# Patient Record
Sex: FEMALE | Race: BLACK OR AFRICAN AMERICAN | NOT HISPANIC OR LATINO | Employment: OTHER | URBAN - METROPOLITAN AREA
[De-identification: names, ages, dates, MRNs, and addresses within clinical notes are randomized per-mention and may not be internally consistent; named-entity substitution may affect disease eponyms.]

---

## 2018-07-16 ENCOUNTER — TELEPHONE (OUTPATIENT)
Dept: GASTROENTEROLOGY | Facility: CLINIC | Age: 58
End: 2018-07-16

## 2018-07-16 NOTE — TELEPHONE ENCOUNTER
----- Message from Marissa Franks sent at 7/16/2018 10:06 AM CDT -----  Contact: self - [phone]  Is asking to have cscope done - has not had one in 7 years - [phone]

## 2018-07-17 ENCOUNTER — TELEPHONE (OUTPATIENT)
Dept: ENDOSCOPY | Facility: HOSPITAL | Age: 58
End: 2018-07-17

## 2018-09-17 ENCOUNTER — TELEPHONE (OUTPATIENT)
Dept: ENDOSCOPY | Facility: HOSPITAL | Age: 58
End: 2018-09-17

## 2018-09-17 DIAGNOSIS — Z12.11 SPECIAL SCREENING FOR MALIGNANT NEOPLASMS, COLON: Primary | ICD-10-CM

## 2018-09-21 DIAGNOSIS — Z12.11 SPECIAL SCREENING FOR MALIGNANT NEOPLASMS, COLON: Primary | ICD-10-CM

## 2018-09-21 RX ORDER — SODIUM, POTASSIUM,MAG SULFATES 17.5-3.13G
SOLUTION, RECONSTITUTED, ORAL ORAL
Qty: 354 ML | Refills: 0 | Status: SHIPPED | OUTPATIENT
Start: 2018-09-21 | End: 2022-07-11

## 2018-09-24 ENCOUNTER — TELEPHONE (OUTPATIENT)
Dept: PHARMACY | Facility: CLINIC | Age: 58
End: 2018-09-24

## 2018-09-25 ENCOUNTER — TELEPHONE (OUTPATIENT)
Dept: ENDOSCOPY | Facility: HOSPITAL | Age: 58
End: 2018-09-25

## 2018-10-29 ENCOUNTER — ANESTHESIA EVENT (OUTPATIENT)
Dept: ENDOSCOPY | Facility: HOSPITAL | Age: 58
End: 2018-10-29
Payer: MEDICARE

## 2018-10-29 ENCOUNTER — ANESTHESIA (OUTPATIENT)
Dept: ENDOSCOPY | Facility: HOSPITAL | Age: 58
End: 2018-10-29
Payer: MEDICARE

## 2018-10-29 ENCOUNTER — HOSPITAL ENCOUNTER (OUTPATIENT)
Facility: HOSPITAL | Age: 58
Discharge: HOME | End: 2018-10-29
Attending: INTERNAL MEDICINE | Admitting: INTERNAL MEDICINE
Payer: MEDICARE

## 2018-10-29 VITALS
OXYGEN SATURATION: 97 % | RESPIRATION RATE: 12 BRPM | TEMPERATURE: 99 F | WEIGHT: 150 LBS | DIASTOLIC BLOOD PRESSURE: 69 MMHG | HEART RATE: 70 BPM | BODY MASS INDEX: 24.11 KG/M2 | HEIGHT: 66 IN | SYSTOLIC BLOOD PRESSURE: 114 MMHG

## 2018-10-29 DIAGNOSIS — Z12.11 SCREENING FOR COLON CANCER: ICD-10-CM

## 2018-10-29 DIAGNOSIS — Z12.11 COLON CANCER SCREENING: Primary | ICD-10-CM

## 2018-10-29 PROCEDURE — E9220 PRA ENDO ANESTHESIA: HCPCS | Mod: ,,, | Performed by: NURSE ANESTHETIST, CERTIFIED REGISTERED

## 2018-10-29 PROCEDURE — 25000003 PHARM REV CODE 250: Performed by: INTERNAL MEDICINE

## 2018-10-29 PROCEDURE — 37000008 HC ANESTHESIA 1ST 15 MINUTES: Performed by: INTERNAL MEDICINE

## 2018-10-29 PROCEDURE — 63600175 PHARM REV CODE 636 W HCPCS: Performed by: NURSE ANESTHETIST, CERTIFIED REGISTERED

## 2018-10-29 PROCEDURE — G0105 COLORECTAL SCRN; HI RISK IND: HCPCS | Mod: ,,, | Performed by: INTERNAL MEDICINE

## 2018-10-29 PROCEDURE — G0105 COLORECTAL SCRN; HI RISK IND: HCPCS | Performed by: INTERNAL MEDICINE

## 2018-10-29 PROCEDURE — 37000009 HC ANESTHESIA EA ADD 15 MINS: Performed by: INTERNAL MEDICINE

## 2018-10-29 RX ORDER — LIDOCAINE HCL/PF 100 MG/5ML
SYRINGE (ML) INTRAVENOUS
Status: DISCONTINUED | OUTPATIENT
Start: 2018-10-29 | End: 2018-10-29

## 2018-10-29 RX ORDER — SODIUM CHLORIDE 9 MG/ML
INJECTION, SOLUTION INTRAVENOUS CONTINUOUS
Status: DISCONTINUED | OUTPATIENT
Start: 2018-10-29 | End: 2018-10-29 | Stop reason: HOSPADM

## 2018-10-29 RX ORDER — PROPOFOL 10 MG/ML
VIAL (ML) INTRAVENOUS CONTINUOUS PRN
Status: DISCONTINUED | OUTPATIENT
Start: 2018-10-29 | End: 2018-10-29

## 2018-10-29 RX ORDER — PROPOFOL 10 MG/ML
VIAL (ML) INTRAVENOUS
Status: DISCONTINUED | OUTPATIENT
Start: 2018-10-29 | End: 2018-10-29

## 2018-10-29 RX ADMIN — PROPOFOL 80 MG: 10 INJECTION, EMULSION INTRAVENOUS at 09:10

## 2018-10-29 RX ADMIN — LIDOCAINE HYDROCHLORIDE 60 MG: 20 INJECTION, SOLUTION INTRAVENOUS at 09:10

## 2018-10-29 RX ADMIN — SODIUM CHLORIDE: 0.9 INJECTION, SOLUTION INTRAVENOUS at 09:10

## 2018-10-29 RX ADMIN — PROPOFOL 90 MCG/KG/MIN: 10 INJECTION, EMULSION INTRAVENOUS at 09:10

## 2018-10-29 NOTE — [employer] PATIENT INSTRUCTIONS
Discharge Summary/Instructions after an Endoscopic Procedure  Patient Name: Nohemi Hull  Patient MRN: [id number]  Patient YOB: 1960  Monday, October 29, 2018  Mp Alvarado MD  RESTRICTIONS:  During your procedure today, you received medications for sedation.  These   medications may affect your judgment, balance and coordination.  Therefore,   for 24 hours, you have the following restrictions:   - DO NOT drive a car, operate machinery, make legal/financial decisions,   sign important papers or drink alcohol.    ACTIVITY:  Today: no heavy lifting, straining or running due to procedural   sedation/anesthesia.  The following day: return to full activity including work.  DIET:  Eat and drink normally unless instructed otherwise.     TREATMENT FOR COMMON SIDE EFFECTS:  - Mild abdominal pain, nausea, belching, bloating or excessive gas:  rest,   eat lightly and use a heating pad.  - Sore Throat: treat with throat lozenges and/or gargle with warm salt   water.  - Because air was used during the procedure, expelling large amounts of air   from your rectum or belching is normal.  - If a bowel prep was taken, you may not have a bowel movement for 1-3 days.    This is normal.  SYMPTOMS TO WATCH FOR AND REPORT TO YOUR PHYSICIAN:  1. Abdominal pain or bloating, other than gas cramps.  2. Chest pain.  3. Back pain.  4. Signs of infection such as: chills or fever occurring within 24 hours   after the procedure.  5. Rectal bleeding, which would show as bright red, maroon, or black stools.   (A tablespoon of blood from the rectum is not serious, especially if   hemorrhoids are present.)  6. Vomiting.  7. Weakness or dizziness.  GO DIRECTLY TO THE NEAREST EMERGENCY ROOM IF YOU HAVE ANY OF THE FOLLOWING:      Difficulty breathing              Chills and/or fever over 101 F   Persistent vomiting and/or vomiting blood   Severe abdominal pain   Severe chest pain   Black, tarry stools   Bleeding- more than one  tablespoon   Any other symptom or condition that you feel may need urgent attention  Your doctor recommends these additional instructions:  If any biopsies were taken, your doctors clinic will contact you in 1 to 2   weeks with any results.  - Patient has a contact number available for emergencies.  The signs and   symptoms of potential delayed complications were discussed with the   patient.  Return to normal activities tomorrow.  Written discharge   instructions were provided to the patient.   - Discharge patient to home.   - Resume previous diet.   - Continue present medications.   - Repeat colonoscopy in 5 years for surveillance.   For questions, problems or results please call your physician - Mp Alvarado MD at Work:  [phone].  [facility], EMERGENCY ROOM PHONE NUMBER: [phone]  IF A COMPLICATION OR EMERGENCY SITUATION ARISES AND YOU ARE UNABLE TO REACH   YOUR PHYSICIAN - GO DIRECTLY TO THE EMERGENCY ROOM.  Mp Alvarado MD  10/29/2018 9:45:32 AM  This report has been verified and signed electronically.  [employer]

## 2018-10-29 NOTE — DISCHARGE INSTRUCTIONS
Colonoscopy     A camera attached to a flexible tube with a viewing lens is used to take video pictures.     Colonoscopy is a test to view the inside of your lower digestive tract (colon and rectum). Sometimes it can show the last part of the small intestine (ileum). During the test, small pieces of tissue may be removed for testing. This is called a biopsy. Small growths, such as polyps, may also be removed.   Why is colonoscopy done?  The test is done to help look for colon cancer. And it can help find the source of abdominal pain, bleeding, and changes in bowel habits. It may be needed once a year, depending on factors such as your:  · Age  · Health history  · Family health history  · Symptoms  · Results from any prior colonoscopy  Risks and possible complications  These include:  · Bleeding               · A puncture or tear in the colon   · Risks of anesthesia  · A cancer lesion not being seen  Getting ready   To prepare for the test:  · Talk with your healthcare provider about the risks of the test (see below). Also ask your healthcare provider about alternatives to the test.  · Tell your healthcare provider about any medicines you take. Also tell him or her about any health conditions you may have.  · Make sure your rectum and colon are empty for the test. Follow the diet and bowel prep instructions exactly. If you dont, the test may need to be rescheduled.  · Plan for a friend or family member to drive you home after the test.     Colonoscopy provides an inside view of the entire colon.     You may discuss the results with your doctor right away or at a future visit.  During the test   The test is usually done in the hospital on an outpatient basis. This means you go home the same day. The procedure takes about 30 minutes. During that time:  · You are given relaxing (sedating) medicine through an IV line. You may be drowsy, or fully asleep.  · The healthcare provider will first give you a physical exam to  check for anal and rectal problems.  · Then the anus is lubricated and the scope inserted.  · If you are awake, you may have a feeling similar to needing to have a bowel movement. You may also feel pressure as air is pumped into the colon. Its OK to pass gas during the procedure.  · Biopsy, polyp removal, or other treatments may be done during the test.  After the test   You may have gas right after the test. It can help to try to pass it to help prevent later bloating. Your healthcare provider may discuss the results with you right away. Or you may need to schedule a follow-up visit to talk about the results. After the test, you can go back to your normal eating and other activities. You may be tired from the sedation and need to rest for a few hours.  Date Last Reviewed: 11/1/2016 © 2000-2017 [employer]. [address] [place] [address], [place] [address]. All rights reserved. This information is not intended as a substitute for professional medical care. Always follow your healthcare professional's instructions.

## 2018-10-29 NOTE — ANESTHESIA POSTPROCEDURE EVALUATION
"Anesthesia Post Evaluation    Patient: Nohemi Hull    Procedure(s) Performed: Procedure(s) (LRB):  COLONOSCOPY (N/A)    Final Anesthesia Type: general  Patient location during evaluation: PACU  Patient participation: Yes- Able to Participate  Level of consciousness: awake and alert  Post-procedure vital signs: reviewed and stable  Pain management: adequate  Airway patency: patent  PONV status at discharge: No PONV  Anesthetic complications: no      Cardiovascular status: blood pressure returned to baseline  Respiratory status: unassisted  Hydration status: euvolemic  Follow-up not needed.        Visit Vitals  /69 (BP Location: Left arm, Patient Position: Lying)   Pulse 70   Temp 37.1 °C (98.7 °F) (Oral)   Resp 12   Ht 5' 6" (1.676 m)   Wt 68 kg (150 lb)   SpO2 97%   Breastfeeding? No   BMI 24.21 kg/m²       Pain/Hussein Score: Pain Assessment Performed: Yes (10/29/2018  9:59 AM)  Presence of Pain: denies (10/29/2018  9:59 AM)  Pain Rating Prior to Med Admin: 0 (10/29/2018  9:59 AM)  Hussein Score: 10 (10/29/2018  9:59 AM)        "

## 2018-10-29 NOTE — H&P
Short Stay Endoscopy History and Physical    PCP - Natalya Arias MD    Procedure - Colonoscopy  Sedation: GA  ASA - per anesthesia  Mallampati - per anesthesia  History of Anesthesia problems - no  Family history Anesthesia problems -  no     HPI:  This is a 58 y.o. female here for evaluation of : Screening for CRC    Reflux - no  Dysphagia - no  Abdominal pain - no  Diarrhea - no    ROS:  Constitutional: No fevers, chills, No weight loss  ENT: No allergies  CV: No chest pain  Pulm: No cough, No shortness of breath  Ophtho: No vision changes  GI: see HPI  Medical History:  has a past medical history of Depression (2000).    Surgical History:  has a past surgical history that includes Gastric bypass; Tonsillectomy; Dilation and curettage of uterus; and Partial hysterectomy.    Family History: family history includes Cancer in her maternal grandfather and paternal grandmother; Depression in her brother, maternal aunt, maternal grandfather, mother, paternal grandfather, paternal uncle, sister, and son; Diabetes in her paternal uncle; Hypertension in her maternal aunt, mother, and sister.. Otherwise no colon cancer, inflammatory bowel disease, or GI malignancies.    Social History:  reports that she has been smoking.  She has a 1.75 pack-year smoking history. She does not have any smokeless tobacco history on file. She reports that she drinks about 1.2 oz of alcohol per week. She reports that she does not use drugs.    Review of patient's allergies indicates:  No Known Allergies    Medications:   Medications Prior to Admission   Medication Sig Dispense Refill Last Dose    alprazolam (XANAX) 1 MG tablet Take 1 mg by mouth 2 (two) times daily.   Taking    doxepin (SINEQUAN) 100 MG capsule Take 100 mg by mouth every evening.       duloxetine (CYMBALTA) 30 MG capsule Take 3 capsules by mouth once daily. 3 capsule,delayed release(DR/EC) Oral Every day   Taking    fluconazole (DIFLUCAN) 150 MG Tab Take 1 tablet by  mouth as directed.   Taking    ibuprofen (ADVIL,MOTRIN) 800 MG tablet Take 1 tablet by mouth. 1 Tablet Oral Three times a day.  Take with food   Taking    metronidazole (FLAGYL) 500 MG tablet Take 1 tablet by mouth Twice daily.     Taking    sertraline (ZOLOFT) 100 MG tablet Take 1 tablet by mouth every morning.   Taking    sodium,potassium,mag sulfates (SUPREP BOWEL PREP KIT) 17.5-3.13-1.6 gram SolR Use as directed. 354 mL 0     zolpidem 12.5 MG ORAL CR TBMP (AMBIEN CR) 12.5 MG CR tablet Take 1 tablet (12.5 mg total) by mouth nightly as needed for Insomnia (brand name  is preferred). 30 tablet 2        Objective Findings:    Vital Signs: Per nursing notes.    Physical Exam:  General Appearance: Well appearing in no acute distress  Head:   Normocephalic, without obvious abnormality  Eyes:    No scleral icterus  Airway: Open  Neck: No restriction in mobility  Lungs: CTA bilaterally in anterior and posterior fields, no wheezes, no crackles.  Heart:  Regular rate and rhythm, S1, S2 normal, no murmurs heard  Abdomen: Soft, non tender, non distended      Labs:  Lab Results   Component Value Date    WBC 3.45 (L) 09/28/2006    HGB 12.1 09/28/2006    HCT 37.1 09/28/2006     09/28/2006    CHOL 143 09/28/2006    TRIG 41 09/28/2006    HDL 45.0 09/28/2006    ALT 16 09/28/2006    AST 17 09/28/2006     09/28/2006    K 3.8 09/28/2006     09/28/2006    CREATININE 0.9 09/28/2006    BUN 17 09/28/2006    CO2 23 09/28/2006    TSH 4.4 (H) 10/04/2004         I have explained the risks and benefits of endoscopy procedures to the patient including but not limited to bleeding, perforation, infection, and death.    Thank you so much for allowing me to participate in the care of Nohemi Alvarado MD

## 2018-10-29 NOTE — ANESTHESIA PREPROCEDURE EVALUATION
10/29/2018  Nohemi Hull is a 58 y.o., female.  Past Medical History:   Diagnosis Date    Depression 2000     Past Surgical History:   Procedure Laterality Date    DILATION AND CURETTAGE OF UTERUS      GASTRIC BYPASS      PARTIAL HYSTERECTOMY      TONSILLECTOMY       Patient Active Problem List   Diagnosis    Screening for colon cancer     There is no height or weight on file to calculate BMI.  2D Echo:  No results found for this or any previous visit.    Please See ROS/PMH and Active Problem List above      Anesthesia Evaluation    I have reviewed the Patient Summary Reports.    I have reviewed the Nursing Notes.   I have reviewed the Medications.     Review of Systems  Anesthesia Hx:  No problems with previous Anesthesia    Hematology/Oncology:  Hematology Normal   Oncology Normal     Cardiovascular:   Exercise tolerance: good    Pulmonary:  Pulmonary Normal    Renal/:  Renal/ Normal     Hepatic/GI:   S/p gastric bypass   Musculoskeletal:  Musculoskeletal Normal    Neurological:  Neurology Normal    Endocrine:  Endocrine Normal    Dermatological:  Skin Normal    Psych:   Psychiatric History          Physical Exam  General:  Well nourished    Airway/Jaw/Neck:  Airway Findings: Mouth Opening: Normal Tongue: Normal  General Airway Assessment: Adult  Mallampati: I  TM Distance: 4 - 6 cm  Jaw/Neck Findings:  Neck ROM: Normal ROM     Eyes/Ears/Nose:  Eyes/Ears/Nose Findings:    Dental:  Dental Findings: In tact   Chest/Lungs:  Chest/Lungs Findings: Clear to auscultation, Normal Respiratory Rate     Heart/Vascular:  Heart Findings: Rate: Normal  Rhythm: Regular Rhythm        Mental Status:  Mental Status Findings:  Cooperative, Alert and Oriented         Anesthesia Plan  Type of Anesthesia, risks & benefits discussed:  Anesthesia Type:  general  Patient's Preference: gen  Intra-op Monitoring Plan:  standard ASA monitors  Intra-op Monitoring Plan Comments:   Post Op Pain Control Plan:   Post Op Pain Control Plan Comments: Iv>po  Induction:   IV  Beta Blocker:  Patient is not currently on a Beta-Blocker (No further documentation required).       Informed Consent: Patient understands risks and agrees with Anesthesia plan.  Questions answered. Anesthesia consent signed with patient.  ASA Score: 2     Day of Surgery Review of History & Physical:    H&P update referred to the surgeon.         Ready For Surgery From Anesthesia Perspective.   GETA 2005

## 2018-10-29 NOTE — TRANSFER OF CARE
"Anesthesia Transfer of Care Note    Patient: Nohemi Hull    Procedure(s) Performed: Procedure(s) (LRB):  COLONOSCOPY (N/A)    Patient location: PACU    Anesthesia Type: general    Transport from OR: Transported from OR on 2-3 L/min O2 by NC with adequate spontaneous ventilation    Post pain: adequate analgesia    Post assessment: no apparent anesthetic complications    Post vital signs: stable    Level of consciousness: awake    Nausea/Vomiting: no nausea/vomiting    Complications: none    Transfer of care protocol was followed      Last vitals:   Visit Vitals  /75 (BP Location: Left arm, Patient Position: Lying)   Pulse (!) 57   Temp 36.2 °C (97.2 °F) (Temporal)   Resp 18   Ht 5' 6" (1.676 m)   Wt 68 kg (150 lb)   SpO2 100%   Breastfeeding? No   BMI 24.21 kg/m²     "

## 2018-11-05 ENCOUNTER — TELEPHONE (OUTPATIENT)
Dept: ENDOSCOPY | Facility: HOSPITAL | Age: 58
End: 2018-11-05

## 2019-07-29 ENCOUNTER — NURSE TRIAGE (OUTPATIENT)
Dept: ADMINISTRATIVE | Facility: CLINIC | Age: 59
End: 2019-07-29

## 2019-07-29 NOTE — TELEPHONE ENCOUNTER
"    Reason for Disposition   Nursing judgment    Protocols used: ST NO GUIDELINE OR REFERENCE [id number]      Nohemi thinks she may be lactose intolerant.  Gets soft stool after any almond milk or cow's milk.  Explained there is no lactose in almond milk, and encouraged her to discuss symptoms with her pcp.  She said she has had the soft stool (not diarrhea) with intake of these products for over 2 mos.  She will call her pcp for appt.  Message to Dr Natalya Arias.  Please contact caller directly with any additional care advice.      Lactose is digested with the help of an enzyme the body makes called lactase." People with lactose intolerance cannot digest dairy products. This is because their bodies do not make enough lactase. Undigested lactose in the gut cannot be absorbed. This can cause nausea, cramping, bloating, gas, diarrhea, and foul-smelling stools. These symptoms occur within 30 minutes to 2 hours after eating. Symptoms may be mild or severe.  Babies are born with the lactose enzyme, which allows them to absorb breast milk. However, lactose intolerance may appear in children as early as 2 to 5 years old. Even if you have been able to eat dairy products all your life, your body may lose the ability to make the lactose enzyme as you get older. Asians,  Americans, Hispanics, and American Indians are prone to get this problem earlier in life.  Home care  The following guidelines will help you care for yourself at home:  · Your body doesnt need dairy products to be healthy. So, if your symptoms are severe, it is best to stop eating dairy products that contain lactose.  · If your symptoms are milder, you can probably do well consuming smaller amounts of dairy as long as you combine it with nondairy foods. Yogurt with live cultures may be OK because it contains enzymes that digest lactose. Butter, margarine, hard and aged cheeses (cheddar, Swiss) contain less lactose and may be OK to eat. You " will have to experiment to learn how much dairy you can tolerate without getting symptoms. It may help to keep a food diary.  · There are many lactose-free dairy products including milk, ice cream, and cheeses that allow you to still enjoy dairy products. You may also take a lactase enzyme as a supplement that will help you digest dairy products.  · We all need calcium and vitamin D in our diet for healthy bones. If you reduce or eliminate dairy from your diet, you need to replace it with other food sources that contain these nutrients such as kale, broccoli, white beans, green beans, lima beans, salmon, soy beans, tofu, or fortified juices. Or, you can take daily supplements.  · Learn to read food labels. Also, watch for prepared foods that are made with products that contain lactose (such as bread, cereal, lunch meats, salad dressings, cake and cookie mix, and coffee creamers). However, many people can consume small amounts of lactose without symptoms, so an overly restrictive diet is often not needed.  · Lactase enzyme supplements can be obtained over-the-counter. They can help control symptoms if you take the enzymes when you eat lactose.  Other products besides milk and milk products may contain lactose. They may be less obvious, so check the labels carefully. These things may not bother you, but should be considered if you continue to have problems:  · Bread and other baked goods  · Waffles, pancakes, biscuits, cookies, and mixes to make them  · Processed breakfast foods such as doughnuts, frozen waffles and pancakes, toaster pastries, and sweet rolls  · Processed breakfast cereals  · Instant potatoes, soups, and breakfast drinks  · Potato chips, corn chips, and other processed snacks  · Processed meats like wallace, sausage, hot dogs, and lunch meats  · Margarine  · Salad dressings  · Liquid and powdered milk-based meal replacements  · Protein powders and bars  · Candies  · Nondairy liquid and powdered coffee  creamers  · Nondairy whipped toppings  Follow-up care  Follow up with your healthcare provider, or as advised.  When to seek medical advice  Call your healthcare provider right away if any of these occur:  · Increasing abdominal pain or swelling  · Abdominal pain that moves to the right side of the lower abdomen  · Fever of 100.4ºF (38ºC) or higher, or as directed by your healthcare provider  · Repeated vomiting or diarrhea  · Blood in the stool or black and tarry stool (depending on the amount of blood, consider calling 911 for emergency assistance)  Date Last Reviewed: 10/1/2016  © 3257-3644 [employer]. [address] [place] [address], [place] [address]. All rights reserved. This information is not intended as a substitute for professional medical care. Always follow your healthcare professional's instructions.

## 2019-07-29 NOTE — TELEPHONE ENCOUNTER
Reason for Disposition   No answer.  First attempt to contact caller.  Follow-up call scheduled within 15 minutes.   Second attempt to contact family AND no contact made. Phone number verified.    Protocols used: NO CONTACT OR DUPLICATE CONTACT CALL-A-OH    No answer x 2 attempts.  No contact, no triage.

## 2020-02-18 ENCOUNTER — HOSPITAL ENCOUNTER (EMERGENCY)
Facility: HOSPITAL | Age: 60
Discharge: HOME | End: 2020-02-18
Attending: EMERGENCY MEDICINE
Payer: MEDICARE

## 2020-02-18 VITALS
OXYGEN SATURATION: 96 % | HEART RATE: 80 BPM | WEIGHT: 160 LBS | HEIGHT: 68 IN | SYSTOLIC BLOOD PRESSURE: 133 MMHG | BODY MASS INDEX: 24.25 KG/M2 | TEMPERATURE: 100 F | RESPIRATION RATE: 20 BRPM | DIASTOLIC BLOOD PRESSURE: 82 MMHG

## 2020-02-18 DIAGNOSIS — F41.9 ANXIETY: Primary | ICD-10-CM

## 2020-02-18 LAB
ALBUMIN SERPL BCP-MCNC: 3.8 G/DL (ref 3.5–5.2)
ALP SERPL-CCNC: 72 U/L (ref 55–135)
ALT SERPL W/O P-5'-P-CCNC: 13 U/L (ref 10–44)
ANION GAP SERPL CALC-SCNC: 9 MMOL/L (ref 8–16)
AST SERPL-CCNC: 16 U/L (ref 10–40)
BASOPHILS # BLD AUTO: 0.01 K/UL (ref 0–0.2)
BASOPHILS NFR BLD: 0.3 % (ref 0–1.9)
BILIRUB SERPL-MCNC: 0.4 MG/DL (ref 0.1–1)
BUN SERPL-MCNC: 15 MG/DL (ref 6–20)
CALCIUM SERPL-MCNC: 8.8 MG/DL (ref 8.7–10.5)
CHLORIDE SERPL-SCNC: 104 MMOL/L (ref 95–110)
CO2 SERPL-SCNC: 27 MMOL/L (ref 23–29)
CREAT SERPL-MCNC: 1.1 MG/DL (ref 0.5–1.4)
DIFFERENTIAL METHOD: ABNORMAL
EOSINOPHIL # BLD AUTO: 0 K/UL (ref 0–0.5)
EOSINOPHIL NFR BLD: 0 % (ref 0–8)
ERYTHROCYTE [DISTWIDTH] IN BLOOD BY AUTOMATED COUNT: 12 % (ref 11.5–14.5)
EST. GFR  (AFRICAN AMERICAN): >60 ML/MIN/1.73 M^2
EST. GFR  (NON AFRICAN AMERICAN): 55.1 ML/MIN/1.73 M^2
GLUCOSE SERPL-MCNC: 100 MG/DL (ref 70–110)
HCT VFR BLD AUTO: 37.7 % (ref 37–48.5)
HGB BLD-MCNC: 11.7 G/DL (ref 12–16)
IMM GRANULOCYTES # BLD AUTO: 0.03 K/UL (ref 0–0.04)
IMM GRANULOCYTES NFR BLD AUTO: 0.8 % (ref 0–0.5)
INFLUENZA A, MOLECULAR: NEGATIVE
INFLUENZA B, MOLECULAR: NEGATIVE
LYMPHOCYTES # BLD AUTO: 0.6 K/UL (ref 1–4.8)
LYMPHOCYTES NFR BLD: 16.9 % (ref 18–48)
MCH RBC QN AUTO: 32.1 PG (ref 27–31)
MCHC RBC AUTO-ENTMCNC: 31 G/DL (ref 32–36)
MCV RBC AUTO: 103 FL (ref 82–98)
MONOCYTES # BLD AUTO: 0.3 K/UL (ref 0.3–1)
MONOCYTES NFR BLD: 9.4 % (ref 4–15)
NEUTROPHILS # BLD AUTO: 2.6 K/UL (ref 1.8–7.7)
NEUTROPHILS NFR BLD: 72.6 % (ref 38–73)
NRBC BLD-RTO: 0 /100 WBC
PLATELET # BLD AUTO: 174 K/UL (ref 150–350)
PMV BLD AUTO: 9.7 FL (ref 9.2–12.9)
POTASSIUM SERPL-SCNC: 3.6 MMOL/L (ref 3.5–5.1)
PROT SERPL-MCNC: 7.2 G/DL (ref 6–8.4)
RBC # BLD AUTO: 3.65 M/UL (ref 4–5.4)
SODIUM SERPL-SCNC: 140 MMOL/L (ref 136–145)
SPECIMEN SOURCE: NORMAL
TSH SERPL DL<=0.005 MIU/L-ACNC: 1.03 UIU/ML (ref 0.4–4)
WBC # BLD AUTO: 3.6 K/UL (ref 3.9–12.7)

## 2020-02-18 PROCEDURE — 63600175 PHARM REV CODE 636 W HCPCS: Performed by: STUDENT IN AN ORGANIZED HEALTH CARE EDUCATION/TRAINING PROGRAM

## 2020-02-18 PROCEDURE — 99284 EMERGENCY DEPT VISIT MOD MDM: CPT | Mod: 25

## 2020-02-18 PROCEDURE — 96374 THER/PROPH/DIAG INJ IV PUSH: CPT

## 2020-02-18 PROCEDURE — 87502 INFLUENZA DNA AMP PROBE: CPT

## 2020-02-18 PROCEDURE — 93005 ELECTROCARDIOGRAM TRACING: CPT

## 2020-02-18 PROCEDURE — 80053 COMPREHEN METABOLIC PANEL: CPT

## 2020-02-18 PROCEDURE — 25000003 PHARM REV CODE 250: Performed by: EMERGENCY MEDICINE

## 2020-02-18 PROCEDURE — 85025 COMPLETE CBC W/AUTO DIFF WBC: CPT

## 2020-02-18 PROCEDURE — 93010 EKG 12-LEAD: ICD-10-PCS | Mod: ,,, | Performed by: INTERNAL MEDICINE

## 2020-02-18 PROCEDURE — 84443 ASSAY THYROID STIM HORMONE: CPT

## 2020-02-18 PROCEDURE — 99285 EMERGENCY DEPT VISIT HI MDM: CPT | Mod: ,,, | Performed by: EMERGENCY MEDICINE

## 2020-02-18 PROCEDURE — 99285 PR EMERGENCY DEPT VISIT,LEVEL V: ICD-10-PCS | Mod: ,,, | Performed by: EMERGENCY MEDICINE

## 2020-02-18 PROCEDURE — 93010 ELECTROCARDIOGRAM REPORT: CPT | Mod: ,,, | Performed by: INTERNAL MEDICINE

## 2020-02-18 RX ORDER — MIRTAZAPINE 45 MG/1
45 TABLET, FILM COATED ORAL NIGHTLY
COMMUNITY

## 2020-02-18 RX ORDER — LORAZEPAM 1 MG/1
1 TABLET ORAL
Status: COMPLETED | OUTPATIENT
Start: 2020-02-18 | End: 2020-02-18

## 2020-02-18 RX ORDER — TRAZODONE HYDROCHLORIDE 150 MG/1
300 TABLET ORAL NIGHTLY
COMMUNITY

## 2020-02-18 RX ORDER — LORAZEPAM 2 MG/ML
0.5 INJECTION INTRAMUSCULAR
Status: COMPLETED | OUTPATIENT
Start: 2020-02-18 | End: 2020-02-18

## 2020-02-18 RX ORDER — LORAZEPAM 1 MG/1
0.5 TABLET ORAL EVERY 6 HOURS PRN
Qty: 10 TABLET | Refills: 0 | Status: SHIPPED | OUTPATIENT
Start: 2020-02-18 | End: 2022-07-11

## 2020-02-18 RX ORDER — TEMAZEPAM 15 MG/1
15 CAPSULE ORAL NIGHTLY PRN
COMMUNITY
End: 2022-07-11

## 2020-02-18 RX ADMIN — LORAZEPAM 0.5 MG: 2 INJECTION INTRAMUSCULAR; INTRAVENOUS at 03:02

## 2020-02-18 RX ADMIN — LORAZEPAM 1 MG: 1 TABLET ORAL at 03:02

## 2020-02-18 RX ADMIN — SODIUM CHLORIDE 1000 ML: 0.9 INJECTION, SOLUTION INTRAVENOUS at 03:02

## 2020-02-18 NOTE — ED PROVIDER NOTES
Encounter Date: 2/18/2020       History     Chief Complaint   Patient presents with    Panic Attack     deniex suicidal/homicidal ideation, i got anxiety really bad     Patient is a 59-year-old female with history of depression and anxiety who presents for anxiety x3 weeks.  History is taken from the patient.  Patient notes she was in her usual state of health until about 3 weeks ago, when she had gradual worsening anxiety.  She noted her brother recently was admitted for anxiety, and notes that made her anxious.  She notes no suicidal or homicidal ideation.  She notes she just feels anxious about everything.  She lives alone, but her daughter lives below her and checks on her every day.  I spoke with the daughter, who noted she has been anxious for while.  Patient notes she recently saw her psychiatrist, but has no new medications.  She has no chest pain, no shortness of breath.        Review of patient's allergies indicates:  No Known Allergies  Past Medical History:   Diagnosis Date    Depression 2000     Past Surgical History:   Procedure Laterality Date    COLONOSCOPY N/A 10/29/2018    Procedure: COLONOSCOPY;  Surgeon: Mp Alvarado MD;  Location: [facility] ([address] [place] [address]);  Service: Endoscopy;  Laterality: N/A;  Referral received from Dr. SMOOTH Arias, [facility]  Last colonoscopy 2012-recommended f/u 5 years-past due    DILATION AND CURETTAGE OF UTERUS      GASTRIC BYPASS      PARTIAL HYSTERECTOMY      TONSILLECTOMY       Family History   Problem Relation Age of Onset    Hypertension Mother     Depression Mother     Hypertension Sister     Depression Sister     Depression Brother     Depression Son     Hypertension Maternal Aunt     Depression Maternal Aunt     Diabetes Paternal Uncle     Depression Paternal Uncle     Cancer Maternal Grandfather         rectal ca    Depression Maternal Grandfather     Cancer Paternal Grandmother         breast ca    Depression Paternal Grandfather      Alcohol abuse Neg Hx      Social History     Tobacco Use    Smoking status: Current Some Day Smoker     Packs/day: 0.25     Years: 7.00     Pack years: 1.75   Substance Use Topics    Alcohol use: Yes     Alcohol/week: 2.0 standard drinks     Types: 2 Glasses of wine per week    Drug use: No     Review of Systems   Constitutional: Negative for chills and fever.   HENT: Positive for congestion. Negative for rhinorrhea and sore throat.    Eyes: Negative for visual disturbance.   Respiratory: Negative for cough and shortness of breath.    Cardiovascular: Negative for chest pain.   Gastrointestinal: Negative for abdominal pain, diarrhea, nausea and vomiting.   Genitourinary: Negative for dysuria and hematuria.   Musculoskeletal: Negative for back pain.   Skin: Negative for wound.   Neurological: Negative for dizziness, syncope and weakness.   Psychiatric/Behavioral: Negative for hallucinations, self-injury and suicidal ideas.        Anxiety       Physical Exam     Initial Vitals [02/18/20 1413]   BP Pulse Resp Temp SpO2   (!) 154/77 (!) 112 18 100.3 °F (37.9 °C) 96 %      MAP       --         Physical Exam    Nursing note and vitals reviewed.  Constitutional: She appears well-developed and well-nourished. She is not diaphoretic.   HENT:   Head: Normocephalic and atraumatic.   Mouth/Throat: Oropharynx is clear and moist. No oropharyngeal exudate.   Eyes: Pupils are equal, round, and reactive to light. Right eye exhibits no discharge. Left eye exhibits no discharge. No scleral icterus.   Neck: No tracheal deviation present.   Cardiovascular: Normal rate, regular rhythm, normal heart sounds and intact distal pulses. Exam reveals no gallop and no friction rub.    No murmur heard.  Initially tachycardic on arrival, however on reassessment, her tachycardia resolved.  Radial pulses are synchronous and symmetric bilaterally   Pulmonary/Chest: Breath sounds normal. No respiratory distress. She has no wheezes. She has no  rhonchi. She has no rales. She exhibits no tenderness.   Abdominal: Soft. Bowel sounds are normal. She exhibits no distension. There is no tenderness. There is no guarding.   Musculoskeletal: She exhibits no edema or tenderness.   Neurological: She is alert. No cranial nerve deficit.   Moves all extremities, carries on conversation   Skin: Skin is warm and dry. Capillary refill takes less than 2 seconds.   Psychiatric: She has a normal mood and affect.   Calm and cooperative, no suicidal or homicidal ideation.  No plan to hurt herself.  She notes she feels safe at home.         ED Course   Procedures  Labs Reviewed   CBC W/ AUTO DIFFERENTIAL - Abnormal; Notable for the following components:       Result Value    WBC 3.60 (*)     RBC 3.65 (*)     Hemoglobin 11.7 (*)     Mean Corpuscular Volume 103 (*)     Mean Corpuscular Hemoglobin 32.1 (*)     Mean Corpuscular Hemoglobin Conc 31.0 (*)     Immature Granulocytes 0.8 (*)     Lymph # 0.6 (*)     Lymph% 16.9 (*)     All other components within normal limits   COMPREHENSIVE METABOLIC PANEL - Abnormal; Notable for the following components:    eGFR if non  55.1 (*)     All other components within normal limits   INFLUENZA A & B BY MOLECULAR   TSH     EKG Readings: (Independently Interpreted)   Initial Reading: No STEMI.     ECG Results          EKG 12-lead (Final result)  Result time 02/19/20 14:29:55    Final result by Interface, Lab In [facility] (02/19/20 14:29:55)                 Narrative:    Test Reason : F41.9,    Vent. Rate : 096 BPM     Atrial Rate : 096 BPM     P-R Int : 126 ms          QRS Dur : 066 ms      QT Int : 348 ms       P-R-T Axes : 082 069 046 degrees     QTc Int : 439 ms    Normal sinus rhythm  Nonspecific T wave abnormality  Abnormal ECG  When compared with ECG of 16-SEP-2010 07:21,  Vent. rate has increased BY  32 BPM    Confirmed by Isabella Kirkpatrick MD ([id number]) on 2/19/2020 2:29:44 PM    Referred By: CORY   SELF           Confirmed  "By:Isabella Kirkpatrick MD                            Imaging Results    None          Medical Decision Making:   History:   I obtained history from: someone other than patient.       <> Summary of History: Family member as above  Initial Assessment:   On arrival, patient was mildly tachycardic which improved with observation.  Blood pressure stable, she is not hypoxic  Differential Diagnosis:   Anxiety, influenza, electrolyte abnormality  Clinical Tests:   Lab Tests: Ordered and Reviewed  ED Management:  On arrival, patient noted some congestion as well as anxiety.  Her influenza test was negative. She repeatedly did not endorse any suicidal or homicidal ideation, and noted she felt safe at home.  She was treated with Ativan, IV fluids, observation.  She notes significant improvement and noted she felt safe going home with follow-up with her outpatient psychiatrist.  She was given a short course of Xanax, which she noted helped her, and agreed to call her psychiatrist for short-term follow-up.  We do not believe she meant PEC criteria, as she was not a harm to herself or others.  I reviewed the case with the daughter on arrival.  I believe her congestion and low-grade temperature was related to a viral URI, as her influenza test was negative. No chest x-ray was pursued because she had no hypoxia, abnormal lung sounds on exam.  Her electrolytes did not require repletion, HGB not have any leukocytosis or bandemia.  Patient stable for discharge.              Attending Attestation:   Physician Attestation Statement for Resident:  As the supervising MD   Physician Attestation Statement: I have personally seen and examined this patient.   I agree with the above history. -:   As the supervising MD I agree with the above PE.    As the supervising MD I agree with the above treatment, course, plan, and disposition.   -: Patient has sad, guarded affect, no FNDs.  She reports change in "night medication "by her psychiatrist 1 week " ago, however she reports her anxiety has been increasing prior to this.  She states that she has no plans to harm herself, however she does feels if her anxiety is becoming unmanageable.    Pt reports improvement of her symptoms with Ativan. Will provide short course of Xanax which has worked well for her in the past, advised her to f/u promptly with her psychiatrist and return for worsening symptoms.       I have reviewed and agree with the residents interpretation of the following: lab data and EKG.  I have reviewed the following: old records at this facility.                                  Clinical Impression:       ICD-10-CM ICD-9-CM   1. Anxiety F41.9 300.00                             Kurt Pak MD  Resident  02/18/20 1509       Daniela Adan MD  02/20/20 1228

## 2020-02-18 NOTE — DISCHARGE INSTRUCTIONS
Thank you for coming to our Emergency Department today. You were evaluated for anxiety. Your heart testing was normal, your flu test was normal. Please call your psychiatrist for re-evaluation and discussion about your medications.      It is important to remember that some problems are difficult to diagnose and may not be found during your first visit. Be sure to follow up with your primary care doctor and review any labs/imaging that was performed with them. If you do not have a primary care doctor, you may contact the one listed on your discharge paperwork or you may also call the [facility] Appointment Desk at [phone] to schedule an appointment with one.     All medications may potentially have side effects and it is impossible to predict which medications may give you side effects. If you feel that you are having a negative effect of any medication you should immediately stop taking them and seek medical attention.    Return to the ER with any questions/concerns, new/concerning symptoms, worsening or failure to improve. Do not drive or make any important decisions for 24 hours if you have received any pain medications, sedatives or mood altering drugs during your ER visit.

## 2021-03-20 ENCOUNTER — IMMUNIZATION (OUTPATIENT)
Dept: PRIMARY CARE CLINIC | Facility: CLINIC | Age: 61
End: 2021-03-20
Payer: MEDICARE

## 2021-03-20 DIAGNOSIS — Z23 NEED FOR VACCINATION: Primary | ICD-10-CM

## 2021-03-20 PROCEDURE — 0011A COVID-19, MRNA, LNP-S, PF, 100 MCG/0.5 ML DOSE VACCINE: CPT | Mod: PBBFAC | Performed by: FAMILY MEDICINE

## 2021-03-27 ENCOUNTER — NURSE TRIAGE (OUTPATIENT)
Dept: ADMINISTRATIVE | Facility: CLINIC | Age: 61
End: 2021-03-27

## 2021-03-29 ENCOUNTER — TELEPHONE (OUTPATIENT)
Dept: BARIATRICS | Facility: CLINIC | Age: 61
End: 2021-03-29

## 2021-04-17 ENCOUNTER — IMMUNIZATION (OUTPATIENT)
Dept: PRIMARY CARE CLINIC | Facility: CLINIC | Age: 61
End: 2021-04-17
Payer: MEDICARE

## 2021-04-17 DIAGNOSIS — Z23 NEED FOR VACCINATION: Primary | ICD-10-CM

## 2021-04-17 PROCEDURE — 0012A COVID-19, MRNA, LNP-S, PF, 100 MCG/0.5 ML DOSE VACCINE: ICD-10-PCS | Mod: CV19,S$GLB,, | Performed by: FAMILY MEDICINE

## 2021-04-17 PROCEDURE — 91301 COVID-19, MRNA, LNP-S, PF, 100 MCG/0.5 ML DOSE VACCINE: ICD-10-PCS | Mod: S$GLB,,, | Performed by: FAMILY MEDICINE

## 2021-04-17 PROCEDURE — 0012A COVID-19, MRNA, LNP-S, PF, 100 MCG/0.5 ML DOSE VACCINE: CPT | Mod: CV19,S$GLB,, | Performed by: FAMILY MEDICINE

## 2021-04-17 PROCEDURE — 91301 COVID-19, MRNA, LNP-S, PF, 100 MCG/0.5 ML DOSE VACCINE: CPT | Mod: S$GLB,,, | Performed by: FAMILY MEDICINE

## 2021-12-03 ENCOUNTER — HOSPITAL ENCOUNTER (OUTPATIENT)
Dept: RADIOLOGY | Facility: OTHER | Age: 61
Discharge: HOME | End: 2021-12-03
Attending: STUDENT IN AN ORGANIZED HEALTH CARE EDUCATION/TRAINING PROGRAM
Payer: MEDICARE

## 2021-12-03 DIAGNOSIS — Z12.31 OTHER SCREENING MAMMOGRAM: ICD-10-CM

## 2021-12-03 PROCEDURE — 77067 MAMMO DIGITAL SCREENING BILAT WITH TOMO: ICD-10-PCS | Mod: 26,,, | Performed by: RADIOLOGY

## 2021-12-03 PROCEDURE — 77067 SCR MAMMO BI INCL CAD: CPT | Mod: TC

## 2021-12-03 PROCEDURE — 77063 MAMMO DIGITAL SCREENING BILAT WITH TOMO: ICD-10-PCS | Mod: 26,,, | Performed by: RADIOLOGY

## 2021-12-03 PROCEDURE — 77063 BREAST TOMOSYNTHESIS BI: CPT | Mod: 26,,, | Performed by: RADIOLOGY

## 2021-12-03 PROCEDURE — 77067 SCR MAMMO BI INCL CAD: CPT | Mod: 26,,, | Performed by: RADIOLOGY

## 2022-01-14 ENCOUNTER — OFFICE VISIT (OUTPATIENT)
Dept: ORTHOPEDICS | Facility: CLINIC | Age: 62
End: 2022-01-14
Payer: MEDICARE

## 2022-01-14 ENCOUNTER — HOSPITAL ENCOUNTER (OUTPATIENT)
Dept: RADIOLOGY | Facility: HOSPITAL | Age: 62
Discharge: HOME | End: 2022-01-14
Attending: ORTHOPAEDIC SURGERY
Payer: MEDICARE

## 2022-01-14 VITALS — WEIGHT: 170.94 LBS | HEIGHT: 67 IN | BODY MASS INDEX: 26.83 KG/M2

## 2022-01-14 DIAGNOSIS — M25.561 PAIN IN BOTH KNEES, UNSPECIFIED CHRONICITY: ICD-10-CM

## 2022-01-14 DIAGNOSIS — M25.562 PAIN IN BOTH KNEES, UNSPECIFIED CHRONICITY: Primary | ICD-10-CM

## 2022-01-14 DIAGNOSIS — M17.11 PRIMARY OSTEOARTHRITIS OF RIGHT KNEE: ICD-10-CM

## 2022-01-14 DIAGNOSIS — M17.12 PRIMARY OSTEOARTHRITIS OF LEFT KNEE: Primary | ICD-10-CM

## 2022-01-14 DIAGNOSIS — M25.562 PAIN IN BOTH KNEES, UNSPECIFIED CHRONICITY: ICD-10-CM

## 2022-01-14 DIAGNOSIS — M25.561 PAIN IN BOTH KNEES, UNSPECIFIED CHRONICITY: Primary | ICD-10-CM

## 2022-01-14 PROCEDURE — 1159F MED LIST DOCD IN RCRD: CPT | Mod: CPTII,S$GLB,ICN, | Performed by: ORTHOPAEDIC SURGERY

## 2022-01-14 PROCEDURE — 1160F RVW MEDS BY RX/DR IN RCRD: CPT | Mod: CPTII,S$GLB,ICN, | Performed by: ORTHOPAEDIC SURGERY

## 2022-01-14 PROCEDURE — 3008F BODY MASS INDEX DOCD: CPT | Mod: CPTII,S$GLB,ICN, | Performed by: ORTHOPAEDIC SURGERY

## 2022-01-14 PROCEDURE — 20610 DRAIN/INJ JOINT/BURSA W/O US: CPT | Mod: 50,S$GLB,ICN, | Performed by: ORTHOPAEDIC SURGERY

## 2022-01-14 PROCEDURE — 99999 PR PBB SHADOW E&M-EST. PATIENT-LVL III: CPT | Mod: PBBFAC,,, | Performed by: ORTHOPAEDIC SURGERY

## 2022-01-14 PROCEDURE — 99203 PR OFFICE/OUTPT VISIT, NEW, LEVL III, 30-44 MIN: ICD-10-PCS | Mod: 25,S$GLB,ICN, | Performed by: ORTHOPAEDIC SURGERY

## 2022-01-14 PROCEDURE — 20610 DRAIN/INJ JOINT/BURSA W/O US: CPT | Mod: 50,PBBFAC | Performed by: ORTHOPAEDIC SURGERY

## 2022-01-14 PROCEDURE — 20610 PR DRAIN/INJECT LARGE JOINT/BURSA: ICD-10-PCS | Mod: 50,S$GLB,ICN, | Performed by: ORTHOPAEDIC SURGERY

## 2022-01-14 PROCEDURE — 73562 X-RAY EXAM OF KNEE 3: CPT | Mod: 26,50,, | Performed by: RADIOLOGY

## 2022-01-14 PROCEDURE — 99999 PR PBB SHADOW E&M-EST. PATIENT-LVL III: ICD-10-PCS | Mod: PBBFAC,,, | Performed by: ORTHOPAEDIC SURGERY

## 2022-01-14 PROCEDURE — 99203 OFFICE O/P NEW LOW 30 MIN: CPT | Mod: 25,S$GLB,ICN, | Performed by: ORTHOPAEDIC SURGERY

## 2022-01-14 PROCEDURE — 73562 X-RAY EXAM OF KNEE 3: CPT | Mod: TC,50

## 2022-01-14 PROCEDURE — 3008F PR BODY MASS INDEX (BMI) DOCUMENTED: ICD-10-PCS | Mod: CPTII,S$GLB,ICN, | Performed by: ORTHOPAEDIC SURGERY

## 2022-01-14 PROCEDURE — 96372 THER/PROPH/DIAG INJ SC/IM: CPT | Mod: PBBFAC | Performed by: ORTHOPAEDIC SURGERY

## 2022-01-14 PROCEDURE — 1159F PR MEDICATION LIST DOCUMENTED IN MEDICAL RECORD: ICD-10-PCS | Mod: CPTII,S$GLB,ICN, | Performed by: ORTHOPAEDIC SURGERY

## 2022-01-14 PROCEDURE — 73562 XR KNEE ORTHO BILAT: ICD-10-PCS | Mod: 26,50,, | Performed by: RADIOLOGY

## 2022-01-14 PROCEDURE — 99213 OFFICE O/P EST LOW 20 MIN: CPT | Mod: PBBFAC | Performed by: ORTHOPAEDIC SURGERY

## 2022-01-14 PROCEDURE — 1160F PR REVIEW ALL MEDS BY PRESCRIBER/CLIN PHARMACIST DOCUMENTED: ICD-10-PCS | Mod: CPTII,S$GLB,ICN, | Performed by: ORTHOPAEDIC SURGERY

## 2022-01-14 RX ORDER — CLONAZEPAM 1 MG/1
1 TABLET ORAL 2 TIMES DAILY PRN
COMMUNITY

## 2022-01-14 RX ORDER — MULTIVIT-MIN/FA/LYCOPEN/LUTEIN .4-300-25
1 TABLET ORAL DAILY
COMMUNITY

## 2022-01-14 RX ORDER — MELOXICAM 15 MG/1
15 TABLET ORAL DAILY
Qty: 30 TABLET | Refills: 1 | Status: SHIPPED | OUTPATIENT
Start: 2022-01-14 | End: 2022-08-12 | Stop reason: ALTCHOICE

## 2022-01-14 RX ORDER — MAGNESIUM 30 MG
TABLET ORAL ONCE
COMMUNITY

## 2022-01-14 RX ORDER — MELOXICAM 15 MG/1
15 TABLET ORAL DAILY
COMMUNITY
End: 2022-01-14 | Stop reason: SDUPTHER

## 2022-01-14 RX ORDER — RISPERIDONE 1 MG/1
1 TABLET ORAL 2 TIMES DAILY
COMMUNITY

## 2022-01-14 RX ORDER — PEDI MULTIVIT NO.12 W-FLUORIDE 0.25 MG
1 TABLET,CHEWABLE ORAL DAILY
COMMUNITY

## 2022-01-14 RX ORDER — SUVOREXANT 20 MG/1
TABLET, FILM COATED ORAL
COMMUNITY

## 2022-01-14 RX ADMIN — TRIAMCINOLONE ACETONIDE 40 MG: 40 INJECTION, SUSPENSION INTRA-ARTICULAR; INTRAMUSCULAR at 09:01

## 2022-01-19 PROBLEM — M17.12 PRIMARY OSTEOARTHRITIS OF LEFT KNEE: Status: ACTIVE | Noted: 2022-01-19

## 2022-01-19 PROBLEM — M17.11 PRIMARY OSTEOARTHRITIS OF RIGHT KNEE: Status: ACTIVE | Noted: 2022-01-19

## 2022-01-19 RX ORDER — TRIAMCINOLONE ACETONIDE 40 MG/ML
40 INJECTION, SUSPENSION INTRA-ARTICULAR; INTRAMUSCULAR
Status: DISCONTINUED | OUTPATIENT
Start: 2022-01-14 | End: 2022-01-19 | Stop reason: HOSPADM

## 2022-01-19 NOTE — PROCEDURES
Large Joint Aspiration/Injection: bilateral knee    Date/Time: 1/14/2022 9:00 AM  Performed by: Luis Fernando Meehan MD  Authorized by: Luis Fernando Meehan MD     Consent Done?:  Yes (Verbal)  Indications:  Pain and arthritis  Site marked: the procedure site was marked    Timeout: prior to procedure the correct patient, procedure, and site was verified    Prep: patient was prepped and draped in usual sterile fashion      Local anesthesia used?: Yes    Local anesthetic:  Bupivacaine 0.25% without epinephrine  Anesthetic total (ml):  5      Details:  Needle Size:  25 G  Ultrasonic Guidance for needle placement?: No    Approach:  Anterolateral  Location:  Knee  Laterality:  Bilateral  Site:  Bilateral knee  Medications (Right):  40 mg triamcinolone acetonide 40 mg/mL  Medications (Left):  40 mg triamcinolone acetonide 40 mg/mL  Patient tolerance:  Patient tolerated the procedure well with no immediate complications

## 2022-01-19 NOTE — PROGRESS NOTES
Subjective:       Patient ID: Nohemi Hull is a 61 y.o. female.    Chief Complaint:   Pain of the Right Knee and Pain of the Left Knee  She comes in for bilateral knee pain for almost a year.  This began after she started exercising more.  She has in the past had 3 meniscus surgeries on her knees, 2 on the left and 1 on the right.  She typically walks several miles at a time and goes to yoga classes.  She had a gastric bypass in 2004. She has night pain interfering with sleep.  The left side hurts more than the right.  No fall, accident, injury.  No groin pain, distal numbness or tingling.    Past Medical History:   Diagnosis Date    Depression 2000     Past Surgical History:   Procedure Laterality Date    COLONOSCOPY N/A 10/29/2018    Procedure: COLONOSCOPY;  Surgeon: Mp Alvarado MD;  Location: [facility] ([address] [place] [address]);  Service: Endoscopy;  Laterality: N/A;  Referral received from Dr. SMOOTH Arias, [facility]  Last colonoscopy 2012-recommended f/u 5 years-past due    DILATION AND CURETTAGE OF UTERUS      GASTRIC BYPASS      PARTIAL HYSTERECTOMY      TONSILLECTOMY       Family History   Problem Relation Age of Onset    Hypertension Mother     Depression Mother     Hypertension Sister     Depression Sister     Depression Brother     Depression Son     Hypertension Maternal Aunt     Depression Maternal Aunt     Diabetes Paternal Uncle     Depression Paternal Uncle     Cancer Maternal Grandfather         rectal ca    Depression Maternal Grandfather     Cancer Paternal Grandmother         breast ca    Depression Paternal Grandfather     Alcohol abuse Neg Hx      Social History     Socioeconomic History    Marital status:    Tobacco Use    Smoking status: Current Some Day Smoker     Packs/day: 0.25     Years: 7.00     Pack years: 1.75    Smokeless tobacco: Never Used   Substance and Sexual Activity    Alcohol use: Yes     Alcohol/week: 2.0 standard drinks     Types: 2  Glasses of wine per week    Drug use: No    Sexual activity: Yes     Partners: Male     Birth control/protection: None       Current Outpatient Medications   Medication Sig Dispense Refill    aspirin (RANDEE ADVANCED) 500 MG tablet Take 500 mg by mouth every 6 (six) hours as needed for Pain.      calcium carbonate (CALCIUM 300 ORAL) Take by mouth.      clonazePAM (KLONOPIN) 1 MG tablet Take 1 mg by mouth 2 (two) times daily as needed for Anxiety.      DULoxetine (CYMBALTA) 30 MG capsule Take 3 capsules by mouth once daily. 3 capsule,delayed release(DR/EC) Oral Every day      magnesium 30 mg Tab Take by mouth once.      mirtazapine (REMERON) 45 MG tablet Take 45 mg by mouth every evening.      multivit-min-FA-lycopen-lutein (CENTRUM SILVER) 0.4-300-250 mg-mcg-mcg Tab Take 1 tablet by mouth once daily.      omega-3 fatty acids fish oil (OMEGA-3 2100) 1,050-1,200 mg Cap capsule Take 1 capsule by mouth once daily.      risperiDONE (RISPERDAL) 1 MG tablet Take 1 mg by mouth 2 (two) times daily.      sertraline (ZOLOFT) 100 MG tablet Take 1 tablet by mouth every morning.      suvorexant (BELSOMRA) 20 mg Tab Take by mouth.      traZODone (DESYREL) 150 MG tablet Take 450 mg by mouth every evening.      alprazolam (XANAX) 1 MG tablet Take 1 mg by mouth 2 (two) times daily.      doxepin (SINEQUAN) 100 MG capsule Take 100 mg by mouth every evening.      fluconazole (DIFLUCAN) 150 MG Tab Take 1 tablet by mouth as directed.      LORazepam (ATIVAN) 1 MG tablet Take 0.5 tablets (0.5 mg total) by mouth every 6 (six) hours as needed for Anxiety. 10 tablet 0    meloxicam (MOBIC) 15 MG tablet Take 1 tablet (15 mg total) by mouth once daily. 30 tablet 1    metroNIDAZOLE (FLAGYL) 500 MG tablet Take 1 tablet by mouth Twice daily.        sodium,potassium,mag sulfates (SUPREP BOWEL PREP KIT) 17.5-3.13-1.6 gram SolR Use as directed. (Patient not taking: Reported on 1/14/2022) 354 mL 0    temazepam (RESTORIL) 15 mg Cap  "Take 15 mg by mouth nightly as needed.      zolpidem 12.5 MG ORAL CR TBMP (AMBIEN CR) 12.5 MG CR tablet Take 1 tablet (12.5 mg total) by mouth nightly as needed for Insomnia (brand name  is preferred). 30 tablet 2     No current facility-administered medications for this visit.     Review of patient's allergies indicates:  No Known Allergies      Objective:      Vitals:    01/14/22 0855   Weight: 77.6 kg (170 lb 15.5 oz)   Height: 5' 7" (1.702 m)     Physical Exam  Bilateral knees:  There is is a mild varus deformity, which is passively correctable.  Active range of motion is 5-120 degrees.  Anterior crepitus with active extension.  Patellar mobility is limited.  Boggy synovitis without effusion.  Medial joint line tenderness predominates.  No instability to varus/valgus/Lachman's stressing.  No pain in the groin with flexion and internal rotation of the hip which is not limited.  Skin intact.  Distal neurovascular intact.  Flip test negative.    Imaging Review:   Weightbearing x-rays show advanced varus gonarthrosis bilaterally, but without bone-on-bone changes.  Assessment:   GERARDO, knees  Plan:       After discussion of treatment options we opted to try cortisone injection today.  She cannot take NSAIDs because of her gastric bypass, and she has not gotten adequate relief from Tylenol.  She understands the injections can be repeated as often as every 3 months if they are effective, and that there are other nonsurgical treatment options which we discussed in detail today.  The patient's pathophysiology was explained in detail with reference to x-rays, models, other visual aids as appropriate.  Treatment options were discussed in detail.  Questions were invited and answered to the patient's satisfaction.    "

## 2022-01-25 ENCOUNTER — TELEPHONE (OUTPATIENT)
Dept: OTOLARYNGOLOGY | Facility: CLINIC | Age: 62
End: 2022-01-25

## 2022-01-25 ENCOUNTER — OFFICE VISIT (OUTPATIENT)
Dept: OTOLARYNGOLOGY | Facility: CLINIC | Age: 62
End: 2022-01-25
Payer: MEDICARE

## 2022-01-25 VITALS
HEIGHT: 67 IN | DIASTOLIC BLOOD PRESSURE: 85 MMHG | BODY MASS INDEX: 26.68 KG/M2 | WEIGHT: 170 LBS | HEART RATE: 103 BPM | TEMPERATURE: 97 F | SYSTOLIC BLOOD PRESSURE: 132 MMHG

## 2022-01-25 DIAGNOSIS — S01.311S: Primary | ICD-10-CM

## 2022-01-25 PROCEDURE — 3079F DIAST BP 80-89 MM HG: CPT | Mod: CPTII,S$GLB,, | Performed by: OTOLARYNGOLOGY

## 2022-01-25 PROCEDURE — 3008F PR BODY MASS INDEX (BMI) DOCUMENTED: ICD-10-PCS | Mod: CPTII,S$GLB,, | Performed by: OTOLARYNGOLOGY

## 2022-01-25 PROCEDURE — 3079F PR MOST RECENT DIASTOLIC BLOOD PRESSURE 80-89 MM HG: ICD-10-PCS | Mod: CPTII,S$GLB,, | Performed by: OTOLARYNGOLOGY

## 2022-01-25 PROCEDURE — 3075F SYST BP GE 130 - 139MM HG: CPT | Mod: CPTII,S$GLB,, | Performed by: OTOLARYNGOLOGY

## 2022-01-25 PROCEDURE — 1159F MED LIST DOCD IN RCRD: CPT | Mod: CPTII,S$GLB,, | Performed by: OTOLARYNGOLOGY

## 2022-01-25 PROCEDURE — 1159F PR MEDICATION LIST DOCUMENTED IN MEDICAL RECORD: ICD-10-PCS | Mod: CPTII,S$GLB,, | Performed by: OTOLARYNGOLOGY

## 2022-01-25 PROCEDURE — 1160F RVW MEDS BY RX/DR IN RCRD: CPT | Mod: CPTII,S$GLB,, | Performed by: OTOLARYNGOLOGY

## 2022-01-25 PROCEDURE — 99499 UNLISTED E&M SERVICE: CPT | Mod: S$GLB,,, | Performed by: OTOLARYNGOLOGY

## 2022-01-25 PROCEDURE — 3008F BODY MASS INDEX DOCD: CPT | Mod: CPTII,S$GLB,, | Performed by: OTOLARYNGOLOGY

## 2022-01-25 PROCEDURE — 99499 NO LOS: ICD-10-PCS | Mod: S$GLB,,, | Performed by: OTOLARYNGOLOGY

## 2022-01-25 PROCEDURE — 3075F PR MOST RECENT SYSTOLIC BLOOD PRESS GE 130-139MM HG: ICD-10-PCS | Mod: CPTII,S$GLB,, | Performed by: OTOLARYNGOLOGY

## 2022-01-25 PROCEDURE — 1160F PR REVIEW ALL MEDS BY PRESCRIBER/CLIN PHARMACIST DOCUMENTED: ICD-10-PCS | Mod: CPTII,S$GLB,, | Performed by: OTOLARYNGOLOGY

## 2022-01-25 NOTE — TELEPHONE ENCOUNTER
Called to inform the patient that I will call tomorrow to let her know a date for her procedure.  She is available either 2/14 or 2/21.

## 2022-01-25 NOTE — PROGRESS NOTES
a 61-year-old black female who presents with complaints of right torn earlobe.  This is a complete through and through tear from her previous ear piercing through the lobe.  I have discussed with her the fact that we need to excise intact skin then sutured this together then removed skin sutures at approximately 6-7 days.  She then must allow this to heal for 6-8 weeks before we can repierce.  She also understands that this is not an insurance comparable procedure and it is out of pocket.  She wishes to set this up which we will do at her convenience in the outpatient surgery area under local anesthesia.

## 2022-02-21 ENCOUNTER — PROCEDURE VISIT (OUTPATIENT)
Dept: OTOLARYNGOLOGY | Facility: CLINIC | Age: 62
End: 2022-02-21
Payer: MEDICAID

## 2022-02-21 DIAGNOSIS — Z91.89 AT HIGH RISK FOR PAIN FROM PROCEDURE: Primary | ICD-10-CM

## 2022-02-21 PROCEDURE — 12051 INTMD RPR FACE/MM 2.5 CM/<: CPT | Mod: S$GLB,,, | Performed by: OTOLARYNGOLOGY

## 2022-02-21 PROCEDURE — 12051 PR INTERMED WOUND REPAIR FACE/EAR/EYELID/NOSE/LIP/MUC MEBR, 2.5CM OR LESS: ICD-10-PCS | Mod: S$GLB,,, | Performed by: OTOLARYNGOLOGY

## 2022-02-21 RX ORDER — LIDOCAINE HYDROCHLORIDE 10 MG/ML
1 INJECTION INFILTRATION; PERINEURAL
Status: DISCONTINUED | OUTPATIENT
Start: 2022-02-21 | End: 2022-07-11

## 2022-02-21 NOTE — PROCEDURES
presents for cosmetic repair of torn right earlobe.  The area was injected using 1% lidocaine with epi and sterilly preped and draped.  The intact skin as excised and hemosatsis was obtained with thermal cautery.  Closure was performed with 5-0 vicryl in the deep layer and 6-0 nylon for the skin in interrupted fashion on the front of lobe and 5-0 chromic on the posterior aspect.  Bacitracin ointment was then applied.  She tolerated the procedure well and post op instructions given.  She will return to clinic for suture removal March 2nd.

## 2022-03-02 ENCOUNTER — OFFICE VISIT (OUTPATIENT)
Dept: OTOLARYNGOLOGY | Facility: CLINIC | Age: 62
End: 2022-03-02
Payer: MEDICARE

## 2022-03-02 VITALS — TEMPERATURE: 98 F | HEART RATE: 84 BPM | SYSTOLIC BLOOD PRESSURE: 117 MMHG | DIASTOLIC BLOOD PRESSURE: 81 MMHG

## 2022-03-02 DIAGNOSIS — S01.311S: Primary | ICD-10-CM

## 2022-03-02 DIAGNOSIS — Z98.890 POST-OPERATIVE STATE: ICD-10-CM

## 2022-03-02 PROCEDURE — 3074F SYST BP LT 130 MM HG: CPT | Mod: CPTII,S$GLB,, | Performed by: OTOLARYNGOLOGY

## 2022-03-02 PROCEDURE — 1159F PR MEDICATION LIST DOCUMENTED IN MEDICAL RECORD: ICD-10-PCS | Mod: CPTII,S$GLB,, | Performed by: OTOLARYNGOLOGY

## 2022-03-02 PROCEDURE — 1160F RVW MEDS BY RX/DR IN RCRD: CPT | Mod: CPTII,S$GLB,, | Performed by: OTOLARYNGOLOGY

## 2022-03-02 PROCEDURE — 99024 PR POST-OP FOLLOW-UP VISIT: ICD-10-PCS | Mod: S$GLB,,, | Performed by: OTOLARYNGOLOGY

## 2022-03-02 PROCEDURE — 3074F PR MOST RECENT SYSTOLIC BLOOD PRESSURE < 130 MM HG: ICD-10-PCS | Mod: CPTII,S$GLB,, | Performed by: OTOLARYNGOLOGY

## 2022-03-02 PROCEDURE — 3079F DIAST BP 80-89 MM HG: CPT | Mod: CPTII,S$GLB,, | Performed by: OTOLARYNGOLOGY

## 2022-03-02 PROCEDURE — 1159F MED LIST DOCD IN RCRD: CPT | Mod: CPTII,S$GLB,, | Performed by: OTOLARYNGOLOGY

## 2022-03-02 PROCEDURE — 3079F PR MOST RECENT DIASTOLIC BLOOD PRESSURE 80-89 MM HG: ICD-10-PCS | Mod: CPTII,S$GLB,, | Performed by: OTOLARYNGOLOGY

## 2022-03-02 PROCEDURE — 1160F PR REVIEW ALL MEDS BY PRESCRIBER/CLIN PHARMACIST DOCUMENTED: ICD-10-PCS | Mod: CPTII,S$GLB,, | Performed by: OTOLARYNGOLOGY

## 2022-03-02 PROCEDURE — 99024 POSTOP FOLLOW-UP VISIT: CPT | Mod: S$GLB,,, | Performed by: OTOLARYNGOLOGY

## 2022-03-02 NOTE — PROGRESS NOTES
One week status post repair of torn right earlobe.  She is healing well and her sutures removed without difficulty.  She will wait 3 months before re piercing.  Plan:  Return to clinic p.r.n.

## 2022-04-26 ENCOUNTER — TELEPHONE (OUTPATIENT)
Dept: BARIATRICS | Facility: CLINIC | Age: 62
End: 2022-04-26
Payer: MEDICARE

## 2022-04-26 NOTE — TELEPHONE ENCOUNTER
Contacted the patient. Patient was scheduled an appointment with Dr. Quijano per her request. Patient stated she is in need of a follow up visit.   Patient was scheduled and date and time was verbally understood by patient.

## 2022-04-26 NOTE — TELEPHONE ENCOUNTER
----- Message from Joel Fosneca sent at 4/26/2022 12:51 PM CDT -----  Type:  Sooner Apoointment Request    Caller is requesting a sooner appointment.  Caller declined first available appointment listed below.  Caller will not accept being placed on the waitlist and is requesting a message be sent to doctor.  Name of Caller:Nohemi Hull     When is the first available appointment?no available appointment     Symptoms:patient states that she has   about ten pounds and would like to be tested if possible     Would the patient rather a call back or a response via [facility]? Call back     Best Call Back Number:[phone] (mobile)     Additional Information:

## 2022-05-10 ENCOUNTER — LAB VISIT (OUTPATIENT)
Dept: LAB | Facility: HOSPITAL | Age: 62
End: 2022-05-10
Payer: MEDICARE

## 2022-05-10 ENCOUNTER — OFFICE VISIT (OUTPATIENT)
Dept: BARIATRICS | Facility: CLINIC | Age: 62
End: 2022-05-10
Payer: MEDICARE

## 2022-05-10 VITALS
DIASTOLIC BLOOD PRESSURE: 74 MMHG | WEIGHT: 165.81 LBS | BODY MASS INDEX: 26.02 KG/M2 | OXYGEN SATURATION: 97 % | HEART RATE: 72 BPM | HEIGHT: 67 IN | SYSTOLIC BLOOD PRESSURE: 106 MMHG

## 2022-05-10 DIAGNOSIS — R79.9 ABNORMAL FINDING OF BLOOD CHEMISTRY, UNSPECIFIED: ICD-10-CM

## 2022-05-10 DIAGNOSIS — Z79.899 ENCOUNTER FOR LONG-TERM (CURRENT) USE OF HIGH-RISK MEDICATION: ICD-10-CM

## 2022-05-10 DIAGNOSIS — Z98.84 H/O GASTRIC BYPASS: Primary | ICD-10-CM

## 2022-05-10 DIAGNOSIS — Z98.84 H/O GASTRIC BYPASS: ICD-10-CM

## 2022-05-10 LAB
25(OH)D3+25(OH)D2 SERPL-MCNC: 53 NG/ML (ref 30–96)
ALBUMIN SERPL BCP-MCNC: 4.1 G/DL (ref 3.5–5.2)
ALP SERPL-CCNC: 77 U/L (ref 55–135)
ALT SERPL W/O P-5'-P-CCNC: 17 U/L (ref 10–44)
ANION GAP SERPL CALC-SCNC: 10 MMOL/L (ref 8–16)
AST SERPL-CCNC: 23 U/L (ref 10–40)
BASOPHILS # BLD AUTO: 0.02 K/UL (ref 0–0.2)
BASOPHILS NFR BLD: 0.5 % (ref 0–1.9)
BILIRUB SERPL-MCNC: 0.4 MG/DL (ref 0.1–1)
BUN SERPL-MCNC: 24 MG/DL (ref 8–23)
CALCIUM SERPL-MCNC: 9.6 MG/DL (ref 8.7–10.5)
CHLORIDE SERPL-SCNC: 101 MMOL/L (ref 95–110)
CHOLEST SERPL-MCNC: 164 MG/DL (ref 120–199)
CHOLEST/HDLC SERPL: 2.2 {RATIO} (ref 2–5)
CO2 SERPL-SCNC: 26 MMOL/L (ref 23–29)
CREAT SERPL-MCNC: 1 MG/DL (ref 0.5–1.4)
DIFFERENTIAL METHOD: ABNORMAL
EOSINOPHIL # BLD AUTO: 0.1 K/UL (ref 0–0.5)
EOSINOPHIL NFR BLD: 1.8 % (ref 0–8)
ERYTHROCYTE [DISTWIDTH] IN BLOOD BY AUTOMATED COUNT: 13 % (ref 11.5–14.5)
EST. GFR  (AFRICAN AMERICAN): >60 ML/MIN/1.73 M^2
EST. GFR  (NON AFRICAN AMERICAN): >60 ML/MIN/1.73 M^2
GLUCOSE SERPL-MCNC: 94 MG/DL (ref 70–110)
HCT VFR BLD AUTO: 37.1 % (ref 37–48.5)
HDLC SERPL-MCNC: 74 MG/DL (ref 40–75)
HDLC SERPL: 45.1 % (ref 20–50)
HGB BLD-MCNC: 12 G/DL (ref 12–16)
IMM GRANULOCYTES # BLD AUTO: 0.01 K/UL (ref 0–0.04)
IMM GRANULOCYTES NFR BLD AUTO: 0.3 % (ref 0–0.5)
IRON SERPL-MCNC: 76 UG/DL (ref 30–160)
LDLC SERPL CALC-MCNC: 79.8 MG/DL (ref 63–159)
LYMPHOCYTES # BLD AUTO: 1.3 K/UL (ref 1–4.8)
LYMPHOCYTES NFR BLD: 34 % (ref 18–48)
MCH RBC QN AUTO: 32.8 PG (ref 27–31)
MCHC RBC AUTO-ENTMCNC: 32.3 G/DL (ref 32–36)
MCV RBC AUTO: 101 FL (ref 82–98)
MONOCYTES # BLD AUTO: 0.2 K/UL (ref 0.3–1)
MONOCYTES NFR BLD: 6 % (ref 4–15)
NEUTROPHILS # BLD AUTO: 2.2 K/UL (ref 1.8–7.7)
NEUTROPHILS NFR BLD: 57.4 % (ref 38–73)
NONHDLC SERPL-MCNC: 90 MG/DL
NRBC BLD-RTO: 0 /100 WBC
PLATELET # BLD AUTO: 211 K/UL (ref 150–450)
PMV BLD AUTO: 9.7 FL (ref 9.2–12.9)
POTASSIUM SERPL-SCNC: 4.3 MMOL/L (ref 3.5–5.1)
PROT SERPL-MCNC: 7.2 G/DL (ref 6–8.4)
RBC # BLD AUTO: 3.66 M/UL (ref 4–5.4)
SATURATED IRON: 20 % (ref 20–50)
SODIUM SERPL-SCNC: 137 MMOL/L (ref 136–145)
TOTAL IRON BINDING CAPACITY: 385 UG/DL (ref 250–450)
TRANSFERRIN SERPL-MCNC: 260 MG/DL (ref 200–375)
TRIGL SERPL-MCNC: 51 MG/DL (ref 30–150)
VIT B12 SERPL-MCNC: 711 PG/ML (ref 210–950)
WBC # BLD AUTO: 3.85 K/UL (ref 3.9–12.7)

## 2022-05-10 PROCEDURE — 82607 VITAMIN B-12: CPT | Performed by: SURGERY

## 2022-05-10 PROCEDURE — 80061 LIPID PANEL: CPT | Performed by: SURGERY

## 2022-05-10 PROCEDURE — 1159F PR MEDICATION LIST DOCUMENTED IN MEDICAL RECORD: ICD-10-PCS | Mod: CPTII,S$GLB,, | Performed by: SURGERY

## 2022-05-10 PROCEDURE — 3008F BODY MASS INDEX DOCD: CPT | Mod: CPTII,S$GLB,, | Performed by: SURGERY

## 2022-05-10 PROCEDURE — 80053 COMPREHEN METABOLIC PANEL: CPT | Performed by: SURGERY

## 2022-05-10 PROCEDURE — 3074F SYST BP LT 130 MM HG: CPT | Mod: CPTII,S$GLB,, | Performed by: SURGERY

## 2022-05-10 PROCEDURE — 85025 COMPLETE CBC W/AUTO DIFF WBC: CPT | Performed by: SURGERY

## 2022-05-10 PROCEDURE — 3078F PR MOST RECENT DIASTOLIC BLOOD PRESSURE < 80 MM HG: ICD-10-PCS | Mod: CPTII,S$GLB,, | Performed by: SURGERY

## 2022-05-10 PROCEDURE — 3074F PR MOST RECENT SYSTOLIC BLOOD PRESSURE < 130 MM HG: ICD-10-PCS | Mod: CPTII,S$GLB,, | Performed by: SURGERY

## 2022-05-10 PROCEDURE — 99999 PR PBB SHADOW E&M-EST. PATIENT-LVL IV: CPT | Mod: PBBFAC,,, | Performed by: SURGERY

## 2022-05-10 PROCEDURE — 84425 ASSAY OF VITAMIN B-1: CPT | Performed by: SURGERY

## 2022-05-10 PROCEDURE — 84466 ASSAY OF TRANSFERRIN: CPT | Performed by: SURGERY

## 2022-05-10 PROCEDURE — 1160F RVW MEDS BY RX/DR IN RCRD: CPT | Mod: CPTII,S$GLB,, | Performed by: SURGERY

## 2022-05-10 PROCEDURE — 99204 OFFICE O/P NEW MOD 45 MIN: CPT | Mod: S$GLB,,, | Performed by: SURGERY

## 2022-05-10 PROCEDURE — 1160F PR REVIEW ALL MEDS BY PRESCRIBER/CLIN PHARMACIST DOCUMENTED: ICD-10-PCS | Mod: CPTII,S$GLB,, | Performed by: SURGERY

## 2022-05-10 PROCEDURE — 82306 VITAMIN D 25 HYDROXY: CPT | Performed by: SURGERY

## 2022-05-10 PROCEDURE — 99999 PR PBB SHADOW E&M-EST. PATIENT-LVL IV: ICD-10-PCS | Mod: PBBFAC,,, | Performed by: SURGERY

## 2022-05-10 PROCEDURE — 3078F DIAST BP <80 MM HG: CPT | Mod: CPTII,S$GLB,, | Performed by: SURGERY

## 2022-05-10 PROCEDURE — 1159F MED LIST DOCD IN RCRD: CPT | Mod: CPTII,S$GLB,, | Performed by: SURGERY

## 2022-05-10 PROCEDURE — 3008F PR BODY MASS INDEX (BMI) DOCUMENTED: ICD-10-PCS | Mod: CPTII,S$GLB,, | Performed by: SURGERY

## 2022-05-10 PROCEDURE — 99204 PR OFFICE/OUTPT VISIT, NEW, LEVL IV, 45-59 MIN: ICD-10-PCS | Mod: S$GLB,,, | Performed by: SURGERY

## 2022-05-10 RX ORDER — FERROUS SULFATE TAB 325 MG (65 MG ELEMENTAL FE) 325 (65 FE) MG
TAB ORAL DAILY
COMMUNITY
Start: 2022-02-22

## 2022-05-10 NOTE — LETTER
[facility] 2nd Fl  [address]  [facility] [address]  Phone: [phone]  Fax: [phone]                 May 10, 2022        Patient: Nohemi Hull   MR Number: [id number]   YOB: 1960   Date of Visit: 5/10/2022       Natalya Arias MD  [facility] [address] [address]      Dear Dr. Arias:    Thank you for referring Nohemi Hull to me for evaluation. Attached you will find relevant portions of my assessment and plan of care.    If you have questions, please do not hesitate to call me. I look forward to following Nohemi Hull along with you.      Sincerely,          Ruddy Quijano MD            Enclosure

## 2022-05-10 NOTE — PROGRESS NOTES
History & Physical    SUBJECTIVE:     History of Present Illness:  Patient is a 62 y.o. female presents with s/p bypass by me 2004 (bmi 44, essential htn (states she didn't have it that it was stress), hld (resolved), gerd (states she didn't have it but it is in our record), osteoarthritis, hx dvt (no further trouble) and depression).  She has not been taking her vitamins and has some dietary concerns.  She wants to have her blood drawn to check levels.  She is remaining on her diet and remains very low carb and sugars.  She goes to the gym twice a week and walks 3 times a week.     shows 3 narcotics rx this year.  She takes them for dental problems.  She won't need more.    Chief Complaint   Patient presents with    Follow-up       Review of patient's allergies indicates:  No Known Allergies    Current Outpatient Medications   Medication Sig Dispense Refill    aspirin 500 MG tablet Take 500 mg by mouth every 6 (six) hours as needed for Pain.      calcium carbonate (CALCIUM 300 ORAL) Take by mouth.      clonazePAM (KLONOPIN) 1 MG tablet Take 1 mg by mouth 2 (two) times daily as needed for Anxiety.      doxepin (SINEQUAN) 100 MG capsule Take 100 mg by mouth every evening.      DULoxetine (CYMBALTA) 30 MG capsule Take 3 capsules by mouth once daily. 3 capsule,delayed release(DR/EC) Oral Every day      magnesium 30 mg Tab Take by mouth once.      meloxicam (MOBIC) 15 MG tablet Take 1 tablet (15 mg total) by mouth once daily. 30 tablet 1    multivit-min-FA-lycopen-lutein (CENTRUM SILVER) 0.4-300-250 mg-mcg-mcg Tab Take 1 tablet by mouth once daily.      omega-3 fatty acids fish oil (OMEGA-3 2100) 1,050-1,200 mg Cap capsule Take 1 capsule by mouth once daily.      risperiDONE (RISPERDAL) 1 MG tablet Take 1 mg by mouth 2 (two) times daily.      alprazolam (XANAX) 1 MG tablet Take 1 mg by mouth 2 (two) times daily.      FEROSUL 325 mg (65 mg iron) Tab tablet Take by mouth once daily.      fluconazole  (DIFLUCAN) 150 MG Tab Take 1 tablet by mouth as directed.      LORazepam (ATIVAN) 1 MG tablet Take 0.5 tablets (0.5 mg total) by mouth every 6 (six) hours as needed for Anxiety. (Patient not taking: Reported on 5/10/2022) 10 tablet 0    metroNIDAZOLE (FLAGYL) 500 MG tablet Take 1 tablet by mouth Twice daily.        mirtazapine (REMERON) 45 MG tablet Take 45 mg by mouth every evening.      sertraline (ZOLOFT) 100 MG tablet Take 1 tablet by mouth every morning.      sodium,potassium,mag sulfates (SUPREP BOWEL PREP KIT) 17.5-3.13-1.6 gram SolR Use as directed. (Patient not taking: Reported on 5/10/2022) 354 mL 0    suvorexant (BELSOMRA) 20 mg Tab Take by mouth.      temazepam (RESTORIL) 15 mg Cap Take 15 mg by mouth nightly as needed.      traZODone (DESYREL) 150 MG tablet Take 450 mg by mouth every evening.      zolpidem 12.5 MG ORAL CR TBMP (AMBIEN CR) 12.5 MG CR tablet Take 1 tablet (12.5 mg total) by mouth nightly as needed for Insomnia (brand name  is preferred). (Patient not taking: Reported on 5/10/2022) 30 tablet 2     Current Facility-Administered Medications   Medication Dose Route Frequency Provider Last Rate Last Admin    LIDOcaine HCL 10 mg/ml (1%) injection 1 mL  1 mL Other 1 time in Clinic/HOD Inez CHELLE Manley III, MD           Past Medical History:   Diagnosis Date    Depression 2000     Past Surgical History:   Procedure Laterality Date    COLONOSCOPY N/A 10/29/2018    Procedure: COLONOSCOPY;  Surgeon: Mp Alvarado MD;  Location: [address]);  Service: Endoscopy;  Laterality: N/A;  Referral received from Dr. SMOOTH Arias, [facility]  Last colonoscopy 2012-recommended f/u 5 years-past due    DILATION AND CURETTAGE OF UTERUS      GASTRIC BYPASS      PARTIAL HYSTERECTOMY      TONSILLECTOMY       Family History   Problem Relation Age of Onset    Hypertension Mother     Depression Mother     Hypertension Sister     Depression Sister     Depression Brother      "Depression Son     Hypertension Maternal Aunt     Depression Maternal Aunt     Diabetes Paternal Uncle     Depression Paternal Uncle     Cancer Maternal Grandfather         rectal ca    Depression Maternal Grandfather     Cancer Paternal Grandmother         breast ca    Depression Paternal Grandfather     Alcohol abuse Neg Hx      Social History     Tobacco Use    Smoking status: Current Some Day Smoker     Packs/day: 0.25     Years: 7.00     Pack years: 1.75    Smokeless tobacco: Never Used   Substance Use Topics    Alcohol use: Yes     Alcohol/week: 2.0 standard drinks     Types: 2 Glasses of wine per week    Drug use: No        Review of Systems:  Review of Systems   Constitutional: Negative for fever.   Respiratory: Negative for cough, chest tightness and shortness of breath.    Cardiovascular: Negative for chest pain.   Gastrointestinal: Negative for abdominal pain, constipation, diarrhea, nausea and vomiting.        Denies heartburn   Genitourinary: Negative for difficulty urinating.   Skin: Negative for rash and wound.   Hematological:        Has easy bruising but no easy bleeding       OBJECTIVE:     Vital Signs (Most Recent)  Pulse: 72 (05/10/22 1335)  BP: 106/74 (05/10/22 1335)  SpO2: 97 % (05/10/22 1335)  5' 7" (1.702 m)  75.2 kg (165 lb 12.6 oz)     Physical Exam:  Physical Exam    Laboratory  CBC: Reviewed  CMP: Reviewed  Basically ok 6/2021    Diagnostic Results:  Cscope reviewed, 2018, diverticula  Ekg 2020 reviewed, nssttw changes    ASSESSMENT/PLAN:     S/p bypass and doing very well.      PLAN:       Obtain bariatric labs.  Follow up one year.               "

## 2022-05-16 LAB — VIT B1 BLD-MCNC: 80 UG/L (ref 38–122)

## 2022-05-19 ENCOUNTER — TELEPHONE (OUTPATIENT)
Dept: SURGERY | Facility: CLINIC | Age: 62
End: 2022-05-19
Payer: MEDICARE

## 2022-05-19 NOTE — TELEPHONE ENCOUNTER
----- Message from Radha Boone sent at 5/19/2022  2:10 PM CDT -----  Regarding: Pt Inquiry  Pt calling to see if labs can sent to PCP Dr Zambrano      [phone]

## 2022-05-23 ENCOUNTER — TELEPHONE (OUTPATIENT)
Dept: PLASTIC SURGERY | Facility: CLINIC | Age: 62
End: 2022-05-23
Payer: MEDICARE

## 2022-05-23 ENCOUNTER — TELEPHONE (OUTPATIENT)
Dept: ORTHOPEDICS | Facility: CLINIC | Age: 62
End: 2022-05-23
Payer: MEDICARE

## 2022-05-23 NOTE — TELEPHONE ENCOUNTER
I called and spoke to the patient regarding her appointment. I informed the patient that Dr. Meehan will not be in this week. The patient did not want to see an HAILEE and would prefer to wait for Dr. Meehan's next appointment. The patient is scheduled for csi injection on 6/9/22. The patient is understanding and has no further questions.     ----- Message from Maria Antonia Magallon sent at 5/23/2022 12:30 PM CDT -----  Contact: Patient  Type: Patient Call Back         Who called: Patient         What is the request in detail: calling to see if she is ready for her 2nd injection for knee; currently sched for 5/25; wants to know if she can get next injection; please advise         Can the clinic reply by [facility]? call         Would the patient rather a call back or a response via [facility]? Call          Best call back number: [phone]         Additional Information:            Thank You

## 2022-05-27 ENCOUNTER — CLINICAL SUPPORT (OUTPATIENT)
Dept: BARIATRICS | Facility: CLINIC | Age: 62
End: 2022-05-27
Payer: MEDICARE

## 2022-05-27 VITALS — BODY MASS INDEX: 26.4 KG/M2 | WEIGHT: 168.19 LBS | HEIGHT: 67 IN

## 2022-05-27 DIAGNOSIS — Z98.84 S/P BARIATRIC SURGERY: ICD-10-CM

## 2022-05-27 DIAGNOSIS — Z71.3 DIETARY COUNSELING: ICD-10-CM

## 2022-05-27 PROCEDURE — 97803 MED NUTRITION INDIV SUBSEQ: CPT | Mod: PBBFAC | Performed by: DIETITIAN, REGISTERED

## 2022-05-27 PROCEDURE — 99212 OFFICE O/P EST SF 10 MIN: CPT | Mod: PBBFAC | Performed by: DIETITIAN, REGISTERED

## 2022-05-27 PROCEDURE — 99999 PR PBB SHADOW E&M-EST. PATIENT-LVL II: CPT | Mod: PBBFAC,,, | Performed by: DIETITIAN, REGISTERED

## 2022-05-27 PROCEDURE — 99499 UNLISTED E&M SERVICE: CPT | Mod: S$PBB,,, | Performed by: DIETITIAN, REGISTERED

## 2022-05-27 PROCEDURE — 99999 PR PBB SHADOW E&M-EST. PATIENT-LVL II: ICD-10-PCS | Mod: PBBFAC,,, | Performed by: DIETITIAN, REGISTERED

## 2022-05-27 PROCEDURE — 99499 NO LOS: ICD-10-PCS | Mod: S$PBB,,, | Performed by: DIETITIAN, REGISTERED

## 2022-05-27 NOTE — PATIENT INSTRUCTIONS
Menu Plan: 800-1000 Calories;  grams of Protein    DAY 1     Breakfast  ½ cup 2% cottage cheese  ¼ cup fruit (no sugar added)    Snack  2% mozzarella string cheese  10 grapes    Lunch  2oz Lean hamburger or turkey saran  1 slice low-fat cheese  ¼ cup green beans    Snack  200 calorie low-carb protein drink (4 grams sugar or less)    Dinner  2oz chicken thigh  ¼ cup cooked spinach     Snack  Atkins bar (15g protein)      DAY 2    Breakfast  1 egg with 1oz shredded cheddar cheese and 2T salsa    Snack  200 calorie low-carb protein drink (4 grams sugar or less)    Lunch  Lettuce Wraps: 2oz sliced turkey, 1 slice low-fat Swiss cheese, tomato, and mustard wrapped in a Dwayne lettuce leaf    Snack  ½ cup low-fat cottage cheese  Pear cup (no sugar added)    Dinner  2oz baked fish  ½ cup cooked broccoli    Snack  Sugar-free pudding cup      DAY 3    Breakfast   ½ cup low-fat ricotta cheese w/ Splenda to watson  ½ scoop Vanilla protein powder   ¼ cup fresh fruit    Snack  2% string cheese  6 unsalted almonds    Lunch  Tuna/Chicken Salad: 2oz canned tuna/chicken, 1 egg white, and 1 tsp light lynn  Pineapple cup (no sugar added)    Snack  200 calorie low-carb protein drink (4 grams sugar or less)    Dinner  ½ baked pork chop   ¼ cup beans      DAY 4    Breakfast  200 calorie low-carb protein drink (4 grams sugar or less)    Snack  Boiled egg    Lunch  ½ cup grilled shrimp  Salad w/ 2 tbsp crumbled fat-free feta  1 tbsp light vinaigrette    Snack  200 calorie low-carb protein drink (4 grams sugar or less)    Dinner  ¾ cup red beans    Snack  Mini Babybell light      DAY 5    Breakfast  [place] pie: 3oz Greek yogurt, 1 tbsp Splenda, ½ individual pack Crystal Light lemonade. Top with ¼ cup chopped walnuts     Snack  3-4 lean ham or turkey slices, ¼ - ½ cup fruit    Lunch  Fiesta Chicken: 2oz canned chicken, 1oz shredded cheddar cheese, ¼ cup black beans  Top with 2 tbsp salsa and a small dollop light sour  cream    Snack  200 calorie low-carb protein drink (4 grams sugar or less)    Dinner  Omelette: ¼ cup Egg Beaters, 4 large (1oz) shrimp, 1oz shredded low-fat cheese. Add bell pepper, onion, mushrooms, green onions, or salsa, optional.      DAY 6    Breakfast  1 bob or 2 links turkey sausage  ½ cup fruit    Snack  200 calorie low-carb protein drink (4 grams sugar or less)    Lunch  Grilled tilapia  Salad of baby spinach leaves with light dressing    Snack  200 calorie low-carb protein drink (4 grams sugar or less)    Dinner  Chicken thigh simmered in 98% fat free cream of mushroom soup  ½ cup cooked green beans      Meal Ideas for Regular Bariatric Diet  *Recipes and products available at www.bariatriceating.com      Breakfast: (15-20g protein)    - Egg white omelet: 2 egg whites or ½ cup Egg Beaters. (Optional proteins: cheese, shrimp, black beans, chicken, sliced turkey) (Optional veggies: tomatoes, salsa, spinach, mushrooms, onions, green peppers, or small slice avocado)     - Egg and sausage: 1 egg or ¼ cup Egg Beaters (any variety), with 1 bob or 2 links of Turkey sausage or Veggie breakfast sausage ([employer] or [employer])    - Crust-less breakfast quiche: To make a glass pie dish, mix 4oz part skim Ricotta, 1 cup skim milk, and 2 eggs as your base. Add protein: shredded cheese, sliced lean ham or turkey, turkey wallace/sausage. Add veggies: tomato, onion, green onion, mushroom, green pepper, spinach, etc.    - Yogurt parfait: Mix 1 - 6oz container Dannon Light N Fit vanilla yogurt, with ¼ cup crushed unsalted nuts    - Cottage cheese and fruit: ½ cup part-skim cottage cheese or ricotta cheese topped with fresh fruit or sugar free preserves     - Belem Steen's Vanilla Egg custard* (add 2 Tbsp instant coffee granules to make Cappuccino Custard*)    - Hi-Protein café latte (skim milk, decaf coffee, 1 scoop protein powder). Optional to add Sugar free syrup or extract flavoring.    - Breakfast Lox: spread fat  free cream cheese on slices of smoked salmon. Serve over scrambled or egg over easy (sauteed with nonstick cookspray) OR on a cucumber slice    - Eggwhich: Scramble or cook 1 large egg over easy using nonstick cookspray. Place between 2 slices of English wallace and low fat cheese.     Lunch: (20-30g protein)    - ½ cup Black bean soup (Homemade or Progresso), with ¼ cup shredded low-fat cheese. Top with chopped tomato or fresh salsa.     - Lean deli turkey breast and low-fat sliced cheese, mustard or light lynn to moisten, rolled up together, or wrapped in a Dwayne lettuce leaf    - Chicken salad made from dinner leftovers, moisten with low-fat salad dressing or light lynn. Also try leftover salmon, shrimp, tuna or boiled eggs. Serve ½ cup over dark green salad    - Fat-free canned refried beans, topped with ¼ cup shredded low-fat cheese. Top with chopped tomato or fresh salsa.     - Greek salad: Top mixed greens with 1-2oz grilled chicken, tomatoes, red onions, 2-3 kalamata olives, and sprinkle lightly with feta cheese. Spritz with Balsamic vinegar to taste.     - Crust-less lunch quiche: To make a glass pie dish, mix 4oz part skim Ricotta, 1 cup skim milk, and 2 eggs as your base. Add protein: shredded cheese, sliced lean ham or turkey, shrimp, chicken. Add veggies: tomato, onion, green onion, mushroom, green pepper, spinach, artichoke, broccoli, etc.    - Pizza bake: spread a  hernan terese mushroom with tomato sauce, low-fat shredded mozzarella and turkey pepperoni or [place] wallace. Add any veggies. Roast for 10-15 minutes, until cheese melted.     - Cucumber crab bites: Spread ¼ cup crab dip (lump crabmeat + light cream cheese and green onions) over sliced cucumber.     - Chicken with light spinach and artichoke dip*: Puree in : 6oz cooked and drained spinach, 2 cloves garlic, 1 can cannelloni beans, ½ cup chopped green onions, 1 can drained artichoke hearts (not marinated in oil), lemon juice and  basil. Mix in 2oz chopped up chicken.    Supper: (20-30g protein)    - Serve grilled fish over dark green salad tossed with low-fat dressing, served with grilled asparagus werner     - Rotisserie chicken salad: served with sliced strawberries, walnuts, fat-free feta cheese crumbles and 1 tbsp Robertss Own Light Raspberry [place] Vinaigrette    - Shrimp cocktail: Dip cold boiled shrimp in homemade low-sugar cocktail sauce (1/2 cup Lexi One Carb ketchup, 2 tbsp horseradish, 1/4 tsp hot sauce, 1 tsp Worcestershire sauce, 1 tbsp freshly-squeezed lemon juice). Serve with dark green salad, walnuts, and crumbled blue cheese drizzled with olive oil and Balsamic vinegar    - Tuna Melt: Spread tuna salad onto 2 thick slices of tomato. Top with low-fat cheese and broil until cheese is melted. May also be made with chicken salad of shrimp salad. [place] with different types of cheeses.    - Chicken or beef fajitas (no tortilla, rice, beans, chips). Top meat and veggies w/ fresh salsa, fat free sour cream.     - Homemade low-fat Chili using extra lean ground beef or ground turkey. Top with shredded cheese and salsa as desired. May add dollop fat-free sour cream if desired    - Chicken parmesan: Top chicken breast w/ low sugar marinara sauce, mozzarella cheese and bake until chicken reaches 165*.  Serve w/ spaghetti SQUASH or [place] cut green beans    - Dinner Omelet with shrimp or chicken and onion, green peppers and chives.    - No noodle lasagna: Use sliced zucchini or eggplant in place of noodles.  Layer with part skim ricotta cheese and low sugar meat sauce (use very lean ground beef or ground turkey).    - Mexican chicken bake: Bake chunks of chicken breast or thigh with taco seasoning, Pace brand enchilada sauce, green onions and low-fat cheese. Serve with ¼ cup black beans or fat free refried beans topped with chopped tomatoes or salsa.    - Jonathan frozen meatballs, simmered in Classico Marigavinoa sauce. Different  flavors of salsa or spaghetti sauce create different dishes! Sprinkle with parmesan cheese. Serve with grilled or steamed veggies, or a dark green salad.    - Simmer boneless skinless chicken thigh chunks in Classico Marinara sauce or roasted salsa until tender with chopped onion, bell pepper, garlic, mushrooms, spinach, etc.     - Hamburger or veggie burger, without the bun, dressed the way you like. Served with grilled or steamed veggies.    - Eggplant parmesan: Bake slices of eggplant at 350 degrees for 15 minutes. Layer tomato sauce, sliced eggplant and low-fat mozzarella cheese in a baking dish and cover with foil. Bake 30-40 more minutes or until bubbly. Uncover and bake at 400 degrees for about 15 more minutes, or until top is slightly crisp.    - Fish tacos: grilled/baked white fish, wrapped in Dwayne lettuce leaf, topped with salsa, shredded low-fat cheese, and light coleslaw.    - Chicken frank: Sprinkle chicken w/ 1 tsp of [place] ranch dip mix. Then grill chicken and top with black beans, salsa and 1 tsp fat free sour cream.     - Cauliflower pizza crust: Use cauliflower as crust (see recipe on [facility], no flour!). Top w/ low fat cheese, turkey pepperoni and veggies and bake again    - chicken or turkey crust pizza: use ground chicken or turkey instead of cauliflower, spread in [place] and bake at 350 for about 20-30 minutes(may want to add garlic, black pepper, oregano and other herbs to ground meat mixture).  Remove and top w/ low fat cheese, turkey pepperoni and veggies and bake again for another 10 minutes or until cheese is browned.     Snacks: (100-200 calories; >5g protein)    - 1 low-fat cheese stick with 8 cherry tomatoes or 1 serving fresh fruit  - 4 thin slices fat-free turkey breast and 1 slice low-fat cheese  - 4 thin slices fat-free honey ham with wedge of melon  - 6-8 edamame pods (equivalent to about 1/4 cup edamame without pods).   - 1/4 cup unsalted nuts with ½ cup fruit  -  6-oz container Dannon Light n Fit vanilla yogurt, topped with 1oz unsalted nuts         - apple, celery or baby carrots spread with 2 Tbsp PB2  - apple slices with 1 oz slice low-fat cheese  - Apple slices dipped in 2 Tbsp of PB2  - celery, cucumber, bell pepper or baby carrots dipped in ¼ cup hummus bean spread or light spinach and artichoke dip (*recipe in lunch section)  - celery, cucumber, baby carrots dipped in high protein greek yogurt (Mix 16 oz plain greek yogurt + 1 packet of hidden [place] ranch dip mix)  - Chadwick Links Beef Steak - 14g protein! (similar to beef jerky)  - 2 wedges Laughing Cow - Light Herb & Garlic Cheese with sliced cucumber or green bell pepper  - 1/2 cup low-fat cottage cheese with ¼ cup fruit or ¼ cup salsa  - RTD Protein drinks: Atkins, Low Carb Slim Fast, EAS light, Muscle Milk Light, etc.  - Homemade Protein drinks: [facility] Soy95, Isopure, Nectar, UNJURY, Whey Gourmet, etc. Mix 1 scoop powder with 8oz skim/1% milk or light soymilk.  - Protein bars: Atkins, EAS, Pure Protein, Think Thin, Detour, etc. Must have 0-4 grams sugar - Read the label.    Takeout Options: No more than twice/week  Deli - Salads (no pasta or rice), meats, cheeses. Roasted chicken. Lox (salmon)    Mexican - Platters which don't include tortillas, chips, or rice. Go easy on the beans. Example: Fajitas without the tortillas. Ask the  not to bring chips to the table if they are too tempting.    Greek - Meat or fish and vegetable, but no bread or rice. Including hummus, baba ganoush, etc, is OK. Most sit-down Greek restaurants can provide you with cucumber slices for dipping instead of vickey bread.    Fast Food (Avoid as much as possible) - Salads (no croutons and limit salad dressing to 2 tbsp), grilled chicken sandwich without the bun and ask for no lynn. Sowmyas low fat chili or Taco Bell pintos and cheese.    BBQ - The meats are fine if you ask for sauces on the side, but most of the traditional side dishes are  loaded with carbs. Yunier slaw, baked beans and BBQ sauce are typically made with sugar.    Chinese - Nothing deep-fried, no rice or noodles. Many Chinese sauces have starch and sugar in them, so you'll have to use your judgement. If you find that these sauces trigger cravings, or cause Dumping, you can ask for the sauce to be made without sugar or just use soy sauce.    Required Vitamin/Mineral Supplements:    Multivitamins - one taken twice a day       Iron- 18 mg total daily (may be included in multivitamin)     Super B-complex with 50 mg Thiamine (Vitamin B1)- once daily  Calcium Citrate + Vitamin D- 500 mg three times per day  Vitamin B12- 500 mcg sublingual daily or monthly injections     *NO gummy multivitamins due to poor quality and sugar content.  *Do NOT take calcium citrate and Iron within 2 hours of each other due to poor absorption.    For Example: [employer]:     AM:   Alive! Ultra Potency tablet  Sublingual B12    Noon:  Calcium Citrate 630mg tablet    Mid-PM:  Alive! Ultra Potency tablet    PM:  Calcium Citrate 630mg tablet

## 2022-05-27 NOTE — PROGRESS NOTES
NUTRITION NOTE    Referring Physician: Ruddy Quijano M.D.  Reason for MNT Referral: Follow-up 18 years s/p Gastric Bypass    Denies nausea, vomiting, constipation and diarrhea.  Reports doing well overall and maintaining almost 80% EWL. States she gained about 20 lbs a few months ago from getting off track with her eating habits (stress snacking on junk foods/cookies), but has placed herself on a diet reboot (2 prot shakes and 1 lean/green meal) and her weight is coming back down. Currently smoking but trying to quit. Has gone from 8 cigarettes per day to 4. Discussed increased risks with hx of bariatric sx. Likes to ride bikes and go out dancing to stay active. Does drink 1-2 cocktails (liquor + sweet tea/coke), 3 times per month.    Past Medical History:   Diagnosis Date    Depression 2000       CLINICAL DATA:  62 y.o. female.    Current Weight: 168 lbs  BMI: 26 (from 44)  Total Weight Loss: 116 lbs  Excess Weight Loss: 78%     Pre sx wt: 284 lbs  EW: 147 lbs    LABS:  Reviewed.    CURRENT DIET:  Bariatric Diet.  Diet Recall: 80 grams of protein/day; 48 oz of fluids/day    - prot shake  - prot shake  - homemade baked 2 small crab cakes, side salad with 1 tbsp Italian dressing    Diet includes:  Meal Pattern: 1-2 meal(s) + 0-1 snack(s) + 2 protein supplement(s)  Adequate protein supplement intake. Equate high performance as needed.  Adequate dairy intake.  Adequate vegetable intake. Tolerates raw vegetables and lettuce.  Adequate fruit intake.  Starchy CHO: avoids/limits  Other: chips, cookies (when stressed)    EXERCISE:  Adequate light exercise. Walking [place] twice around, a few times/week.    Restrictions to Exercise: None.    VITAMINS / MINERALS:  Centrum Silver  Calcium  Vit D otc  Magnesium  Fish oil    ASSESSMENT:  Doing well overall.  Weight loss excellent.  Adequate calorie intake.  Adequate protein intake.  Adequate fluid intake.  Following diet appropriately.  Exercising.  Adequate vitamins  & minerals.    BARIATRIC DIET DISCUSSION:  Instructed and provided written materials on bariatric diet plan.  Reinforced post-op nutrition guidelines.    PLAN / RECOMMENDATIONS:  Continue excellent diet plan.  Maintain protein intake.  Maintain fluid intake.  Continue exercise.  Continue appropriate vitamins & minerals.  - suggest switching to mv with Iron next bottle    Return to clinic in one year or as needed.    SESSION TIME: 30 minutes

## 2022-06-23 ENCOUNTER — OFFICE VISIT (OUTPATIENT)
Dept: ORTHOPEDICS | Facility: CLINIC | Age: 62
End: 2022-06-23
Payer: MEDICARE

## 2022-06-23 VITALS — WEIGHT: 168.75 LBS | HEIGHT: 67 IN | BODY MASS INDEX: 26.48 KG/M2

## 2022-06-23 DIAGNOSIS — M17.11 PRIMARY OSTEOARTHRITIS OF RIGHT KNEE: ICD-10-CM

## 2022-06-23 DIAGNOSIS — M17.12 PRIMARY OSTEOARTHRITIS OF LEFT KNEE: Primary | ICD-10-CM

## 2022-06-23 PROCEDURE — 1159F PR MEDICATION LIST DOCUMENTED IN MEDICAL RECORD: ICD-10-PCS | Mod: CPTII,S$GLB,, | Performed by: ORTHOPAEDIC SURGERY

## 2022-06-23 PROCEDURE — 99999 PR PBB SHADOW E&M-EST. PATIENT-LVL III: ICD-10-PCS | Mod: PBBFAC,,, | Performed by: ORTHOPAEDIC SURGERY

## 2022-06-23 PROCEDURE — 99499 NO LOS: ICD-10-PCS | Mod: S$GLB,,, | Performed by: ORTHOPAEDIC SURGERY

## 2022-06-23 PROCEDURE — 20610 DRAIN/INJ JOINT/BURSA W/O US: CPT | Mod: 50,PBBFAC | Performed by: ORTHOPAEDIC SURGERY

## 2022-06-23 PROCEDURE — 1160F RVW MEDS BY RX/DR IN RCRD: CPT | Mod: CPTII,S$GLB,, | Performed by: ORTHOPAEDIC SURGERY

## 2022-06-23 PROCEDURE — 1159F MED LIST DOCD IN RCRD: CPT | Mod: CPTII,S$GLB,, | Performed by: ORTHOPAEDIC SURGERY

## 2022-06-23 PROCEDURE — 1160F PR REVIEW ALL MEDS BY PRESCRIBER/CLIN PHARMACIST DOCUMENTED: ICD-10-PCS | Mod: CPTII,S$GLB,, | Performed by: ORTHOPAEDIC SURGERY

## 2022-06-23 PROCEDURE — 99499 UNLISTED E&M SERVICE: CPT | Mod: S$GLB,,, | Performed by: ORTHOPAEDIC SURGERY

## 2022-06-23 PROCEDURE — 3008F BODY MASS INDEX DOCD: CPT | Mod: CPTII,S$GLB,, | Performed by: ORTHOPAEDIC SURGERY

## 2022-06-23 PROCEDURE — 20610 LARGE JOINT ASPIRATION/INJECTION: BILATERAL KNEE: ICD-10-PCS | Mod: 50,S$GLB,, | Performed by: ORTHOPAEDIC SURGERY

## 2022-06-23 PROCEDURE — 99999 PR PBB SHADOW E&M-EST. PATIENT-LVL III: CPT | Mod: PBBFAC,,, | Performed by: ORTHOPAEDIC SURGERY

## 2022-06-23 PROCEDURE — 99213 OFFICE O/P EST LOW 20 MIN: CPT | Mod: PBBFAC,25 | Performed by: ORTHOPAEDIC SURGERY

## 2022-06-23 PROCEDURE — 3008F PR BODY MASS INDEX (BMI) DOCUMENTED: ICD-10-PCS | Mod: CPTII,S$GLB,, | Performed by: ORTHOPAEDIC SURGERY

## 2022-06-23 RX ADMIN — TRIAMCINOLONE ACETONIDE 40 MG: 40 INJECTION, SUSPENSION INTRA-ARTICULAR; INTRAMUSCULAR at 04:06

## 2022-06-24 ENCOUNTER — TELEPHONE (OUTPATIENT)
Dept: OTOLARYNGOLOGY | Facility: CLINIC | Age: 62
End: 2022-06-24
Payer: MEDICARE

## 2022-06-24 RX ORDER — TRIAMCINOLONE ACETONIDE 40 MG/ML
40 INJECTION, SUSPENSION INTRA-ARTICULAR; INTRAMUSCULAR
Status: DISCONTINUED | OUTPATIENT
Start: 2022-06-23 | End: 2022-06-24 | Stop reason: HOSPADM

## 2022-06-24 NOTE — PROCEDURES
Large Joint Aspiration/Injection: bilateral knee    Date/Time: 6/23/2022 4:00 PM  Performed by: Luis Fernando Meehan MD  Authorized by: Luis Fernando Meehan MD     Consent Done?:  Yes (Verbal)  Indications:  Pain and arthritis  Site marked: the procedure site was marked    Timeout: prior to procedure the correct patient, procedure, and site was verified    Prep: patient was prepped and draped in usual sterile fashion      Local anesthesia used?: Yes    Local anesthetic:  Bupivacaine 0.25% without epinephrine  Anesthetic total (ml):  5      Details:  Needle Size:  25 G  Ultrasonic Guidance for needle placement?: No    Approach:  Anterolateral  Location:  Knee  Laterality:  Bilateral  Site:  Bilateral knee  Medications (Right):  40 mg triamcinolone acetonide 40 mg/mL  Medications (Left):  40 mg triamcinolone acetonide 40 mg/mL  Patient tolerance:  Patient tolerated the procedure well with no immediate complications

## 2022-06-24 NOTE — TELEPHONE ENCOUNTER
----- Message from Concepcion Dewey sent at 6/24/2022 12:13 PM CDT -----  Regarding: Self/  [phone]  Type: Patient Call Back    Who called:  Parent    What is the request in detail:  Patient is asking for a call back from staff.  She's wanting to know when can she get her ears pierced.  Thank you    Would the patient rather a call back or a response via [facility]?  Call back    Best call back number:  [phone] (home)         Thank you

## 2022-06-24 NOTE — PROGRESS NOTES
Subjective:      Patient ID: Nohemi Hull is a 62 y.o. female.    Chief Complaint:   Pain and Follow-up of the Left Knee and Pain and Follow-up of the Right Knee    HPI    The patient got good results cortisone injection previously for osteoarthritis knee pain, and made this appointment for the purpose of repeating this treatment.  No evaluation/management charge for this visit.      Luis Fernando Meehan MD  Orthopedic Surgery

## 2022-07-11 ENCOUNTER — OFFICE VISIT (OUTPATIENT)
Dept: INTERNAL MEDICINE | Facility: CLINIC | Age: 62
End: 2022-07-11
Payer: MEDICARE

## 2022-07-11 ENCOUNTER — HOSPITAL ENCOUNTER (OUTPATIENT)
Dept: RADIOLOGY | Facility: OTHER | Age: 62
Discharge: HOME | End: 2022-07-11
Attending: STUDENT IN AN ORGANIZED HEALTH CARE EDUCATION/TRAINING PROGRAM
Payer: MEDICARE

## 2022-07-11 VITALS
SYSTOLIC BLOOD PRESSURE: 119 MMHG | WEIGHT: 165.81 LBS | BODY MASS INDEX: 26.36 KG/M2 | DIASTOLIC BLOOD PRESSURE: 59 MMHG | HEART RATE: 71 BPM | OXYGEN SATURATION: 99 %

## 2022-07-11 DIAGNOSIS — F33.41 RECURRENT MAJOR DEPRESSIVE DISORDER, IN PARTIAL REMISSION: ICD-10-CM

## 2022-07-11 DIAGNOSIS — Z23 NEED FOR VACCINATION: ICD-10-CM

## 2022-07-11 DIAGNOSIS — F51.01 PRIMARY INSOMNIA: ICD-10-CM

## 2022-07-11 DIAGNOSIS — Z78.0 ASYMPTOMATIC MENOPAUSAL STATE: ICD-10-CM

## 2022-07-11 DIAGNOSIS — Z00.00 HEALTH MAINTENANCE EXAMINATION: ICD-10-CM

## 2022-07-11 DIAGNOSIS — F17.210 CIGARETTE NICOTINE DEPENDENCE WITHOUT COMPLICATION: ICD-10-CM

## 2022-07-11 DIAGNOSIS — K62.5 BRIGHT RED BLOOD PER RECTUM: ICD-10-CM

## 2022-07-11 DIAGNOSIS — Z98.84 HISTORY OF GASTRIC BYPASS: ICD-10-CM

## 2022-07-11 DIAGNOSIS — B37.9 YEAST INFECTION: ICD-10-CM

## 2022-07-11 DIAGNOSIS — D51.3 OTHER DIETARY VITAMIN B12 DEFICIENCY ANEMIA: ICD-10-CM

## 2022-07-11 DIAGNOSIS — R10.9 LEFT SIDED ABDOMINAL PAIN: Primary | ICD-10-CM

## 2022-07-11 PROCEDURE — 1160F RVW MEDS BY RX/DR IN RCRD: CPT | Mod: CPTII,S$GLB,, | Performed by: STUDENT IN AN ORGANIZED HEALTH CARE EDUCATION/TRAINING PROGRAM

## 2022-07-11 PROCEDURE — 3074F SYST BP LT 130 MM HG: CPT | Mod: CPTII,S$GLB,, | Performed by: STUDENT IN AN ORGANIZED HEALTH CARE EDUCATION/TRAINING PROGRAM

## 2022-07-11 PROCEDURE — 99999 PR PBB SHADOW E&M-EST. PATIENT-LVL V: ICD-10-PCS | Mod: PBBFAC,,, | Performed by: STUDENT IN AN ORGANIZED HEALTH CARE EDUCATION/TRAINING PROGRAM

## 2022-07-11 PROCEDURE — 71271 CT THORAX LUNG CANCER SCR C-: CPT | Mod: TC

## 2022-07-11 PROCEDURE — 1159F PR MEDICATION LIST DOCUMENTED IN MEDICAL RECORD: ICD-10-PCS | Mod: CPTII,S$GLB,, | Performed by: STUDENT IN AN ORGANIZED HEALTH CARE EDUCATION/TRAINING PROGRAM

## 2022-07-11 PROCEDURE — 1159F MED LIST DOCD IN RCRD: CPT | Mod: CPTII,S$GLB,, | Performed by: STUDENT IN AN ORGANIZED HEALTH CARE EDUCATION/TRAINING PROGRAM

## 2022-07-11 PROCEDURE — 71271 CT THORAX LUNG CANCER SCR C-: CPT | Mod: 26,,, | Performed by: RADIOLOGY

## 2022-07-11 PROCEDURE — 3078F DIAST BP <80 MM HG: CPT | Mod: CPTII,S$GLB,, | Performed by: STUDENT IN AN ORGANIZED HEALTH CARE EDUCATION/TRAINING PROGRAM

## 2022-07-11 PROCEDURE — 3008F PR BODY MASS INDEX (BMI) DOCUMENTED: ICD-10-PCS | Mod: CPTII,S$GLB,, | Performed by: STUDENT IN AN ORGANIZED HEALTH CARE EDUCATION/TRAINING PROGRAM

## 2022-07-11 PROCEDURE — 99203 OFFICE O/P NEW LOW 30 MIN: CPT | Mod: S$GLB,,, | Performed by: STUDENT IN AN ORGANIZED HEALTH CARE EDUCATION/TRAINING PROGRAM

## 2022-07-11 PROCEDURE — 1160F PR REVIEW ALL MEDS BY PRESCRIBER/CLIN PHARMACIST DOCUMENTED: ICD-10-PCS | Mod: CPTII,S$GLB,, | Performed by: STUDENT IN AN ORGANIZED HEALTH CARE EDUCATION/TRAINING PROGRAM

## 2022-07-11 PROCEDURE — 99499 UNLISTED E&M SERVICE: CPT | Mod: S$GLB,,, | Performed by: STUDENT IN AN ORGANIZED HEALTH CARE EDUCATION/TRAINING PROGRAM

## 2022-07-11 PROCEDURE — 99203 PR OFFICE/OUTPT VISIT, NEW, LEVL III, 30-44 MIN: ICD-10-PCS | Mod: S$GLB,,, | Performed by: STUDENT IN AN ORGANIZED HEALTH CARE EDUCATION/TRAINING PROGRAM

## 2022-07-11 PROCEDURE — 3008F BODY MASS INDEX DOCD: CPT | Mod: CPTII,S$GLB,, | Performed by: STUDENT IN AN ORGANIZED HEALTH CARE EDUCATION/TRAINING PROGRAM

## 2022-07-11 PROCEDURE — 3074F PR MOST RECENT SYSTOLIC BLOOD PRESSURE < 130 MM HG: ICD-10-PCS | Mod: CPTII,S$GLB,, | Performed by: STUDENT IN AN ORGANIZED HEALTH CARE EDUCATION/TRAINING PROGRAM

## 2022-07-11 PROCEDURE — 71271 CT CHEST LUNG SCREENING LOW DOSE: ICD-10-PCS | Mod: 26,,, | Performed by: RADIOLOGY

## 2022-07-11 PROCEDURE — 3078F PR MOST RECENT DIASTOLIC BLOOD PRESSURE < 80 MM HG: ICD-10-PCS | Mod: CPTII,S$GLB,, | Performed by: STUDENT IN AN ORGANIZED HEALTH CARE EDUCATION/TRAINING PROGRAM

## 2022-07-11 PROCEDURE — 99999 PR PBB SHADOW E&M-EST. PATIENT-LVL V: CPT | Mod: PBBFAC,,, | Performed by: STUDENT IN AN ORGANIZED HEALTH CARE EDUCATION/TRAINING PROGRAM

## 2022-07-11 RX ORDER — ASPIRIN 81 MG/1
81 TABLET ORAL DAILY
COMMUNITY

## 2022-07-11 RX ORDER — DULOXETIN HYDROCHLORIDE 60 MG/1
120 CAPSULE, DELAYED RELEASE ORAL DAILY
COMMUNITY

## 2022-07-11 RX ORDER — FLUCONAZOLE 150 MG/1
150 TABLET ORAL DAILY
Qty: 1 TABLET | Refills: 0 | Status: SHIPPED | OUTPATIENT
Start: 2022-07-11 | End: 2022-07-18 | Stop reason: SDUPTHER

## 2022-07-11 RX ORDER — TERCONAZOLE 8 MG/G
1 CREAM VAGINAL NIGHTLY
COMMUNITY
End: 2023-11-06

## 2022-07-11 NOTE — PROGRESS NOTES
Subjective:       Patient ID: Nohemi Hull is a 62 y.o. female.    Chief Complaint: Health maintenance examination [Z00.00]    Patient is new to me, here to establish care.    Health maintenance -   Colonoscopy performed OCT2018. Told to repeat in 5 years.  Family history of colorectal cancer.   Mammogram BI-RADS 1 in DEC2021.  Denies history of prior abnormal mammogram.  Family history of breast cancers.  Denies family history of ovarian cancers.  Hysterectomy for AUB.   Denies history of prior abnormal pap smears.  Denies family history of osteoporosis.  Denies significant family history of cardiac disease.  UTD on COVID19 primary and booster vaccinations.  Due for COVID19 4th dose, shingles, Tdap vaccinations.  Started smoking at age 18-19, smoked intermittently since then. Smokes <1 PPD.  Drinks alcohol 3 times weekly, 1 drinks per sitting.  Denies drug use.  Due for low dose lung CT  Completed HIV and Hep C screening.  UTD on lipid screening.  Lab Results       Component                Value               Date                       LDLCALC                  79.8                05/10/2022            The 10-year ASCVD risk score (Shanellzaid CARLSON Jr., et al., 2013) is: 7.3%  UTD on diabetes screening.  Lab Results       Component                Value               Date                       HGBA1C                   4.6                 06/14/2021            Exercises routinely 3 times weekly    Following in bariatric clinic yearly after gastric bypass in 2004  Vitamin levels being monitored with bariatric clinic     MDD -   Generalized anxiety -   Insomnia -   Seeing Dr. Luis Fernando Lovett for psychiatry  Taking Belsomra and trazodone for insomnia   Weaning trazodone   Klonopin 1mg BID, gabapentin 300 mg BID for anxiety  Taking Zoloft, Risperdal, mirtazapine, and duloxetine for depression   MAR2020 last psychiatric hospitalization    Constipation with iron supplement  Small volume BRBPR recently over past few weeks  History  of hemorrhoids requiring banding    Left sided abdominal queasiness for the past 3 weeks   Queasiness constant, but fluctuates in intensity   Saw prior PCP for problem about 3 weeks ago   Episode of diarrhea that self resolved about 1 week ago  Denies nausea, vomiting, fevers     Concerned for yeast infection, requests diflucan for treatment     Review of Systems   Constitutional: Negative for chills and fever.   Respiratory: Negative for cough and shortness of breath.    Cardiovascular: Negative for chest pain.   Gastrointestinal: Positive for abdominal pain, constipation and diarrhea. Negative for nausea and vomiting.         Current Outpatient Medications   Medication Instructions    aspirin (ECOTRIN) 81 mg, Oral, Daily    calcium carbonate (CALCIUM 300 ORAL) Oral    clonazePAM (KLONOPIN) 1 mg, Oral, 2 times daily PRN    diphth,pertus,acell,,tetanus (BOOSTRIX TDAP) 2.5-8-5 Lf-mcg-Lf/0.5mL Syrg injection 0.5 mLs, Intramuscular, Once, For one dose.    DULoxetine (CYMBALTA) 120 mg, Oral, Daily    FEROSUL 325 mg (65 mg iron) Tab tablet Oral, Daily    fluconazole (DIFLUCAN) 150 mg, Oral, Daily    magnesium 30 mg Tab Oral, Once    meloxicam (MOBIC) 15 mg, Oral, Daily    mirtazapine (REMERON) 45 mg, Oral, Nightly    multivit-min-FA-lycopen-lutein (CENTRUM SILVER) 0.4-300-250 mg-mcg-mcg Tab 1 tablet, Oral, Daily    omega-3 fatty acids fish oil (OMEGA-3 2100) 1,050-1,200 mg Cap capsule 1 capsule, Oral, Daily    risperiDONE (RISPERDAL) 1 mg, Oral, 2 times daily    sertraline (ZOLOFT) 100 MG tablet 1 tablet, Every morning    suvorexant (BELSOMRA) 20 mg Tab Oral    terconazole (TERAZOL 3) 0.8 % vaginal cream 1 applicator, Vaginal, Nightly    traZODone (DESYREL) 300 mg, Oral, Nightly    varicella-zoster gE-AS01B, PF, (SHINGRIX, PF,) 50 mcg/0.5 mL injection 0.5 mLs, Intramuscular, Once     Objective:      Vitals:    07/11/22 1059   BP: (!) 119/59   Pulse: 71   SpO2: 99%   Weight: 75.2 kg (165 lb 12.6 oz)    PainSc: 0-No pain     Body mass index is 26.36 kg/m².    Physical Exam  Vitals reviewed.   Constitutional:       General: She is not in acute distress.     Appearance: Normal appearance. She is not ill-appearing or diaphoretic.   HENT:      Head: Normocephalic and atraumatic.      Right Ear: Tympanic membrane, ear canal and external ear normal. There is no impacted cerumen.      Left Ear: Tympanic membrane, ear canal and external ear normal. There is no impacted cerumen.      Nose: Nose normal. No rhinorrhea.      Mouth/Throat:      Mouth: Mucous membranes are moist.      Pharynx: Oropharynx is clear. No oropharyngeal exudate or posterior oropharyngeal erythema.   Eyes:      General: No scleral icterus.        Right eye: No discharge.         Left eye: No discharge.      Conjunctiva/sclera: Conjunctivae normal.   Neck:      Thyroid: No thyromegaly or thyroid tenderness.      Trachea: Trachea normal.   Cardiovascular:      Rate and Rhythm: Normal rate and regular rhythm.      Pulses:           Dorsalis pedis pulses are 2+ on the right side and 2+ on the left side.        Posterior tibial pulses are 2+ on the right side and 2+ on the left side.      Heart sounds: Normal heart sounds. No murmur heard.    No friction rub. No gallop.   Pulmonary:      Effort: Pulmonary effort is normal. No respiratory distress.      Breath sounds: Normal breath sounds. No stridor. No wheezing, rhonchi or rales.   Chest:   Breasts:      Right: No supraclavicular adenopathy.      Left: No supraclavicular adenopathy.       Abdominal:      General: Bowel sounds are normal. There is no distension.      Palpations: Abdomen is soft.      Tenderness: There is abdominal tenderness (mild left mid abdomen) in the left upper quadrant. There is no guarding or rebound.   Musculoskeletal:      Cervical back: Neck supple.   Lymphadenopathy:      Head:      Right side of head: No submandibular or posterior auricular adenopathy.      Left side of head:  No submandibular or posterior auricular adenopathy.      Cervical: No cervical adenopathy.      Right cervical: No superficial, deep or posterior cervical adenopathy.     Left cervical: No superficial, deep or posterior cervical adenopathy.      Upper Body:      Right upper body: No supraclavicular adenopathy.      Left upper body: No supraclavicular adenopathy.   Skin:     General: Skin is warm and dry.   Neurological:      General: No focal deficit present.      Mental Status: She is alert. Mental status is at baseline.   Psychiatric:         Mood and Affect: Mood normal.         Behavior: Behavior normal.         Assessment:       1. Health maintenance examination    2. Left sided abdominal pain    3. Bright red blood per rectum    4. History of gastric bypass    5. Other dietary vitamin B12 deficiency anemia     6. Recurrent major depressive disorder, in partial remission    7. Primary insomnia    8. Yeast infection    9. Need for vaccination    10. Cigarette nicotine dependence without complication    11. Asymptomatic menopausal state        Plan:       Left sided abdominal pain  Bright red blood per rectum  -     Occult blood x 1, stool; Future  -     US Abdomen Complete; Future  -     Comprehensive Metabolic Panel; Future  -     Lipase; Future  -     CBC Auto Differential; Future  -     TSH; Future    History of gastric bypass  Other dietary vitamin B12 deficiency anemia   -     CBC Auto Differential; Future  -     Pathologist Interpretation Differential; Future  -     Methylmalonic Acid, Serum; Future  -     Homocysteine, Serum; Future  -     Vitamin B12; Future  -     Folate; Future  -     Zinc; Future  -     COPPER, SERUM; Future    Recurrent major depressive disorder, in partial remission  Primary insomnia  Continue medication, evaluation, and management per psychiatrist.  -     TSH; Future    Yeast infection  -     fluconazole (DIFLUCAN) 150 MG Tab; Take 1 tablet (150 mg total) by mouth once daily. for 1  day    Health maintenance examination  Need for vaccination  Provided written Rx for shingles and Tdap vaccination, advised to obtain at [facility] pharmacy on 2nd floor.  RTC in 3 months for follow up     Cigarette nicotine dependence without complication  -     CT Chest Lung Screening Low Dose; Future    Asymptomatic menopausal state  -     DXA Bone Density Spine And Hip; Future        Nataly Rose MD  7/11/2022

## 2022-07-12 ENCOUNTER — TELEPHONE (OUTPATIENT)
Dept: INTERNAL MEDICINE | Facility: CLINIC | Age: 62
End: 2022-07-12
Payer: MEDICARE

## 2022-07-12 NOTE — TELEPHONE ENCOUNTER
Patient informed that medication fluconazole was sent to pharmacy below. Thanks.     [facility]     [address]   [facility] [address]

## 2022-07-12 NOTE — TELEPHONE ENCOUNTER
----- Message from Laura MAISHA Dorantes sent at 7/12/2022  4:11 PM CDT -----  Contact: PT - [phone]  Caller: PT - [phone]    Reason:  regarding yeast medication / pt said she's just checking on status of script going to pharmacy      [id number] - [place], [place] - [address] AT [facility] [address]  [facility]  [facility] [place] [address]  Phone: [phone] Fax: [phone]

## 2022-07-12 NOTE — TELEPHONE ENCOUNTER
----- Message from Nataly Rose MD sent at 7/11/2022  4:25 PM CDT -----  Please let patient know her kidney function looks fine, but I'd like her to obtain additional lab tests to recheck her vitamin B12 level, thank you!

## 2022-07-13 ENCOUNTER — TELEPHONE (OUTPATIENT)
Dept: INTERNAL MEDICINE | Facility: CLINIC | Age: 62
End: 2022-07-13
Payer: MEDICARE

## 2022-07-13 DIAGNOSIS — Z23 NEED FOR VACCINATION: Primary | ICD-10-CM

## 2022-07-13 DIAGNOSIS — B37.9 YEAST INFECTION: ICD-10-CM

## 2022-07-13 NOTE — TELEPHONE ENCOUNTER
----- Message from Liberty Forrester sent at 7/12/2022  4:49 PM CDT -----  Regarding: Medication consult  Contact: HILARIO ALBERTS [[id number]]  Name of Who is Calling:HILARIO ALBERTS [[id number]]          What is the request in detail: Pt states the pharmacy informed her that  the medication(s)  1)varicella-zoster gE-AS01B, PF, (SHINGRIX, PF,) 50 mcg/0.5 mL injection ,   2) diphth,pertus,acell,,tetanus (BOOSTRIX TDAP) 2.5-8-5 Lf-mcg-Lf/0.5mL Syrg injection  3)fluconazole (DIFLUCAN) 150 MG Tab    Were cancelled , she wants to know why. Please advise she is requesting a call back in regards     Can the clinic reply by [facility]: No           What Number to Call Back if not in [facility]:[phone]

## 2022-07-14 ENCOUNTER — HOSPITAL ENCOUNTER (OUTPATIENT)
Dept: RADIOLOGY | Facility: OTHER | Age: 62
Discharge: HOME | End: 2022-07-14
Attending: STUDENT IN AN ORGANIZED HEALTH CARE EDUCATION/TRAINING PROGRAM
Payer: MEDICARE

## 2022-07-14 DIAGNOSIS — R10.9 LEFT SIDED ABDOMINAL PAIN: ICD-10-CM

## 2022-07-14 PROCEDURE — 76700 US ABDOMEN COMPLETE: ICD-10-PCS | Mod: 26,,, | Performed by: INTERNAL MEDICINE

## 2022-07-14 PROCEDURE — 76700 US EXAM ABDOM COMPLETE: CPT | Mod: 26,,, | Performed by: INTERNAL MEDICINE

## 2022-07-14 PROCEDURE — 76700 US EXAM ABDOM COMPLETE: CPT | Mod: TC

## 2022-07-18 ENCOUNTER — HOSPITAL ENCOUNTER (OUTPATIENT)
Dept: RADIOLOGY | Facility: OTHER | Age: 62
Discharge: HOME | End: 2022-07-18
Attending: STUDENT IN AN ORGANIZED HEALTH CARE EDUCATION/TRAINING PROGRAM
Payer: MEDICARE

## 2022-07-18 DIAGNOSIS — Z78.0 ASYMPTOMATIC MENOPAUSAL STATE: ICD-10-CM

## 2022-07-18 PROCEDURE — 77080 DEXA BONE DENSITY SPINE HIP: ICD-10-PCS | Mod: 26,,, | Performed by: RADIOLOGY

## 2022-07-18 PROCEDURE — 77080 DXA BONE DENSITY AXIAL: CPT | Mod: TC

## 2022-07-18 PROCEDURE — 77080 DXA BONE DENSITY AXIAL: CPT | Mod: 26,,, | Performed by: RADIOLOGY

## 2022-07-18 RX ORDER — FLUCONAZOLE 150 MG/1
150 TABLET ORAL DAILY
Qty: 1 TABLET | Refills: 0 | Status: SHIPPED | OUTPATIENT
Start: 2022-07-18 | End: 2022-07-19

## 2022-07-18 RX ORDER — TETANUS TOXOID, REDUCED DIPHTHERIA TOXOID AND ACELLULAR PERTUSSIS VACCINE, ADSORBED 5; 2.5; 8; 8; 2.5 [IU]/.5ML; [IU]/.5ML; UG/.5ML; UG/.5ML; UG/.5ML
0.5 SUSPENSION INTRAMUSCULAR ONCE
Qty: 0.5 ML | Refills: 0 | Status: SHIPPED | OUTPATIENT
Start: 2022-07-18 | End: 2022-07-18

## 2022-07-18 RX ORDER — ZOSTER VACCINE RECOMBINANT, ADJUVANTED 50 MCG/0.5
0.5 KIT INTRAMUSCULAR ONCE
Qty: 1 EACH | Refills: 0 | Status: SHIPPED | OUTPATIENT
Start: 2022-07-18 | End: 2022-07-18

## 2022-07-18 NOTE — TELEPHONE ENCOUNTER
Medications re-ordered, please let patient know. If she continues to have trouble we can also try changing the pharmacy, thank you!

## 2022-07-19 ENCOUNTER — TELEPHONE (OUTPATIENT)
Dept: INTERNAL MEDICINE | Facility: CLINIC | Age: 62
End: 2022-07-19
Payer: MEDICARE

## 2022-07-19 NOTE — TELEPHONE ENCOUNTER
----- Message from Nataly Rose MD sent at 7/18/2022  6:00 PM CDT -----  Please let patient know her bone density scan showed osteopenia, meaning her bone density is normal, but it's not yet osteoporosis. I recommend starting on vitamin supplementation with calcium 1,200 mg and vitamin D 3,000 IU daily and we can recheck her bone density in 2-3 years. Thank you!

## 2022-07-19 NOTE — TELEPHONE ENCOUNTER
I would like her to have additional blood work drawn to check her B12 levels, if she requires supplementation then we can set her up for b12 injections if she would like. Please let patient know, thank you!

## 2022-07-19 NOTE — TELEPHONE ENCOUNTER
----- Message from Nataly Rose MD sent at 7/18/2022  5:13 PM CDT -----  Please let patient know there was no blood found in her stool, thank you!

## 2022-07-20 ENCOUNTER — LAB VISIT (OUTPATIENT)
Dept: LAB | Facility: OTHER | Age: 62
End: 2022-07-20
Attending: STUDENT IN AN ORGANIZED HEALTH CARE EDUCATION/TRAINING PROGRAM
Payer: MEDICARE

## 2022-07-20 ENCOUNTER — TELEPHONE (OUTPATIENT)
Dept: ORTHOPEDICS | Facility: CLINIC | Age: 62
End: 2022-07-20
Payer: MEDICARE

## 2022-07-20 DIAGNOSIS — D51.3 OTHER DIETARY VITAMIN B12 DEFICIENCY ANEMIA: ICD-10-CM

## 2022-07-20 DIAGNOSIS — Z98.84 HISTORY OF GASTRIC BYPASS: ICD-10-CM

## 2022-07-20 DIAGNOSIS — R10.9 LEFT SIDED ABDOMINAL PAIN: ICD-10-CM

## 2022-07-20 DIAGNOSIS — F33.41 RECURRENT MAJOR DEPRESSIVE DISORDER, IN PARTIAL REMISSION: ICD-10-CM

## 2022-07-20 LAB
BASOPHILS # BLD AUTO: 0.02 K/UL (ref 0–0.2)
BASOPHILS NFR BLD: 0.5 % (ref 0–1.9)
DIFFERENTIAL METHOD: ABNORMAL
EOSINOPHIL # BLD AUTO: 0.1 K/UL (ref 0–0.5)
EOSINOPHIL NFR BLD: 1.5 % (ref 0–8)
ERYTHROCYTE [DISTWIDTH] IN BLOOD BY AUTOMATED COUNT: 12.8 % (ref 11.5–14.5)
FOLATE SERPL-MCNC: 13.7 NG/ML (ref 4–24)
HCT VFR BLD AUTO: 36.4 % (ref 37–48.5)
HCYS SERPL-SCNC: 9.9 UMOL/L (ref 4–15.5)
HGB BLD-MCNC: 11.9 G/DL (ref 12–16)
IMM GRANULOCYTES # BLD AUTO: 0 K/UL (ref 0–0.04)
IMM GRANULOCYTES NFR BLD AUTO: 0 % (ref 0–0.5)
LYMPHOCYTES # BLD AUTO: 1.8 K/UL (ref 1–4.8)
LYMPHOCYTES NFR BLD: 43.9 % (ref 18–48)
MCH RBC QN AUTO: 34.1 PG (ref 27–31)
MCHC RBC AUTO-ENTMCNC: 32.7 G/DL (ref 32–36)
MCV RBC AUTO: 104 FL (ref 82–98)
MONOCYTES # BLD AUTO: 0.3 K/UL (ref 0.3–1)
MONOCYTES NFR BLD: 7 % (ref 4–15)
NEUTROPHILS # BLD AUTO: 1.9 K/UL (ref 1.8–7.7)
NEUTROPHILS NFR BLD: 47.1 % (ref 38–73)
NRBC BLD-RTO: 0 /100 WBC
PATH REV BLD -IMP: NORMAL
PLATELET # BLD AUTO: 219 K/UL (ref 150–450)
PMV BLD AUTO: 9.5 FL (ref 9.2–12.9)
RBC # BLD AUTO: 3.49 M/UL (ref 4–5.4)
TSH SERPL DL<=0.005 MIU/L-ACNC: 0.94 UIU/ML (ref 0.4–4)
VIT B12 SERPL-MCNC: 677 PG/ML (ref 210–950)
WBC # BLD AUTO: 4.01 K/UL (ref 3.9–12.7)

## 2022-07-20 PROCEDURE — 84630 ASSAY OF ZINC: CPT | Performed by: STUDENT IN AN ORGANIZED HEALTH CARE EDUCATION/TRAINING PROGRAM

## 2022-07-20 PROCEDURE — 82607 VITAMIN B-12: CPT | Performed by: STUDENT IN AN ORGANIZED HEALTH CARE EDUCATION/TRAINING PROGRAM

## 2022-07-20 PROCEDURE — 85025 COMPLETE CBC W/AUTO DIFF WBC: CPT | Performed by: STUDENT IN AN ORGANIZED HEALTH CARE EDUCATION/TRAINING PROGRAM

## 2022-07-20 PROCEDURE — 82746 ASSAY OF FOLIC ACID SERUM: CPT | Performed by: STUDENT IN AN ORGANIZED HEALTH CARE EDUCATION/TRAINING PROGRAM

## 2022-07-20 PROCEDURE — 85060 PATHOLOGIST REVIEW: ICD-10-PCS | Mod: ,,, | Performed by: PATHOLOGY

## 2022-07-20 PROCEDURE — 85060 BLOOD SMEAR INTERPRETATION: CPT | Mod: ,,, | Performed by: PATHOLOGY

## 2022-07-20 PROCEDURE — 83921 ORGANIC ACID SINGLE QUANT: CPT | Performed by: STUDENT IN AN ORGANIZED HEALTH CARE EDUCATION/TRAINING PROGRAM

## 2022-07-20 PROCEDURE — 84443 ASSAY THYROID STIM HORMONE: CPT | Performed by: STUDENT IN AN ORGANIZED HEALTH CARE EDUCATION/TRAINING PROGRAM

## 2022-07-20 PROCEDURE — 36415 COLL VENOUS BLD VENIPUNCTURE: CPT | Performed by: STUDENT IN AN ORGANIZED HEALTH CARE EDUCATION/TRAINING PROGRAM

## 2022-07-20 PROCEDURE — 83090 ASSAY OF HOMOCYSTEINE: CPT | Performed by: STUDENT IN AN ORGANIZED HEALTH CARE EDUCATION/TRAINING PROGRAM

## 2022-07-20 PROCEDURE — 82525 ASSAY OF COPPER: CPT | Performed by: STUDENT IN AN ORGANIZED HEALTH CARE EDUCATION/TRAINING PROGRAM

## 2022-07-20 NOTE — TELEPHONE ENCOUNTER
Spoke to Pt @ 4:20pm to schedule appointment for tomorrow July 21st @ 10:00am for injection in right knee. Pt states that her knee is swollen and pain level is an 8

## 2022-07-21 ENCOUNTER — OFFICE VISIT (OUTPATIENT)
Dept: ORTHOPEDICS | Facility: CLINIC | Age: 62
End: 2022-07-21
Payer: MEDICARE

## 2022-07-21 VITALS — HEIGHT: 67 IN | BODY MASS INDEX: 25.97 KG/M2 | WEIGHT: 165.44 LBS

## 2022-07-21 DIAGNOSIS — M17.12 PRIMARY OSTEOARTHRITIS OF LEFT KNEE: ICD-10-CM

## 2022-07-21 DIAGNOSIS — M17.11 PRIMARY OSTEOARTHRITIS OF RIGHT KNEE: Primary | ICD-10-CM

## 2022-07-21 LAB — PATH REV BLD -IMP: NORMAL

## 2022-07-21 PROCEDURE — 1160F RVW MEDS BY RX/DR IN RCRD: CPT | Mod: CPTII,S$GLB,, | Performed by: ORTHOPAEDIC SURGERY

## 2022-07-21 PROCEDURE — 99213 PR OFFICE/OUTPT VISIT, EST, LEVL III, 20-29 MIN: ICD-10-PCS | Mod: S$GLB,,, | Performed by: ORTHOPAEDIC SURGERY

## 2022-07-21 PROCEDURE — 3008F PR BODY MASS INDEX (BMI) DOCUMENTED: ICD-10-PCS | Mod: CPTII,S$GLB,, | Performed by: ORTHOPAEDIC SURGERY

## 2022-07-21 PROCEDURE — 99213 OFFICE O/P EST LOW 20 MIN: CPT | Mod: S$GLB,,, | Performed by: ORTHOPAEDIC SURGERY

## 2022-07-21 PROCEDURE — 99999 PR PBB SHADOW E&M-EST. PATIENT-LVL III: CPT | Mod: PBBFAC,,, | Performed by: ORTHOPAEDIC SURGERY

## 2022-07-21 PROCEDURE — 1160F PR REVIEW ALL MEDS BY PRESCRIBER/CLIN PHARMACIST DOCUMENTED: ICD-10-PCS | Mod: CPTII,S$GLB,, | Performed by: ORTHOPAEDIC SURGERY

## 2022-07-21 PROCEDURE — 3008F BODY MASS INDEX DOCD: CPT | Mod: CPTII,S$GLB,, | Performed by: ORTHOPAEDIC SURGERY

## 2022-07-21 PROCEDURE — 1159F MED LIST DOCD IN RCRD: CPT | Mod: CPTII,S$GLB,, | Performed by: ORTHOPAEDIC SURGERY

## 2022-07-21 PROCEDURE — 99999 PR PBB SHADOW E&M-EST. PATIENT-LVL III: ICD-10-PCS | Mod: PBBFAC,,, | Performed by: ORTHOPAEDIC SURGERY

## 2022-07-21 PROCEDURE — 1159F PR MEDICATION LIST DOCUMENTED IN MEDICAL RECORD: ICD-10-PCS | Mod: CPTII,S$GLB,, | Performed by: ORTHOPAEDIC SURGERY

## 2022-07-21 NOTE — PROGRESS NOTES
"Subjective:       Patient ID: Nohemi Hull is a 62 y.o. female.    Chief Complaint:   Follow-up and Pain of the Right Knee  Pt presents for follow up of left knee pain. Reports that her pain was relieved with a CSI at last visit but she feels that her pain has gotten worse recently. She denies any falls. Last CSI 6/24/22.  Objective:      Vitals:    07/21/22 1006   Weight: 75 kg (165 lb 7.3 oz)   Height: 5' 6.5" (1.689 m)     Physical Exam      Right Lower Extremity  Inspection  - Skin intact throughout, no open wounds  - No swelling  - No ecchymosis, erythema, or signs of cellulitis  Palpation  - TTP to medial joint line  Range of motion  - AROM and PROM of the hip, knee, ankle, and foot intact without pain  - ROM at knee limited due to pain      Flexion: 120 degrees      Extension: 0 degrees  Stability  - No evidence of joint dislocation or abnormal laxity  Neurovascular  - TA/EHL/Gastroc/FHL assessed in isolation without deficit  - SILT throughout  - Compartments soft  - DP palpated   - Muscle tone normal      Imaging Review: I independently reviewed and interpreted the imaging and my findings are as follows: right medial joint space narrowing with subchondral sclerosis noted        Assessment:   62 year old female with chronic OA pain with minimal relief from CSI    Plan:     - referral to pain management for possible RFA     The patient's pathophysiology was explained in detail with reference to x-rays, models, other visual aids as appropriate.  Treatment options were discussed in detail.  Questions were invited and answered to the patient's satisfaction.      "

## 2022-07-25 LAB — ZINC SERPL-MCNC: 63 UG/DL (ref 60–130)

## 2022-07-26 ENCOUNTER — TELEPHONE (OUTPATIENT)
Dept: PAIN MEDICINE | Facility: CLINIC | Age: 62
End: 2022-07-26
Payer: MEDICARE

## 2022-07-26 LAB
COPPER SERPL-MCNC: 927 UG/L (ref 810–1990)
METHYLMALONATE SERPL-SCNC: 0.21 UMOL/L

## 2022-07-26 NOTE — TELEPHONE ENCOUNTER
"This message is for patient in regards to his/her appointment 07/26/22 at 8:30a       [facility] Policy: For the safety of all patients and staff members.     Patient Visitor policy: Due to social distancing and limited seating staff are requesting patient to arrive to their schedule appointments on time.    Upon arriving to facility; patients are required to wear a face mask, if patient do not have a face mask one will be provided. Upon arriving patient we ask patients to contact clinic at this number [phone] to notify staff that they have arrived or they may do so by utilizing the Mobile [employer] in [place](if patient have patient portal; clinical staff will send a message through there letting them know it's okay to proceed to their visit). Staff will give the patient the "okay" to come up or wait inside their vehicle until clinic is ready for patient to be seen by Dr. Radha Justin MD. If you have any questions or concerns please contact [phone]      also inquired IPM within message.    [facility] providers and Mid-levels offer interventional, procedure--based options to treat chronic pain. The goal is to manage chronic pain by reducing pain frequency and intensity and address your functional goals for activities of daily living while simultaneously reducing or eliminating your reliance on medications. Please bring any records or images that you have from prior treatments for your pain. You will be presented with multi-modal treatment plan that may or may not include imaging, interventional procedures, physical/occupational/aqua therapy, pain creams, and non-narcotic pain medications used for the treatments of chronic pain.    Patient verbalized and confirmed appt  "

## 2022-07-27 ENCOUNTER — PATIENT MESSAGE (OUTPATIENT)
Dept: PAIN MEDICINE | Facility: OTHER | Age: 62
End: 2022-07-27
Payer: MEDICARE

## 2022-07-27 ENCOUNTER — OFFICE VISIT (OUTPATIENT)
Dept: PAIN MEDICINE | Facility: CLINIC | Age: 62
End: 2022-07-27
Payer: MEDICARE

## 2022-07-27 VITALS
HEIGHT: 65 IN | HEART RATE: 89 BPM | SYSTOLIC BLOOD PRESSURE: 144 MMHG | RESPIRATION RATE: 18 BRPM | WEIGHT: 165 LBS | BODY MASS INDEX: 27.49 KG/M2 | DIASTOLIC BLOOD PRESSURE: 77 MMHG

## 2022-07-27 DIAGNOSIS — M17.0 PRIMARY OSTEOARTHRITIS OF BOTH KNEES: Primary | ICD-10-CM

## 2022-07-27 DIAGNOSIS — M17.0 PRIMARY OSTEOARTHRITIS OF BOTH KNEES: ICD-10-CM

## 2022-07-27 PROCEDURE — 1159F PR MEDICATION LIST DOCUMENTED IN MEDICAL RECORD: ICD-10-PCS | Mod: CPTII,S$GLB,, | Performed by: ANESTHESIOLOGY

## 2022-07-27 PROCEDURE — 3078F PR MOST RECENT DIASTOLIC BLOOD PRESSURE < 80 MM HG: ICD-10-PCS | Mod: CPTII,S$GLB,, | Performed by: ANESTHESIOLOGY

## 2022-07-27 PROCEDURE — 3078F DIAST BP <80 MM HG: CPT | Mod: CPTII,S$GLB,, | Performed by: ANESTHESIOLOGY

## 2022-07-27 PROCEDURE — 3008F BODY MASS INDEX DOCD: CPT | Mod: CPTII,S$GLB,, | Performed by: ANESTHESIOLOGY

## 2022-07-27 PROCEDURE — 3008F PR BODY MASS INDEX (BMI) DOCUMENTED: ICD-10-PCS | Mod: CPTII,S$GLB,, | Performed by: ANESTHESIOLOGY

## 2022-07-27 PROCEDURE — 99204 PR OFFICE/OUTPT VISIT, NEW, LEVL IV, 45-59 MIN: ICD-10-PCS | Mod: S$GLB,,, | Performed by: ANESTHESIOLOGY

## 2022-07-27 PROCEDURE — 1160F RVW MEDS BY RX/DR IN RCRD: CPT | Mod: CPTII,S$GLB,, | Performed by: ANESTHESIOLOGY

## 2022-07-27 PROCEDURE — 3077F SYST BP >= 140 MM HG: CPT | Mod: CPTII,S$GLB,, | Performed by: ANESTHESIOLOGY

## 2022-07-27 PROCEDURE — 3077F PR MOST RECENT SYSTOLIC BLOOD PRESSURE >= 140 MM HG: ICD-10-PCS | Mod: CPTII,S$GLB,, | Performed by: ANESTHESIOLOGY

## 2022-07-27 PROCEDURE — 99999 PR PBB SHADOW E&M-EST. PATIENT-LVL IV: CPT | Mod: PBBFAC,,, | Performed by: ANESTHESIOLOGY

## 2022-07-27 PROCEDURE — 99204 OFFICE O/P NEW MOD 45 MIN: CPT | Mod: S$GLB,,, | Performed by: ANESTHESIOLOGY

## 2022-07-27 PROCEDURE — 99999 PR PBB SHADOW E&M-EST. PATIENT-LVL IV: ICD-10-PCS | Mod: PBBFAC,,, | Performed by: ANESTHESIOLOGY

## 2022-07-27 PROCEDURE — 1160F PR REVIEW ALL MEDS BY PRESCRIBER/CLIN PHARMACIST DOCUMENTED: ICD-10-PCS | Mod: CPTII,S$GLB,, | Performed by: ANESTHESIOLOGY

## 2022-07-27 PROCEDURE — 1159F MED LIST DOCD IN RCRD: CPT | Mod: CPTII,S$GLB,, | Performed by: ANESTHESIOLOGY

## 2022-07-27 NOTE — PROGRESS NOTES
PCP: Natalya Arias MD    REFERRING PHYSICIAN: Luis Fernando Meehan MD    CHIEF COMPLAINT: Right Knee Pain    Original HISTORY OF PRESENT ILLNESS: Nohemi Hull presents to the clinic for the evaluation of the above pain. The pain started 5 months ago after no particular event. She does have a history of meniscal injuries with knee scopes in the early 2000s.     Original Pain Description:  The pain is located in the medial right knee and does not radiate. The pain is described as aching. Exacerbating factors: Standing and Walking. Mitigating factors rest and sitting. Symptoms interfere with daily activity and sleeping. The patient feels like symptoms have been worsening.     Original PAIN SCORES:  Best: Pain is 3  Worst: Pain is 8  Usually: Pain is 5  Current: Pain is 5    INTERVAL HISTORY:     6 weeks of Conservative therapy (PT/Chiro/Home Exercises with Dates)  PT: No  Chiro: No  HEP: 3X per week    Treatments / Medications: (Ice/Heat/NSAIDS/APAP/etc):  Pharmaceutical Cream  APAP - was asked to stop by her Nephro team (CRF Stage 3)     Report:      Interventional Pain Procedures: (Previous injections)  Right Knee Steroid Injection: Only helped for 1 month relief on right    Past Medical History:   Diagnosis Date    Anemia     Anxiety     Arthritis     Clotting disorder     Depression 2000     Past Surgical History:   Procedure Laterality Date    COLONOSCOPY N/A 10/29/2018    Procedure: COLONOSCOPY;  Surgeon: Mp Alvarado MD;  Location: [facility] ([address] [place] [address]);  Service: Endoscopy;  Laterality: N/A;  Referral received from Dr. SMOOTH Arias, [facility]  Last colonoscopy 2012-recommended f/u 5 years-past due    DILATION AND CURETTAGE OF UTERUS      GASTRIC BYPASS      PARTIAL HYSTERECTOMY      AUB    TONSILLECTOMY      TUBAL LIGATION Bilateral      Social History     Socioeconomic History    Marital status:    Tobacco Use    Smoking status: Current Some Day Smoker      Packs/day: 0.25     Years: 7.00     Pack years: 1.75    Smokeless tobacco: Current User   Substance and Sexual Activity    Alcohol use: Not Currently     Alcohol/week: 2.0 standard drinks     Types: 2 Glasses of wine per week    Drug use: No    Sexual activity: Yes     Partners: Male     Birth control/protection: None     Family History   Problem Relation Age of Onset    Hypertension Mother     Depression Mother     Hyperlipidemia Mother     Dementia Mother     Gallbladder disease Mother     Hyperlipidemia Father     Hypertension Sister     Depression Sister     Hyperlipidemia Sister     Diabetes Sister     Hypertension Sister     Diabetes Sister     Hypertension Sister     Depression Brother     Diabetes Brother     Hyperlipidemia Brother     Hypertension Brother     Diabetes Brother     Hypertension Brother     Diabetes Brother     Hyperlipidemia Brother     Colon cancer Maternal Grandfather         rectal ca    Depression Maternal Grandfather     Breast cancer Paternal Grandmother     Depression Paternal Grandfather     Depression Son     Hypertension Maternal Aunt     Depression Maternal Aunt     Diabetes Paternal Uncle     Depression Paternal Uncle     Alcohol abuse Neg Hx     Ovarian cancer Neg Hx     Heart disease Neg Hx     Stroke Neg Hx        Review of patient's allergies indicates:  No Known Allergies    Current Outpatient Medications   Medication Sig    aspirin (ECOTRIN) 81 MG EC tablet Take 81 mg by mouth once daily.    calcium carbonate (CALCIUM 300 ORAL) Take by mouth.    clonazePAM (KLONOPIN) 1 MG tablet Take 1 mg by mouth 2 (two) times daily as needed for Anxiety.    DULoxetine (CYMBALTA) 60 MG capsule Take 120 mg by mouth once daily.    FEROSUL 325 mg (65 mg iron) Tab tablet Take by mouth once daily.    magnesium 30 mg Tab Take by mouth once.    meloxicam (MOBIC) 15 MG tablet Take 1 tablet (15 mg total) by mouth once daily.    mirtazapine (REMERON) 45  "MG tablet Take 45 mg by mouth every evening.    multivit-min-FA-lycopen-lutein (CENTRUM SILVER) 0.4-300-250 mg-mcg-mcg Tab Take 1 tablet by mouth once daily.    omega-3 fatty acids fish oil (OMEGA-3 2100) 1,050-1,200 mg Cap capsule Take 1 capsule by mouth once daily.    risperiDONE (RISPERDAL) 1 MG tablet Take 1 mg by mouth 2 (two) times daily.    sertraline (ZOLOFT) 100 MG tablet Take 1 tablet by mouth every morning.    suvorexant (BELSOMRA) 20 mg Tab Take by mouth.    terconazole (TERAZOL 3) 0.8 % vaginal cream Place 1 applicator vaginally every evening.    traZODone (DESYREL) 150 MG tablet Take 300 mg by mouth every evening.     No current facility-administered medications for this visit.       ROS:  GENERAL: No fever. No chills. No fatigue. Denies weight loss. Denies weight gain.  HEENT: Denies headaches. Denies vision change. Denies eye pain. Denies double vision. Denies ear pain.   CV: Denies chest pain.   PULM: Denies of shortness of breath.  GI: Denies constipation. No diarrhea. No abdominal pain. Denies nausea. Denies vomiting. No blood in stool.  HEME: Denies bleeding problems.  : Denies urgency. No painful urination. No blood in urine.  MS: Denies joint stiffness. Denies joint swelling.  Denies back pain. +Right knee pain  SKIN: Denies rash.   NEURO: Denies seizures. No weakness.  PSYCH:  Denies difficulty sleeping. No anxiety. Denies depression. No suicidal thoughts.       VITALS:   Vitals:    07/27/22 0809   BP: (!) 144/77   Pulse: 89   Resp: 18   Weight: 74.8 kg (165 lb)   Height: 5' 5" (1.651 m)   PainSc:   5   PainLoc: Knee         PHYSICAL EXAM:   GENERAL: Well appearing, in no acute distress, alert and oriented x3.  PSYCH:  Mood and affect appropriate.  SKIN: Skin color, texture, turgor normal, no rashes or lesions.  HEENT:  Normocephalic, atraumatic. Cranial nerves grossly intact.  NECK: No pain to palpation over the cervical paraspinous muscles. No pain to palpation over facets. No pain " with neck flexion, extension, or lateral flexion.   PULM: No evidence of respiratory difficulty, symmetric chest rise.  GI:  Non-distended  BACK: Normal range of motion. No pain to palpation over the spinous processes. No pain to palpation over facet joints. There is no pain with palpation over the sacroiliac joints bilaterally. Straight leg raising in the supine position is negative to radicular pain.   EXTREMITIES: No deformities, edema, or skin discoloration.   MUSCULOSKELETAL: Rt Knee: Full ROM with audible crepitus. Some lateral flexibility without instability. Pain reproduced over the medial joint line.   NEURO: Sensation is equal and appropriate bilaterally. Bilateral upper and lower extremity coordination and muscle stretch reflexes are physiologic and symmetric. Plantar response are downgoing.   GAIT: normal.      LABS:      IMAGING:    XR KNEE ORTHO BILAT     CLINICAL HISTORY:  B/L KNEE;Pain in right knee     TECHNIQUE:  AP standing, lateral and Merchant views of both knees were performed.     COMPARISON:  Bilateral knees October 2004     FINDINGS:  There is moderate narrowing at the medial femoral tibial compartments.  Hypertrophic new bone noted.  Patellofemoral joints fairly well maintained.  No significant effusions.     Impression:     Degenerative changes above.        Electronically signed by: Hernando Agosto MD  Date:                                            01/14/2022  Time:                                           09:36    ASSESSMENT: 62 y.o. year old female with pain, consistent with knee osteoarthritis.      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 Xrays show moderate medial joint line narrowing bilaterally. Steroid injections are helping the left knee, but not the right.     PLAN:  1. Right knee genicular nerve blocks followed by RFA if appropriate  2. Will refer back to Dr. Meehan for surgical correction as needed      I would like to  thank Luis Fernando Meehan MD for the opportunity to assist in the care of this patient. We had a very nice visit and I look forward to continuing their care. Please let me know if I can be of further assistance.     Breana Banks  07/27/2022

## 2022-07-27 NOTE — H&P (VIEW-ONLY)
PCP: Natalya Arias MD    REFERRING PHYSICIAN: Lius Fernando Meehan MD    CHIEF COMPLAINT: Right Knee Pain    Original HISTORY OF PRESENT ILLNESS: Nohemi Hull presents to the clinic for the evaluation of the above pain. The pain started 5 months ago after no particular event. She does have a history of meniscal injuries with knee scopes in the early 2000s.     Original Pain Description:  The pain is located in the medial right knee and does not radiate. The pain is described as aching. Exacerbating factors: Standing and Walking. Mitigating factors rest and sitting. Symptoms interfere with daily activity and sleeping. The patient feels like symptoms have been worsening.     Original PAIN SCORES:  Best: Pain is 3  Worst: Pain is 8  Usually: Pain is 5  Current: Pain is 5    INTERVAL HISTORY:     6 weeks of Conservative therapy (PT/Chiro/Home Exercises with Dates)  PT: No  Chiro: No  HEP: 3X per week    Treatments / Medications: (Ice/Heat/NSAIDS/APAP/etc):  Pharmaceutical Cream  APAP - was asked to stop by her Nephro team (CRF Stage 3)     Report:      Interventional Pain Procedures: (Previous injections)  Right Knee Steroid Injection: Only helped for 1 month relief on right    Past Medical History:   Diagnosis Date    Anemia     Anxiety     Arthritis     Clotting disorder     Depression 2000     Past Surgical History:   Procedure Laterality Date    COLONOSCOPY N/A 10/29/2018    Procedure: COLONOSCOPY;  Surgeon: Mp Alvarado MD;  Location: [facility] ([address] [place] [address]);  Service: Endoscopy;  Laterality: N/A;  Referral received from Dr. SMOOTH Arias, [facility]  Last colonoscopy 2012-recommended f/u 5 years-past due    DILATION AND CURETTAGE OF UTERUS      GASTRIC BYPASS      PARTIAL HYSTERECTOMY      AUB    TONSILLECTOMY      TUBAL LIGATION Bilateral      Social History     Socioeconomic History    Marital status:    Tobacco Use    Smoking status: Current Some Day Smoker      Packs/day: 0.25     Years: 7.00     Pack years: 1.75    Smokeless tobacco: Current User   Substance and Sexual Activity    Alcohol use: Not Currently     Alcohol/week: 2.0 standard drinks     Types: 2 Glasses of wine per week    Drug use: No    Sexual activity: Yes     Partners: Male     Birth control/protection: None     Family History   Problem Relation Age of Onset    Hypertension Mother     Depression Mother     Hyperlipidemia Mother     Dementia Mother     Gallbladder disease Mother     Hyperlipidemia Father     Hypertension Sister     Depression Sister     Hyperlipidemia Sister     Diabetes Sister     Hypertension Sister     Diabetes Sister     Hypertension Sister     Depression Brother     Diabetes Brother     Hyperlipidemia Brother     Hypertension Brother     Diabetes Brother     Hypertension Brother     Diabetes Brother     Hyperlipidemia Brother     Colon cancer Maternal Grandfather         rectal ca    Depression Maternal Grandfather     Breast cancer Paternal Grandmother     Depression Paternal Grandfather     Depression Son     Hypertension Maternal Aunt     Depression Maternal Aunt     Diabetes Paternal Uncle     Depression Paternal Uncle     Alcohol abuse Neg Hx     Ovarian cancer Neg Hx     Heart disease Neg Hx     Stroke Neg Hx        Review of patient's allergies indicates:  No Known Allergies    Current Outpatient Medications   Medication Sig    aspirin (ECOTRIN) 81 MG EC tablet Take 81 mg by mouth once daily.    calcium carbonate (CALCIUM 300 ORAL) Take by mouth.    clonazePAM (KLONOPIN) 1 MG tablet Take 1 mg by mouth 2 (two) times daily as needed for Anxiety.    DULoxetine (CYMBALTA) 60 MG capsule Take 120 mg by mouth once daily.    FEROSUL 325 mg (65 mg iron) Tab tablet Take by mouth once daily.    magnesium 30 mg Tab Take by mouth once.    meloxicam (MOBIC) 15 MG tablet Take 1 tablet (15 mg total) by mouth once daily.    mirtazapine (REMERON) 45  "MG tablet Take 45 mg by mouth every evening.    multivit-min-FA-lycopen-lutein (CENTRUM SILVER) 0.4-300-250 mg-mcg-mcg Tab Take 1 tablet by mouth once daily.    omega-3 fatty acids fish oil (OMEGA-3 2100) 1,050-1,200 mg Cap capsule Take 1 capsule by mouth once daily.    risperiDONE (RISPERDAL) 1 MG tablet Take 1 mg by mouth 2 (two) times daily.    sertraline (ZOLOFT) 100 MG tablet Take 1 tablet by mouth every morning.    suvorexant (BELSOMRA) 20 mg Tab Take by mouth.    terconazole (TERAZOL 3) 0.8 % vaginal cream Place 1 applicator vaginally every evening.    traZODone (DESYREL) 150 MG tablet Take 300 mg by mouth every evening.     No current facility-administered medications for this visit.       ROS:  GENERAL: No fever. No chills. No fatigue. Denies weight loss. Denies weight gain.  HEENT: Denies headaches. Denies vision change. Denies eye pain. Denies double vision. Denies ear pain.   CV: Denies chest pain.   PULM: Denies of shortness of breath.  GI: Denies constipation. No diarrhea. No abdominal pain. Denies nausea. Denies vomiting. No blood in stool.  HEME: Denies bleeding problems.  : Denies urgency. No painful urination. No blood in urine.  MS: Denies joint stiffness. Denies joint swelling.  Denies back pain. +Right knee pain  SKIN: Denies rash.   NEURO: Denies seizures. No weakness.  PSYCH:  Denies difficulty sleeping. No anxiety. Denies depression. No suicidal thoughts.       VITALS:   Vitals:    07/27/22 0809   BP: (!) 144/77   Pulse: 89   Resp: 18   Weight: 74.8 kg (165 lb)   Height: 5' 5" (1.651 m)   PainSc:   5   PainLoc: Knee         PHYSICAL EXAM:   GENERAL: Well appearing, in no acute distress, alert and oriented x3.  PSYCH:  Mood and affect appropriate.  SKIN: Skin color, texture, turgor normal, no rashes or lesions.  HEENT:  Normocephalic, atraumatic. Cranial nerves grossly intact.  NECK: No pain to palpation over the cervical paraspinous muscles. No pain to palpation over facets. No pain " with neck flexion, extension, or lateral flexion.   PULM: No evidence of respiratory difficulty, symmetric chest rise.  GI:  Non-distended  BACK: Normal range of motion. No pain to palpation over the spinous processes. No pain to palpation over facet joints. There is no pain with palpation over the sacroiliac joints bilaterally. Straight leg raising in the supine position is negative to radicular pain.   EXTREMITIES: No deformities, edema, or skin discoloration.   MUSCULOSKELETAL: Rt Knee: Full ROM with audible crepitus. Some lateral flexibility without instability. Pain reproduced over the medial joint line.   NEURO: Sensation is equal and appropriate bilaterally. Bilateral upper and lower extremity coordination and muscle stretch reflexes are physiologic and symmetric. Plantar response are downgoing.   GAIT: normal.      LABS:      IMAGING:    XR KNEE ORTHO BILAT     CLINICAL HISTORY:  B/L KNEE;Pain in right knee     TECHNIQUE:  AP standing, lateral and Merchant views of both knees were performed.     COMPARISON:  Bilateral knees October 2004     FINDINGS:  There is moderate narrowing at the medial femoral tibial compartments.  Hypertrophic new bone noted.  Patellofemoral joints fairly well maintained.  No significant effusions.     Impression:     Degenerative changes above.        Electronically signed by: Hernando Agosto MD  Date:                                            01/14/2022  Time:                                           09:36    ASSESSMENT: 62 y.o. year old female with pain, consistent with knee osteoarthritis.      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 Xrays show moderate medial joint line narrowing bilaterally. Steroid injections are helping the left knee, but not the right.     PLAN:  1. Right knee genicular nerve blocks followed by RFA if appropriate  2. Will refer back to Dr. Meehan for surgical correction as needed      I would like to  thank Luis Fernando Meehan MD for the opportunity to assist in the care of this patient. We had a very nice visit and I look forward to continuing their care. Please let me know if I can be of further assistance.     Breana Banks  07/27/2022

## 2022-08-08 ENCOUNTER — TELEPHONE (OUTPATIENT)
Dept: INTERNAL MEDICINE | Facility: CLINIC | Age: 62
End: 2022-08-08
Payer: MEDICARE

## 2022-08-08 DIAGNOSIS — D53.9 ANEMIA, MACROCYTIC: Primary | ICD-10-CM

## 2022-08-08 NOTE — TELEPHONE ENCOUNTER
----- Message from Shante Lam sent at 8/8/2022  1:40 PM CDT -----  Contact: [phone]  Pt is stating she is calling to tell the dr she is very cold she has to wear a sweater wherever she goes please give return call

## 2022-08-08 NOTE — TELEPHONE ENCOUNTER
Returned pt's call.  Pt states she is unusually cold - she's wearing a sweater everywhere since the winter, including the grocery store. Today's she's at her daughter's house with a sweater and a blanket.    Pt is concerned that her H&H is low.  Pt had blood work on 7/20/22 & 7/11/22

## 2022-08-12 ENCOUNTER — OFFICE VISIT (OUTPATIENT)
Dept: NEPHROLOGY | Facility: CLINIC | Age: 62
End: 2022-08-12
Payer: MEDICARE

## 2022-08-12 ENCOUNTER — LAB VISIT (OUTPATIENT)
Dept: LAB | Facility: HOSPITAL | Age: 62
End: 2022-08-12
Attending: INTERNAL MEDICINE
Payer: MEDICARE

## 2022-08-12 VITALS
OXYGEN SATURATION: 98 % | HEART RATE: 125 BPM | WEIGHT: 167.13 LBS | SYSTOLIC BLOOD PRESSURE: 104 MMHG | HEIGHT: 65 IN | DIASTOLIC BLOOD PRESSURE: 64 MMHG | BODY MASS INDEX: 27.85 KG/M2

## 2022-08-12 DIAGNOSIS — N18.2 CKD (CHRONIC KIDNEY DISEASE) STAGE 2, GFR 60-89 ML/MIN: Primary | ICD-10-CM

## 2022-08-12 DIAGNOSIS — N18.2 CKD (CHRONIC KIDNEY DISEASE) STAGE 2, GFR 60-89 ML/MIN: ICD-10-CM

## 2022-08-12 LAB
BILIRUB UR QL STRIP: NEGATIVE
CLARITY UR REFRACT.AUTO: CLEAR
COLOR UR AUTO: YELLOW
CREAT UR-MCNC: 148 MG/DL (ref 15–325)
GLUCOSE UR QL STRIP: NEGATIVE
HGB UR QL STRIP: NEGATIVE
KETONES UR QL STRIP: NEGATIVE
LEUKOCYTE ESTERASE UR QL STRIP: NEGATIVE
NITRITE UR QL STRIP: NEGATIVE
PH UR STRIP: 6 [PH] (ref 5–8)
PROT UR QL STRIP: NEGATIVE
PROT UR-MCNC: 9 MG/DL (ref 0–15)
PROT/CREAT UR: 0.06 MG/G{CREAT} (ref 0–0.2)
SP GR UR STRIP: 1.02 (ref 1–1.03)
URN SPEC COLLECT METH UR: NORMAL

## 2022-08-12 PROCEDURE — 1160F RVW MEDS BY RX/DR IN RCRD: CPT | Mod: CPTII,S$GLB,, | Performed by: INTERNAL MEDICINE

## 2022-08-12 PROCEDURE — 3008F PR BODY MASS INDEX (BMI) DOCUMENTED: ICD-10-PCS | Mod: CPTII,S$GLB,, | Performed by: INTERNAL MEDICINE

## 2022-08-12 PROCEDURE — 3066F NEPHROPATHY DOC TX: CPT | Mod: CPTII,S$GLB,, | Performed by: INTERNAL MEDICINE

## 2022-08-12 PROCEDURE — 1159F PR MEDICATION LIST DOCUMENTED IN MEDICAL RECORD: ICD-10-PCS | Mod: CPTII,S$GLB,, | Performed by: INTERNAL MEDICINE

## 2022-08-12 PROCEDURE — 99203 PR OFFICE/OUTPT VISIT, NEW, LEVL III, 30-44 MIN: ICD-10-PCS | Mod: S$GLB,,, | Performed by: INTERNAL MEDICINE

## 2022-08-12 PROCEDURE — 3008F BODY MASS INDEX DOCD: CPT | Mod: CPTII,S$GLB,, | Performed by: INTERNAL MEDICINE

## 2022-08-12 PROCEDURE — 82570 ASSAY OF URINE CREATININE: CPT | Performed by: INTERNAL MEDICINE

## 2022-08-12 PROCEDURE — 99999 PR PBB SHADOW E&M-EST. PATIENT-LVL IV: ICD-10-PCS | Mod: PBBFAC,,, | Performed by: INTERNAL MEDICINE

## 2022-08-12 PROCEDURE — 99999 PR PBB SHADOW E&M-EST. PATIENT-LVL IV: CPT | Mod: PBBFAC,,, | Performed by: INTERNAL MEDICINE

## 2022-08-12 PROCEDURE — 1159F MED LIST DOCD IN RCRD: CPT | Mod: CPTII,S$GLB,, | Performed by: INTERNAL MEDICINE

## 2022-08-12 PROCEDURE — 99203 OFFICE O/P NEW LOW 30 MIN: CPT | Mod: S$GLB,,, | Performed by: INTERNAL MEDICINE

## 2022-08-12 PROCEDURE — 3078F DIAST BP <80 MM HG: CPT | Mod: CPTII,S$GLB,, | Performed by: INTERNAL MEDICINE

## 2022-08-12 PROCEDURE — 99499 RISK ADDL DX/OHS AUDIT: ICD-10-PCS | Mod: S$PBB,,, | Performed by: INTERNAL MEDICINE

## 2022-08-12 PROCEDURE — 1160F PR REVIEW ALL MEDS BY PRESCRIBER/CLIN PHARMACIST DOCUMENTED: ICD-10-PCS | Mod: CPTII,S$GLB,, | Performed by: INTERNAL MEDICINE

## 2022-08-12 PROCEDURE — 3066F PR DOCUMENTATION OF TREATMENT FOR NEPHROPATHY: ICD-10-PCS | Mod: CPTII,S$GLB,, | Performed by: INTERNAL MEDICINE

## 2022-08-12 PROCEDURE — 99499 UNLISTED E&M SERVICE: CPT | Mod: S$PBB,,, | Performed by: INTERNAL MEDICINE

## 2022-08-12 PROCEDURE — 3078F PR MOST RECENT DIASTOLIC BLOOD PRESSURE < 80 MM HG: ICD-10-PCS | Mod: CPTII,S$GLB,, | Performed by: INTERNAL MEDICINE

## 2022-08-12 PROCEDURE — 81003 URINALYSIS AUTO W/O SCOPE: CPT | Performed by: INTERNAL MEDICINE

## 2022-08-12 PROCEDURE — 3074F SYST BP LT 130 MM HG: CPT | Mod: CPTII,S$GLB,, | Performed by: INTERNAL MEDICINE

## 2022-08-12 PROCEDURE — 3074F PR MOST RECENT SYSTOLIC BLOOD PRESSURE < 130 MM HG: ICD-10-PCS | Mod: CPTII,S$GLB,, | Performed by: INTERNAL MEDICINE

## 2022-08-12 NOTE — PROGRESS NOTES
REASON FOR CONSULT/CHIEF COMPLAIN: Renal dysfunction    REFERRING PHYSICIAN: Natalya Arias MD    HISTORY OF PRESENT ILLNESS: 62 y.o. female who is new to me, referred here for abnormal renal function.   No chronic NSAID use now (Chronic Motrin use in the past), no known exposure to lithium, lead. No family history of Lupus, or deafness. Some family members have diabetic kidney disease. No ingestion of licorice. No kidney stones. Smoking off and on now four cigarettes per day.   Denied any issues with urination including dysuria, hematuria, urgency, hesitancy, nocturia, incomplete voiding. Denied chest pain, nausea, vomiting, abd pain, diarrhea, shortness of breath, pedal edema, Orthopnea, PND.    ROS:  General: negative for chills, or fatigue  Respiratory: No cough, shortness of breath, or wheezing  Cardiovascular: No chest pain or dyspnea   Gastrointestinal: No abdominal pain, change in bowel habits  Neurological: No new focal weakness, no numbness  Dermatological: No rash or ulcers.    MEDICAL PROBLEMS:  Past Medical History:   Diagnosis Date    Anemia     Anxiety     Arthritis     Clotting disorder     Depression 2000       SURGICAL PROBLEMS:  Past Surgical History:   Procedure Laterality Date    COLONOSCOPY N/A 10/29/2018    Procedure: COLONOSCOPY;  Surgeon: Mp Alvarado MD;  Location: [address]);  Service: Endoscopy;  Laterality: N/A;  Referral received from Dr. SMOOTH Arias, [facility]  Last colonoscopy 2012-recommended f/u 5 years-past due    DILATION AND CURETTAGE OF UTERUS      GASTRIC BYPASS      PARTIAL HYSTERECTOMY      AUB    TONSILLECTOMY      TUBAL LIGATION Bilateral        FAMILY HISTORY:   Family History   Problem Relation Age of Onset    Hypertension Mother     Depression Mother     Hyperlipidemia Mother     Dementia Mother     Gallbladder disease Mother     Hyperlipidemia Father     Hypertension Sister     Depression Sister     Hyperlipidemia Sister      Diabetes Sister     Hypertension Sister     Diabetes Sister     Hypertension Sister     Depression Brother     Diabetes Brother     Hyperlipidemia Brother     Hypertension Brother     Diabetes Brother     Hypertension Brother     Diabetes Brother     Hyperlipidemia Brother     Colon cancer Maternal Grandfather         rectal ca    Depression Maternal Grandfather     Breast cancer Paternal Grandmother     Depression Paternal Grandfather     Depression Son     Hypertension Maternal Aunt     Depression Maternal Aunt     Diabetes Paternal Uncle     Depression Paternal Uncle     Alcohol abuse Neg Hx     Ovarian cancer Neg Hx     Heart disease Neg Hx     Stroke Neg Hx        SOCIAL HISTORY:  Social History     Socioeconomic History    Marital status:    Tobacco Use    Smoking status: Current Some Day Smoker     Packs/day: 0.25     Years: 7.00     Pack years: 1.75    Smokeless tobacco: Current User   Substance and Sexual Activity    Alcohol use: Not Currently     Alcohol/week: 2.0 standard drinks     Types: 2 Glasses of wine per week    Drug use: No    Sexual activity: Yes     Partners: Male     Birth control/protection: None       ALLERGIES:  Review of patient's allergies indicates:  No Known Allergies    MEDICATIONS:    Current Outpatient Medications:     aspirin (ECOTRIN) 81 MG EC tablet, Take 81 mg by mouth once daily., Disp: , Rfl:     calcium carbonate (CALCIUM 300 ORAL), Take by mouth., Disp: , Rfl:     clonazePAM (KLONOPIN) 1 MG tablet, Take 1 mg by mouth 2 (two) times daily as needed for Anxiety., Disp: , Rfl:     DULoxetine (CYMBALTA) 60 MG capsule, Take 120 mg by mouth once daily., Disp: , Rfl:     FEROSUL 325 mg (65 mg iron) Tab tablet, Take by mouth once daily., Disp: , Rfl:     magnesium 30 mg Tab, Take by mouth once., Disp: , Rfl:     mirtazapine (REMERON) 45 MG tablet, Take 45 mg by mouth every evening., Disp: , Rfl:     multivit-min-FA-lycopen-lutein  (CENTRUM SILVER) 0.4-300-250 mg-mcg-mcg Tab, Take 1 tablet by mouth once daily., Disp: , Rfl:     omega-3 fatty acids fish oil (OMEGA-3 2100) 1,050-1,200 mg Cap capsule, Take 1 capsule by mouth once daily., Disp: , Rfl:     risperiDONE (RISPERDAL) 1 MG tablet, Take 1 mg by mouth 2 (two) times daily., Disp: , Rfl:     sertraline (ZOLOFT) 100 MG tablet, Take 1 tablet by mouth every morning., Disp: , Rfl:     suvorexant (BELSOMRA) 20 mg Tab, Take by mouth., Disp: , Rfl:     terconazole (TERAZOL 3) 0.8 % vaginal cream, Place 1 applicator vaginally every evening., Disp: , Rfl:     traZODone (DESYREL) 150 MG tablet, Take 300 mg by mouth every evening., Disp: , Rfl:    Medication List with Changes/Refills   Current Medications    ASPIRIN (ECOTRIN) 81 MG EC TABLET    Take 81 mg by mouth once daily.    CALCIUM CARBONATE (CALCIUM 300 ORAL)    Take by mouth.    CLONAZEPAM (KLONOPIN) 1 MG TABLET    Take 1 mg by mouth 2 (two) times daily as needed for Anxiety.    DULOXETINE (CYMBALTA) 60 MG CAPSULE    Take 120 mg by mouth once daily.    FEROSUL 325 MG (65 MG IRON) TAB TABLET    Take by mouth once daily.    MAGNESIUM 30 MG TAB    Take by mouth once.    MIRTAZAPINE (REMERON) 45 MG TABLET    Take 45 mg by mouth every evening.    MULTIVIT-MIN-FA-LYCOPEN-LUTEIN (CENTRUM SILVER) 0.4-300-250 MG-MCG-MCG TAB    Take 1 tablet by mouth once daily.    OMEGA-3 FATTY ACIDS FISH OIL (OMEGA-3 2100) 1,050-1,200 MG CAP CAPSULE    Take 1 capsule by mouth once daily.    RISPERIDONE (RISPERDAL) 1 MG TABLET    Take 1 mg by mouth 2 (two) times daily.    SERTRALINE (ZOLOFT) 100 MG TABLET    Take 1 tablet by mouth every morning.    SUVOREXANT (BELSOMRA) 20 MG TAB    Take by mouth.    TERCONAZOLE (TERAZOL 3) 0.8 % VAGINAL CREAM    Place 1 applicator vaginally every evening.    TRAZODONE (DESYREL) 150 MG TABLET    Take 300 mg by mouth every evening.   Discontinued Medications    MELOXICAM (MOBIC) 15 MG TABLET    Take 1 tablet (15 mg total) by mouth  "once daily.        PHYSICAL EXAM:  /64   Pulse (!) 125   Ht 5' 5" (1.651 m)   Wt 75.8 kg (167 lb 1.7 oz)   SpO2 98%   BMI 27.81 kg/m²     General: No distress, No fever or chills  Neck: No adenopathy,no carotid bruit,no JVD  Lungs:Clear to auscultation bilaterally, No Crackles  Heart: Regular rate and rhythm, no murmur, gallops or rubs  Abdomen: Soft, no tenderness, bowel sounds normal  Extremities: Atraumatic, no edema in LE  Skin: Turgor normal. No rashes  Neurologic: No focal weakness, oriented.  Dialysis Access: Non applicable        LABS:  Lab Results   Component Value Date    HGB 11.9 (L) 07/20/2022        Lab Results   Component Value Date    CREATININE 1.0 07/11/2022       No results found for: UTPCR    Lab Results   Component Value Date     07/11/2022    K 4.3 07/11/2022    CO2 25 07/11/2022       last PTH   Lab Results   Component Value Date    CALCIUM 8.9 07/11/2022    PHOS 2.8 09/28/2006       Lab Results   Component Value Date    HGBA1C 4.6 06/14/2021       Lab Results   Component Value Date    LDLCALC 79.8 05/10/2022         No results found for: CREATRANDUR    No results found for: PROTEINURINE    No results found for: UTPCR      ASSESSMENT AND PLAN:    1) Chronic Kidney disease stage 2   Patients creatinine has been around 1.0. Urine analysis negative for hematuria and proteinuria. Abd ultrasound showed 9.6 and 9.2 cm kidneys. CKD stage 2 is secondary to aging and smoking.    Electrolytes, Acid Base, Hemoglobin and vitamin D in acceptable range.    - Educated patient about CKD and anticipated progression  - Please try to drink atleast 8929-6728 ml of water.  - Consider limiting/cutting your smoking.  - Educated about limiting NSAID's, low salt and low animal protein diet.    RTC as needed    Diagnosis and plan of care explained to the patient.  Pt Verbalized understanding. Answered all questions.  Thanks for allowing me to participate in the care of this patient.     9:21 " ALDO Brown MD  Nephrology & Critical Care

## 2022-08-12 NOTE — PATIENT INSTRUCTIONS
- Please try to drink atleast 0025-2246 ml of water.  - Consider limiting/cutting your smoking.  - Please donot take NSAID's (Motrin, Advil, Aleve, Ibuprofen, BC powder, Goody Powder, Diclofenac, Naproxen, Meloxicam/MOBIC, Celebrex etc), Ok to take baby aspirin.  - The patient was educated in practicing a low salt diet.  Avoid high salt foods (olives, pickles, smoked meats, salted potato chips, etc.).   Do not add salt to your food at the table.   Use only small amounts of salt when cooking.  - Please eat low phosphate diet (no dark colored sodas).  - Please limit the amout of animal protein (atleast cut down pork and beef).  - Follow up as needed.

## 2022-08-12 NOTE — TELEPHONE ENCOUNTER
Please let patient know that her blood counts are still a little low, but very mild. Her vitamin levels were all normal. I would like her to be evaluated by a hematologist to discuss her anemia. I have placed a referral to hematology, please assist with scheduling, thank you!

## 2022-08-15 DIAGNOSIS — R91.1 SOLITARY PULMONARY NODULE: ICD-10-CM

## 2022-08-19 ENCOUNTER — TELEPHONE (OUTPATIENT)
Dept: INTERNAL MEDICINE | Facility: CLINIC | Age: 62
End: 2022-08-19

## 2022-08-19 ENCOUNTER — HOSPITAL ENCOUNTER (OUTPATIENT)
Facility: OTHER | Age: 62
Discharge: HOME | End: 2022-08-19
Attending: ANESTHESIOLOGY | Admitting: ANESTHESIOLOGY
Payer: MEDICARE

## 2022-08-19 ENCOUNTER — TELEPHONE (OUTPATIENT)
Dept: INTERNAL MEDICINE | Facility: CLINIC | Age: 62
End: 2022-08-19
Payer: MEDICARE

## 2022-08-19 VITALS
DIASTOLIC BLOOD PRESSURE: 66 MMHG | OXYGEN SATURATION: 96 % | SYSTOLIC BLOOD PRESSURE: 107 MMHG | HEART RATE: 66 BPM | TEMPERATURE: 98 F | RESPIRATION RATE: 16 BRPM

## 2022-08-19 DIAGNOSIS — G89.29 CHRONIC PAIN: ICD-10-CM

## 2022-08-19 DIAGNOSIS — M17.12 PRIMARY OSTEOARTHRITIS OF LEFT KNEE: Primary | ICD-10-CM

## 2022-08-19 DIAGNOSIS — M17.11 PRIMARY OSTEOARTHRITIS OF RIGHT KNEE: ICD-10-CM

## 2022-08-19 PROCEDURE — 63600175 PHARM REV CODE 636 W HCPCS: Performed by: ANESTHESIOLOGY

## 2022-08-19 PROCEDURE — 25000003 PHARM REV CODE 250: Performed by: ANESTHESIOLOGY

## 2022-08-19 PROCEDURE — 25000003 PHARM REV CODE 250: Performed by: STUDENT IN AN ORGANIZED HEALTH CARE EDUCATION/TRAINING PROGRAM

## 2022-08-19 PROCEDURE — 64454 NJX AA&/STRD GNCLR NRV BRNCH: CPT | Mod: KX,RT | Performed by: ANESTHESIOLOGY

## 2022-08-19 PROCEDURE — 64454 PR NERVE BLOCK INJ, ANES/STEROID, GENICULAR NERVE, W/IMG: ICD-10-PCS | Mod: KX,RT,, | Performed by: ANESTHESIOLOGY

## 2022-08-19 PROCEDURE — 64454 NJX AA&/STRD GNCLR NRV BRNCH: CPT | Mod: KX,RT,, | Performed by: ANESTHESIOLOGY

## 2022-08-19 RX ORDER — SODIUM CHLORIDE 9 MG/ML
500 INJECTION, SOLUTION INTRAVENOUS CONTINUOUS
Status: DISCONTINUED | OUTPATIENT
Start: 2022-08-19 | End: 2022-08-19 | Stop reason: HOSPADM

## 2022-08-19 RX ORDER — BUPIVACAINE HYDROCHLORIDE 2.5 MG/ML
INJECTION, SOLUTION EPIDURAL; INFILTRATION; INTRACAUDAL
Status: DISCONTINUED | OUTPATIENT
Start: 2022-08-19 | End: 2022-08-19 | Stop reason: HOSPADM

## 2022-08-19 RX ORDER — MIDAZOLAM HYDROCHLORIDE 1 MG/ML
INJECTION INTRAMUSCULAR; INTRAVENOUS
Status: DISCONTINUED | OUTPATIENT
Start: 2022-08-19 | End: 2022-08-19 | Stop reason: HOSPADM

## 2022-08-19 RX ORDER — LIDOCAINE HYDROCHLORIDE 20 MG/ML
INJECTION, SOLUTION INFILTRATION; PERINEURAL
Status: DISCONTINUED | OUTPATIENT
Start: 2022-08-19 | End: 2022-08-19 | Stop reason: HOSPADM

## 2022-08-19 NOTE — DISCHARGE SUMMARY
Discharge Note  Short Stay      SUMMARY     Admit Date: 8/19/2022    Attending Physician: Radha Justin      Discharge Physician: Radha Justin      Discharge Date: 8/19/2022 9:08 AM    Procedure(s) (LRB):  BLOCK, GENICULAR RIGHT (Right)    Final Diagnosis: Primary osteoarthritis of both knees [M17.0]    Disposition: Home or self care    Patient Instructions:   Current Discharge Medication List      CONTINUE these medications which have NOT CHANGED    Details   aspirin (ECOTRIN) 81 MG EC tablet Take 81 mg by mouth once daily.      calcium carbonate (CALCIUM 300 ORAL) Take by mouth.      clonazePAM (KLONOPIN) 1 MG tablet Take 1 mg by mouth 2 (two) times daily as needed for Anxiety.      DULoxetine (CYMBALTA) 60 MG capsule Take 120 mg by mouth once daily.      FEROSUL 325 mg (65 mg iron) Tab tablet Take by mouth once daily.      magnesium 30 mg Tab Take by mouth once.      mirtazapine (REMERON) 45 MG tablet Take 45 mg by mouth every evening.      multivit-min-FA-lycopen-lutein (CENTRUM SILVER) 0.4-300-250 mg-mcg-mcg Tab Take 1 tablet by mouth once daily.      omega-3 fatty acids fish oil (OMEGA-3 2100) 1,050-1,200 mg Cap capsule Take 1 capsule by mouth once daily.      risperiDONE (RISPERDAL) 1 MG tablet Take 1 mg by mouth 2 (two) times daily.      sertraline (ZOLOFT) 100 MG tablet Take 1 tablet by mouth every morning.      suvorexant (BELSOMRA) 20 mg Tab Take by mouth.      terconazole (TERAZOL 3) 0.8 % vaginal cream Place 1 applicator vaginally every evening.      traZODone (DESYREL) 150 MG tablet Take 300 mg by mouth every evening.                 Discharge Diagnosis: Primary osteoarthritis of both knees [M17.0]  Condition on Discharge: Stable with no complications to procedure   Diet on Discharge: Same as before.  Activity: as per instruction sheet.  Discharge to: Home with a responsible adult.  Follow up: 2-4 weeks       Please call my office or pager at [phone] if experienced any weakness or loss of  sensation, fever > 101.5, pain uncontrolled with oral medications, persistent nausea/vomiting/or diarrhea, redness or drainage from the incisions, or any other worrisome concerns. If physician on call was not reached or could not communicate with our office for any reason please go to the nearest emergency department

## 2022-08-19 NOTE — TELEPHONE ENCOUNTER
"Ms. Mejia calls back. I relay msg from Dr. Rose to her " Your low dose lung CT showed a few lung nodules which are likely nothing concerning, but should still be monitored. It is recommended that we repeat this test in January to ensure they nodules remain the same size and are not changing. I have ordered this test to be completed in January and you may schedule online or contact our office for assistance. Let us know if you have any questions in the meantime."    I ask pt if she has any Qs to MD.  1. She wants to know what happens if nodules get bigger.  2. She has been experiencing vaginal itchiness this entire week. She would like a medication be called to her pharmacy.     Last office visit: 7/11/22    "

## 2022-08-19 NOTE — OP NOTE
Diagnostic Genicular Nerve Block under Fluoroscopic Guidance    The procedure, risks, benefits, and options were discussed with the patient. There are no contraindications to the procedure. The patent expressed understanding and agreed to the procedure. Informed written consent was obtained prior to the start of the procedure and can be found in the patient's chart.    PATIENT NAME: Nohemi Hull   MRN: [id number]     DATE OF PROCEDURE: 08/19/2022    PROCEDURE: Diagnostic Right Genicular Nerve Block under Fluoroscopic Guidance    PRE-OP DIAGNOSIS: Primary osteoarthritis of both knees [M17.0]    POST-OP DIAGNOSIS: Same    PHYSICIAN: Radha Justin MD    ASSISTANTS: Alexandr Montiel MD     MEDICATIONS INJECTED: Bupivacine 0.25%     LOCAL ANESTHETIC INJECTED: Xylocaine 2%     SEDATION: Versed 1mg    ESTIMATED BLOOD LOSS: None    COMPLICATIONS: None    INTERVAL HISTORY: Patient has clinical findings of chronic knee pain that is not relieved by other therapies.     TECHNIQUE: Time-out was performed to identify the patient and procedure to be performed. With the patient laying in a supine position, the surgical area was prepped and draped in the usual sterile fashion using ChloraPrep and a fenestrated drape. Three target sites including the superior lateral genicular nerve where the lateral femoral shaft meets the epicondyle, the superior medial genicular nerve where the medial femoral shaft meets the epicondyle, and the inferior medial genicular nerve where the medial tibial shaft meets the epicondyle, were determined under fluoroscopy guidance. Skin anesthesia was achieved by injecting Lidocaine 2% over the injection sites. A 25 gauge, 3.5 inch spinal quinke needle was then advanced under fluoroscopy in the AP and lateral views into the positions of the geniculate nerves at each site. Once the needle tip position was confirmed on fluoroscopy, negative pressure was applied to confirm no intravascular placement.1 mL of  the anesthetic listed above was then slowly injected at each site. The needles were removed and bleeding was nil. A sterile dressing was applied. No specimens collected. The patient tolerated the procedure well.       The patient was monitored after the procedure in the recovery area. They were given post-procedure and discharge instructions to follow at home. The patient was discharged in a stable condition.    Radha Justin MD

## 2022-08-19 NOTE — DISCHARGE INSTRUCTIONS
Thank you for allowing us to care for you today. You may receive a survey about the care we provided. Your feedback is valuable and helps us provide excellent care throughout the community.     Home Care Instructions for Pain Management:    1. DIET:   You may resume your normal diet today.   2. BATHING:   You may shower with luke warm water. No tub baths or anything that will soak injection sites under water for the next 24 hours.  3. DRESSING:   You may remove your bandage today.   4. ACTIVITY LEVEL:   You may resume your normal activities 24 hrs after your procedure. Nothing strenuous today.  5. MEDICATIONS:   You may resume your normal medications today. To restart blood thinners, ask your doctor.  6. DRIVING    If you have received any sedatives by mouth today, you may not drive for 12 hours.    If you have received any sedation through your IV, you may not drive for 24 hrs.   7. SPECIAL INSTRUCTIONS:   No heat to the injection site for 24 hrs including, hot bath or shower, heating pad, moist heat, or hot tubs.    Use ice pack to injection site for any pain or discomfort.  Apply ice packs for 20 minute intervals as needed.    IF you have diabetes, be sure to monitor your blood sugar more closely. IF your injection contained steroids your blood sugar levels may become higher than normal.    If you are still having pain upon discharge:  Your pain may improve over the next 48 hours. The anesthetic (numbing medication) works immediately to 48 hours. IF your injection contained a steroid (anti-inflammatory medication), it takes approximately 3 days to start feeling relief and 7-10 days to see your greatest results from the medication. It is possible you may need subsequent injections. This would be discussed at your follow up appointment with pain management or your referring doctor.    Please call the PAIN MANAGEMENT office at [phone] or ON CALL pager at [phone] if you experienced any:   -Weakness or  loss of sensation  -Fever > 101.5  -Pain uncontrolled with oral medications   -Persistent nausea, vomiting, or diarrhea  -Redness or drainage from the injection sites, or any other worrisome concerns.   If physician on call was not reached or could not communicate with our office for any reason please go to the nearest emergency department. Adult Procedural Sedation Instructions    Recovery After Procedural Sedation (Adult)  You have been given medicine by vein to make you sleep during your surgery. This may have included both a pain medicine and sleeping medicine. Most of the effects have worn off. But you may still have some drowsiness for the next 6 to 8 hours.  Home care  Follow these guidelines when you get home:  For the next 8 hours, you should be watched by a responsible adult. This person should make sure your condition is not getting worse.  Don't drink any alcohol for the next 24 hours.  Don't drive, operate dangerous machinery, or make important business or personal decisions during the next 24 hours.  Note: Your healthcare provider may tell you not to take any medicine by mouth for pain or sleep in the next 4 hours. These medicines may react with the medicines you were given in the hospital. This could cause a much stronger response than usual.  Follow-up care  Follow up with your healthcare provider if you are not alert and back to your usual level of activity within 12 hours.  When to seek medical advice  Call your healthcare provider right away if any of these occur:  Drowsiness gets worse  Weakness or dizziness gets worse  Repeated vomiting  You can't be awakened   Date Last Reviewed: 10/18/2016  © 7038-3645 [employer], linked[employer] [address] [place] [address], [place] [address]. All rights reserved. This information is not intended as a substitute for professional medical care. Always follow your healthcare professional's instructions.          Hydroxychloroquine Counseling:  I discussed with the patient that a baseline ophthalmologic exam is needed at the start of therapy and every year thereafter while on therapy. A CBC may also be warranted for monitoring.  The side effects of this medication were discussed with the patient, including but not limited to agranulocytosis, aplastic anemia, seizures, rashes, retinopathy, and liver toxicity. Patient instructed to call the office should any adverse effect occur.  The patient verbalized understanding of the proper use and possible adverse effects of Plaquenil.  All the patient's questions and concerns were addressed.

## 2022-08-25 ENCOUNTER — PATIENT MESSAGE (OUTPATIENT)
Dept: PAIN MEDICINE | Facility: CLINIC | Age: 62
End: 2022-08-25
Payer: MEDICARE

## 2022-08-25 NOTE — TELEPHONE ENCOUNTER
Nhan Jones,    Pt states her overall percentage is 50% out of 100% she states had a  Bad day. Im also schedule the pt a f/u with you in the future dates.

## 2022-08-26 ENCOUNTER — OFFICE VISIT (OUTPATIENT)
Dept: PAIN MEDICINE | Facility: CLINIC | Age: 62
End: 2022-08-26
Payer: MEDICARE

## 2022-08-26 VITALS
WEIGHT: 165 LBS | HEART RATE: 89 BPM | RESPIRATION RATE: 18 BRPM | HEIGHT: 65 IN | TEMPERATURE: 98 F | DIASTOLIC BLOOD PRESSURE: 76 MMHG | BODY MASS INDEX: 27.49 KG/M2 | SYSTOLIC BLOOD PRESSURE: 129 MMHG

## 2022-08-26 DIAGNOSIS — M17.11 PRIMARY OSTEOARTHRITIS OF RIGHT KNEE: Primary | ICD-10-CM

## 2022-08-26 PROCEDURE — 99999 PR PBB SHADOW E&M-EST. PATIENT-LVL IV: CPT | Mod: PBBFAC,,, | Performed by: NURSE PRACTITIONER

## 2022-08-26 PROCEDURE — 1159F PR MEDICATION LIST DOCUMENTED IN MEDICAL RECORD: ICD-10-PCS | Mod: CPTII,S$GLB,, | Performed by: NURSE PRACTITIONER

## 2022-08-26 PROCEDURE — 1160F PR REVIEW ALL MEDS BY PRESCRIBER/CLIN PHARMACIST DOCUMENTED: ICD-10-PCS | Mod: CPTII,S$GLB,, | Performed by: NURSE PRACTITIONER

## 2022-08-26 PROCEDURE — 99213 PR OFFICE/OUTPT VISIT, EST, LEVL III, 20-29 MIN: ICD-10-PCS | Mod: S$GLB,,, | Performed by: NURSE PRACTITIONER

## 2022-08-26 PROCEDURE — 3074F SYST BP LT 130 MM HG: CPT | Mod: CPTII,S$GLB,, | Performed by: NURSE PRACTITIONER

## 2022-08-26 PROCEDURE — 99999 PR PBB SHADOW E&M-EST. PATIENT-LVL IV: ICD-10-PCS | Mod: PBBFAC,,, | Performed by: NURSE PRACTITIONER

## 2022-08-26 PROCEDURE — 3078F DIAST BP <80 MM HG: CPT | Mod: CPTII,S$GLB,, | Performed by: NURSE PRACTITIONER

## 2022-08-26 PROCEDURE — 3078F PR MOST RECENT DIASTOLIC BLOOD PRESSURE < 80 MM HG: ICD-10-PCS | Mod: CPTII,S$GLB,, | Performed by: NURSE PRACTITIONER

## 2022-08-26 PROCEDURE — 3008F BODY MASS INDEX DOCD: CPT | Mod: CPTII,S$GLB,, | Performed by: NURSE PRACTITIONER

## 2022-08-26 PROCEDURE — 3008F PR BODY MASS INDEX (BMI) DOCUMENTED: ICD-10-PCS | Mod: CPTII,S$GLB,, | Performed by: NURSE PRACTITIONER

## 2022-08-26 PROCEDURE — 3066F NEPHROPATHY DOC TX: CPT | Mod: CPTII,S$GLB,, | Performed by: NURSE PRACTITIONER

## 2022-08-26 PROCEDURE — 99213 OFFICE O/P EST LOW 20 MIN: CPT | Mod: S$GLB,,, | Performed by: NURSE PRACTITIONER

## 2022-08-26 PROCEDURE — 3074F PR MOST RECENT SYSTOLIC BLOOD PRESSURE < 130 MM HG: ICD-10-PCS | Mod: CPTII,S$GLB,, | Performed by: NURSE PRACTITIONER

## 2022-08-26 PROCEDURE — 1160F RVW MEDS BY RX/DR IN RCRD: CPT | Mod: CPTII,S$GLB,, | Performed by: NURSE PRACTITIONER

## 2022-08-26 PROCEDURE — 1159F MED LIST DOCD IN RCRD: CPT | Mod: CPTII,S$GLB,, | Performed by: NURSE PRACTITIONER

## 2022-08-26 PROCEDURE — 3066F PR DOCUMENTATION OF TREATMENT FOR NEPHROPATHY: ICD-10-PCS | Mod: CPTII,S$GLB,, | Performed by: NURSE PRACTITIONER

## 2022-08-26 NOTE — H&P (VIEW-ONLY)
PCP: Natalya Arias MD    REFERRING PHYSICIAN: No ref. provider found    CHIEF COMPLAINT: Right Knee Pain    Original HISTORY OF PRESENT ILLNESS: Nohemi Hull presents to the clinic for the evaluation of the above pain. The pain started 5 months ago after no particular event. She does have a history of meniscal injuries with knee scopes in the early 2000s.     Original Pain Description:  The pain is located in the medial right knee and does not radiate. The pain is described as aching. Exacerbating factors: Standing and Walking. Mitigating factors rest and sitting. Symptoms interfere with daily activity and sleeping. The patient feels like symptoms have been worsening.     Interval History 8/26/2022:  Mrs Hull is s/p right knee genicular RFA, she had >80% benefit from R knee genicular block for <24hr then pain returned fully. She had improved functioning at that time. Denies newer areas of pain or neurological changes.     Original PAIN SCORES:  Best: Pain is 3  Worst: Pain is 8  Usually: Pain is 5  Current: Pain is 5    INTERVAL HISTORY:     6 weeks of Conservative therapy (PT/Chiro/Home Exercises with Dates)  PT: No  Chiro: No  HEP: 3X per week    Treatments / Medications: (Ice/Heat/NSAIDS/APAP/etc):  Pharmaceutical Cream  APAP - was asked to stop by her Nephro team (CRF Stage 3)     Report:      Interventional Pain Procedures: (Previous injections)  Right Knee Steroid Injection: Only helped for 1 month relief on right    Past Medical History:   Diagnosis Date    Anemia     Anxiety     Arthritis     Clotting disorder     Depression 2000     Past Surgical History:   Procedure Laterality Date    COLONOSCOPY N/A 10/29/2018    Procedure: COLONOSCOPY;  Surgeon: Mp Alvarado MD;  Location: [facility] ([address] [place] [address]);  Service: Endoscopy;  Laterality: N/A;  Referral received from Dr. SMOOTH Arias, [facility]  Last colonoscopy 2012-recommended f/u 5 years-past due    DILATION AND CURETTAGE  OF UTERUS      GASTRIC BYPASS      PARTIAL HYSTERECTOMY      AUB    TONSILLECTOMY      TUBAL LIGATION Bilateral      Social History     Socioeconomic History    Marital status:    Tobacco Use    Smoking status: Current Some Day Smoker     Packs/day: 0.25     Years: 7.00     Pack years: 1.75    Smokeless tobacco: Current User   Substance and Sexual Activity    Alcohol use: Not Currently     Alcohol/week: 2.0 standard drinks     Types: 2 Glasses of wine per week    Drug use: No    Sexual activity: Yes     Partners: Male     Birth control/protection: None     Family History   Problem Relation Age of Onset    Hypertension Mother     Depression Mother     Hyperlipidemia Mother     Dementia Mother     Gallbladder disease Mother     Hyperlipidemia Father     Hypertension Sister     Depression Sister     Hyperlipidemia Sister     Diabetes Sister     Hypertension Sister     Diabetes Sister     Hypertension Sister     Depression Brother     Diabetes Brother     Hyperlipidemia Brother     Hypertension Brother     Diabetes Brother     Hypertension Brother     Diabetes Brother     Hyperlipidemia Brother     Colon cancer Maternal Grandfather         rectal ca    Depression Maternal Grandfather     Breast cancer Paternal Grandmother     Depression Paternal Grandfather     Depression Son     Hypertension Maternal Aunt     Depression Maternal Aunt     Diabetes Paternal Uncle     Depression Paternal Uncle     Alcohol abuse Neg Hx     Ovarian cancer Neg Hx     Heart disease Neg Hx     Stroke Neg Hx        Review of patient's allergies indicates:  No Known Allergies    Current Outpatient Medications   Medication Sig    aspirin (ECOTRIN) 81 MG EC tablet Take 81 mg by mouth once daily.    calcium carbonate (CALCIUM 300 ORAL) Take by mouth.    clonazePAM (KLONOPIN) 1 MG tablet Take 1 mg by mouth 2 (two) times daily as needed for Anxiety.    DULoxetine (CYMBALTA) 60 MG capsule Take  "120 mg by mouth once daily.    FEROSUL 325 mg (65 mg iron) Tab tablet Take by mouth once daily.    magnesium 30 mg Tab Take by mouth once.    mirtazapine (REMERON) 45 MG tablet Take 45 mg by mouth every evening.    multivit-min-FA-lycopen-lutein (CENTRUM SILVER) 0.4-300-250 mg-mcg-mcg Tab Take 1 tablet by mouth once daily.    omega-3 fatty acids fish oil (OMEGA-3 2100) 1,050-1,200 mg Cap capsule Take 1 capsule by mouth once daily.    risperiDONE (RISPERDAL) 1 MG tablet Take 1 mg by mouth 2 (two) times daily.    sertraline (ZOLOFT) 100 MG tablet Take 1 tablet by mouth every morning.    suvorexant (BELSOMRA) 20 mg Tab Take by mouth.    terconazole (TERAZOL 3) 0.8 % vaginal cream Place 1 applicator vaginally every evening.    traZODone (DESYREL) 150 MG tablet Take 300 mg by mouth every evening.     No current facility-administered medications for this visit.       ROS:  GENERAL: No fever. No chills. No fatigue. Denies weight loss. Denies weight gain.  HEENT: Denies headaches. Denies vision change. Denies eye pain. Denies double vision. Denies ear pain.   CV: Denies chest pain.   PULM: Denies of shortness of breath.  GI: Denies constipation. No diarrhea. No abdominal pain. Denies nausea. Denies vomiting. No blood in stool.  HEME: Denies bleeding problems.  : Denies urgency. No painful urination. No blood in urine.  MS: Denies joint stiffness. Denies joint swelling.  Denies back pain. +Right knee pain  SKIN: Denies rash.   NEURO: Denies seizures. No weakness.  PSYCH:  Denies difficulty sleeping. No anxiety. Denies depression. No suicidal thoughts.       VITALS:   Vitals:    08/26/22 1336   BP: 129/76   Pulse: 89   Resp: 18   Temp: 98.1 °F (36.7 °C)   Weight: 74.8 kg (165 lb)   Height: 5' 5" (1.651 m)   PainSc:   5   PainLoc: Knee         PHYSICAL EXAM:   GENERAL: Well appearing, in no acute distress, alert and oriented x3.  PSYCH:  Mood and affect appropriate.  SKIN: Skin color, texture, turgor normal, no " rashes or lesions.  HEENT:  Normocephalic, atraumatic. Cranial nerves grossly intact.  NECK: No pain to palpation over the cervical paraspinous muscles. No pain to palpation over facets. No pain with neck flexion, extension, or lateral flexion.   PULM: No evidence of respiratory difficulty, symmetric chest rise.  GI:  Non-distended  BACK: Normal range of motion. No pain to palpation over the spinous processes. No pain to palpation over facet joints. There is no pain with palpation over the sacroiliac joints bilaterally. Straight leg raising in the supine position is negative to radicular pain.   EXTREMITIES: No deformities, edema, or skin discoloration.   MUSCULOSKELETAL: Rt Knee: Full ROM with audible crepitus. Some lateral flexibility without instability. Pain reproduced over the medial joint line.   NEURO: Sensation is equal and appropriate bilaterally. Bilateral upper and lower extremity coordination and muscle stretch reflexes are physiologic and symmetric. Plantar response are downgoing.   GAIT: normal.      LABS:      IMAGING:    XR KNEE ORTHO BILAT     CLINICAL HISTORY:  B/L KNEE;Pain in right knee     TECHNIQUE:  AP standing, lateral and Merchant views of both knees were performed.     COMPARISON:  Bilateral knees October 2004     FINDINGS:  There is moderate narrowing at the medial femoral tibial compartments.  Hypertrophic new bone noted.  Patellofemoral joints fairly well maintained.  No significant effusions.     Impression:     Degenerative changes above.        Electronically signed by: Hernando Agosto MD  Date:                                            01/14/2022  Time:                                           09:36    ASSESSMENT: 62 y.o. year old female with pain, consistent with knee osteoarthritis.      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 Xrays show moderate medial joint line narrowing bilaterally. Steroid injections are helping the  left knee, but not the right.     - Prior records reviewed  - >80% STR from R knee genicular block  - will s/f RFA as Pt ready to proceed with plan  - RTC 4 weeks after RFA      I would like to thank No ref. provider found for the opportunity to assist in the care of this patient. We had a very nice visit and I look forward to continuing their care. Please let me know if I can be of further assistance.     Artemio Dumont  08/26/2022

## 2022-08-26 NOTE — PROGRESS NOTES
PCP: Natalya Arias MD    REFERRING PHYSICIAN: No ref. provider found    CHIEF COMPLAINT: Right Knee Pain    Original HISTORY OF PRESENT ILLNESS: Nohemi Hull presents to the clinic for the evaluation of the above pain. The pain started 5 months ago after no particular event. She does have a history of meniscal injuries with knee scopes in the early 2000s.     Original Pain Description:  The pain is located in the medial right knee and does not radiate. The pain is described as aching. Exacerbating factors: Standing and Walking. Mitigating factors rest and sitting. Symptoms interfere with daily activity and sleeping. The patient feels like symptoms have been worsening.     Interval History 8/26/2022:  Mrs Hull is s/p right knee genicular RFA, she had >80% benefit from R knee genicular block for <24hr then pain returned fully. She had improved functioning at that time. Denies newer areas of pain or neurological changes.     Original PAIN SCORES:  Best: Pain is 3  Worst: Pain is 8  Usually: Pain is 5  Current: Pain is 5    INTERVAL HISTORY:     6 weeks of Conservative therapy (PT/Chiro/Home Exercises with Dates)  PT: No  Chiro: No  HEP: 3X per week    Treatments / Medications: (Ice/Heat/NSAIDS/APAP/etc):  Pharmaceutical Cream  APAP - was asked to stop by her Nephro team (CRF Stage 3)     Report:      Interventional Pain Procedures: (Previous injections)  Right Knee Steroid Injection: Only helped for 1 month relief on right    Past Medical History:   Diagnosis Date    Anemia     Anxiety     Arthritis     Clotting disorder     Depression 2000     Past Surgical History:   Procedure Laterality Date    COLONOSCOPY N/A 10/29/2018    Procedure: COLONOSCOPY;  Surgeon: Mp Alvarado MD;  Location: [facility] ([address] [place] [address]);  Service: Endoscopy;  Laterality: N/A;  Referral received from Dr. SMOOTH Arias, [facility]  Last colonoscopy 2012-recommended f/u 5 years-past due    DILATION AND CURETTAGE  OF UTERUS      GASTRIC BYPASS      PARTIAL HYSTERECTOMY      AUB    TONSILLECTOMY      TUBAL LIGATION Bilateral      Social History     Socioeconomic History    Marital status:    Tobacco Use    Smoking status: Current Some Day Smoker     Packs/day: 0.25     Years: 7.00     Pack years: 1.75    Smokeless tobacco: Current User   Substance and Sexual Activity    Alcohol use: Not Currently     Alcohol/week: 2.0 standard drinks     Types: 2 Glasses of wine per week    Drug use: No    Sexual activity: Yes     Partners: Male     Birth control/protection: None     Family History   Problem Relation Age of Onset    Hypertension Mother     Depression Mother     Hyperlipidemia Mother     Dementia Mother     Gallbladder disease Mother     Hyperlipidemia Father     Hypertension Sister     Depression Sister     Hyperlipidemia Sister     Diabetes Sister     Hypertension Sister     Diabetes Sister     Hypertension Sister     Depression Brother     Diabetes Brother     Hyperlipidemia Brother     Hypertension Brother     Diabetes Brother     Hypertension Brother     Diabetes Brother     Hyperlipidemia Brother     Colon cancer Maternal Grandfather         rectal ca    Depression Maternal Grandfather     Breast cancer Paternal Grandmother     Depression Paternal Grandfather     Depression Son     Hypertension Maternal Aunt     Depression Maternal Aunt     Diabetes Paternal Uncle     Depression Paternal Uncle     Alcohol abuse Neg Hx     Ovarian cancer Neg Hx     Heart disease Neg Hx     Stroke Neg Hx        Review of patient's allergies indicates:  No Known Allergies    Current Outpatient Medications   Medication Sig    aspirin (ECOTRIN) 81 MG EC tablet Take 81 mg by mouth once daily.    calcium carbonate (CALCIUM 300 ORAL) Take by mouth.    clonazePAM (KLONOPIN) 1 MG tablet Take 1 mg by mouth 2 (two) times daily as needed for Anxiety.    DULoxetine (CYMBALTA) 60 MG capsule Take  "120 mg by mouth once daily.    FEROSUL 325 mg (65 mg iron) Tab tablet Take by mouth once daily.    magnesium 30 mg Tab Take by mouth once.    mirtazapine (REMERON) 45 MG tablet Take 45 mg by mouth every evening.    multivit-min-FA-lycopen-lutein (CENTRUM SILVER) 0.4-300-250 mg-mcg-mcg Tab Take 1 tablet by mouth once daily.    omega-3 fatty acids fish oil (OMEGA-3 2100) 1,050-1,200 mg Cap capsule Take 1 capsule by mouth once daily.    risperiDONE (RISPERDAL) 1 MG tablet Take 1 mg by mouth 2 (two) times daily.    sertraline (ZOLOFT) 100 MG tablet Take 1 tablet by mouth every morning.    suvorexant (BELSOMRA) 20 mg Tab Take by mouth.    terconazole (TERAZOL 3) 0.8 % vaginal cream Place 1 applicator vaginally every evening.    traZODone (DESYREL) 150 MG tablet Take 300 mg by mouth every evening.     No current facility-administered medications for this visit.       ROS:  GENERAL: No fever. No chills. No fatigue. Denies weight loss. Denies weight gain.  HEENT: Denies headaches. Denies vision change. Denies eye pain. Denies double vision. Denies ear pain.   CV: Denies chest pain.   PULM: Denies of shortness of breath.  GI: Denies constipation. No diarrhea. No abdominal pain. Denies nausea. Denies vomiting. No blood in stool.  HEME: Denies bleeding problems.  : Denies urgency. No painful urination. No blood in urine.  MS: Denies joint stiffness. Denies joint swelling.  Denies back pain. +Right knee pain  SKIN: Denies rash.   NEURO: Denies seizures. No weakness.  PSYCH:  Denies difficulty sleeping. No anxiety. Denies depression. No suicidal thoughts.       VITALS:   Vitals:    08/26/22 1336   BP: 129/76   Pulse: 89   Resp: 18   Temp: 98.1 °F (36.7 °C)   Weight: 74.8 kg (165 lb)   Height: 5' 5" (1.651 m)   PainSc:   5   PainLoc: Knee         PHYSICAL EXAM:   GENERAL: Well appearing, in no acute distress, alert and oriented x3.  PSYCH:  Mood and affect appropriate.  SKIN: Skin color, texture, turgor normal, no " rashes or lesions.  HEENT:  Normocephalic, atraumatic. Cranial nerves grossly intact.  NECK: No pain to palpation over the cervical paraspinous muscles. No pain to palpation over facets. No pain with neck flexion, extension, or lateral flexion.   PULM: No evidence of respiratory difficulty, symmetric chest rise.  GI:  Non-distended  BACK: Normal range of motion. No pain to palpation over the spinous processes. No pain to palpation over facet joints. There is no pain with palpation over the sacroiliac joints bilaterally. Straight leg raising in the supine position is negative to radicular pain.   EXTREMITIES: No deformities, edema, or skin discoloration.   MUSCULOSKELETAL: Rt Knee: Full ROM with audible crepitus. Some lateral flexibility without instability. Pain reproduced over the medial joint line.   NEURO: Sensation is equal and appropriate bilaterally. Bilateral upper and lower extremity coordination and muscle stretch reflexes are physiologic and symmetric. Plantar response are downgoing.   GAIT: normal.      LABS:      IMAGING:    XR KNEE ORTHO BILAT     CLINICAL HISTORY:  B/L KNEE;Pain in right knee     TECHNIQUE:  AP standing, lateral and Merchant views of both knees were performed.     COMPARISON:  Bilateral knees October 2004     FINDINGS:  There is moderate narrowing at the medial femoral tibial compartments.  Hypertrophic new bone noted.  Patellofemoral joints fairly well maintained.  No significant effusions.     Impression:     Degenerative changes above.        Electronically signed by: Hernando Agosto MD  Date:                                            01/14/2022  Time:                                           09:36    ASSESSMENT: 62 y.o. year old female with pain, consistent with knee osteoarthritis.      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 Xrays show moderate medial joint line narrowing bilaterally. Steroid injections are helping the  left knee, but not the right.     - Prior records reviewed  - >80% STR from R knee genicular block  - will s/f RFA as Pt ready to proceed with plan  - RTC 4 weeks after RFA      I would like to thank No ref. provider found for the opportunity to assist in the care of this patient. We had a very nice visit and I look forward to continuing their care. Please let me know if I can be of further assistance.     Artemio Dumont  08/26/2022

## 2022-08-29 ENCOUNTER — TELEPHONE (OUTPATIENT)
Dept: ADMINISTRATIVE | Facility: OTHER | Age: 62
End: 2022-08-29
Payer: MEDICARE

## 2022-08-31 ENCOUNTER — TELEPHONE (OUTPATIENT)
Dept: ADMINISTRATIVE | Facility: OTHER | Age: 62
End: 2022-08-31
Payer: MEDICARE

## 2022-09-02 ENCOUNTER — TELEPHONE (OUTPATIENT)
Dept: INTERNAL MEDICINE | Facility: CLINIC | Age: 62
End: 2022-09-02
Payer: MEDICARE

## 2022-09-02 NOTE — TELEPHONE ENCOUNTER
----- Message from Jayme Villanueva sent at 9/2/2022 11:10 AM CDT -----  Regarding: Return Call  Contact: HILARIO ALBERTS [[id number]]  Type:  Patient Returning Call    Who Called: HILARIO ALBERTS [[id number]]    Who Left Message for Patient:    Does the patient know what this is regarding?: no    Would the patient rather a call back or a response via [facility]? Call    Best Call Back Number:[phone]    Additional Information:

## 2022-09-07 ENCOUNTER — OFFICE VISIT (OUTPATIENT)
Dept: HEMATOLOGY/ONCOLOGY | Facility: CLINIC | Age: 62
End: 2022-09-07
Payer: MEDICARE

## 2022-09-07 ENCOUNTER — LAB VISIT (OUTPATIENT)
Dept: LAB | Facility: OTHER | Age: 62
End: 2022-09-07
Attending: FAMILY MEDICINE
Payer: MEDICARE

## 2022-09-07 VITALS
HEIGHT: 65 IN | HEART RATE: 100 BPM | RESPIRATION RATE: 16 BRPM | DIASTOLIC BLOOD PRESSURE: 68 MMHG | TEMPERATURE: 97 F | OXYGEN SATURATION: 97 % | WEIGHT: 169.75 LBS | BODY MASS INDEX: 28.28 KG/M2 | SYSTOLIC BLOOD PRESSURE: 113 MMHG

## 2022-09-07 DIAGNOSIS — D53.9 ANEMIA, MACROCYTIC: ICD-10-CM

## 2022-09-07 LAB
ALBUMIN SERPL BCP-MCNC: 3.9 G/DL (ref 3.5–5.2)
ALP SERPL-CCNC: 77 U/L (ref 55–135)
ALT SERPL W/O P-5'-P-CCNC: 15 U/L (ref 10–44)
ANION GAP SERPL CALC-SCNC: 6 MMOL/L (ref 8–16)
AST SERPL-CCNC: 22 U/L (ref 10–40)
B2 MICROGLOB SERPL-MCNC: 1.9 UG/ML (ref 0–2.5)
BASOPHILS # BLD AUTO: 0.01 K/UL (ref 0–0.2)
BASOPHILS NFR BLD: 0.3 % (ref 0–1.9)
BILIRUB SERPL-MCNC: 0.7 MG/DL (ref 0.1–1)
BUN SERPL-MCNC: 24 MG/DL (ref 8–23)
CALCIUM SERPL-MCNC: 9.6 MG/DL (ref 8.7–10.5)
CHLORIDE SERPL-SCNC: 106 MMOL/L (ref 95–110)
CO2 SERPL-SCNC: 26 MMOL/L (ref 23–29)
CREAT SERPL-MCNC: 1 MG/DL (ref 0.5–1.4)
DIFFERENTIAL METHOD: ABNORMAL
EOSINOPHIL # BLD AUTO: 0.1 K/UL (ref 0–0.5)
EOSINOPHIL NFR BLD: 3 % (ref 0–8)
ERYTHROCYTE [DISTWIDTH] IN BLOOD BY AUTOMATED COUNT: 13.2 % (ref 11.5–14.5)
EST. GFR  (NO RACE VARIABLE): >60 ML/MIN/1.73 M^2
GLUCOSE SERPL-MCNC: 75 MG/DL (ref 70–110)
HCT VFR BLD AUTO: 39.7 % (ref 37–48.5)
HGB BLD-MCNC: 12.9 G/DL (ref 12–16)
IMM GRANULOCYTES # BLD AUTO: 0.01 K/UL (ref 0–0.04)
IMM GRANULOCYTES NFR BLD AUTO: 0.3 % (ref 0–0.5)
LYMPHOCYTES # BLD AUTO: 1.1 K/UL (ref 1–4.8)
LYMPHOCYTES NFR BLD: 30.3 % (ref 18–48)
MCH RBC QN AUTO: 33.6 PG (ref 27–31)
MCHC RBC AUTO-ENTMCNC: 32.5 G/DL (ref 32–36)
MCV RBC AUTO: 103 FL (ref 82–98)
MONOCYTES # BLD AUTO: 0.4 K/UL (ref 0.3–1)
MONOCYTES NFR BLD: 9.6 % (ref 4–15)
NEUTROPHILS # BLD AUTO: 2.1 K/UL (ref 1.8–7.7)
NEUTROPHILS NFR BLD: 56.5 % (ref 38–73)
NRBC BLD-RTO: 0 /100 WBC
PLATELET # BLD AUTO: 220 K/UL (ref 150–450)
PMV BLD AUTO: 8.9 FL (ref 9.2–12.9)
POTASSIUM SERPL-SCNC: 4.1 MMOL/L (ref 3.5–5.1)
PROT SERPL-MCNC: 7.2 G/DL (ref 6–8.4)
RBC # BLD AUTO: 3.84 M/UL (ref 4–5.4)
SODIUM SERPL-SCNC: 138 MMOL/L (ref 136–145)
WBC # BLD AUTO: 3.66 K/UL (ref 3.9–12.7)

## 2022-09-07 PROCEDURE — 86334 IMMUNOFIX E-PHORESIS SERUM: CPT | Performed by: INTERNAL MEDICINE

## 2022-09-07 PROCEDURE — 99204 PR OFFICE/OUTPT VISIT, NEW, LEVL IV, 45-59 MIN: ICD-10-PCS | Mod: S$GLB,,, | Performed by: INTERNAL MEDICINE

## 2022-09-07 PROCEDURE — 84165 PATHOLOGIST INTERPRETATION SPE: ICD-10-PCS | Mod: 26,,, | Performed by: PATHOLOGY

## 2022-09-07 PROCEDURE — 1159F MED LIST DOCD IN RCRD: CPT | Mod: CPTII,S$GLB,, | Performed by: INTERNAL MEDICINE

## 2022-09-07 PROCEDURE — 80053 COMPREHEN METABOLIC PANEL: CPT | Performed by: INTERNAL MEDICINE

## 2022-09-07 PROCEDURE — 3066F PR DOCUMENTATION OF TREATMENT FOR NEPHROPATHY: ICD-10-PCS | Mod: CPTII,S$GLB,, | Performed by: INTERNAL MEDICINE

## 2022-09-07 PROCEDURE — 86334 IMMUNOFIX E-PHORESIS SERUM: CPT | Mod: 26,,, | Performed by: PATHOLOGY

## 2022-09-07 PROCEDURE — 1159F PR MEDICATION LIST DOCUMENTED IN MEDICAL RECORD: ICD-10-PCS | Mod: CPTII,S$GLB,, | Performed by: INTERNAL MEDICINE

## 2022-09-07 PROCEDURE — 99999 PR PBB SHADOW E&M-EST. PATIENT-LVL IV: ICD-10-PCS | Mod: PBBFAC,,, | Performed by: INTERNAL MEDICINE

## 2022-09-07 PROCEDURE — 3008F PR BODY MASS INDEX (BMI) DOCUMENTED: ICD-10-PCS | Mod: CPTII,S$GLB,, | Performed by: INTERNAL MEDICINE

## 2022-09-07 PROCEDURE — 3066F NEPHROPATHY DOC TX: CPT | Mod: CPTII,S$GLB,, | Performed by: INTERNAL MEDICINE

## 2022-09-07 PROCEDURE — 3008F BODY MASS INDEX DOCD: CPT | Mod: CPTII,S$GLB,, | Performed by: INTERNAL MEDICINE

## 2022-09-07 PROCEDURE — 99999 PR PBB SHADOW E&M-EST. PATIENT-LVL IV: CPT | Mod: PBBFAC,,, | Performed by: INTERNAL MEDICINE

## 2022-09-07 PROCEDURE — 3074F SYST BP LT 130 MM HG: CPT | Mod: CPTII,S$GLB,, | Performed by: INTERNAL MEDICINE

## 2022-09-07 PROCEDURE — 3078F PR MOST RECENT DIASTOLIC BLOOD PRESSURE < 80 MM HG: ICD-10-PCS | Mod: CPTII,S$GLB,, | Performed by: INTERNAL MEDICINE

## 2022-09-07 PROCEDURE — 82232 ASSAY OF BETA-2 PROTEIN: CPT | Performed by: INTERNAL MEDICINE

## 2022-09-07 PROCEDURE — 83520 IMMUNOASSAY QUANT NOS NONAB: CPT | Mod: 59 | Performed by: INTERNAL MEDICINE

## 2022-09-07 PROCEDURE — 85025 COMPLETE CBC W/AUTO DIFF WBC: CPT | Performed by: INTERNAL MEDICINE

## 2022-09-07 PROCEDURE — 36415 COLL VENOUS BLD VENIPUNCTURE: CPT | Performed by: INTERNAL MEDICINE

## 2022-09-07 PROCEDURE — 84165 PROTEIN E-PHORESIS SERUM: CPT | Performed by: INTERNAL MEDICINE

## 2022-09-07 PROCEDURE — 3074F PR MOST RECENT SYSTOLIC BLOOD PRESSURE < 130 MM HG: ICD-10-PCS | Mod: CPTII,S$GLB,, | Performed by: INTERNAL MEDICINE

## 2022-09-07 PROCEDURE — 99204 OFFICE O/P NEW MOD 45 MIN: CPT | Mod: S$GLB,,, | Performed by: INTERNAL MEDICINE

## 2022-09-07 PROCEDURE — 3078F DIAST BP <80 MM HG: CPT | Mod: CPTII,S$GLB,, | Performed by: INTERNAL MEDICINE

## 2022-09-07 PROCEDURE — 86334 PATHOLOGIST INTERPRETATION IFE: ICD-10-PCS | Mod: 26,,, | Performed by: PATHOLOGY

## 2022-09-07 PROCEDURE — 84165 PROTEIN E-PHORESIS SERUM: CPT | Mod: 26,,, | Performed by: PATHOLOGY

## 2022-09-07 NOTE — PROGRESS NOTES
[facility]         Chief Complaint:   Encounter Diagnosis   Name Primary?    Anemia, macrocytic         Cancer Staging   No matching staging information was found for the patient.    HPI:  Nohemi Hull is a 62 y.o. female who presents today for evaluation of macrocytic anemia.     Hematolgoy History  Patient referred to hematology in August 2022 for concerns of macrocytic anemia  Review of labs demonstrates borderline macrocytosis since at least 2004. She has had intermittent anemia since 2020  Labs 7/20/22 show hgb 11.9g/dL and   Pathology review demonstrates macrocytic anemia but no dysplasia  Remaining labs are WNL  Imaging is benign        Social History     Tobacco Use    Smoking status: Some Days     Packs/day: 0.25     Years: 7.00     Pack years: 1.75     Types: Cigarettes    Smokeless tobacco: Current   Substance Use Topics    Alcohol use: Not Currently     Alcohol/week: 2.0 standard drinks     Types: 2 Glasses of wine per week    Drug use: No     Family History   Problem Relation Age of Onset    Hypertension Mother     Depression Mother     Hyperlipidemia Mother     Dementia Mother     Gallbladder disease Mother     Hyperlipidemia Father     Hypertension Sister     Depression Sister     Hyperlipidemia Sister     Diabetes Sister     Hypertension Sister     Diabetes Sister     Hypertension Sister     Depression Brother     Diabetes Brother     Hyperlipidemia Brother     Hypertension Brother     Diabetes Brother     Hypertension Brother     Diabetes Brother     Hyperlipidemia Brother     Colon cancer Maternal Grandfather         rectal ca    Depression Maternal Grandfather     Breast cancer Paternal Grandmother     Depression Paternal Grandfather     Depression Son     Hypertension Maternal Aunt     Depression Maternal Aunt     Diabetes Paternal Uncle     Depression Paternal Uncle     Alcohol abuse Neg Hx     Ovarian cancer Neg Hx     Heart disease Neg Hx      Stroke Neg Hx      Past Medical History:   Diagnosis Date    Anemia     Anxiety     Arthritis     Clotting disorder     Depression 2000     Past Surgical History:   Procedure Laterality Date    COLONOSCOPY N/A 10/29/2018    Procedure: COLONOSCOPY;  Surgeon: Mp Alvarado MD;  Location: [address]);  Service: Endoscopy;  Laterality: N/A;  Referral received from Dr. SMOOTH Arias, [facility]  Last colonoscopy 2012-recommended f/u 5 years-past due    DILATION AND CURETTAGE OF UTERUS      GASTRIC BYPASS      PARTIAL HYSTERECTOMY      AUB    TONSILLECTOMY      TUBAL LIGATION Bilateral        Patient Active Problem List   Diagnosis    Screening for colon cancer    Primary osteoarthritis of right knee    Primary osteoarthritis of left knee    Primary insomnia    History of gastric bypass    Recurrent major depressive disorder, in partial remission       Current Outpatient Medications   Medication Instructions    aspirin (ECOTRIN) 81 mg, Oral, Daily    calcium carbonate (CALCIUM 300 ORAL) Oral    clonazePAM (KLONOPIN) 1 mg, Oral, 2 times daily PRN    DULoxetine (CYMBALTA) 120 mg, Oral, Daily    FEROSUL 325 mg (65 mg iron) Tab tablet Oral, Daily    magnesium 30 mg Tab Oral, Once    mirtazapine (REMERON) 45 mg, Oral, Nightly    multivit-min-FA-lycopen-lutein (CENTRUM SILVER) 0.4-300-250 mg-mcg-mcg Tab 1 tablet, Oral, Daily    omega-3 fatty acids fish oil (OMEGA-3 2100) 1,050-1,200 mg Cap capsule 1 capsule, Oral, Daily    risperiDONE (RISPERDAL) 1 mg, Oral, 2 times daily    sertraline (ZOLOFT) 100 MG tablet 1 tablet, Every morning    suvorexant (BELSOMRA) 20 mg Tab Oral    terconazole (TERAZOL 3) 0.8 % vaginal cream 1 applicator, Vaginal, Nightly    traZODone (DESYREL) 300 mg, Oral, Nightly         Review of Systems:   Review of Systems   Constitutional: Negative.    HENT: Negative.     Respiratory: Negative.     Cardiovascular: Negative.    Gastrointestinal: Negative.    Musculoskeletal: Negative.   "  Neurological: Negative.    All other systems reviewed and are negative.    PHYSICAL EXAM:  /68 (BP Location: Left arm, Patient Position: Sitting, BP Method: Medium (Automatic))   Pulse 100   Temp 96.9 °F (36.1 °C) (Oral)   Resp 16   Ht 5' 5" (1.651 m)   Wt 77 kg (169 lb 12.1 oz)   SpO2 97%   BMI 28.25 kg/m²     General Appearance:    Alert, cooperative, no distress, appears stated age   Head:    Normocephalic, without obvious abnormality, atraumatic   Neck:   Supple, symmetrical, no adenopathy;     thyroid:  no enlargement/tenderness/nodules   Lungs:     Clear to auscultation bilaterally, respirations unlabored    Heart:    Regular rate and rhythm, S1 and S2 normal   Abdomen:     Soft, non-tender, bowel sounds active, no masses, no organomegaly   Extremities:   Extremities normal, atraumatic, no cyanosis or edema   Pulses:   2+ and symmetric all extremities   Skin:   Skin color, texture normal, no rashes or lesions   Lymph nodes:   Cervical, supraclavicular, and axillary nodes normal   Neurologic:   CNII-XII intact, normal strength           Pertinent Labs & Imaging:  Recent Labs   Lab Result Units 07/11/22  1251 07/20/22  1600   WBC K/uL 3.82* 4.01   Hemoglobin g/dL 12.3 11.9*   Hematocrit % 37.7 36.4*   Platelets K/uL 199 219     Recent Labs   Lab Result Units 07/11/22  1251   Creatinine mg/dL 1.0   AST U/L 23   ALT U/L 27         Assessment & Plan:    1. Anemia, macrocytic  - Ambulatory referral/consult to Hematology / Oncology  - Protein Electrophoresis, Serum; Future  - Immunofixation Electrophoresis; Future  - CBC Auto Differential; Future  - Immunoglobulin Free LT Chains Blood; Future  - Beta 2 Microglobulin, Serum; Future  - Comprehensive Metabolic Panel; Future    Full chart review of past labs and imaging, referring providers' notes, and consultants' reports.   Patient with macrocytosis that is long standing with intermittent borderline anemia  Differential diagnosis of anemia is extensive " including, but not limited to, nutritional deficiency, hemolysis, iron deficiency/blood loss, anemia of inflammation/chronic disease, chronic renal insufficiency, endocrine dysfunction, bone marrow suppression, and malignancy/myelodysplastic syndrome  TSH, copper, zinc, folate, and B12 are all within normal limits  Colonoscopy 2018 normal with mild diverticulosis  No dysplasia seen on peripheral smear. Will repeat labs today with myeloma work up as well        Med Onc Chart Routing      Follow up with physician 2 weeks.   Follow up with HAILEE    Infusion scheduling note    Injection scheduling note    Labs   Lab interval:  Labs today   Imaging    Pharmacy appointment    Other referrals          Total time of this visit, including time spent face to face with patient and/or via video/audio, and also in preparing for today's visit for MDM and documentation. (Medical Decision Making, including consideration of possible diagnoses, management options, complex medical record review, review of diagnostic tests and information, consideration and discussion of significant complications based on comorbidities, and discussion with providers involved with the care of the patient) 45 minutes. Greater than 50% was spent face to face with the patient counseling and coordinating care.      oLli Chavez MD  09/07/2022

## 2022-09-08 LAB
INTERPRETATION SERPL IFE-IMP: NORMAL
KAPPA LC SER QL IA: 2.65 MG/DL (ref 0.33–1.94)
KAPPA LC/LAMBDA SER IA: 1.27 (ref 0.26–1.65)
LAMBDA LC SER QL IA: 2.08 MG/DL (ref 0.57–2.63)
PATHOLOGIST INTERPRETATION IFE: NORMAL

## 2022-09-09 LAB
ALBUMIN SERPL ELPH-MCNC: 3.98 G/DL (ref 3.35–5.55)
ALPHA1 GLOB SERPL ELPH-MCNC: 0.26 G/DL (ref 0.17–0.41)
ALPHA2 GLOB SERPL ELPH-MCNC: 0.64 G/DL (ref 0.43–0.99)
B-GLOBULIN SERPL ELPH-MCNC: 0.68 G/DL (ref 0.5–1.1)
GAMMA GLOB SERPL ELPH-MCNC: 1.14 G/DL (ref 0.67–1.58)
PATHOLOGIST INTERPRETATION SPE: NORMAL
PROT SERPL-MCNC: 6.7 G/DL (ref 6–8.4)

## 2022-09-13 ENCOUNTER — HOSPITAL ENCOUNTER (OUTPATIENT)
Facility: OTHER | Age: 62
Discharge: HOME | End: 2022-09-13
Attending: ANESTHESIOLOGY | Admitting: ANESTHESIOLOGY
Payer: MEDICARE

## 2022-09-13 VITALS
TEMPERATURE: 98 F | OXYGEN SATURATION: 98 % | HEART RATE: 63 BPM | BODY MASS INDEX: 26.84 KG/M2 | RESPIRATION RATE: 14 BRPM | DIASTOLIC BLOOD PRESSURE: 58 MMHG | HEIGHT: 66 IN | WEIGHT: 167 LBS | SYSTOLIC BLOOD PRESSURE: 110 MMHG

## 2022-09-13 DIAGNOSIS — M17.0 PRIMARY OSTEOARTHRITIS OF BOTH KNEES: ICD-10-CM

## 2022-09-13 DIAGNOSIS — M17.12 PRIMARY OSTEOARTHRITIS OF LEFT KNEE: Primary | ICD-10-CM

## 2022-09-13 DIAGNOSIS — G89.29 CHRONIC PAIN: ICD-10-CM

## 2022-09-13 PROCEDURE — 64624 PR DESTRUCT, NEUROLYTIC AGENT, GENICULAR NERVE, W/IMG: ICD-10-PCS | Mod: RT,,, | Performed by: ANESTHESIOLOGY

## 2022-09-13 PROCEDURE — 64624 DSTRJ NULYT AGT GNCLR NRV: CPT | Mod: RT,,, | Performed by: ANESTHESIOLOGY

## 2022-09-13 PROCEDURE — 64624 DSTRJ NULYT AGT GNCLR NRV: CPT | Mod: RT | Performed by: ANESTHESIOLOGY

## 2022-09-13 PROCEDURE — 63600175 PHARM REV CODE 636 W HCPCS: Performed by: ANESTHESIOLOGY

## 2022-09-13 PROCEDURE — 25000003 PHARM REV CODE 250: Performed by: ANESTHESIOLOGY

## 2022-09-13 PROCEDURE — A4649 SURGICAL SUPPLIES: HCPCS | Performed by: ANESTHESIOLOGY

## 2022-09-13 RX ORDER — FENTANYL CITRATE 50 UG/ML
INJECTION, SOLUTION INTRAMUSCULAR; INTRAVENOUS
Status: DISCONTINUED | OUTPATIENT
Start: 2022-09-13 | End: 2022-09-13 | Stop reason: HOSPADM

## 2022-09-13 RX ORDER — TRIAMCINOLONE ACETONIDE 40 MG/ML
INJECTION, SUSPENSION INTRA-ARTICULAR; INTRAMUSCULAR
Status: DISCONTINUED | OUTPATIENT
Start: 2022-09-13 | End: 2022-09-13 | Stop reason: HOSPADM

## 2022-09-13 RX ORDER — MIDAZOLAM HYDROCHLORIDE 1 MG/ML
INJECTION INTRAMUSCULAR; INTRAVENOUS
Status: DISCONTINUED | OUTPATIENT
Start: 2022-09-13 | End: 2022-09-13 | Stop reason: HOSPADM

## 2022-09-13 RX ORDER — SODIUM CHLORIDE 9 MG/ML
500 INJECTION, SOLUTION INTRAVENOUS CONTINUOUS
Status: DISCONTINUED | OUTPATIENT
Start: 2022-09-13 | End: 2022-09-13 | Stop reason: HOSPADM

## 2022-09-13 RX ORDER — LIDOCAINE HYDROCHLORIDE 20 MG/ML
INJECTION, SOLUTION INFILTRATION; PERINEURAL
Status: DISCONTINUED | OUTPATIENT
Start: 2022-09-13 | End: 2022-09-13 | Stop reason: HOSPADM

## 2022-09-13 RX ORDER — BUPIVACAINE HYDROCHLORIDE 2.5 MG/ML
INJECTION, SOLUTION EPIDURAL; INFILTRATION; INTRACAUDAL
Status: DISCONTINUED | OUTPATIENT
Start: 2022-09-13 | End: 2022-09-13 | Stop reason: HOSPADM

## 2022-09-13 NOTE — OP NOTE
Therapeutic Genicular Cooled Nerve Radiofrequency Ablation under Fluoroscopy     The procedure, risks, benefits, and options were discussed with the patient. There are no contraindications to the procedure. The patent expressed understanding and agreed to the procedure. Informed written consent was obtained prior to the start of the procedure and can be found in the patient's chart.        PATIENT NAME: Nohemi Hull   MRN: [id number]     DATE OF PROCEDURE: 09/13/2022     PROCEDURE: Therapeutic Right Genicular Cooled Nerve Radiofrequency Ablation under Fluoroscopy    PRE-OP DIAGNOSIS: Primary osteoarthritis of right knee [M17.11]    POST-OP DIAGNOSIS: Primary osteoarthritis of right knee [M17.11]    PHYSICIAN: Radha Justin MD    ASSISTANTS: Trace Melton    MEDICATIONS INJECTED:  Preservative-free Kenalog 40mg with 9cc of Bupivicaine 0.25%    LOCAL ANESTHETIC INJECTED:   Xylocaine 2%    SEDATION: Versed 1mg and Fentanyl 50mcg                                                                                                                                                                                     Conscious sedation ordered by M.D. Patient re-evaluation prior to administration of conscious sedation. No changes noted in patient's status from initial evaluation. The patient's vital signs were monitored by RN and patient remained hemodynamically stable throughout the procedure.    Event Time In   Sedation Start 1127       ESTIMATED BLOOD LOSS:  None    COMPLICATIONS:  None     INTERVAL HISTORY: Patient has clinical findings of chronic knee pain. Patients has completed 2 previous diagnostic genicular nerve blocks with at least 80% relief for the expected duration of the local anesthetic utilized.     TECHNIQUE: Time-out was performed to identify the patient and procedure to be performed. With the patient laying in a supine position, the surgical area was prepped and draped in the usual sterile fashion using  ChloraPrep and fenestrated drape. Three target sites including the superior lateral genicular nerve where the lateral femoral shaft meets the epicondyle, the superior medial genicular nerve where the medial femoral shaft meets the epicondyle, and the inferior medial genicular nerve where the medial tibial shaft meets the epicondyle, were determined under fluoroscopic guidance. Skin anesthesia was achieved by injecting Lidocaine 2% over the injection sites. A 17 gauge, 50mm, 10mm active tip needle was then advanced under fluoroscopy in the AP and lateral views into the positions of the geniculate nerves at these levels. This was followed by motor testing at each of the nerves to ensure that there was no dorsiflexion of the foot. After negative aspiration for blood was confirmed, 1 mL of the lidocaine 2% listed above was injected slowly at each site. This was followed by cooled thermal lesioning at 80 degrees celsius for 150 seconds at each site. That was followed by slowly injecting 2 mL of the medication mixture listed above at each site. The needles were removed and bleeding was nil. A sterile dressing was applied. No specimens collected. The patient tolerated the procedure well and did not have any procedure related motor deficit at the conclusion of the procedure.    The patient was monitored after the procedure in the recovery area. They were given post-procedure and discharge instructions to follow at home. The patient was discharged in a stable condition.        Radha Justin MD

## 2022-09-13 NOTE — DISCHARGE INSTRUCTIONS

## 2022-09-13 NOTE — DISCHARGE SUMMARY
[facility] ([place])  Discharge Note  Short Stay    Procedure(s) (LRB):  RADIOFREQUENCY ABLATION, GENICULAR RIGHT COOLED (Right)    OUTCOME: Patient tolerated treatment/procedure well without complication and is now ready for discharge.    DISPOSITION: Home or Self Care    FINAL DIAGNOSIS:  Knee osteoarthritis    FOLLOWUP: In clinic    DISCHARGE INSTRUCTIONS:  No discharge procedures on file.     TIME SPENT ON DISCHARGE: 2 minutes

## 2022-09-14 ENCOUNTER — PATIENT MESSAGE (OUTPATIENT)
Dept: PAIN MEDICINE | Facility: OTHER | Age: 62
End: 2022-09-14
Payer: MEDICARE

## 2022-09-20 ENCOUNTER — TELEPHONE (OUTPATIENT)
Dept: INTERNAL MEDICINE | Facility: CLINIC | Age: 62
End: 2022-09-20
Payer: MEDICARE

## 2022-09-20 NOTE — TELEPHONE ENCOUNTER
----- Message from Demetrio Ghosh MA sent at 9/20/2022 10:15 AM CDT -----  Contact: [phone]    ----- Message -----  From: Yolanda Stevens  Sent: 9/19/2022   4:54 PM CDT  To: [facility]DE[facility] Staff    Pt called to advise that she is having issues with pain in her foot. Pt would like to get something for pain sent to her pharmacy. Please Advise       [id number] - [place], [place] - [address] AT [facility] [address]  [facility] [place] [address]  Phone: [phone] Fax: [phone]

## 2022-09-23 ENCOUNTER — TELEPHONE (OUTPATIENT)
Dept: PAIN MEDICINE | Facility: CLINIC | Age: 62
End: 2022-09-23
Payer: MEDICARE

## 2022-09-23 ENCOUNTER — TELEPHONE (OUTPATIENT)
Dept: SPINE | Facility: CLINIC | Age: 62
End: 2022-09-23
Payer: MEDICARE

## 2022-09-23 NOTE — TELEPHONE ENCOUNTER
Staff called pt and was able successfully schedule per request appt with Dr. Justin in [place] ,09/26. Pt verbalized understanding and confirmed. Sg

## 2022-09-23 NOTE — TELEPHONE ENCOUNTER
This message is for patient in regards to his/her appointment 09/26/22 at 9:15a   With Radha Lindo.      For the safety of all patients and staff members. We are requesting during this visit that all patients and visitors to wear required face mask at all times here at [facility].     If you have any questions or concerns please contact [phone]     Staff left voicemail

## 2022-09-26 ENCOUNTER — OFFICE VISIT (OUTPATIENT)
Dept: SPINE | Facility: CLINIC | Age: 62
End: 2022-09-26
Payer: MEDICARE

## 2022-09-26 ENCOUNTER — OFFICE VISIT (OUTPATIENT)
Dept: INTERNAL MEDICINE | Facility: CLINIC | Age: 62
End: 2022-09-26
Payer: MEDICARE

## 2022-09-26 VITALS
SYSTOLIC BLOOD PRESSURE: 110 MMHG | WEIGHT: 162.5 LBS | WEIGHT: 160.94 LBS | OXYGEN SATURATION: 97 % | DIASTOLIC BLOOD PRESSURE: 57 MMHG | HEIGHT: 66 IN | TEMPERATURE: 98 F | SYSTOLIC BLOOD PRESSURE: 100 MMHG | HEART RATE: 102 BPM | DIASTOLIC BLOOD PRESSURE: 64 MMHG | HEIGHT: 66 IN | BODY MASS INDEX: 26.12 KG/M2 | HEART RATE: 86 BPM | BODY MASS INDEX: 25.86 KG/M2

## 2022-09-26 DIAGNOSIS — M17.11 PRIMARY OSTEOARTHRITIS OF RIGHT KNEE: Primary | ICD-10-CM

## 2022-09-26 DIAGNOSIS — R79.9 ELEVATED BUN: ICD-10-CM

## 2022-09-26 DIAGNOSIS — Z79.899 POLYPHARMACY: ICD-10-CM

## 2022-09-26 DIAGNOSIS — R25.2 MUSCLE CRAMPING: Primary | ICD-10-CM

## 2022-09-26 DIAGNOSIS — M79.605 PAIN OF LEFT LOWER EXTREMITY: ICD-10-CM

## 2022-09-26 PROCEDURE — 3074F SYST BP LT 130 MM HG: CPT | Mod: CPTII,S$GLB,, | Performed by: PHYSICIAN ASSISTANT

## 2022-09-26 PROCEDURE — 3066F NEPHROPATHY DOC TX: CPT | Mod: CPTII,S$GLB,, | Performed by: ANESTHESIOLOGY

## 2022-09-26 PROCEDURE — 3078F PR MOST RECENT DIASTOLIC BLOOD PRESSURE < 80 MM HG: ICD-10-PCS | Mod: CPTII,S$GLB,, | Performed by: ANESTHESIOLOGY

## 2022-09-26 PROCEDURE — 99214 OFFICE O/P EST MOD 30 MIN: CPT | Mod: S$GLB,,, | Performed by: ANESTHESIOLOGY

## 2022-09-26 PROCEDURE — 1159F PR MEDICATION LIST DOCUMENTED IN MEDICAL RECORD: ICD-10-PCS | Mod: CPTII,S$GLB,, | Performed by: ANESTHESIOLOGY

## 2022-09-26 PROCEDURE — 1159F PR MEDICATION LIST DOCUMENTED IN MEDICAL RECORD: ICD-10-PCS | Mod: CPTII,S$GLB,, | Performed by: PHYSICIAN ASSISTANT

## 2022-09-26 PROCEDURE — 1160F PR REVIEW ALL MEDS BY PRESCRIBER/CLIN PHARMACIST DOCUMENTED: ICD-10-PCS | Mod: CPTII,S$GLB,, | Performed by: ANESTHESIOLOGY

## 2022-09-26 PROCEDURE — 99999 PR PBB SHADOW E&M-EST. PATIENT-LVL IV: CPT | Mod: PBBFAC,,, | Performed by: PHYSICIAN ASSISTANT

## 2022-09-26 PROCEDURE — 99214 OFFICE O/P EST MOD 30 MIN: CPT | Mod: S$GLB,,, | Performed by: PHYSICIAN ASSISTANT

## 2022-09-26 PROCEDURE — 3008F BODY MASS INDEX DOCD: CPT | Mod: CPTII,S$GLB,, | Performed by: PHYSICIAN ASSISTANT

## 2022-09-26 PROCEDURE — 3074F SYST BP LT 130 MM HG: CPT | Mod: CPTII,S$GLB,, | Performed by: ANESTHESIOLOGY

## 2022-09-26 PROCEDURE — 99214 PR OFFICE/OUTPT VISIT, EST, LEVL IV, 30-39 MIN: ICD-10-PCS | Mod: S$GLB,,, | Performed by: ANESTHESIOLOGY

## 2022-09-26 PROCEDURE — 1160F RVW MEDS BY RX/DR IN RCRD: CPT | Mod: CPTII,S$GLB,, | Performed by: PHYSICIAN ASSISTANT

## 2022-09-26 PROCEDURE — 1159F MED LIST DOCD IN RCRD: CPT | Mod: CPTII,S$GLB,, | Performed by: PHYSICIAN ASSISTANT

## 2022-09-26 PROCEDURE — 1160F RVW MEDS BY RX/DR IN RCRD: CPT | Mod: CPTII,S$GLB,, | Performed by: ANESTHESIOLOGY

## 2022-09-26 PROCEDURE — 3008F PR BODY MASS INDEX (BMI) DOCUMENTED: ICD-10-PCS | Mod: CPTII,S$GLB,, | Performed by: ANESTHESIOLOGY

## 2022-09-26 PROCEDURE — 3066F PR DOCUMENTATION OF TREATMENT FOR NEPHROPATHY: ICD-10-PCS | Mod: CPTII,S$GLB,, | Performed by: PHYSICIAN ASSISTANT

## 2022-09-26 PROCEDURE — 3008F PR BODY MASS INDEX (BMI) DOCUMENTED: ICD-10-PCS | Mod: CPTII,S$GLB,, | Performed by: PHYSICIAN ASSISTANT

## 2022-09-26 PROCEDURE — 1160F PR REVIEW ALL MEDS BY PRESCRIBER/CLIN PHARMACIST DOCUMENTED: ICD-10-PCS | Mod: CPTII,S$GLB,, | Performed by: PHYSICIAN ASSISTANT

## 2022-09-26 PROCEDURE — 3066F NEPHROPATHY DOC TX: CPT | Mod: CPTII,S$GLB,, | Performed by: PHYSICIAN ASSISTANT

## 2022-09-26 PROCEDURE — 3066F PR DOCUMENTATION OF TREATMENT FOR NEPHROPATHY: ICD-10-PCS | Mod: CPTII,S$GLB,, | Performed by: ANESTHESIOLOGY

## 2022-09-26 PROCEDURE — 99999 PR PBB SHADOW E&M-EST. PATIENT-LVL IV: ICD-10-PCS | Mod: PBBFAC,,, | Performed by: ANESTHESIOLOGY

## 2022-09-26 PROCEDURE — 3078F DIAST BP <80 MM HG: CPT | Mod: CPTII,S$GLB,, | Performed by: PHYSICIAN ASSISTANT

## 2022-09-26 PROCEDURE — 1159F MED LIST DOCD IN RCRD: CPT | Mod: CPTII,S$GLB,, | Performed by: ANESTHESIOLOGY

## 2022-09-26 PROCEDURE — 3008F BODY MASS INDEX DOCD: CPT | Mod: CPTII,S$GLB,, | Performed by: ANESTHESIOLOGY

## 2022-09-26 PROCEDURE — 99999 PR PBB SHADOW E&M-EST. PATIENT-LVL IV: ICD-10-PCS | Mod: PBBFAC,,, | Performed by: PHYSICIAN ASSISTANT

## 2022-09-26 PROCEDURE — 99214 PR OFFICE/OUTPT VISIT, EST, LEVL IV, 30-39 MIN: ICD-10-PCS | Mod: S$GLB,,, | Performed by: PHYSICIAN ASSISTANT

## 2022-09-26 PROCEDURE — 3074F PR MOST RECENT SYSTOLIC BLOOD PRESSURE < 130 MM HG: ICD-10-PCS | Mod: CPTII,S$GLB,, | Performed by: ANESTHESIOLOGY

## 2022-09-26 PROCEDURE — 3074F PR MOST RECENT SYSTOLIC BLOOD PRESSURE < 130 MM HG: ICD-10-PCS | Mod: CPTII,S$GLB,, | Performed by: PHYSICIAN ASSISTANT

## 2022-09-26 PROCEDURE — 3078F DIAST BP <80 MM HG: CPT | Mod: CPTII,S$GLB,, | Performed by: ANESTHESIOLOGY

## 2022-09-26 PROCEDURE — 3078F PR MOST RECENT DIASTOLIC BLOOD PRESSURE < 80 MM HG: ICD-10-PCS | Mod: CPTII,S$GLB,, | Performed by: PHYSICIAN ASSISTANT

## 2022-09-26 PROCEDURE — 99999 PR PBB SHADOW E&M-EST. PATIENT-LVL IV: CPT | Mod: PBBFAC,,, | Performed by: ANESTHESIOLOGY

## 2022-09-26 RX ORDER — OXYBUTYNIN CHLORIDE 5 MG/1
5 TABLET ORAL DAILY
COMMUNITY
Start: 2022-09-20 | End: 2023-05-08 | Stop reason: SDUPTHER

## 2022-09-26 RX ORDER — ACETAMINOPHEN 500 MG
500 TABLET ORAL 2 TIMES DAILY PRN
COMMUNITY
Start: 2022-06-30 | End: 2022-09-26 | Stop reason: SDUPTHER

## 2022-09-26 RX ORDER — GABAPENTIN 300 MG/1
300-600 CAPSULE ORAL NIGHTLY
COMMUNITY
Start: 2022-09-23 | End: 2022-09-26 | Stop reason: SDUPTHER

## 2022-09-26 RX ORDER — GABAPENTIN 300 MG/1
600 CAPSULE ORAL NIGHTLY
Qty: 60 CAPSULE | Refills: 0 | Status: SHIPPED | OUTPATIENT
Start: 2022-09-26 | End: 2022-10-11 | Stop reason: ALTCHOICE

## 2022-09-26 RX ORDER — ACETAMINOPHEN 500 MG
500 TABLET ORAL 2 TIMES DAILY PRN
Qty: 60 TABLET | Refills: 2 | Status: SHIPPED | OUTPATIENT
Start: 2022-09-26 | End: 2023-11-02 | Stop reason: SDUPTHER

## 2022-09-26 NOTE — PROGRESS NOTES
INTERNAL MEDICINE URGENT VISIT NOTE    CHIEF COMPLAINT     Chief Complaint   Patient presents with    Knee Pain     Rt knee/ bilateral calf cramping at night        HPI     Nohemi Hull is a 62 y.o. female who presents for an urgent visit today.    PCP is Nataly Rose MD, patient is new to me.     Patient presents with complaints of bilateral right knee and calf pain with muscle cramping at night that has not improved since having radiofrequency ablation by pain management.  She reports no trauma or injury.  She reports no swelling to lower extremities denies fever, chills, nausea or vomiting.  Of note, she does report that she tried her friends doses of tramadol and she is interested in getting a prescription of this for herself.  She is still taking gabapentin 300 mg nightly and reports minimal improvement in pain.      Of note, recently updated lab work earlier this month shows BUN elevated to 24 with creatinine at baseline of 1.0.  Patient reports that she tries to drink fluid 8-10 glasses of water daily but admits sometimes she has difficulty with this.  She reports normal urination.      Past Medical History:  Past Medical History:   Diagnosis Date    Anemia     Anxiety     Arthritis     Clotting disorder     Depression 2000       Home Medications:  Prior to Admission medications    Medication Sig Start Date End Date Taking? Authorizing Provider   acetaminophen (TYLENOL) 500 MG tablet Take 500 mg by mouth 2 (two) times daily as needed. 6/30/22  Yes Historical Provider   aspirin (ECOTRIN) 81 MG EC tablet Take 81 mg by mouth once daily.   Yes Historical Provider   calcium carbonate (CALCIUM 300 ORAL) Take by mouth.   Yes Historical Provider   clonazePAM (KLONOPIN) 1 MG tablet Take 1 mg by mouth 2 (two) times daily as needed for Anxiety.   Yes Historical Provider   DULoxetine (CYMBALTA) 60 MG capsule Take 120 mg by mouth once daily.   Yes Historical Provider   FEROSUL 325 mg (65 mg iron) Tab  tablet Take by mouth once daily. 2/22/22  Yes Historical Provider   gabapentin (NEURONTIN) 300 MG capsule Take 300-600 mg by mouth every evening. 9/23/22  Yes Historical Provider   magnesium 30 mg Tab Take by mouth once.   Yes Historical Provider   mirtazapine (REMERON) 45 MG tablet Take 45 mg by mouth every evening.   Yes Historical Provider   multivit-min-FA-lycopen-lutein (CENTRUM SILVER) 0.4-300-250 mg-mcg-mcg Tab Take 1 tablet by mouth once daily.   Yes Historical Provider   omega-3 fatty acids fish oil (OMEGA-3 2100) 1,050-1,200 mg Cap capsule Take 1 capsule by mouth once daily.   Yes Historical Provider   oxybutynin (DITROPAN) 5 MG Tab Take 5 mg by mouth once daily. 9/20/22  Yes Historical Provider   risperiDONE (RISPERDAL) 1 MG tablet Take 1 mg by mouth 2 (two) times daily.   Yes Historical Provider   sertraline (ZOLOFT) 100 MG tablet Take 1 tablet by mouth every morning.   Yes Historical Provider   suvorexant (BELSOMRA) 20 mg Tab Take by mouth.   Yes Historical Provider   traZODone (DESYREL) 150 MG tablet Take 300 mg by mouth every evening.   Yes Historical Provider   terconazole (TERAZOL 3) 0.8 % vaginal cream Place 1 applicator vaginally every evening.    Historical Provider       Review of Systems:  Review of Systems   Constitutional:  Negative for chills and fever.   HENT:  Negative for sore throat and trouble swallowing.    Eyes:  Negative for visual disturbance.   Respiratory:  Negative for cough and shortness of breath.    Cardiovascular:  Negative for chest pain.   Gastrointestinal:  Negative for abdominal pain, constipation, diarrhea, nausea and vomiting.   Genitourinary:  Negative for dysuria and flank pain.   Musculoskeletal:  Negative for back pain, neck pain and neck stiffness.        Muscle cramping in legs both   Right leg pain    Skin:  Negative for rash.   Neurological:  Negative for dizziness, syncope, weakness and headaches.   Psychiatric/Behavioral:  Negative for confusion.      Health  "Maintainence:   Immunizations:  Health Maintenance         Date Due Completion Date    Hepatitis C Screening Never done ---    Cervical Cancer Screening Never done ---    Pneumococcal Vaccines (Age 0-64) (1 - PCV) Never done ---    HIV Screening Never done ---    Influenza Vaccine (1) Never done ---    Mammogram 12/03/2022 12/3/2021    Colorectal Cancer Screening 10/29/2023 10/29/2018    Lipid Panel 05/10/2027 5/10/2022    TETANUS VACCINE 07/11/2032 7/11/2022             PHYSICAL EXAM     /64 (BP Location: Left arm, Patient Position: Sitting, BP Method: Large (Manual))   Pulse 102   Ht 5' 6" (1.676 m)   Wt 73.7 kg (162 lb 7.7 oz)   SpO2 97%   BMI 26.22 kg/m²     Physical Exam  Vitals and nursing note reviewed.   Constitutional:       Comments: Healthy appearing female in NAD or apparent pain. She makes good eye contact, speaks in clear full sentences and ambulates with ease.            LABS     Lab Results   Component Value Date    HGBA1C 4.6 06/14/2021     CMP  Sodium   Date Value Ref Range Status   09/07/2022 138 136 - 145 mmol/L Final     Potassium   Date Value Ref Range Status   09/07/2022 4.1 3.5 - 5.1 mmol/L Final     Chloride   Date Value Ref Range Status   09/07/2022 106 95 - 110 mmol/L Final     CO2   Date Value Ref Range Status   09/07/2022 26 23 - 29 mmol/L Final     Glucose   Date Value Ref Range Status   09/07/2022 75 70 - 110 mg/dL Final     BUN   Date Value Ref Range Status   09/07/2022 24 (H) 8 - 23 mg/dL Final     Creatinine   Date Value Ref Range Status   09/07/2022 1.0 0.5 - 1.4 mg/dL Final     Calcium   Date Value Ref Range Status   09/07/2022 9.6 8.7 - 10.5 mg/dL Final     Total Protein   Date Value Ref Range Status   09/07/2022 7.2 6.0 - 8.4 g/dL Final     Albumin   Date Value Ref Range Status   09/07/2022 3.9 3.5 - 5.2 g/dL Final     Total Bilirubin   Date Value Ref Range Status   09/07/2022 0.7 0.1 - 1.0 mg/dL Final     Comment:     For infants and newborns, interpretation of " results should be based  on gestational age, weight and in agreement with clinical  observations.    Premature Infant recommended reference ranges:  Up to 24 hours.............<8.0 mg/dL  Up to 48 hours............<12.0 mg/dL  3-5 days..................<15.0 mg/dL  6-29 days.................<15.0 mg/dL       Alkaline Phosphatase   Date Value Ref Range Status   09/07/2022 77 55 - 135 U/L Final     AST   Date Value Ref Range Status   09/07/2022 22 10 - 40 U/L Final     ALT   Date Value Ref Range Status   09/07/2022 15 10 - 44 U/L Final     Anion Gap   Date Value Ref Range Status   09/07/2022 6 (L) 8 - 16 mmol/L Final     eGFR if    Date Value Ref Range Status   07/11/2022 >60 >60 mL/min/1.73 m^2 Final     eGFR if non    Date Value Ref Range Status   07/11/2022 >60 >60 mL/min/1.73 m^2 Final     Comment:     Calculation used to obtain the estimated glomerular filtration  rate (eGFR) is the CKD-EPI equation.        Lab Results   Component Value Date    WBC 3.66 (L) 09/07/2022    HGB 12.9 09/07/2022    HCT 39.7 09/07/2022     (H) 09/07/2022     09/07/2022     Lab Results   Component Value Date    CHOL 164 05/10/2022    CHOL 180 06/14/2021    CHOL 143 09/28/2006     Lab Results   Component Value Date    HDL 74 05/10/2022    HDL 64 06/14/2021    HDL 45.0 09/28/2006     Lab Results   Component Value Date    LDLCALC 79.8 05/10/2022    LDLCALC 89.8 09/28/2006    LDLCALC 91.4 10/04/2004     Lab Results   Component Value Date    TRIG 51 05/10/2022    TRIG 76 06/14/2021    TRIG 41 09/28/2006     Lab Results   Component Value Date    CHOLHDL 45.1 05/10/2022    CHOLHDL 31.5 09/28/2006    CHOLHDL 24.4 10/04/2004     Lab Results   Component Value Date    TSH 0.941 07/20/2022    P9VVBGQ 147 10/04/2004    E1NORVB 8.1 10/04/2004       ASSESSMENT/PLAN     Nohemi Hull is a 62 y.o. female     Nohemi was seen today for knee pain.  Will increase gabapentin dose to 600 mg nightly.   Will defer remainder of pain management planned to Dr. Justin.  Will check CMP in 2 weeks after patient has a chance to hydrate appropriately.  She is educated on ER problems and is amenable to plan.    Diagnoses and all orders for this visit:    Muscle cramping  -     COMPREHENSIVE METABOLIC PANEL; Future    Pain of left lower extremity    Elevated BUN  -     COMPREHENSIVE METABOLIC PANEL; Future    Other orders  -     gabapentin (NEURONTIN) 300 MG capsule; Take 2 capsules (600 mg total) by mouth every evening.       Patient was counseled on when and how to seek emergent care.       Melissa Dorantes PA-C   Department of Internal Medicine - [facility]   8:05 AM

## 2022-09-26 NOTE — PROGRESS NOTES
PCP: Nataly Rose MD    REFERRING PHYSICIAN: No ref. provider found    CHIEF COMPLAINT: Right Knee Pain    Original HISTORY OF PRESENT ILLNESS: Nohemi Hull presents to the clinic for the evaluation of the above pain. The pain started 5 months ago after no particular event. She does have a history of meniscal injuries with knee scopes in the early 2000s.     Original Pain Description:  The pain is located in the medial right knee and does not radiate. The pain is described as aching. Exacerbating factors: Standing and Walking. Mitigating factors rest and sitting. Symptoms interfere with daily activity and sleeping. The patient feels like symptoms have been worsening.     Original PAIN SCORES:  Best: Pain is 3  Worst: Pain is 8  Usually: Pain is 5  Current: Pain is 5    INTERVAL HISTORY:   Interval History 9/26/22: Nohemi Hull returns for follow up. She came to us from Dr. Meehan with right knee pain. We have performed a right knee genicular block and RFA. Today, she reports that her pain is reduced from prior to our procedures. She notes that her average pain is down from 5-8/10 to 4-5/10. She notes that she feels it approximately 3X/week, mainly when she is walking for prolonged periods of time. She did try a Tramadol from a friend, and we discussed alternatives to assist with her pain. She is considering TKA in the new year.     6 weeks of Conservative therapy (PT/Chiro/Home Exercises with Dates)  PT: No  Chiro: No  HEP: 3X per week    Treatments / Medications: (Ice/Heat/NSAIDS/APAP/etc):  Pharmaceutical Cream  APAP - was asked to stop by her Nephro team (CRF Stage 3)     Report:      Interventional Pain Procedures: (Previous injections)  Right Knee Steroid Injection: Only helped for 1 month relief on right  8/19/22: Right Knee GNB 50% relief  9/13/22: Right Knee RFA     Past Medical History:   Diagnosis Date    Anemia     Anxiety     Arthritis     Clotting disorder     Depression 2000      Past Surgical History:   Procedure Laterality Date    COLONOSCOPY N/A 10/29/2018    Procedure: COLONOSCOPY;  Surgeon: Mp Alvarado MD;  Location: [facility] ENDO ([address] [place] [address]);  Service: Endoscopy;  Laterality: N/A;  Referral received from Dr. SMOOTH Arias, [facility]  Last colonoscopy 2012-recommended f/u 5 years-past due    DILATION AND CURETTAGE OF UTERUS      GASTRIC BYPASS      PARTIAL HYSTERECTOMY      AUB    RADIOFREQUENCY ABLATION Right 9/13/2022    Procedure: RADIOFREQUENCY ABLATION, GENICULAR RIGHT COOLED;  Surgeon: Radha Justin MD;  Location: [facility];  Service: Pain Management;  Laterality: Right;    TONSILLECTOMY      TUBAL LIGATION Bilateral      Social History     Socioeconomic History    Marital status:    Tobacco Use    Smoking status: Some Days     Packs/day: 0.25     Years: 7.00     Pack years: 1.75     Types: Cigarettes    Smokeless tobacco: Current   Substance and Sexual Activity    Alcohol use: Not Currently     Alcohol/week: 2.0 standard drinks     Types: 2 Glasses of wine per week    Drug use: No    Sexual activity: Yes     Partners: Male     Birth control/protection: None     Family History   Problem Relation Age of Onset    Hypertension Mother     Depression Mother     Hyperlipidemia Mother     Dementia Mother     Gallbladder disease Mother     Hyperlipidemia Father     Hypertension Sister     Depression Sister     Hyperlipidemia Sister     Diabetes Sister     Hypertension Sister     Diabetes Sister     Hypertension Sister     Depression Brother     Diabetes Brother     Hyperlipidemia Brother     Hypertension Brother     Diabetes Brother     Hypertension Brother     Diabetes Brother     Hyperlipidemia Brother     Colon cancer Maternal Grandfather         rectal ca    Depression Maternal Grandfather     Breast cancer Paternal Grandmother     Depression Paternal Grandfather     Depression Son     Hypertension Maternal Aunt     Depression Maternal Aunt     Diabetes  Paternal Uncle     Depression Paternal Uncle     Alcohol abuse Neg Hx     Ovarian cancer Neg Hx     Heart disease Neg Hx     Stroke Neg Hx        Review of patient's allergies indicates:  No Known Allergies    Current Outpatient Medications   Medication Sig    acetaminophen (TYLENOL) 500 MG tablet Take 500 mg by mouth 2 (two) times daily as needed.    aspirin (ECOTRIN) 81 MG EC tablet Take 81 mg by mouth once daily.    calcium carbonate (CALCIUM 300 ORAL) Take by mouth.    clonazePAM (KLONOPIN) 1 MG tablet Take 1 mg by mouth 2 (two) times daily as needed for Anxiety.    DULoxetine (CYMBALTA) 60 MG capsule Take 120 mg by mouth once daily.    FEROSUL 325 mg (65 mg iron) Tab tablet Take by mouth once daily.    gabapentin (NEURONTIN) 300 MG capsule Take 2 capsules (600 mg total) by mouth every evening.    magnesium 30 mg Tab Take by mouth once.    multivit-min-FA-lycopen-lutein (CENTRUM SILVER) 0.4-300-250 mg-mcg-mcg Tab Take 1 tablet by mouth once daily.    oxybutynin (DITROPAN) 5 MG Tab Take 5 mg by mouth once daily.    risperiDONE (RISPERDAL) 1 MG tablet Take 1 mg by mouth 2 (two) times daily.    sertraline (ZOLOFT) 100 MG tablet Take 1 tablet by mouth every morning.    suvorexant (BELSOMRA) 20 mg Tab Take by mouth.    traZODone (DESYREL) 150 MG tablet Take 300 mg by mouth every evening.    mirtazapine (REMERON) 45 MG tablet Take 45 mg by mouth every evening.    omega-3 fatty acids fish oil (OMEGA-3 2100) 1,050-1,200 mg Cap capsule Take 1 capsule by mouth once daily.    terconazole (TERAZOL 3) 0.8 % vaginal cream Place 1 applicator vaginally every evening.     No current facility-administered medications for this visit.       ROS:  GENERAL: No fever. No chills. No fatigue. Denies weight loss. Denies weight gain.  HEENT: Denies headaches. Denies vision change. Denies eye pain. Denies double vision. Denies ear pain.   CV: Denies chest pain.   PULM: Denies of shortness of breath.  GI: Denies constipation. No diarrhea.  "No abdominal pain. Denies nausea. Denies vomiting. No blood in stool.  HEME: Denies bleeding problems.  : Denies urgency. No painful urination. No blood in urine.  MS: Denies joint stiffness. Denies joint swelling.  Denies back pain. +Right knee pain  SKIN: Denies rash.   NEURO: Denies seizures. No weakness.  PSYCH:  Denies difficulty sleeping. No anxiety. Denies depression. No suicidal thoughts.       VITALS:   Vitals:    09/26/22 0832   BP: (!) 110/57   Pulse: 86   Temp: 98 °F (36.7 °C)   Weight: 73 kg (160 lb 15 oz)   Height: 5' 6" (1.676 m)   PainSc:   4   PainLoc: Knee         PHYSICAL EXAM:   GENERAL: Well appearing, in no acute distress, alert and oriented x3.  PSYCH:  Mood and affect appropriate.  SKIN: Skin color, texture, turgor normal, no rashes or lesions.  HEENT:  Normocephalic, atraumatic. Cranial nerves grossly intact.  NECK: No pain to palpation over the cervical paraspinous muscles. No pain to palpation over facets. No pain with neck flexion, extension, or lateral flexion.   PULM: No evidence of respiratory difficulty, symmetric chest rise.  GI:  Non-distended  BACK: Normal range of motion. No pain to palpation over the spinous processes. No pain to palpation over facet joints. There is no pain with palpation over the sacroiliac joints bilaterally. Straight leg raising in the supine position is negative to radicular pain.   EXTREMITIES: No deformities, edema, or skin discoloration.   MUSCULOSKELETAL: Rt Knee: Full ROM with audible crepitus. Some lateral flexibility without instability. Pain reproduced over the medial joint line, minor laterally.   NEURO: Sensation is equal and appropriate bilaterally. Bilateral upper and lower extremity coordination and muscle stretch reflexes are physiologic and symmetric. Plantar response are downgoing.   GAIT: normal.      LABS:      IMAGING:    XR KNEE ORTHO BILAT     CLINICAL HISTORY:  B/L KNEE;Pain in right knee     TECHNIQUE:  AP standing, lateral and " Merchant views of both knees were performed.     COMPARISON:  Bilateral knees October 2004     FINDINGS:  There is moderate narrowing at the medial femoral tibial compartments.  Hypertrophic new bone noted.  Patellofemoral joints fairly well maintained.  No significant effusions.     Impression:     Degenerative changes above.        Electronically signed by: Hernando Agosto MD  Date:                                            01/14/2022  Time:                                           09:36    ASSESSMENT: 62 y.o. year old female with pain, consistent with knee osteoarthritis.      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 Xrays show moderate medial joint line narrowing bilaterally. Steroid injections are helping the left knee, but not the right. We performed genicular RFAs with pain reduction but she still plans to pursue TKA in the new year.     PLAN:  Advise against Tramadol/opioids given other medications  Prescribed APAP 500 PRN  Discuss TKA with Dr. Meehan  Follow up as needed      I would like to thank No ref. provider found for the opportunity to assist in the care of this patient. We had a very nice visit and I look forward to continuing their care. Please let me know if I can be of further assistance.     Radha Justin  09/26/2022

## 2022-10-04 ENCOUNTER — TELEPHONE (OUTPATIENT)
Dept: PAIN MEDICINE | Facility: CLINIC | Age: 62
End: 2022-10-04
Payer: MEDICARE

## 2022-10-04 NOTE — TELEPHONE ENCOUNTER
Staff spoke with pt in regards to appt  on 10/5/22 with Artemio marie Np pt wants to cancel due to knee is doing much better. Patient wants Artemio suggestion on do he think she should use her treadmill or bike for exercise.

## 2022-10-06 ENCOUNTER — TELEPHONE (OUTPATIENT)
Dept: PAIN MEDICINE | Facility: CLINIC | Age: 62
End: 2022-10-06
Payer: MEDICARE

## 2022-10-06 NOTE — TELEPHONE ENCOUNTER
Staff tried to reach pt to inform her that yes Artemio marie Np states she can ride the bike as long as it doesn't cause a lot of pain.

## 2022-10-07 ENCOUNTER — PATIENT OUTREACH (OUTPATIENT)
Dept: ADMINISTRATIVE | Facility: HOSPITAL | Age: 62
End: 2022-10-07
Payer: MEDICARE

## 2022-10-11 ENCOUNTER — OFFICE VISIT (OUTPATIENT)
Dept: INTERNAL MEDICINE | Facility: CLINIC | Age: 62
End: 2022-10-11
Payer: MEDICARE

## 2022-10-11 VITALS
OXYGEN SATURATION: 99 % | SYSTOLIC BLOOD PRESSURE: 113 MMHG | HEART RATE: 112 BPM | DIASTOLIC BLOOD PRESSURE: 62 MMHG | BODY MASS INDEX: 26.83 KG/M2 | WEIGHT: 166.25 LBS

## 2022-10-11 DIAGNOSIS — E55.9 VITAMIN D DEFICIENCY: ICD-10-CM

## 2022-10-11 DIAGNOSIS — Z12.31 SCREENING MAMMOGRAM FOR BREAST CANCER: ICD-10-CM

## 2022-10-11 DIAGNOSIS — M17.12 PRIMARY OSTEOARTHRITIS OF LEFT KNEE: Primary | ICD-10-CM

## 2022-10-11 DIAGNOSIS — F17.210 CIGARETTE NICOTINE DEPENDENCE WITHOUT COMPLICATION: ICD-10-CM

## 2022-10-11 DIAGNOSIS — D53.9 ANEMIA, MACROCYTIC: ICD-10-CM

## 2022-10-11 DIAGNOSIS — M85.80 OSTEOPENIA, UNSPECIFIED LOCATION: ICD-10-CM

## 2022-10-11 DIAGNOSIS — E61.1 IRON DEFICIENCY: ICD-10-CM

## 2022-10-11 PROCEDURE — 99999 PR PBB SHADOW E&M-EST. PATIENT-LVL IV: CPT | Mod: PBBFAC,,, | Performed by: STUDENT IN AN ORGANIZED HEALTH CARE EDUCATION/TRAINING PROGRAM

## 2022-10-11 PROCEDURE — 1160F PR REVIEW ALL MEDS BY PRESCRIBER/CLIN PHARMACIST DOCUMENTED: ICD-10-PCS | Mod: CPTII,S$GLB,, | Performed by: STUDENT IN AN ORGANIZED HEALTH CARE EDUCATION/TRAINING PROGRAM

## 2022-10-11 PROCEDURE — 3074F SYST BP LT 130 MM HG: CPT | Mod: CPTII,S$GLB,, | Performed by: STUDENT IN AN ORGANIZED HEALTH CARE EDUCATION/TRAINING PROGRAM

## 2022-10-11 PROCEDURE — 3078F DIAST BP <80 MM HG: CPT | Mod: CPTII,S$GLB,, | Performed by: STUDENT IN AN ORGANIZED HEALTH CARE EDUCATION/TRAINING PROGRAM

## 2022-10-11 PROCEDURE — 3008F BODY MASS INDEX DOCD: CPT | Mod: CPTII,S$GLB,, | Performed by: STUDENT IN AN ORGANIZED HEALTH CARE EDUCATION/TRAINING PROGRAM

## 2022-10-11 PROCEDURE — 1160F RVW MEDS BY RX/DR IN RCRD: CPT | Mod: CPTII,S$GLB,, | Performed by: STUDENT IN AN ORGANIZED HEALTH CARE EDUCATION/TRAINING PROGRAM

## 2022-10-11 PROCEDURE — 3078F PR MOST RECENT DIASTOLIC BLOOD PRESSURE < 80 MM HG: ICD-10-PCS | Mod: CPTII,S$GLB,, | Performed by: STUDENT IN AN ORGANIZED HEALTH CARE EDUCATION/TRAINING PROGRAM

## 2022-10-11 PROCEDURE — 1159F MED LIST DOCD IN RCRD: CPT | Mod: CPTII,S$GLB,, | Performed by: STUDENT IN AN ORGANIZED HEALTH CARE EDUCATION/TRAINING PROGRAM

## 2022-10-11 PROCEDURE — 99214 OFFICE O/P EST MOD 30 MIN: CPT | Mod: S$GLB,,, | Performed by: STUDENT IN AN ORGANIZED HEALTH CARE EDUCATION/TRAINING PROGRAM

## 2022-10-11 PROCEDURE — 3074F PR MOST RECENT SYSTOLIC BLOOD PRESSURE < 130 MM HG: ICD-10-PCS | Mod: CPTII,S$GLB,, | Performed by: STUDENT IN AN ORGANIZED HEALTH CARE EDUCATION/TRAINING PROGRAM

## 2022-10-11 PROCEDURE — 3008F PR BODY MASS INDEX (BMI) DOCUMENTED: ICD-10-PCS | Mod: CPTII,S$GLB,, | Performed by: STUDENT IN AN ORGANIZED HEALTH CARE EDUCATION/TRAINING PROGRAM

## 2022-10-11 PROCEDURE — 3066F NEPHROPATHY DOC TX: CPT | Mod: CPTII,S$GLB,, | Performed by: STUDENT IN AN ORGANIZED HEALTH CARE EDUCATION/TRAINING PROGRAM

## 2022-10-11 PROCEDURE — 3066F PR DOCUMENTATION OF TREATMENT FOR NEPHROPATHY: ICD-10-PCS | Mod: CPTII,S$GLB,, | Performed by: STUDENT IN AN ORGANIZED HEALTH CARE EDUCATION/TRAINING PROGRAM

## 2022-10-11 PROCEDURE — 99214 PR OFFICE/OUTPT VISIT, EST, LEVL IV, 30-39 MIN: ICD-10-PCS | Mod: S$GLB,,, | Performed by: STUDENT IN AN ORGANIZED HEALTH CARE EDUCATION/TRAINING PROGRAM

## 2022-10-11 PROCEDURE — 99499 UNLISTED E&M SERVICE: CPT | Mod: S$PBB,,, | Performed by: STUDENT IN AN ORGANIZED HEALTH CARE EDUCATION/TRAINING PROGRAM

## 2022-10-11 PROCEDURE — 1159F PR MEDICATION LIST DOCUMENTED IN MEDICAL RECORD: ICD-10-PCS | Mod: CPTII,S$GLB,, | Performed by: STUDENT IN AN ORGANIZED HEALTH CARE EDUCATION/TRAINING PROGRAM

## 2022-10-11 PROCEDURE — 99499 RISK ADDL DX/OHS AUDIT: ICD-10-PCS | Mod: S$PBB,,, | Performed by: STUDENT IN AN ORGANIZED HEALTH CARE EDUCATION/TRAINING PROGRAM

## 2022-10-11 PROCEDURE — 99999 PR PBB SHADOW E&M-EST. PATIENT-LVL IV: ICD-10-PCS | Mod: PBBFAC,,, | Performed by: STUDENT IN AN ORGANIZED HEALTH CARE EDUCATION/TRAINING PROGRAM

## 2022-10-11 RX ORDER — FOLIC ACID/MULTIVIT,IRON,MINER 0.4MG-18MG
400 TABLET ORAL DAILY
COMMUNITY

## 2022-10-11 RX ORDER — GABAPENTIN 600 MG/1
600 TABLET ORAL 3 TIMES DAILY
Qty: 270 TABLET | Refills: 1 | Status: SHIPPED | OUTPATIENT
Start: 2022-10-11 | End: 2023-02-06 | Stop reason: SDUPTHER

## 2022-10-11 NOTE — PROGRESS NOTES
"Subjective:       Patient ID: Nohemi Hull is a 62 y.o. female.    Chief Complaint: Primary osteoarthritis of left knee [M17.12]    Patient is established with me, here today for the following:    Depression, anxiety, insomnia, gastric bypass (2004), osteopenia, vitamin D deficiency, anemia    Health maintenance -   Colonoscopy performed OCT2018. Told to repeat in 5 years.  Family history of colorectal cancer.   Mammogram BI-RADS 1 in DEC2021. Due for repeat in DEC2022.  Denies history of prior abnormal mammogram.  Family history of breast cancers.  Denies family history of ovarian cancers.  Hysterectomy for AUB.   Denies history of prior abnormal pap smears.  Denies family history of osteoporosis.  Denies significant family history of cardiac disease.  UTD on COVID19 primary/booster/bivalent, influenza, Tdap, shingles vaccinations.  Started smoking at age 18-19, smoked intermittently since then. Smokes <1 PPD.  Drinks alcohol 3 times weekly, 1 drinks per sitting.  Denies drug use.  Low dose lung CT LUNG RADS 3 in JULY2022, plan for repeat CT in JAN2023.  Completed HIV and Hep C screening.  UTD on lipid screening.  Lab Results       Component                Value               Date                       LDLCALC                  79.8                05/10/2022            The 10-year ASCVD risk score (Shanell ROBYN Jr., et al., 2013) is: 7.3%  UTD on diabetes screening.  Lab Results       Component                Value               Date                       HGBA1C                   4.6                 06/14/2021               Osteopenia -   DEXA JULY2022 with osteopenia   "There is a 3.8% risk of a major osteoporotic fracture and a 0.6% risk of hip fracture in the next 10 years (FRAX)."  Taking vitamin D and calcium supplementation    Following with pain management, Dr. Justin, routinely  Underwent radiofrequency ablation SEP2022  Did not feel significant improvement in symptoms  Increase  gabapentin to 600 mg " SEP2022  Recommended follow up with Dr. Meehan to discuss TKA right knee   Plans to discuss replacement in the new year  Going to the gym 3 times weekly  Treadmill for 20 minutes  Upper body strengthening exercises     Followed with Dr. Chavez for anemia SEP2022  Noted with elevated kappa free light chains  Had follow up labs, needs to schedule appointment to discuss  Taking iron supplement daily      Abdominal pain resolved       Review of Systems   Constitutional:  Negative for appetite change and unexpected weight change.   Respiratory:  Negative for cough and shortness of breath.    Cardiovascular:  Negative for chest pain and palpitations.   Musculoskeletal:  Positive for arthralgias and myalgias.   Neurological:  Negative for syncope.   Psychiatric/Behavioral:  Negative for sleep disturbance.        Current Outpatient Medications   Medication Instructions    acetaminophen (TYLENOL) 500 mg, Oral, 2 times daily PRN    aspirin (ECOTRIN) 81 mg, Oral, Daily    calcium carbonate (CALCIUM 300 ORAL) Oral    cholecalciferol (vitamin D3) 400 Units, Oral, Daily    clonazePAM (KLONOPIN) 1 mg, Oral, 2 times daily PRN    cyanocobalamin, vitamin B-12, (VITAMIN B-12 ORAL) 1 tablet, Oral, Daily    DULoxetine (CYMBALTA) 120 mg, Oral, Daily    FEROSUL 325 mg (65 mg iron) Tab tablet Oral, Daily    gabapentin (NEURONTIN) 600 mg, Oral, 3 times daily    magnesium 30 mg Tab Oral, Once    mirtazapine (REMERON) 45 mg, Oral, Nightly    multivit-min-FA-lycopen-lutein (CENTRUM SILVER) 0.4-300-250 mg-mcg-mcg Tab 1 tablet, Oral, Daily    omega-3 fatty acids fish oil (OMEGA-3 2100) 1,050-1,200 mg Cap capsule 1 capsule, Oral, Daily    oxybutynin (DITROPAN) 5 mg, Oral, Daily    risperiDONE (RISPERDAL) 1 mg, Oral, 2 times daily    sertraline (ZOLOFT) 100 MG tablet 1 tablet, Every morning    suvorexant (BELSOMRA) 20 mg Tab Oral    terconazole (TERAZOL 3) 0.8 % vaginal cream 1 applicator, Vaginal, Nightly    traZODone (DESYREL) 300 mg, Oral,  Nightly     Objective:      Vitals:    10/11/22 0844   BP: 113/62   Pulse: (!) 112   SpO2: 99%   Weight: 75.4 kg (166 lb 3.6 oz)     Body mass index is 26.83 kg/m².    Physical Exam  Vitals reviewed.   Constitutional:       General: She is not in acute distress.     Appearance: She is not ill-appearing or diaphoretic.   Cardiovascular:      Rate and Rhythm: Normal rate and regular rhythm.      Heart sounds: Normal heart sounds. No murmur heard.    No friction rub. No gallop.   Pulmonary:      Effort: Pulmonary effort is normal. No respiratory distress.      Breath sounds: Normal breath sounds. No stridor. No wheezing, rhonchi or rales.   Skin:     General: Skin is warm and dry.      Comments: Color WNL   Neurological:      Mental Status: She is alert. Mental status is at baseline.   Psychiatric:         Mood and Affect: Mood normal.         Behavior: Behavior normal.       Assessment:       1. Primary osteoarthritis of left knee    2. Osteopenia, unspecified location    3. Vitamin D deficiency    4. Anemia, macrocytic    5. Iron deficiency    6. Screening mammogram for breast cancer    7. Cigarette nicotine dependence without complication        Plan:       Primary osteoarthritis of left knee  Continue medication, evaluation, and management per orthopedics and pain management.  Will increase gabapentin to three times daily, continue 600 mg   -     gabapentin (NEURONTIN) 600 MG tablet; Take 1 tablet (600 mg total) by mouth 3 (three) times daily.    Osteopenia, unspecified location  Vitamin D deficiency  Continue vitamin D and calcium supplementation    Anemia, macrocytic  Iron deficiency  Recommend scheduling follow up with Dr. Chavez to discuss labs    Screening mammogram for breast cancer  DEC2022  -     Mammo Digital Screening Bilat w/ Aaron; Future    Cigarette nicotine dependence without complication  JAN2023  -     CT Chest Lung Screening Low Dose; Future        Nataly Rose MD  10/11/2022

## 2022-11-29 ENCOUNTER — LAB VISIT (OUTPATIENT)
Dept: LAB | Facility: OTHER | Age: 62
End: 2022-11-29
Attending: PHYSICIAN ASSISTANT
Payer: MEDICARE

## 2022-11-29 ENCOUNTER — OFFICE VISIT (OUTPATIENT)
Dept: INTERNAL MEDICINE | Facility: CLINIC | Age: 62
End: 2022-11-29
Payer: MEDICARE

## 2022-11-29 VITALS
WEIGHT: 168 LBS | HEART RATE: 82 BPM | SYSTOLIC BLOOD PRESSURE: 110 MMHG | HEIGHT: 66 IN | OXYGEN SATURATION: 99 % | DIASTOLIC BLOOD PRESSURE: 78 MMHG | BODY MASS INDEX: 27 KG/M2

## 2022-11-29 DIAGNOSIS — R25.2 MUSCLE CRAMPING: ICD-10-CM

## 2022-11-29 DIAGNOSIS — R79.9 ELEVATED BUN: ICD-10-CM

## 2022-11-29 DIAGNOSIS — N18.2 STAGE 2 CHRONIC KIDNEY DISEASE: ICD-10-CM

## 2022-11-29 DIAGNOSIS — R82.90 MALODOROUS URINE: Primary | ICD-10-CM

## 2022-11-29 LAB
ALBUMIN SERPL BCP-MCNC: 3.7 G/DL (ref 3.5–5.2)
ALP SERPL-CCNC: 85 U/L (ref 55–135)
ALT SERPL W/O P-5'-P-CCNC: 15 U/L (ref 10–44)
ANION GAP SERPL CALC-SCNC: 6 MMOL/L (ref 8–16)
AST SERPL-CCNC: 19 U/L (ref 10–40)
BILIRUB SERPL-MCNC: 0.4 MG/DL (ref 0.1–1)
BILIRUB SERPL-MCNC: ABNORMAL MG/DL
BLOOD URINE, POC: ABNORMAL
BUN SERPL-MCNC: 19 MG/DL (ref 8–23)
CALCIUM SERPL-MCNC: 9.1 MG/DL (ref 8.7–10.5)
CHLORIDE SERPL-SCNC: 108 MMOL/L (ref 95–110)
CLARITY, POC UA: CLEAR
CO2 SERPL-SCNC: 26 MMOL/L (ref 23–29)
COLOR, POC UA: ABNORMAL
CREAT SERPL-MCNC: 1.1 MG/DL (ref 0.5–1.4)
EST. GFR  (NO RACE VARIABLE): 57 ML/MIN/1.73 M^2
GLUCOSE SERPL-MCNC: 88 MG/DL (ref 70–110)
GLUCOSE UR QL STRIP: NORMAL
KETONES UR QL STRIP: ABNORMAL
LEUKOCYTE ESTERASE URINE, POC: ABNORMAL
NITRITE, POC UA: ABNORMAL
PH, POC UA: 5
POTASSIUM SERPL-SCNC: 4 MMOL/L (ref 3.5–5.1)
PROT SERPL-MCNC: 6.7 G/DL (ref 6–8.4)
PROTEIN, POC: ABNORMAL
SODIUM SERPL-SCNC: 140 MMOL/L (ref 136–145)
SPECIFIC GRAVITY, POC UA: 1.01
UROBILINOGEN, POC UA: NORMAL

## 2022-11-29 PROCEDURE — 3078F DIAST BP <80 MM HG: CPT | Mod: CPTII,S$GLB,, | Performed by: STUDENT IN AN ORGANIZED HEALTH CARE EDUCATION/TRAINING PROGRAM

## 2022-11-29 PROCEDURE — 3074F SYST BP LT 130 MM HG: CPT | Mod: CPTII,S$GLB,, | Performed by: STUDENT IN AN ORGANIZED HEALTH CARE EDUCATION/TRAINING PROGRAM

## 2022-11-29 PROCEDURE — 36415 COLL VENOUS BLD VENIPUNCTURE: CPT | Performed by: PHYSICIAN ASSISTANT

## 2022-11-29 PROCEDURE — 3008F BODY MASS INDEX DOCD: CPT | Mod: CPTII,S$GLB,, | Performed by: STUDENT IN AN ORGANIZED HEALTH CARE EDUCATION/TRAINING PROGRAM

## 2022-11-29 PROCEDURE — 80053 COMPREHEN METABOLIC PANEL: CPT | Performed by: PHYSICIAN ASSISTANT

## 2022-11-29 PROCEDURE — 81002 POCT URINE DIPSTICK WITHOUT MICROSCOPE: ICD-10-PCS | Mod: S$GLB,,, | Performed by: STUDENT IN AN ORGANIZED HEALTH CARE EDUCATION/TRAINING PROGRAM

## 2022-11-29 PROCEDURE — 3066F NEPHROPATHY DOC TX: CPT | Mod: CPTII,S$GLB,, | Performed by: STUDENT IN AN ORGANIZED HEALTH CARE EDUCATION/TRAINING PROGRAM

## 2022-11-29 PROCEDURE — 3008F PR BODY MASS INDEX (BMI) DOCUMENTED: ICD-10-PCS | Mod: CPTII,S$GLB,, | Performed by: STUDENT IN AN ORGANIZED HEALTH CARE EDUCATION/TRAINING PROGRAM

## 2022-11-29 PROCEDURE — 1159F MED LIST DOCD IN RCRD: CPT | Mod: CPTII,S$GLB,, | Performed by: STUDENT IN AN ORGANIZED HEALTH CARE EDUCATION/TRAINING PROGRAM

## 2022-11-29 PROCEDURE — 3078F PR MOST RECENT DIASTOLIC BLOOD PRESSURE < 80 MM HG: ICD-10-PCS | Mod: CPTII,S$GLB,, | Performed by: STUDENT IN AN ORGANIZED HEALTH CARE EDUCATION/TRAINING PROGRAM

## 2022-11-29 PROCEDURE — 99999 PR PBB SHADOW E&M-EST. PATIENT-LVL IV: ICD-10-PCS | Mod: PBBFAC,,, | Performed by: STUDENT IN AN ORGANIZED HEALTH CARE EDUCATION/TRAINING PROGRAM

## 2022-11-29 PROCEDURE — 99214 PR OFFICE/OUTPT VISIT, EST, LEVL IV, 30-39 MIN: ICD-10-PCS | Mod: S$GLB,,, | Performed by: STUDENT IN AN ORGANIZED HEALTH CARE EDUCATION/TRAINING PROGRAM

## 2022-11-29 PROCEDURE — 99999 PR PBB SHADOW E&M-EST. PATIENT-LVL IV: CPT | Mod: PBBFAC,,, | Performed by: STUDENT IN AN ORGANIZED HEALTH CARE EDUCATION/TRAINING PROGRAM

## 2022-11-29 PROCEDURE — 99214 OFFICE O/P EST MOD 30 MIN: CPT | Mod: S$GLB,,, | Performed by: STUDENT IN AN ORGANIZED HEALTH CARE EDUCATION/TRAINING PROGRAM

## 2022-11-29 PROCEDURE — 3074F PR MOST RECENT SYSTOLIC BLOOD PRESSURE < 130 MM HG: ICD-10-PCS | Mod: CPTII,S$GLB,, | Performed by: STUDENT IN AN ORGANIZED HEALTH CARE EDUCATION/TRAINING PROGRAM

## 2022-11-29 PROCEDURE — 3066F PR DOCUMENTATION OF TREATMENT FOR NEPHROPATHY: ICD-10-PCS | Mod: CPTII,S$GLB,, | Performed by: STUDENT IN AN ORGANIZED HEALTH CARE EDUCATION/TRAINING PROGRAM

## 2022-11-29 PROCEDURE — 1159F PR MEDICATION LIST DOCUMENTED IN MEDICAL RECORD: ICD-10-PCS | Mod: CPTII,S$GLB,, | Performed by: STUDENT IN AN ORGANIZED HEALTH CARE EDUCATION/TRAINING PROGRAM

## 2022-11-29 PROCEDURE — 81002 URINALYSIS NONAUTO W/O SCOPE: CPT | Mod: S$GLB,,, | Performed by: STUDENT IN AN ORGANIZED HEALTH CARE EDUCATION/TRAINING PROGRAM

## 2022-11-29 NOTE — PROGRESS NOTES
[facility]    Subjective:       Patient ID: Nohemi Hull is a 62 y.o. female.    Chief Complaint: Urinary Tract Infection      History was obtained from the patient and supplemented through chart review.  This patient is normally followed by a colleague of [facility], who is unavailable today.  The patient is new to me.    HPI:    Patient is a 62 y.o. female who presents for strong odor of urine  No pain  Concern for progression of her CKD 2 despite reassurance from nephrologist    Educated on NSAIDS, meds, urine, alcohol, CKD and general wellness  Agreed to recheck Creatine and GFR due to pt preference, also checking urine due to concerns about urine    Medical History  Past Medical History:   Diagnosis Date    Anemia     Anxiety     Arthritis     Clotting disorder     Depression 2000       Review of Systems   Constitutional:  Negative for fever.   Genitourinary:  Negative for dysuria, pelvic pain and urgency.        Urine strong odor   Musculoskeletal:  Negative for back pain.       Surgical hx, family hx, social hx   Have been reviewed      Current Outpatient Medications:     acetaminophen (TYLENOL) 500 MG tablet, Take 1 tablet (500 mg total) by mouth 2 (two) times daily as needed for Pain., Disp: 60 tablet, Rfl: 2    aspirin (ECOTRIN) 81 MG EC tablet, Take 81 mg by mouth once daily., Disp: , Rfl:     calcium carbonate (CALCIUM 300 ORAL), Take by mouth., Disp: , Rfl:     cholecalciferol, vitamin D3, 10 mcg (400 unit) Chew, Take 400 Units by mouth once daily., Disp: , Rfl:     clonazePAM (KLONOPIN) 1 MG tablet, Take 1 mg by mouth 2 (two) times daily as needed for Anxiety., Disp: , Rfl:     cyanocobalamin, vitamin B-12, (VITAMIN B-12 ORAL), Take 1 tablet by mouth once daily., Disp: , Rfl:     DULoxetine (CYMBALTA) 60 MG capsule, Take 120 mg by mouth once daily., Disp: , Rfl:     FEROSUL 325 mg (65 mg iron) Tab tablet, Take by mouth once daily., Disp: , Rfl:     gabapentin (NEURONTIN) 600 MG  "tablet, Take 1 tablet (600 mg total) by mouth 3 (three) times daily., Disp: 270 tablet, Rfl: 1    magnesium 30 mg Tab, Take by mouth once., Disp: , Rfl:     mirtazapine (REMERON) 45 MG tablet, Take 45 mg by mouth every evening., Disp: , Rfl:     multivit-min-FA-lycopen-lutein (CENTRUM SILVER) 0.4-300-250 mg-mcg-mcg Tab, Take 1 tablet by mouth once daily., Disp: , Rfl:     omega-3 fatty acids fish oil (OMEGA-3 2100) 1,050-1,200 mg Cap capsule, Take 1 capsule by mouth once daily., Disp: , Rfl:     oxybutynin (DITROPAN) 5 MG Tab, Take 5 mg by mouth once daily., Disp: , Rfl:     risperiDONE (RISPERDAL) 1 MG tablet, Take 1 mg by mouth 2 (two) times daily., Disp: , Rfl:     sertraline (ZOLOFT) 100 MG tablet, Take 1 tablet by mouth every morning., Disp: , Rfl:     suvorexant (BELSOMRA) 20 mg Tab, Take by mouth., Disp: , Rfl:     terconazole (TERAZOL 3) 0.8 % vaginal cream, Place 1 applicator vaginally every evening., Disp: , Rfl:     traZODone (DESYREL) 150 MG tablet, Take 300 mg by mouth every evening., Disp: , Rfl:     Objective:        Body mass index is 27.11 kg/m².  Vitals:    11/29/22 1443   BP: 110/78   Pulse: 82   SpO2: 99%   Weight: 76.2 kg (167 lb 15.9 oz)   Height: 5' 6" (1.676 m)   PainSc: 0-No pain     Physical Exam  Vitals and nursing note reviewed.   Constitutional:       General: She is not in acute distress.     Appearance: Normal appearance. She is not ill-appearing.   HENT:      Head: Normocephalic and atraumatic.   Eyes:      General: No scleral icterus.  Pulmonary:      Effort: Pulmonary effort is normal.   Musculoskeletal:         General: No deformity.   Neurological:      Mental Status: She is alert and oriented to person, place, and time.   Psychiatric:         Behavior: Behavior normal.         Lab Results   Component Value Date    WBC 3.66 (L) 09/07/2022    HGB 12.9 09/07/2022    HCT 39.7 09/07/2022     09/07/2022    CHOL 164 05/10/2022    TRIG 51 05/10/2022    HDL 74 05/10/2022    ALT 15 " 11/29/2022    AST 19 11/29/2022     11/29/2022    K 4.0 11/29/2022     11/29/2022    CREATININE 1.1 11/29/2022    BUN 19 11/29/2022    CO2 26 11/29/2022    TSH 0.941 07/20/2022    HGBA1C 4.6 06/14/2021       The 10-year ASCVD risk score (David DK, et al., 2019) is: 5.9%    Values used to calculate the score:      Age: 62 years      Sex: Female      Is Non- : Yes      Diabetic: No      Tobacco smoker: Yes      Systolic Blood Pressure: 110 mmHg      Is BP treated: No      HDL Cholesterol: 74 mg/dL      Total Cholesterol: 164 mg/dL    (Imaging have been independently reviewed)      Assessment:         1. Malodorous urine    2. Stage 2 chronic kidney disease            Plan:     Nohemi was seen today for urinary tract infection.    Diagnoses and all orders for this visit:    Malodorous urine  -     Cancel: Urine culture; Future  -     Cancel: Urinalysis; Future  -     Cancel: POCT urine dipstick without microscope  -     Cancel: POCT URINE DIPSTICK WITHOUT MICROSCOPE  -     Urine culture; Future  -     Urinalysis; Future  -     POCT urine dipstick without microscope    Stage 2 chronic kidney disease      Education and reassurance provided    Follow up if symptoms worsen or fail to improve, for and as already determined by PCP.      30 min were used in chart review, evaluation and counseling of patient re: urine, CKD, NSAIDS, tylenol and AE of alcohol, documentation and review of results on same day of service     All medications were reviewed including potential side effects and risks/benefits.  Pt was counseled to call back if anything worsens or if questions arise.    Gino Case MD  Family Medicine  [facility]  [address]  [address]  [place] [address]  Phone [phone]  Fax [phone]

## 2022-12-14 ENCOUNTER — HOSPITAL ENCOUNTER (OUTPATIENT)
Dept: RADIOLOGY | Facility: OTHER | Age: 62
Discharge: HOME | End: 2022-12-14
Attending: STUDENT IN AN ORGANIZED HEALTH CARE EDUCATION/TRAINING PROGRAM
Payer: MEDICARE

## 2022-12-14 DIAGNOSIS — Z12.31 SCREENING MAMMOGRAM FOR BREAST CANCER: ICD-10-CM

## 2022-12-14 PROCEDURE — 77063 BREAST TOMOSYNTHESIS BI: CPT | Mod: TC

## 2022-12-14 PROCEDURE — 77063 BREAST TOMOSYNTHESIS BI: CPT | Mod: 26,,, | Performed by: RADIOLOGY

## 2022-12-14 PROCEDURE — 77063 MAMMO DIGITAL SCREENING BILAT WITH TOMO: ICD-10-PCS | Mod: 26,,, | Performed by: RADIOLOGY

## 2022-12-14 PROCEDURE — 77067 SCR MAMMO BI INCL CAD: CPT | Mod: TC

## 2022-12-14 PROCEDURE — 77067 SCR MAMMO BI INCL CAD: CPT | Mod: 26,,, | Performed by: RADIOLOGY

## 2022-12-14 PROCEDURE — 77067 MAMMO DIGITAL SCREENING BILAT WITH TOMO: ICD-10-PCS | Mod: 26,,, | Performed by: RADIOLOGY

## 2023-01-10 ENCOUNTER — HOSPITAL ENCOUNTER (OUTPATIENT)
Dept: RADIOLOGY | Facility: HOSPITAL | Age: 63
Discharge: HOME | End: 2023-01-10
Attending: STUDENT IN AN ORGANIZED HEALTH CARE EDUCATION/TRAINING PROGRAM
Payer: MEDICARE

## 2023-01-10 DIAGNOSIS — R91.1 SOLITARY PULMONARY NODULE: ICD-10-CM

## 2023-01-10 PROCEDURE — 71250 CT THORAX DX C-: CPT | Mod: 26,,, | Performed by: RADIOLOGY

## 2023-01-10 PROCEDURE — 71250 CT THORAX DX C-: CPT | Mod: TC

## 2023-01-10 PROCEDURE — 71250 CT CHEST WITHOUT CONTRAST: ICD-10-PCS | Mod: 26,,, | Performed by: RADIOLOGY

## 2023-02-03 DIAGNOSIS — M17.12 PRIMARY OSTEOARTHRITIS OF LEFT KNEE: ICD-10-CM

## 2023-02-06 RX ORDER — GABAPENTIN 300 MG/1
CAPSULE ORAL
Qty: 60 CAPSULE | Refills: 0 | OUTPATIENT
Start: 2023-02-06

## 2023-02-06 RX ORDER — GABAPENTIN 600 MG/1
600 TABLET ORAL 3 TIMES DAILY
Qty: 270 TABLET | Refills: 1 | Status: SHIPPED | OUTPATIENT
Start: 2023-02-06 | End: 2023-08-05

## 2023-04-12 ENCOUNTER — PATIENT MESSAGE (OUTPATIENT)
Dept: ADMINISTRATIVE | Facility: HOSPITAL | Age: 63
End: 2023-04-12
Payer: MEDICARE

## 2023-04-13 ENCOUNTER — TELEPHONE (OUTPATIENT)
Dept: INTERNAL MEDICINE | Facility: CLINIC | Age: 63
End: 2023-04-13
Payer: MEDICARE

## 2023-04-13 DIAGNOSIS — F32.A DEPRESSION, UNSPECIFIED DEPRESSION TYPE: Primary | ICD-10-CM

## 2023-04-13 NOTE — TELEPHONE ENCOUNTER
Referrals placed to [facility]ie and psychology department, please give info for scheduling. Can also schedule with us to be seen if interested. Thank you!

## 2023-04-13 NOTE — TELEPHONE ENCOUNTER
----- Message from Demetrio Ghosh MA sent at 4/13/2023 10:21 AM CDT -----  Contact: Nohemi ferrell [phone]  Pt would like referral placed to see a counselor  ----- Message -----  From: Tracey Chavez  Sent: 4/13/2023  10:10 AM CDT  To: Milton MENDEZ Staff    [email or web address]     Patient is calling for Medical Advice regarding:    How long has patient had these symptoms:    Pharmacy name and phone#:    Would like response via [facility]:call back    Comments: Pt is requesting a call back from the nurse because she would like to see a counselor, and stated that she is his power of  and feels that she needs to speak to someone. She stated that she suffers with depression, but is not depressed right now, but is overwhelmed

## 2023-04-14 ENCOUNTER — TELEPHONE (OUTPATIENT)
Dept: BEHAVIORAL HEALTH | Facility: CLINIC | Age: 63
End: 2023-04-14
Payer: MEDICARE

## 2023-04-14 NOTE — PROGRESS NOTES
Patient states that she has some changes today for the positive with her family members. She declines interest in [facility] at this time but will call if needed.

## 2023-04-19 ENCOUNTER — PATIENT MESSAGE (OUTPATIENT)
Dept: RESEARCH | Facility: HOSPITAL | Age: 63
End: 2023-04-19
Payer: MEDICARE

## 2023-05-04 ENCOUNTER — TELEPHONE (OUTPATIENT)
Dept: INTERNAL MEDICINE | Facility: CLINIC | Age: 63
End: 2023-05-04
Payer: MEDICARE

## 2023-05-04 NOTE — TELEPHONE ENCOUNTER
----- Message from Liberty Forrester sent at 5/4/2023 11:10 AM CDT -----  Regarding: sooner appt  Contact: NOHEMI HULL [[id number]]  Name of Who is Calling: Nohemi Hull            What is the request in detail: Pt states she is requesting to be seen sooner then 6/13, next avail was 5/5 but she cannot make that appointment . Please advise           Can the clinic reply by [facility]: No           What Number to Call Back if not in [facility]: Home Phone      [phone]  Work Phone      Not on file.  Mobile          [phone]

## 2023-05-08 RX ORDER — OXYBUTYNIN CHLORIDE 5 MG/1
5 TABLET ORAL DAILY
Qty: 90 TABLET | Refills: 1 | Status: SHIPPED | OUTPATIENT
Start: 2023-05-08 | End: 2023-10-01

## 2023-05-08 NOTE — TELEPHONE ENCOUNTER
----- Message from Rosemarie Badillo sent at 5/8/2023  4:10 PM CDT -----  Contact: [phone] Patient  Requesting an RX refill or new RX.  Is this a refill or new RX: new  RX name and strength (copy/paste from chart):  oxybutynin (DITROPAN) 5 MG Tab  Is this a 30 day or 90 day RX:   Pharmacy name and phone # (copy/paste from chart):    [id number] - [place], [place] - [address] AT [facility] [address]  [address]  [facility] [address]  Phone: [phone] Fax: [phone]     The doctors have asked that we provide their patients with the following 2 reminders -- prescription refills can take up to 72 hours, and a friendly reminder that in the future you can use your [facility] account to request refills: yes, pt does not use the pt portal

## 2023-05-08 NOTE — TELEPHONE ENCOUNTER
No care due was identified.  [facility] Embedded Care Due Messages. Reference number: [id number].   5/08/2023 6:18:58 PM CDT

## 2023-05-09 ENCOUNTER — TELEPHONE (OUTPATIENT)
Dept: INTERNAL MEDICINE | Facility: CLINIC | Age: 63
End: 2023-05-09
Payer: MEDICARE

## 2023-05-09 NOTE — TELEPHONE ENCOUNTER
----- Message from Rosemarie Badillo sent at 5/8/2023  4:14 PM CDT -----  Contact: [phone] Patient  Patient is returning a phone call.  Who left a message for the patient: Demetrio Ghosh MA  Does patient know what this is regarding:  sooner appt then 06/13/23; pt advised she was added to the wait list   Would you like a call back, or a response through your [facility] portal?:   call back  Comments:

## 2023-06-13 ENCOUNTER — OFFICE VISIT (OUTPATIENT)
Dept: INTERNAL MEDICINE | Facility: CLINIC | Age: 63
End: 2023-06-13
Payer: MEDICARE

## 2023-06-13 VITALS
SYSTOLIC BLOOD PRESSURE: 136 MMHG | BODY MASS INDEX: 24.91 KG/M2 | HEART RATE: 120 BPM | WEIGHT: 154.31 LBS | DIASTOLIC BLOOD PRESSURE: 80 MMHG | OXYGEN SATURATION: 99 %

## 2023-06-13 DIAGNOSIS — E55.9 VITAMIN D DEFICIENCY: ICD-10-CM

## 2023-06-13 DIAGNOSIS — F17.210 CIGARETTE NICOTINE DEPENDENCE WITHOUT COMPLICATION: ICD-10-CM

## 2023-06-13 DIAGNOSIS — Z12.12 SCREENING FOR COLORECTAL CANCER: ICD-10-CM

## 2023-06-13 DIAGNOSIS — D53.9 NUTRITIONAL ANEMIA: ICD-10-CM

## 2023-06-13 DIAGNOSIS — R41.3 MEMORY CHANGES: ICD-10-CM

## 2023-06-13 DIAGNOSIS — Z98.84 HISTORY OF GASTRIC BYPASS: ICD-10-CM

## 2023-06-13 DIAGNOSIS — R73.09 OTHER ABNORMAL GLUCOSE: ICD-10-CM

## 2023-06-13 DIAGNOSIS — Z00.00 HEALTH MAINTENANCE EXAMINATION: Primary | ICD-10-CM

## 2023-06-13 DIAGNOSIS — M85.80 OSTEOPENIA, UNSPECIFIED LOCATION: ICD-10-CM

## 2023-06-13 DIAGNOSIS — E53.8 VITAMIN B12 DEFICIENCY: ICD-10-CM

## 2023-06-13 DIAGNOSIS — Z13.6 SCREENING FOR CARDIOVASCULAR CONDITION: ICD-10-CM

## 2023-06-13 DIAGNOSIS — Z12.11 SCREENING FOR COLORECTAL CANCER: ICD-10-CM

## 2023-06-13 DIAGNOSIS — D53.9 ANEMIA, MACROCYTIC: ICD-10-CM

## 2023-06-13 PROCEDURE — 3079F DIAST BP 80-89 MM HG: CPT | Mod: CPTII,S$GLB,, | Performed by: STUDENT IN AN ORGANIZED HEALTH CARE EDUCATION/TRAINING PROGRAM

## 2023-06-13 PROCEDURE — 3079F PR MOST RECENT DIASTOLIC BLOOD PRESSURE 80-89 MM HG: ICD-10-PCS | Mod: CPTII,S$GLB,, | Performed by: STUDENT IN AN ORGANIZED HEALTH CARE EDUCATION/TRAINING PROGRAM

## 2023-06-13 PROCEDURE — 1159F PR MEDICATION LIST DOCUMENTED IN MEDICAL RECORD: ICD-10-PCS | Mod: CPTII,S$GLB,, | Performed by: STUDENT IN AN ORGANIZED HEALTH CARE EDUCATION/TRAINING PROGRAM

## 2023-06-13 PROCEDURE — 3075F PR MOST RECENT SYSTOLIC BLOOD PRESS GE 130-139MM HG: ICD-10-PCS | Mod: CPTII,S$GLB,, | Performed by: STUDENT IN AN ORGANIZED HEALTH CARE EDUCATION/TRAINING PROGRAM

## 2023-06-13 PROCEDURE — 99999 PR PBB SHADOW E&M-EST. PATIENT-LVL V: ICD-10-PCS | Mod: PBBFAC,,, | Performed by: STUDENT IN AN ORGANIZED HEALTH CARE EDUCATION/TRAINING PROGRAM

## 2023-06-13 PROCEDURE — 99214 PR OFFICE/OUTPT VISIT, EST, LEVL IV, 30-39 MIN: ICD-10-PCS | Mod: S$GLB,,, | Performed by: STUDENT IN AN ORGANIZED HEALTH CARE EDUCATION/TRAINING PROGRAM

## 2023-06-13 PROCEDURE — 3044F PR MOST RECENT HEMOGLOBIN A1C LEVEL <7.0%: ICD-10-PCS | Mod: CPTII,S$GLB,, | Performed by: STUDENT IN AN ORGANIZED HEALTH CARE EDUCATION/TRAINING PROGRAM

## 2023-06-13 PROCEDURE — 3008F BODY MASS INDEX DOCD: CPT | Mod: CPTII,S$GLB,, | Performed by: STUDENT IN AN ORGANIZED HEALTH CARE EDUCATION/TRAINING PROGRAM

## 2023-06-13 PROCEDURE — 3008F PR BODY MASS INDEX (BMI) DOCUMENTED: ICD-10-PCS | Mod: CPTII,S$GLB,, | Performed by: STUDENT IN AN ORGANIZED HEALTH CARE EDUCATION/TRAINING PROGRAM

## 2023-06-13 PROCEDURE — 99214 OFFICE O/P EST MOD 30 MIN: CPT | Mod: S$GLB,,, | Performed by: STUDENT IN AN ORGANIZED HEALTH CARE EDUCATION/TRAINING PROGRAM

## 2023-06-13 PROCEDURE — 1159F MED LIST DOCD IN RCRD: CPT | Mod: CPTII,S$GLB,, | Performed by: STUDENT IN AN ORGANIZED HEALTH CARE EDUCATION/TRAINING PROGRAM

## 2023-06-13 PROCEDURE — 99999 PR PBB SHADOW E&M-EST. PATIENT-LVL V: CPT | Mod: PBBFAC,,, | Performed by: STUDENT IN AN ORGANIZED HEALTH CARE EDUCATION/TRAINING PROGRAM

## 2023-06-13 PROCEDURE — 3044F HG A1C LEVEL LT 7.0%: CPT | Mod: CPTII,S$GLB,, | Performed by: STUDENT IN AN ORGANIZED HEALTH CARE EDUCATION/TRAINING PROGRAM

## 2023-06-13 PROCEDURE — 3075F SYST BP GE 130 - 139MM HG: CPT | Mod: CPTII,S$GLB,, | Performed by: STUDENT IN AN ORGANIZED HEALTH CARE EDUCATION/TRAINING PROGRAM

## 2023-06-13 NOTE — PROGRESS NOTES
"Subjective:       Patient ID: Nohemi Hull is a 63 y.o. female.    Chief Complaint: Health maintenance examination [Z00.00]    Patient is established with me, here today for the following:    Depression, anxiety, insomnia, gastric bypass (2004), osteopenia, vitamin D deficiency, anemia     Health maintenance -   Colonoscopy performed OCT2018. Told to repeat in 5 years.  Family history of colorectal cancer.   Mammogram BI-RADS 1 in DEC2022.   Denies history of prior abnormal mammogram.  Family history of breast cancers.  Denies family history of ovarian cancers.  Hysterectomy for AUB.   Denies history of prior abnormal pap smears.  Denies family history of osteoporosis.  Denies significant family history of cardiac disease.  UTD on COVID19 primary/bivalent, influenza, Tdap, shingles vaccinations.  Declines PCV20 vaccination.  Started smoking at age 18-19, smoked intermittently since then. Currently smoking <0.5 PPD.  Interested in participating with tobacco cessation program.   Drinks alcohol 2 times monthly, 1-2 drinks per sitting.  Denies drug use.  Low dose lung CT last completed in JAN2023.  Due for HIV and Hep C screening.  Due for lipid screening.  Lab Results       Component                Value               Date                       LDLCALC                  79.8                05/10/2022            The 10-year ASCVD risk score (David DK, et al., 2019) is: 6.7%  Due for diabetes screening.  Lab Results       Component                Value               Date                       HGBA1C                   4.6                 06/14/2021            Endorses overall healthy diet.   Eating plenty of fresh vegetables.  Eating mostly lean proteins.   Eating whole grains, limits processed foods.  Eats mostly at home.     Osteopenia -   Vitamin D deficiency -   Completed DEXA in JULY2022.  Showed osteopenia.  "There is a 3.8% risk of a major osteoporotic fracture and a 0.6% risk of hip fracture in the next " "10 years (FRAX)."  Currently taking vitamin D3 daily.  Lab Results       Component                Value               Date                       [id number]             53                  05/10/2022           Vitamin B12 deficiency -  Currently taking vitamin B12 daily supplementation.  Lab Results       Component                Value               Date                       [id number]               677                 07/20/2022                  History of gastric bypass -   Anemia -  Followed with Dr. Chavez for anemia SEP2022  Noted with elevated kappa free light chains  Taking iron supplement daily   Lab Results       Component                Value               Date                       HGB                      12.9                09/07/2022                 HCT                      39.7                09/07/2022                 MCV                      103 (H)             09/07/2022                 RDW                      13.2                09/07/2022            Lab Results       Component                Value               Date                       UIBC                     196                 10/04/2004                 IRON                     76                  05/10/2022                 TRANSFERRIN              260                 05/10/2022                 TIBC                     385                 05/10/2022                 FESATURATED              20                  05/10/2022             No results found for: FERRITIN  Lab Results       Component                Value               Date                       [id number]               677                 07/20/2022            Lab Results       Component                Value               Date                       FOLATE                   13.7                07/20/2022              Endorses concern for memory changes progressing more recently  Difficulty with short term memory, trouble word finding  Denies difficulty with long term memory   Not " getting lost in familiar areas         Review of Systems   Constitutional:  Negative for activity change, fatigue and fever.   Respiratory:  Negative for cough and shortness of breath.    Cardiovascular:  Negative for chest pain and palpitations.   Gastrointestinal:  Negative for abdominal pain, constipation, diarrhea, nausea and vomiting.   Neurological:  Negative for syncope and headaches.       Current Outpatient Medications   Medication Instructions    acetaminophen (TYLENOL) 500 mg, Oral, 2 times daily PRN    aspirin (ECOTRIN) 81 mg, Oral, Daily    calcium carbonate (CALCIUM 300 ORAL) Oral    cholecalciferol (vitamin D3) 400 Units, Oral, Daily    clonazePAM (KLONOPIN) 1 mg, Oral, 2 times daily PRN    cyanocobalamin, vitamin B-12, (VITAMIN B-12 ORAL) 1 tablet, Oral, Daily    DULoxetine (CYMBALTA) 120 mg, Oral, Daily    FEROSUL 325 mg (65 mg iron) Tab tablet Oral, Daily    gabapentin (NEURONTIN) 600 mg, Oral, 3 times daily    magnesium 30 mg Tab Oral, Once    mirtazapine (REMERON) 45 mg, Oral, Nightly    multivit-min-FA-lycopen-lutein (CENTRUM SILVER) 0.4-300-250 mg-mcg-mcg Tab 1 tablet, Oral, Daily    omega-3 fatty acids fish oil (OMEGA-3 2100) 1,050-1,200 mg Cap capsule 1 capsule, Oral, Daily    oxybutynin (DITROPAN) 5 mg, Oral, Daily    risperiDONE (RISPERDAL) 1 mg, Oral, 2 times daily    sertraline (ZOLOFT) 100 MG tablet 1 tablet, Every morning    suvorexant (BELSOMRA) 20 mg Tab Oral    terconazole (TERAZOL 3) 0.8 % vaginal cream 1 applicator, Vaginal, Nightly    traZODone (DESYREL) 300 mg, Oral, Nightly     Objective:      Vitals:    06/13/23 0954 06/13/23 1045   BP: (!) 140/82 136/80   Pulse: (!) 120    SpO2: 99%    Weight: 70 kg (154 lb 5.2 oz)    PainSc: 0-No pain      Body mass index is 24.91 kg/m².    Physical Exam  Vitals reviewed.   Constitutional:       General: She is not in acute distress.     Appearance: She is not ill-appearing or diaphoretic.   Cardiovascular:      Rate and Rhythm: Normal rate  and regular rhythm.      Heart sounds: Normal heart sounds. No murmur heard.    No friction rub. No gallop.   Pulmonary:      Effort: Pulmonary effort is normal. No respiratory distress.      Breath sounds: Normal breath sounds. No stridor. No wheezing, rhonchi or rales.   Skin:     General: Skin is warm and dry.      Comments: Color WNL   Neurological:      Mental Status: She is alert. Mental status is at baseline.   Psychiatric:         Mood and Affect: Mood normal.         Behavior: Behavior normal.       Assessment:       1. Health maintenance examination    2. Osteopenia, unspecified location    3. Vitamin D deficiency    4. Vitamin B12 deficiency    5. History of gastric bypass    6. Anemia, macrocytic    7. Nutritional anemia    8. Memory changes    9. Cigarette nicotine dependence without complication    10. Screening for colorectal cancer    11. Other abnormal glucose    12. Screening for cardiovascular condition        Plan:       Osteopenia, unspecified location  Vitamin D deficiency  Continue vitamin D supplementation.  Recommend weight bearing exercises for bone strengthening.  RTC in 6 months for follow up.  -     Vitamin D; Future    Vitamin B12 deficiency  Continue supplementation.  RTC in 6 months for follow up.  -     Vitamin B12; Future    History of gastric bypass  Anemia, macrocytic  -     Copper, Serum; Future  -     Ferritin; Future  -     Folate; Future  -     Iron and TIBC; Future  -     Magnesium; Future  -     Phosphorus; Future  -     PTH, Intact; Future  -     Vitamin A; Future  -     Zinc; Future  -     Vitamin E; Future  -     Vitamin B1; Future  -     Vitamin B12; Future    Nutritional anemia  -     Copper, Serum; Future  -     Ferritin; Future  -     Folate; Future  -     Iron and TIBC; Future  -     Magnesium; Future  -     Phosphorus; Future  -     PTH, Intact; Future  -     Vitamin A; Future  -     Zinc; Future  -     Vitamin E; Future  -     Vitamin B1; Future  -     Vitamin B12;  Future    Memory changes  -     Ambulatory referral/consult to Adult Neuropsychology; Future    Health maintenance examination  Reviewed and discussed age appropriate screenings and immunizations.  -     Comprehensive Metabolic Panel; Future  -     TSH; Future  -     Lipid Panel; Future  -     Hemoglobin A1C; Future  -     CBC Auto Differential; Future  -     Vitamin D; Future  -     Microalbumin/Creatinine Ratio, Urine; Future  -     HIV 1/2 Ag/Ab (4th Gen); Future  -     Hepatitis C Antibody; Future  -     Copper, Serum; Future  -     Ferritin; Future  -     Folate; Future  -     Iron and TIBC; Future  -     Magnesium; Future  -     Phosphorus; Future  -     PTH, Intact; Future  -     Vitamin A; Future  -     Zinc; Future  -     Vitamin E; Future  -     Vitamin B1; Future  -     Vitamin B12; Future    Cigarette nicotine dependence without complication  -     Ambulatory referral/consult to Smoking Cessation Program; Future    Screening for colorectal cancer  -     Ambulatory referral/consult to Endo Procedure ; Future    Other abnormal glucose  -     Hemoglobin A1C; Future    Screening for cardiovascular condition  -     Lipid Panel; Future        Nataly Rose MD  6/13/2023

## 2023-06-16 ENCOUNTER — LAB VISIT (OUTPATIENT)
Dept: LAB | Facility: OTHER | Age: 63
End: 2023-06-16
Attending: STUDENT IN AN ORGANIZED HEALTH CARE EDUCATION/TRAINING PROGRAM
Payer: MEDICARE

## 2023-06-16 DIAGNOSIS — R73.09 OTHER ABNORMAL GLUCOSE: ICD-10-CM

## 2023-06-16 DIAGNOSIS — Z98.84 HISTORY OF GASTRIC BYPASS: ICD-10-CM

## 2023-06-16 DIAGNOSIS — D53.9 NUTRITIONAL ANEMIA: ICD-10-CM

## 2023-06-16 DIAGNOSIS — Z13.6 SCREENING FOR CARDIOVASCULAR CONDITION: ICD-10-CM

## 2023-06-16 DIAGNOSIS — E53.8 VITAMIN B12 DEFICIENCY: ICD-10-CM

## 2023-06-16 DIAGNOSIS — Z00.00 HEALTH MAINTENANCE EXAMINATION: ICD-10-CM

## 2023-06-16 DIAGNOSIS — D53.9 ANEMIA, MACROCYTIC: ICD-10-CM

## 2023-06-16 DIAGNOSIS — E55.9 VITAMIN D DEFICIENCY: ICD-10-CM

## 2023-06-16 LAB
25(OH)D3+25(OH)D2 SERPL-MCNC: 59 NG/ML (ref 30–96)
ALBUMIN SERPL BCP-MCNC: 4.2 G/DL (ref 3.5–5.2)
ALP SERPL-CCNC: 61 U/L (ref 55–135)
ALT SERPL W/O P-5'-P-CCNC: 11 U/L (ref 10–44)
ANION GAP SERPL CALC-SCNC: 10 MMOL/L (ref 8–16)
AST SERPL-CCNC: 16 U/L (ref 10–40)
BASOPHILS # BLD AUTO: 0.02 K/UL (ref 0–0.2)
BASOPHILS NFR BLD: 0.6 % (ref 0–1.9)
BILIRUB SERPL-MCNC: 1 MG/DL (ref 0.1–1)
BUN SERPL-MCNC: 15 MG/DL (ref 8–23)
CALCIUM SERPL-MCNC: 9.6 MG/DL (ref 8.7–10.5)
CHLORIDE SERPL-SCNC: 105 MMOL/L (ref 95–110)
CHOLEST SERPL-MCNC: 154 MG/DL (ref 120–199)
CHOLEST/HDLC SERPL: 2.5 {RATIO} (ref 2–5)
CO2 SERPL-SCNC: 24 MMOL/L (ref 23–29)
CREAT SERPL-MCNC: 1.2 MG/DL (ref 0.5–1.4)
DIFFERENTIAL METHOD: ABNORMAL
EOSINOPHIL # BLD AUTO: 0.1 K/UL (ref 0–0.5)
EOSINOPHIL NFR BLD: 3.8 % (ref 0–8)
ERYTHROCYTE [DISTWIDTH] IN BLOOD BY AUTOMATED COUNT: 12.4 % (ref 11.5–14.5)
EST. GFR  (NO RACE VARIABLE): 51 ML/MIN/1.73 M^2
ESTIMATED AVG GLUCOSE: 80 MG/DL (ref 68–131)
FERRITIN SERPL-MCNC: 55 NG/ML (ref 20–300)
FOLATE SERPL-MCNC: 10.1 NG/ML (ref 4–24)
GLUCOSE SERPL-MCNC: 102 MG/DL (ref 70–110)
HBA1C MFR BLD: 4.4 % (ref 4–5.6)
HCT VFR BLD AUTO: 40.5 % (ref 37–48.5)
HCV AB SERPL QL IA: NORMAL
HDLC SERPL-MCNC: 62 MG/DL (ref 40–75)
HDLC SERPL: 40.3 % (ref 20–50)
HGB BLD-MCNC: 13 G/DL (ref 12–16)
HIV 1+2 AB+HIV1 P24 AG SERPL QL IA: NORMAL
IMM GRANULOCYTES # BLD AUTO: 0 K/UL (ref 0–0.04)
IMM GRANULOCYTES NFR BLD AUTO: 0 % (ref 0–0.5)
IRON SERPL-MCNC: 162 UG/DL (ref 30–160)
LDLC SERPL CALC-MCNC: 80.8 MG/DL (ref 63–159)
LYMPHOCYTES # BLD AUTO: 1.8 K/UL (ref 1–4.8)
LYMPHOCYTES NFR BLD: 51.7 % (ref 18–48)
MAGNESIUM SERPL-MCNC: 1.9 MG/DL (ref 1.6–2.6)
MCH RBC QN AUTO: 33.1 PG (ref 27–31)
MCHC RBC AUTO-ENTMCNC: 32.1 G/DL (ref 32–36)
MCV RBC AUTO: 103 FL (ref 82–98)
MONOCYTES # BLD AUTO: 0.3 K/UL (ref 0.3–1)
MONOCYTES NFR BLD: 7.8 % (ref 4–15)
NEUTROPHILS # BLD AUTO: 1.2 K/UL (ref 1.8–7.7)
NEUTROPHILS NFR BLD: 36.1 % (ref 38–73)
NONHDLC SERPL-MCNC: 92 MG/DL
NRBC BLD-RTO: 0 /100 WBC
PHOSPHATE SERPL-MCNC: 3.9 MG/DL (ref 2.7–4.5)
PLATELET # BLD AUTO: 192 K/UL (ref 150–450)
PMV BLD AUTO: 9.3 FL (ref 9.2–12.9)
POTASSIUM SERPL-SCNC: 4 MMOL/L (ref 3.5–5.1)
PROT SERPL-MCNC: 7.4 G/DL (ref 6–8.4)
PTH-INTACT SERPL-MCNC: 44.8 PG/ML (ref 9–77)
RBC # BLD AUTO: 3.93 M/UL (ref 4–5.4)
SATURATED IRON: 46 % (ref 20–50)
SODIUM SERPL-SCNC: 139 MMOL/L (ref 136–145)
TOTAL IRON BINDING CAPACITY: 349 UG/DL (ref 250–450)
TRANSFERRIN SERPL-MCNC: 236 MG/DL (ref 200–375)
TRIGL SERPL-MCNC: 56 MG/DL (ref 30–150)
TSH SERPL DL<=0.005 MIU/L-ACNC: 2.4 UIU/ML (ref 0.4–4)
VIT B12 SERPL-MCNC: 1447 PG/ML (ref 210–950)
WBC # BLD AUTO: 3.44 K/UL (ref 3.9–12.7)

## 2023-06-16 PROCEDURE — 84590 ASSAY OF VITAMIN A: CPT | Performed by: STUDENT IN AN ORGANIZED HEALTH CARE EDUCATION/TRAINING PROGRAM

## 2023-06-16 PROCEDURE — 83036 HEMOGLOBIN GLYCOSYLATED A1C: CPT | Performed by: STUDENT IN AN ORGANIZED HEALTH CARE EDUCATION/TRAINING PROGRAM

## 2023-06-16 PROCEDURE — 83970 ASSAY OF PARATHORMONE: CPT | Performed by: STUDENT IN AN ORGANIZED HEALTH CARE EDUCATION/TRAINING PROGRAM

## 2023-06-16 PROCEDURE — 36415 COLL VENOUS BLD VENIPUNCTURE: CPT | Performed by: STUDENT IN AN ORGANIZED HEALTH CARE EDUCATION/TRAINING PROGRAM

## 2023-06-16 PROCEDURE — 84443 ASSAY THYROID STIM HORMONE: CPT | Performed by: STUDENT IN AN ORGANIZED HEALTH CARE EDUCATION/TRAINING PROGRAM

## 2023-06-16 PROCEDURE — 85025 COMPLETE CBC W/AUTO DIFF WBC: CPT | Performed by: STUDENT IN AN ORGANIZED HEALTH CARE EDUCATION/TRAINING PROGRAM

## 2023-06-16 PROCEDURE — 83735 ASSAY OF MAGNESIUM: CPT | Performed by: STUDENT IN AN ORGANIZED HEALTH CARE EDUCATION/TRAINING PROGRAM

## 2023-06-16 PROCEDURE — 84100 ASSAY OF PHOSPHORUS: CPT | Performed by: STUDENT IN AN ORGANIZED HEALTH CARE EDUCATION/TRAINING PROGRAM

## 2023-06-16 PROCEDURE — 84425 ASSAY OF VITAMIN B-1: CPT | Performed by: STUDENT IN AN ORGANIZED HEALTH CARE EDUCATION/TRAINING PROGRAM

## 2023-06-16 PROCEDURE — 87389 HIV-1 AG W/HIV-1&-2 AB AG IA: CPT | Performed by: STUDENT IN AN ORGANIZED HEALTH CARE EDUCATION/TRAINING PROGRAM

## 2023-06-16 PROCEDURE — 80061 LIPID PANEL: CPT | Performed by: STUDENT IN AN ORGANIZED HEALTH CARE EDUCATION/TRAINING PROGRAM

## 2023-06-16 PROCEDURE — 86803 HEPATITIS C AB TEST: CPT | Performed by: STUDENT IN AN ORGANIZED HEALTH CARE EDUCATION/TRAINING PROGRAM

## 2023-06-16 PROCEDURE — 82728 ASSAY OF FERRITIN: CPT | Performed by: STUDENT IN AN ORGANIZED HEALTH CARE EDUCATION/TRAINING PROGRAM

## 2023-06-16 PROCEDURE — 82607 VITAMIN B-12: CPT | Performed by: STUDENT IN AN ORGANIZED HEALTH CARE EDUCATION/TRAINING PROGRAM

## 2023-06-16 PROCEDURE — 84466 ASSAY OF TRANSFERRIN: CPT | Performed by: STUDENT IN AN ORGANIZED HEALTH CARE EDUCATION/TRAINING PROGRAM

## 2023-06-16 PROCEDURE — 83540 ASSAY OF IRON: CPT | Performed by: STUDENT IN AN ORGANIZED HEALTH CARE EDUCATION/TRAINING PROGRAM

## 2023-06-16 PROCEDURE — 82306 VITAMIN D 25 HYDROXY: CPT | Performed by: STUDENT IN AN ORGANIZED HEALTH CARE EDUCATION/TRAINING PROGRAM

## 2023-06-16 PROCEDURE — 80053 COMPREHEN METABOLIC PANEL: CPT | Performed by: STUDENT IN AN ORGANIZED HEALTH CARE EDUCATION/TRAINING PROGRAM

## 2023-06-16 PROCEDURE — 82043 UR ALBUMIN QUANTITATIVE: CPT | Performed by: STUDENT IN AN ORGANIZED HEALTH CARE EDUCATION/TRAINING PROGRAM

## 2023-06-16 PROCEDURE — 82525 ASSAY OF COPPER: CPT | Performed by: STUDENT IN AN ORGANIZED HEALTH CARE EDUCATION/TRAINING PROGRAM

## 2023-06-16 PROCEDURE — 84630 ASSAY OF ZINC: CPT | Performed by: STUDENT IN AN ORGANIZED HEALTH CARE EDUCATION/TRAINING PROGRAM

## 2023-06-16 PROCEDURE — 84446 ASSAY OF VITAMIN E: CPT | Performed by: STUDENT IN AN ORGANIZED HEALTH CARE EDUCATION/TRAINING PROGRAM

## 2023-06-16 PROCEDURE — 82746 ASSAY OF FOLIC ACID SERUM: CPT | Performed by: STUDENT IN AN ORGANIZED HEALTH CARE EDUCATION/TRAINING PROGRAM

## 2023-06-19 LAB
COPPER SERPL-MCNC: 995 UG/L (ref 810–1990)
ZINC SERPL-MCNC: 63 UG/DL (ref 60–130)

## 2023-06-20 LAB
A-TOCOPHEROL VIT E SERPL-MCNC: 896 UG/DL (ref 500–1800)
VIT A SERPL-MCNC: 55 UG/DL (ref 38–106)

## 2023-06-21 LAB — VIT B1 BLD-MCNC: 43 UG/L (ref 38–122)

## 2023-06-22 ENCOUNTER — PATIENT MESSAGE (OUTPATIENT)
Dept: INTERNAL MEDICINE | Facility: CLINIC | Age: 63
End: 2023-06-22
Payer: MEDICARE

## 2023-06-29 ENCOUNTER — TELEPHONE (OUTPATIENT)
Dept: ORTHOPEDICS | Facility: CLINIC | Age: 63
End: 2023-06-29
Payer: MEDICARE

## 2023-06-29 ENCOUNTER — TELEPHONE (OUTPATIENT)
Dept: INTERNAL MEDICINE | Facility: CLINIC | Age: 63
End: 2023-06-29
Payer: MEDICARE

## 2023-06-29 DIAGNOSIS — F32.A DEPRESSION, UNSPECIFIED DEPRESSION TYPE: Primary | ICD-10-CM

## 2023-06-29 NOTE — TELEPHONE ENCOUNTER
MELANI with name and call back number  ----- Message from Joann Villanueva sent at 6/29/2023 11:57 AM CDT -----  Who Called: Pt    What is the request in detail: Requesting call back to discuss scheduling appt for both knee injections, pt states she is in pain. Please advise    Can the clinic reply by [facility]? No    Best Call Back Number: [phone]      Additional Information:

## 2023-06-29 NOTE — TELEPHONE ENCOUNTER
----- Message from Domingo Sidney sent at 6/28/2023  4:11 PM CDT -----  Regarding: Counseling  Name of Who is Calling: Patient          What is the request in detail:  Patient stated she would like to attend counseling            Can the clinic reply by [facility]: No            What Number to Call Back if not in [facility]: [phone]

## 2023-06-29 NOTE — TELEPHONE ENCOUNTER
Called and spoke to pt regarding appt with Dr Meehan, advised pt of first available. Pt didn't want to wait, but placed on waiting list if something became available sooner. Pt was pleasant and verbalized understandings.  ----- Message from Therese Watson sent at 6/29/2023  1:30 PM CDT -----  Regarding: injection appt  Contact: pt  Pt returning call to schedule her injection       Confirmed patient's contact info below:  Contact Name: Nohemi Hull  Phone Number: [phone]

## 2023-06-30 ENCOUNTER — TELEPHONE (OUTPATIENT)
Dept: INTERNAL MEDICINE | Facility: CLINIC | Age: 63
End: 2023-06-30
Payer: MEDICARE

## 2023-06-30 LAB
ALBUMIN/CREAT UR: NORMAL UG/MG (ref 0–30)
CREAT UR-MCNC: 145.5 MG/DL (ref 15–325)
MICROALBUMIN UR DL<=1MG/L-MCNC: <5 UG/ML

## 2023-06-30 NOTE — TELEPHONE ENCOUNTER
----- Message from Melvi Arias sent at 6/30/2023  1:58 PM CDT -----  Regarding: Pt called to speak to the nurse regarding her care and referral to psychiatry and she would like a call back today  Patient Advice:    Pt called to speak to the nurse regarding her care and referral to psychiatry and she would like a call back today    Pt can be reached at [phone]

## 2023-07-06 ENCOUNTER — TELEPHONE (OUTPATIENT)
Dept: INTERNAL MEDICINE | Facility: CLINIC | Age: 63
End: 2023-07-06
Payer: MEDICARE

## 2023-07-06 NOTE — TELEPHONE ENCOUNTER
----- Message from Shea Veloz sent at 7/6/2023 12:45 PM CDT -----  Name of Who is Calling: HILARIO ALBERTS [[id number]]              What is the request in detail: Patient requesting a call back to discuss referral for counselor. Patient says she doesn't need psychiatry              Can the clinic reply by [facility]: No              What Number to Call Back if not in [facility]: [phone]

## 2023-07-10 ENCOUNTER — PATIENT OUTREACH (OUTPATIENT)
Dept: ADMINISTRATIVE | Facility: HOSPITAL | Age: 63
End: 2023-07-10
Payer: MEDICARE

## 2023-07-20 DIAGNOSIS — M17.11 PRIMARY OSTEOARTHRITIS OF RIGHT KNEE: Primary | ICD-10-CM

## 2023-07-20 DIAGNOSIS — M17.12 PRIMARY OSTEOARTHRITIS OF LEFT KNEE: ICD-10-CM

## 2023-07-21 ENCOUNTER — HOSPITAL ENCOUNTER (OUTPATIENT)
Dept: RADIOLOGY | Facility: HOSPITAL | Age: 63
Discharge: HOME | End: 2023-07-21
Attending: ORTHOPAEDIC SURGERY
Payer: MEDICARE

## 2023-07-21 ENCOUNTER — OFFICE VISIT (OUTPATIENT)
Dept: ORTHOPEDICS | Facility: CLINIC | Age: 63
End: 2023-07-21
Payer: MEDICARE

## 2023-07-21 VITALS — WEIGHT: 154.19 LBS | HEIGHT: 66 IN | BODY MASS INDEX: 24.78 KG/M2

## 2023-07-21 DIAGNOSIS — M17.12 PRIMARY OSTEOARTHRITIS OF LEFT KNEE: ICD-10-CM

## 2023-07-21 DIAGNOSIS — M17.11 PRIMARY OSTEOARTHRITIS OF RIGHT KNEE: ICD-10-CM

## 2023-07-21 DIAGNOSIS — M17.11 PRIMARY OSTEOARTHRITIS OF RIGHT KNEE: Primary | ICD-10-CM

## 2023-07-21 PROCEDURE — 73564 XR KNEE ORTHO BILAT WITH FLEXION: ICD-10-PCS | Mod: 26,50,, | Performed by: RADIOLOGY

## 2023-07-21 PROCEDURE — 3044F PR MOST RECENT HEMOGLOBIN A1C LEVEL <7.0%: ICD-10-PCS | Mod: CPTII,S$GLB,, | Performed by: ORTHOPAEDIC SURGERY

## 2023-07-21 PROCEDURE — 99999 PR PBB SHADOW E&M-EST. PATIENT-LVL III: ICD-10-PCS | Mod: PBBFAC,,, | Performed by: ORTHOPAEDIC SURGERY

## 2023-07-21 PROCEDURE — 99999 PR PBB SHADOW E&M-EST. PATIENT-LVL III: CPT | Mod: PBBFAC,,, | Performed by: ORTHOPAEDIC SURGERY

## 2023-07-21 PROCEDURE — 99213 OFFICE O/P EST LOW 20 MIN: CPT | Mod: S$GLB,,, | Performed by: ORTHOPAEDIC SURGERY

## 2023-07-21 PROCEDURE — 1159F PR MEDICATION LIST DOCUMENTED IN MEDICAL RECORD: ICD-10-PCS | Mod: CPTII,S$GLB,, | Performed by: ORTHOPAEDIC SURGERY

## 2023-07-21 PROCEDURE — 1160F PR REVIEW ALL MEDS BY PRESCRIBER/CLIN PHARMACIST DOCUMENTED: ICD-10-PCS | Mod: CPTII,S$GLB,, | Performed by: ORTHOPAEDIC SURGERY

## 2023-07-21 PROCEDURE — 73564 X-RAY EXAM KNEE 4 OR MORE: CPT | Mod: 26,50,, | Performed by: RADIOLOGY

## 2023-07-21 PROCEDURE — 3008F BODY MASS INDEX DOCD: CPT | Mod: CPTII,S$GLB,, | Performed by: ORTHOPAEDIC SURGERY

## 2023-07-21 PROCEDURE — 73564 X-RAY EXAM KNEE 4 OR MORE: CPT | Mod: TC,50

## 2023-07-21 PROCEDURE — 3044F HG A1C LEVEL LT 7.0%: CPT | Mod: CPTII,S$GLB,, | Performed by: ORTHOPAEDIC SURGERY

## 2023-07-21 PROCEDURE — 99213 PR OFFICE/OUTPT VISIT, EST, LEVL III, 20-29 MIN: ICD-10-PCS | Mod: S$GLB,,, | Performed by: ORTHOPAEDIC SURGERY

## 2023-07-21 PROCEDURE — 1159F MED LIST DOCD IN RCRD: CPT | Mod: CPTII,S$GLB,, | Performed by: ORTHOPAEDIC SURGERY

## 2023-07-21 PROCEDURE — 3008F PR BODY MASS INDEX (BMI) DOCUMENTED: ICD-10-PCS | Mod: CPTII,S$GLB,, | Performed by: ORTHOPAEDIC SURGERY

## 2023-07-21 PROCEDURE — 1160F RVW MEDS BY RX/DR IN RCRD: CPT | Mod: CPTII,S$GLB,, | Performed by: ORTHOPAEDIC SURGERY

## 2023-07-24 ENCOUNTER — PATIENT MESSAGE (OUTPATIENT)
Dept: RESEARCH | Facility: HOSPITAL | Age: 63
End: 2023-07-24
Payer: MEDICARE

## 2023-07-31 ENCOUNTER — PATIENT MESSAGE (OUTPATIENT)
Dept: RESEARCH | Facility: HOSPITAL | Age: 63
End: 2023-07-31
Payer: MEDICARE

## 2023-08-07 ENCOUNTER — TELEPHONE (OUTPATIENT)
Dept: INTERNAL MEDICINE | Facility: CLINIC | Age: 63
End: 2023-08-07
Payer: MEDICARE

## 2023-08-07 NOTE — TELEPHONE ENCOUNTER
----- Message from Isabel Vazquez sent at 8/7/2023 11:14 AM CDT -----  Name of Who is Calling: HILARIO ALBERTS [[id number]]           What is the request in detail: Patient is requesting a prescription for mupirocin 2% ointment.  Please assist.      Pharmacy Location : [facility] [address] [place] [address] [address] AT [facility] [address] [facility]           Can the clinic reply by [facility]: No           What Number to Call Back if not in [facility]: [phone]

## 2023-08-10 ENCOUNTER — TELEPHONE (OUTPATIENT)
Dept: INTERNAL MEDICINE | Facility: CLINIC | Age: 63
End: 2023-08-10
Payer: MEDICARE

## 2023-08-10 DIAGNOSIS — N89.8 VAGINAL IRRITATION: Primary | ICD-10-CM

## 2023-08-10 NOTE — TELEPHONE ENCOUNTER
----- Message from Keila Callie sent at 8/10/2023  2:30 PM CDT -----  Contact: HILARIO ALBERTS [[id number]]  Type: Call Back      Who called: HILARIO ALBERTS [[id number]]      What is the request in detail: Patient is requesting a call back. Pt is calling to check the status of her medication refill request. She states that she is having some vaginal irritation.   Please advise.     Can the clinic reply by [facility]? No      Would the patient rather a call back or a response via [facility]? Call back       Best call back number: [phone] (home)       Additional Information:

## 2023-08-10 NOTE — TELEPHONE ENCOUNTER
Patient has been contacted in regards to medication refill request for mupirocin 2% ointment. Patient informed Dr Rose is currently out of office, however request has been routed for advising by in office providers. Patient states she is still experiencing vaginal irration and well as discomfort after urination.     Patient informed message will be routed again for further advising. Patient does voice understanding.

## 2023-08-15 RX ORDER — MUPIROCIN 20 MG/G
OINTMENT TOPICAL 2 TIMES DAILY
Qty: 15 G | Refills: 0 | Status: SHIPPED | OUTPATIENT
Start: 2023-08-15

## 2023-10-01 RX ORDER — OXYBUTYNIN CHLORIDE 5 MG/1
5 TABLET ORAL
Qty: 90 TABLET | Refills: 2 | Status: SHIPPED | OUTPATIENT
Start: 2023-10-01

## 2023-10-01 NOTE — TELEPHONE ENCOUNTER
No care due was identified.  [facility] Embedded Care Due Messages. Reference number: [id number].   10/01/2023 5:58:02 AM CDT

## 2023-10-01 NOTE — TELEPHONE ENCOUNTER
Refill Decision Note   Nohemi Hull  is requesting a refill authorization.  Brief Assessment and Rationale for Refill:  Approve     Medication Therapy Plan:       Medication Reconciliation Completed: No   Comments:     No Care Gaps recommended.     Note composed:5:38 PM 10/01/2023

## 2023-10-27 ENCOUNTER — TELEPHONE (OUTPATIENT)
Dept: INTERNAL MEDICINE | Facility: CLINIC | Age: 63
End: 2023-10-27
Payer: MEDICARE

## 2023-10-27 NOTE — TELEPHONE ENCOUNTER
----- Message from Nicole Izzycee sent at 10/26/2023  2:38 PM CDT -----  Type:  Needs Medical Advice     Who Called: HILARIO ALBERTS [[id number]]      Would the patient rather a call back or a response via [facility]? Call back     Best Call Back Number: [phone]    Additional Information: Patient stated that she has been feeling cold for the past 3 years she always wear a sweater everyday and it is getting worse. She would like to know who can she see in regards to this condition. Please advise patient.    Thank you!

## 2023-11-01 ENCOUNTER — TELEPHONE (OUTPATIENT)
Dept: INTERNAL MEDICINE | Facility: CLINIC | Age: 63
End: 2023-11-01
Payer: MEDICARE

## 2023-11-01 NOTE — TELEPHONE ENCOUNTER
----- Message from Isaura Quijano sent at 11/1/2023  3:25 PM CDT -----  Contact: [phone]  1MEDICALADVICE     Patient is calling for Medical Advice regarding: pt state she will like a call back because she won't be able to make her appt tomorrow   Would like response via [facility]:  ##call back     Comments:

## 2023-11-01 NOTE — TELEPHONE ENCOUNTER
----- Message from Alcira Martinez sent at 11/1/2023  2:21 PM CDT -----  Contact: RISA Song @[phone]  Patient is returning a phone call.    Who left a message for the patient: --Demetrio--    Does patient know what this is regarding:  --Always cold and it's been going on for 3 years now-    Would you like a call back, or a response through your [facility] portal?:  --Call back--    Comments:  Please call to advise.

## 2023-11-02 ENCOUNTER — OFFICE VISIT (OUTPATIENT)
Dept: ORTHOPEDICS | Facility: CLINIC | Age: 63
End: 2023-11-02
Payer: MEDICARE

## 2023-11-02 ENCOUNTER — OFFICE VISIT (OUTPATIENT)
Dept: OBSTETRICS AND GYNECOLOGY | Facility: CLINIC | Age: 63
End: 2023-11-02
Payer: MEDICARE

## 2023-11-02 VITALS
HEIGHT: 66 IN | BODY MASS INDEX: 24.41 KG/M2 | SYSTOLIC BLOOD PRESSURE: 122 MMHG | WEIGHT: 151.88 LBS | DIASTOLIC BLOOD PRESSURE: 78 MMHG

## 2023-11-02 VITALS — WEIGHT: 152.25 LBS | BODY MASS INDEX: 24.47 KG/M2 | HEIGHT: 66 IN

## 2023-11-02 DIAGNOSIS — M17.12 PRIMARY OSTEOARTHRITIS OF LEFT KNEE: Primary | ICD-10-CM

## 2023-11-02 DIAGNOSIS — M17.11 PRIMARY OSTEOARTHRITIS OF RIGHT KNEE: ICD-10-CM

## 2023-11-02 DIAGNOSIS — N89.8 VAGINAL LESION: ICD-10-CM

## 2023-11-02 DIAGNOSIS — N89.8 VAGINAL ITCHING: Primary | ICD-10-CM

## 2023-11-02 DIAGNOSIS — N89.8 VAGINAL IRRITATION: ICD-10-CM

## 2023-11-02 PROCEDURE — 1159F MED LIST DOCD IN RCRD: CPT | Mod: CPTII,S$GLB,,

## 2023-11-02 PROCEDURE — 99999 PR PBB SHADOW E&M-EST. PATIENT-LVL IV: CPT | Mod: PBBFAC,,,

## 2023-11-02 PROCEDURE — 87529 HSV DNA AMP PROBE: CPT | Mod: 59

## 2023-11-02 PROCEDURE — 99999 PR PBB SHADOW E&M-EST. PATIENT-LVL III: ICD-10-PCS | Mod: PBBFAC,,, | Performed by: ORTHOPAEDIC SURGERY

## 2023-11-02 PROCEDURE — 99999 PR PBB SHADOW E&M-EST. PATIENT-LVL IV: ICD-10-PCS | Mod: PBBFAC,,,

## 2023-11-02 PROCEDURE — 99999 PR PBB SHADOW E&M-EST. PATIENT-LVL III: CPT | Mod: PBBFAC,,, | Performed by: ORTHOPAEDIC SURGERY

## 2023-11-02 PROCEDURE — 99499 NO LOS: ICD-10-PCS | Mod: S$GLB,,, | Performed by: ORTHOPAEDIC SURGERY

## 2023-11-02 PROCEDURE — 1160F PR REVIEW ALL MEDS BY PRESCRIBER/CLIN PHARMACIST DOCUMENTED: ICD-10-PCS | Mod: CPTII,S$GLB,,

## 2023-11-02 PROCEDURE — 99203 PR OFFICE/OUTPT VISIT, NEW, LEVL III, 30-44 MIN: ICD-10-PCS | Mod: S$GLB,,,

## 2023-11-02 PROCEDURE — 3078F DIAST BP <80 MM HG: CPT | Mod: CPTII,S$GLB,,

## 2023-11-02 PROCEDURE — 1160F RVW MEDS BY RX/DR IN RCRD: CPT | Mod: CPTII,S$GLB,,

## 2023-11-02 PROCEDURE — 3074F SYST BP LT 130 MM HG: CPT | Mod: CPTII,S$GLB,,

## 2023-11-02 PROCEDURE — 87086 URINE CULTURE/COLONY COUNT: CPT

## 2023-11-02 PROCEDURE — 1159F PR MEDICATION LIST DOCUMENTED IN MEDICAL RECORD: ICD-10-PCS | Mod: CPTII,S$GLB,,

## 2023-11-02 PROCEDURE — 3074F PR MOST RECENT SYSTOLIC BLOOD PRESSURE < 130 MM HG: ICD-10-PCS | Mod: CPTII,S$GLB,,

## 2023-11-02 PROCEDURE — 3078F PR MOST RECENT DIASTOLIC BLOOD PRESSURE < 80 MM HG: ICD-10-PCS | Mod: CPTII,S$GLB,,

## 2023-11-02 PROCEDURE — 3044F HG A1C LEVEL LT 7.0%: CPT | Mod: CPTII,S$GLB,,

## 2023-11-02 PROCEDURE — 3008F BODY MASS INDEX DOCD: CPT | Mod: CPTII,S$GLB,,

## 2023-11-02 PROCEDURE — 99203 OFFICE O/P NEW LOW 30 MIN: CPT | Mod: S$GLB,,,

## 2023-11-02 PROCEDURE — 81514 NFCT DS BV&VAGINITIS DNA ALG: CPT

## 2023-11-02 PROCEDURE — 3044F PR MOST RECENT HEMOGLOBIN A1C LEVEL <7.0%: ICD-10-PCS | Mod: CPTII,S$GLB,,

## 2023-11-02 PROCEDURE — 99499 UNLISTED E&M SERVICE: CPT | Mod: S$GLB,,, | Performed by: ORTHOPAEDIC SURGERY

## 2023-11-02 PROCEDURE — 20610 DRAIN/INJ JOINT/BURSA W/O US: CPT | Mod: 50,S$GLB,, | Performed by: ORTHOPAEDIC SURGERY

## 2023-11-02 PROCEDURE — 3008F PR BODY MASS INDEX (BMI) DOCUMENTED: ICD-10-PCS | Mod: CPTII,S$GLB,,

## 2023-11-02 PROCEDURE — 20610 LARGE JOINT ASPIRATION/INJECTION: BILATERAL KNEE: ICD-10-PCS | Mod: 50,S$GLB,, | Performed by: ORTHOPAEDIC SURGERY

## 2023-11-02 RX ORDER — ACETAMINOPHEN 500 MG
500 TABLET ORAL 2 TIMES DAILY PRN
Qty: 60 TABLET | Refills: 2 | Status: SHIPPED | OUTPATIENT
Start: 2023-11-02

## 2023-11-02 RX ORDER — TRIAMCINOLONE ACETONIDE 1 MG/G
CREAM TOPICAL 2 TIMES DAILY
Qty: 45 G | Refills: 0 | Status: SHIPPED | OUTPATIENT
Start: 2023-11-02 | End: 2024-02-21 | Stop reason: SDUPTHER

## 2023-11-02 RX ADMIN — TRIAMCINOLONE ACETONIDE 40 MG: 40 INJECTION, SUSPENSION INTRA-ARTICULAR; INTRAMUSCULAR at 02:11

## 2023-11-02 NOTE — PATIENT INSTRUCTIONS
a. avoiding feminine products such as deoderant soaps, body wash, bubble bath, douches, scented toilet paper, deoderant tampons or pads, feminine wipes, chronic pad use, etc.  b. avoiding other vulvovaginal irritants such as long hot baths, humidity, tight, synthetic clothing, chlorine and sitting around in wet bathing suits  c. wearing cotton underwear, avoiding thong underwear and no underwear to bed  d. taking showers instead of baths and use a hair dryer on cool setting afterwards to dry  e. wearing cotton to exercise and shower immediately after exercise and change clothes  f. using polyurethane condoms without spermicide if sexually active and symptoms are triggered by intercourse

## 2023-11-02 NOTE — PROGRESS NOTES
History & Physical  Gynecology      SUBJECTIVE:     Chief Complaint:   vaginal irritation     History of Present Illness  Nohemi Hull is a 63 y.o. female   presents for vaginal irritation. Has hx of hysterectomy in her 30s for abnormal bleeding. States she noticed vaginal irritation for the past few weeks. Reports itching to the outside of vagina. Has been scratching. Lesions showed up after scratching. Declines discharge and odor. Does report the itching increases after she urinates. Declines other urinary symptoms. Is not sexually active, has not been in over 15 years. Declines a hx of HSV.     BP Readings from Last 2 Encounters:   23 122/78   23 136/80      Review of patient's allergies indicates:  No Known Allergies    Past Medical History:   Diagnosis Date    Anemia     Anxiety     Arthritis     Clotting disorder     Depression     Renal failure      Past Surgical History:   Procedure Laterality Date    COLONOSCOPY N/A 10/29/2018    Procedure: COLONOSCOPY;  Surgeon: Mp Alvarado MD;  Location: [address];  Service: Endoscopy;  Laterality: N/A;  Referral received from Dr. SMOOTH Arias, [facility]  Last colonoscopy -recommended f/u 5 years-past due    DILATION AND CURETTAGE OF UTERUS      GASTRIC BYPASS      PARTIAL HYSTERECTOMY      AUB    RADIOFREQUENCY ABLATION Right 2022    Procedure: RADIOFREQUENCY ABLATION, GENICULAR RIGHT COOLED;  Surgeon: Radha Justin MD;  Location: [facility];  Service: Pain Management;  Laterality: Right;    TONSILLECTOMY      TUBAL LIGATION Bilateral      OB History          2    Para   2    Term   2            AB        Living             SAB        IAB        Ectopic        Multiple        Live Births                   Family History   Problem Relation Age of Onset    Hypertension Mother     Depression Mother     Hyperlipidemia Mother     Dementia Mother     Gallbladder  disease Mother     Hyperlipidemia Father     Hypertension Sister     Depression Sister     Hyperlipidemia Sister     Diabetes Sister     Hypertension Sister     Diabetes Sister     Hypertension Sister     Depression Brother     Diabetes Brother     Hyperlipidemia Brother     Hypertension Brother     Diabetes Brother     Hypertension Brother     Diabetes Brother     Hyperlipidemia Brother     Colon cancer Maternal Grandfather         rectal ca    Depression Maternal Grandfather     Breast cancer Paternal Grandmother     Depression Paternal Grandfather     Depression Son     Hypertension Maternal Aunt     Depression Maternal Aunt     Diabetes Paternal Uncle     Depression Paternal Uncle     Alcohol abuse Neg Hx     Ovarian cancer Neg Hx     Heart disease Neg Hx     Stroke Neg Hx      Social History     Tobacco Use    Smoking status: Some Days     Current packs/day: 0.25     Average packs/day: 0.3 packs/day for 7.0 years (1.8 ttl pk-yrs)     Types: Cigarettes    Smokeless tobacco: Current   Substance Use Topics    Alcohol use: Not Currently     Alcohol/week: 2.0 standard drinks of alcohol     Types: 2 Glasses of wine per week    Drug use: No       Current Outpatient Medications   Medication Sig    acetaminophen (TYLENOL) 500 MG tablet Take 1 tablet (500 mg total) by mouth 2 (two) times daily as needed for Pain.    aspirin (ECOTRIN) 81 MG EC tablet Take 81 mg by mouth once daily.    calcium carbonate (CALCIUM 300 ORAL) Take by mouth.    cholecalciferol, vitamin D3, 10 mcg (400 unit) Chew Take 400 Units by mouth once daily.    clonazePAM (KLONOPIN) 1 MG tablet Take 1 mg by mouth 2 (two) times daily as needed for Anxiety.    cyanocobalamin, vitamin B-12, (VITAMIN B-12 ORAL) Take 1 tablet by mouth once daily.    DULoxetine (CYMBALTA) 60 MG capsule Take 120 mg by mouth once daily.    FEROSUL 325 mg (65 mg iron) Tab tablet Take by mouth once daily.    magnesium 30 mg Tab Take by  mouth once.    mirtazapine (REMERON) 45 MG tablet Take 45 mg by mouth every evening.    multivit-min-FA-lycopen-lutein (CENTRUM SILVER) 0.4-300-250 mg-mcg-mcg Tab Take 1 tablet by mouth once daily.    mupirocin (BACTROBAN) 2 % ointment Apply topically 2 (two) times daily.    omega-3 fatty acids fish oil (OMEGA-3 2100) 1,050-1,200 mg Cap capsule Take 1 capsule by mouth once daily.    oxybutynin (DITROPAN) 5 MG Tab TAKE 1 TABLET(5 MG) BY MOUTH EVERY DAY    risperiDONE (RISPERDAL) 1 MG tablet Take 1 mg by mouth 2 (two) times daily.    sertraline (ZOLOFT) 100 MG tablet Take 1 tablet by mouth every morning.    suvorexant (BELSOMRA) 20 mg Tab Take by mouth.    traZODone (DESYREL) 150 MG tablet Take 300 mg by mouth every evening.    gabapentin (NEURONTIN) 600 MG tablet Take 1 tablet (600 mg total) by mouth 3 (three) times daily.    terconazole (TERAZOL 3) 0.8 % vaginal cream Place 1 applicator vaginally every evening.    triamcinolone acetonide 0.1% (KENALOG) 0.1 % cream Apply topically 2 (two) times daily. for 7 days     No current facility-administered medications for this visit.         Review of Systems:  Review of Systems   Constitutional:  Negative for fever.   Respiratory:  Negative for shortness of breath and wheezing.    Cardiovascular:  Negative for palpitations and leg swelling.   Genitourinary:  Positive for genital sores and vaginal pain. Negative for dysuria, frequency, pelvic pain and urgency.        OBJECTIVE:     Physical Exam:  Physical Exam  Pulmonary:      Effort: Pulmonary effort is normal.   Genitourinary:     Labia:         Right: Lesion present. No tenderness.         Left: Lesion present. No tenderness.       Vagina: Vaginal discharge present.      Uterus: Absent.           Comments: - Few  ~4mm ulcerations with underlying erythema   - Scant thin white vaginal discharge  Skin:     General: Skin is warm and dry.   Neurological:      Mental Status: She is alert and oriented to person,  place, and time.   Psychiatric:         Behavior: Behavior normal.         ASSESSMENT:       ICD-10-CM ICD-9-CM    1. Vaginal itching  N89.8 698.1 CULTURE, URINE      Vaginosis Screen by DNA Probe      triamcinolone acetonide 0.1% (KENALOG) 0.1 % cream      2. Vaginal irritation  N89.8 623.9 Ambulatory referral/consult to Obstetrics / Gynecology      CULTURE, URINE      Vaginosis Screen by DNA Probe      triamcinolone acetonide 0.1% (KENALOG) 0.1 % cream      3. Vaginal lesion  N89.8 623.8 HSV by Rapid PCR, Non-Blood [facility]; Vagina             Plan:      - HSV swab collected due to lesions- low suspicion since she has not been SA in years and does not have hx  - AFFIRM collected   - Urine culture ordered per pt request  - Kenalog sent  - Follow up if no improvement     Patient was counseled today on vaginitis prevention including :  a. avoiding feminine products such as deoderant soaps, body wash, bubble bath, douches, scented toilet paper, deoderant tampons or pads, feminine wipes, chronic pad use, etc.  b. avoiding other vulvovaginal irritants such as long hot baths, humidity, tight, synthetic clothing, chlorine and sitting around in wet bathing suits  c. wearing cotton underwear, avoiding thong underwear and no underwear to bed  d. taking showers instead of baths and use a hair dryer on cool setting afterwards to dry  e. wearing cotton to exercise and shower immediately after exercise and change clothes  f. using polyurethane condoms without spermicide if sexually active and symptoms are triggered by intercourse     Loida Sagastume, [facility]  MURIEL

## 2023-11-03 LAB
BACTERIA UR CULT: NO GROWTH
BACTERIAL VAGINOSIS DNA: POSITIVE
CANDIDA GLABRATA DNA: POSITIVE
CANDIDA KRUSEI DNA: NEGATIVE
CANDIDA RRNA VAG QL PROBE: NEGATIVE
T VAGINALIS RRNA GENITAL QL PROBE: NEGATIVE

## 2023-11-04 LAB
HSV1 DNA SPEC QL NAA+PROBE: NEGATIVE
HSV2 DNA SPEC QL NAA+PROBE: NEGATIVE
SPECIMEN SOURCE: NORMAL

## 2023-11-06 ENCOUNTER — TELEPHONE (OUTPATIENT)
Dept: OBSTETRICS AND GYNECOLOGY | Facility: CLINIC | Age: 63
End: 2023-11-06
Payer: MEDICARE

## 2023-11-06 DIAGNOSIS — B37.9 CANDIDA GLABRATA INFECTION: Primary | ICD-10-CM

## 2023-11-06 DIAGNOSIS — B96.89 BV (BACTERIAL VAGINOSIS): ICD-10-CM

## 2023-11-06 DIAGNOSIS — N76.0 BV (BACTERIAL VAGINOSIS): ICD-10-CM

## 2023-11-06 RX ORDER — METRONIDAZOLE 500 MG/1
500 TABLET ORAL EVERY 12 HOURS
Qty: 14 TABLET | Refills: 0 | Status: SHIPPED | OUTPATIENT
Start: 2023-11-06 | End: 2023-11-13

## 2023-11-06 RX ORDER — TERCONAZOLE 4 MG/G
1 CREAM VAGINAL NIGHTLY
Qty: 45 G | Refills: 0 | Status: SHIPPED | OUTPATIENT
Start: 2023-11-06 | End: 2023-11-13

## 2023-11-06 NOTE — PROGRESS NOTES
Subjective:      Patient ID: Nohemi Hull is a 63 y.o. female.    Chief Complaint:   Pain of the Left Knee and Pain of the Right Knee    HPI    The patient got good results cortisone injection previously for osteoarthritis knee pain, and made this appointment for the purpose of repeating this treatment.  No evaluation/management charge for this visit.      Luis Fernando Meehan MD  Orthopedic Surgery

## 2023-11-07 RX ORDER — TRIAMCINOLONE ACETONIDE 40 MG/ML
40 INJECTION, SUSPENSION INTRA-ARTICULAR; INTRAMUSCULAR
Status: DISCONTINUED | OUTPATIENT
Start: 2023-11-02 | End: 2023-11-07 | Stop reason: HOSPADM

## 2023-11-07 NOTE — PROCEDURES
Large Joint Aspiration/Injection: bilateral knee    Date/Time: 11/2/2023 2:45 PM    Performed by: Luis Fernando Meehan MD  Authorized by: Luis Fernando Meehan MD    Consent Done?:  Yes (Verbal)  Indications:  Pain and arthritis  Site marked: the procedure site was marked    Timeout: prior to procedure the correct patient, procedure, and site was verified    Prep: patient was prepped and draped in usual sterile fashion      Local anesthesia used?: Yes    Local anesthetic:  Lidocaine 1% without epinephrine  Anesthetic total (ml):  5      Details:  Needle Size:  25 G  Ultrasonic Guidance for needle placement?: No    Approach:  Anterolateral  Location:  Knee  Laterality:  Bilateral  Site:  Bilateral knee  Medications (Right):  40 mg triamcinolone acetonide 40 mg/mL  Medications (Left):  40 mg triamcinolone acetonide 40 mg/mL  Patient tolerance:  Patient tolerated the procedure well with no immediate complications

## 2023-12-12 NOTE — PROGRESS NOTES
"Subjective:       Patient ID: Nohemi Hull is a 63 y.o. female.    Chief Complaint:   Injections of the Right Knee and Injections of the Left Knee  Pt presents for follow up of left knee pain. Reports that her pain was relieved with a CSI at last visit but she feels that her pain has gotten worse recently. She denies any falls. Last CSI 6/24/22.  Objective:      Vitals:    07/21/23 0823   Weight: 70 kg (154 lb 3.4 oz)   Height: 5' 6" (1.676 m)     Physical Exam      Right Lower Extremity  Inspection  - Skin intact throughout, no open wounds  - No swelling  - No ecchymosis, erythema, or signs of cellulitis  Palpation  - TTP to medial joint line  Range of motion  - AROM and PROM of the hip, knee, ankle, and foot intact without pain  - ROM at knee limited due to pain      Flexion: 120 degrees      Extension: 0 degrees  Stability  - No evidence of joint dislocation or abnormal laxity  Neurovascular  - TA/EHL/Gastroc/FHL assessed in isolation without deficit  - SILT throughout  - Compartments soft  - DP palpated   - Muscle tone normal      Imaging Review: I independently reviewed and interpreted the imaging and my findings are as follows: right medial joint space narrowing with subchondral sclerosis noted      Assessment:   62 year old female with chronic bilateral knee OA pain with minimal relief from CSI    Plan:     - referral to pain management for possible RFA     The patient's pathophysiology was explained in detail with reference to x-rays, models, other visual aids as appropriate.  Treatment options were discussed in detail.  Questions were invited and answered to the patient's satisfaction.        " details… soft/nontender/nondistended/normal active bowel sounds soft/nontender/nondistended/normal active bowel sounds/no guarding/no rigidity/no organomegaly/no palpable fernando/no masses palpable

## 2023-12-19 ENCOUNTER — TELEPHONE (OUTPATIENT)
Dept: INTERNAL MEDICINE | Facility: CLINIC | Age: 63
End: 2023-12-19
Payer: MEDICARE

## 2023-12-19 DIAGNOSIS — Z12.31 SCREENING MAMMOGRAM FOR BREAST CANCER: Primary | ICD-10-CM

## 2023-12-19 NOTE — TELEPHONE ENCOUNTER
----- Message from Zoila Danielle sent at 12/18/2023  2:10 PM CST -----  Contact: [phone]  Pt is requesting to have orders put in and scheduled for a mammogram at the [facility] location. Please call.              Thank you

## 2024-01-10 ENCOUNTER — TELEPHONE (OUTPATIENT)
Dept: ORTHOPEDICS | Facility: CLINIC | Age: 64
End: 2024-01-10
Payer: MEDICARE

## 2024-01-10 NOTE — TELEPHONE ENCOUNTER
Called and spoke to pt regarding appt for injections. Pt accepted first available since last injection. Pt understood conversation with no further questions.     ----- Message from Domingo Little sent at 1/10/2024  8:43 AM CST -----  Regarding: Question  Name of Who is Calling:  Patient          What is the request in detail:  Patient would like to know is it to early for her to get an injection in her knee            Can the clinic reply by [facility]: No            What Number to Call Back if not in [facility]: [phone]

## 2024-02-05 ENCOUNTER — HOSPITAL ENCOUNTER (OUTPATIENT)
Dept: RADIOLOGY | Facility: OTHER | Age: 64
Discharge: HOME | End: 2024-02-05
Attending: STUDENT IN AN ORGANIZED HEALTH CARE EDUCATION/TRAINING PROGRAM
Payer: MEDICARE

## 2024-02-05 DIAGNOSIS — Z12.31 SCREENING MAMMOGRAM FOR BREAST CANCER: ICD-10-CM

## 2024-02-05 PROCEDURE — 77063 BREAST TOMOSYNTHESIS BI: CPT | Mod: 26,,, | Performed by: RADIOLOGY

## 2024-02-05 PROCEDURE — 77067 SCR MAMMO BI INCL CAD: CPT | Mod: TC

## 2024-02-05 PROCEDURE — 77067 SCR MAMMO BI INCL CAD: CPT | Mod: 26,,, | Performed by: RADIOLOGY

## 2024-02-06 ENCOUNTER — OFFICE VISIT (OUTPATIENT)
Dept: ORTHOPEDICS | Facility: CLINIC | Age: 64
End: 2024-02-06
Payer: MEDICARE

## 2024-02-06 VITALS — BODY MASS INDEX: 24.43 KG/M2 | WEIGHT: 152 LBS | HEIGHT: 66 IN

## 2024-02-06 DIAGNOSIS — M17.11 PRIMARY OSTEOARTHRITIS OF RIGHT KNEE: Primary | ICD-10-CM

## 2024-02-06 DIAGNOSIS — M17.12 PRIMARY OSTEOARTHRITIS OF LEFT KNEE: ICD-10-CM

## 2024-02-06 PROCEDURE — 99999 PR PBB SHADOW E&M-EST. PATIENT-LVL III: CPT | Mod: PBBFAC,,, | Performed by: ORTHOPAEDIC SURGERY

## 2024-02-06 PROCEDURE — 20610 DRAIN/INJ JOINT/BURSA W/O US: CPT | Mod: 50,S$GLB,, | Performed by: ORTHOPAEDIC SURGERY

## 2024-02-06 PROCEDURE — 99499 UNLISTED E&M SERVICE: CPT | Mod: 25,S$GLB,, | Performed by: ORTHOPAEDIC SURGERY

## 2024-02-06 RX ADMIN — TRIAMCINOLONE ACETONIDE 40 MG: 40 INJECTION, SUSPENSION INTRA-ARTICULAR; INTRAMUSCULAR at 08:02

## 2024-02-07 RX ORDER — TRIAMCINOLONE ACETONIDE 40 MG/ML
40 INJECTION, SUSPENSION INTRA-ARTICULAR; INTRAMUSCULAR
Status: DISCONTINUED | OUTPATIENT
Start: 2024-02-06 | End: 2024-02-07 | Stop reason: HOSPADM

## 2024-02-07 NOTE — PROGRESS NOTES
Subjective:      Patient ID: Nohemi Hull is a 63 y.o. female.    Chief Complaint:   Injections and Pain of the Right Knee and Injections and Pain of the Left Knee    HPI    The patient got good results cortisone injection previously for osteoarthritis knee pain, and made this appointment for the purpose of repeating this treatment.  No evaluation/management charge for this visit.      Luis Fernando Meehan MD  Orthopedic Surgery

## 2024-02-07 NOTE — PROCEDURES
Large Joint Aspiration/Injection: bilateral knee    Date/Time: 2/6/2024 8:30 AM    Performed by: Luis Fernando Meehan MD  Authorized by: Luis Fernando Meehan MD    Consent Done?:  Yes (Verbal)  Indications:  Pain and arthritis  Site marked: the procedure site was marked    Timeout: prior to procedure the correct patient, procedure, and site was verified    Prep: patient was prepped and draped in usual sterile fashion      Local anesthesia used?: Yes    Local anesthetic:  Lidocaine 1% without epinephrine  Anesthetic total (ml):  5      Details:  Needle Size:  25 G  Ultrasonic Guidance for needle placement?: No    Approach:  Anterolateral  Location:  Knee  Laterality:  Bilateral  Site:  Bilateral knee  Medications (Right):  40 mg triamcinolone acetonide 40 mg/mL  Medications (Left):  40 mg triamcinolone acetonide 40 mg/mL  Patient tolerance:  Patient tolerated the procedure well with no immediate complications

## 2024-02-15 ENCOUNTER — TELEPHONE (OUTPATIENT)
Dept: INTERNAL MEDICINE | Facility: CLINIC | Age: 64
End: 2024-02-15
Payer: MEDICARE

## 2024-02-15 NOTE — TELEPHONE ENCOUNTER
----- Message from Fannycandelario Conner sent at 2/15/2024  7:57 AM CST -----  Contact: [phone] pt  [email or web address]     Patient is calling for Medical Advice regarding:    How long has patient had these symptoms:pt states when she urine she feels heaviness.she also cannot hold her urine. Had since yesterday(2/14/2024)    Call back please    Pharmacy name and phone#    [id number] [place] [facility] [address] AT [facility] [address]  [address]  [facility] [place] [address]  Phone: [phone] Fax: [phone]

## 2024-02-16 ENCOUNTER — OFFICE VISIT (OUTPATIENT)
Dept: INTERNAL MEDICINE | Facility: CLINIC | Age: 64
End: 2024-02-16
Payer: MEDICARE

## 2024-02-16 VITALS
WEIGHT: 151.25 LBS | SYSTOLIC BLOOD PRESSURE: 106 MMHG | OXYGEN SATURATION: 98 % | BODY MASS INDEX: 24.31 KG/M2 | HEIGHT: 66 IN | HEART RATE: 103 BPM | DIASTOLIC BLOOD PRESSURE: 57 MMHG

## 2024-02-16 DIAGNOSIS — R30.0 DYSURIA: ICD-10-CM

## 2024-02-16 DIAGNOSIS — R31.9 URINARY TRACT INFECTION WITH HEMATURIA, SITE UNSPECIFIED: ICD-10-CM

## 2024-02-16 DIAGNOSIS — N39.0 URINARY TRACT INFECTION WITH HEMATURIA, SITE UNSPECIFIED: ICD-10-CM

## 2024-02-16 DIAGNOSIS — N89.8 VAGINAL IRRITATION: Primary | ICD-10-CM

## 2024-02-16 LAB
BACTERIA #/AREA URNS AUTO: ABNORMAL /HPF
BILIRUB SERPL-MCNC: ABNORMAL MG/DL
BILIRUB UR QL STRIP: NEGATIVE
BLOOD URINE, POC: 250
CLARITY UR REFRACT.AUTO: ABNORMAL
CLARITY, POC UA: ABNORMAL
COLOR UR AUTO: ABNORMAL
COLOR, POC UA: ABNORMAL
GLUCOSE UR QL STRIP: NEGATIVE
GLUCOSE UR QL STRIP: NORMAL
HGB UR QL STRIP: ABNORMAL
HYALINE CASTS UR QL AUTO: 0 /LPF
KETONES UR QL STRIP: ABNORMAL
KETONES UR QL STRIP: ABNORMAL
LEUKOCYTE ESTERASE UR QL STRIP: ABNORMAL
LEUKOCYTE ESTERASE URINE, POC: ABNORMAL
MICROSCOPIC COMMENT: ABNORMAL
NITRITE UR QL STRIP: NEGATIVE
NITRITE, POC UA: ABNORMAL
PH UR STRIP: 5 [PH] (ref 5–8)
PH, POC UA: 5
PROT UR QL STRIP: ABNORMAL
PROTEIN, POC: ABNORMAL
RBC #/AREA URNS AUTO: >100 /HPF (ref 0–4)
SP GR UR STRIP: 1.02 (ref 1–1.03)
SPECIFIC GRAVITY, POC UA: 1.02
SQUAMOUS #/AREA URNS AUTO: 1 /HPF
URN SPEC COLLECT METH UR: ABNORMAL
UROBILINOGEN, POC UA: NORMAL
WBC #/AREA URNS AUTO: >100 /HPF (ref 0–5)

## 2024-02-16 PROCEDURE — 87088 URINE BACTERIA CULTURE: CPT

## 2024-02-16 PROCEDURE — 87077 CULTURE AEROBIC IDENTIFY: CPT

## 2024-02-16 PROCEDURE — 81002 URINALYSIS NONAUTO W/O SCOPE: CPT | Mod: S$GLB,,,

## 2024-02-16 PROCEDURE — 87186 SC STD MICRODIL/AGAR DIL: CPT

## 2024-02-16 PROCEDURE — 99999 PR PBB SHADOW E&M-EST. PATIENT-LVL IV: CPT | Mod: PBBFAC,,,

## 2024-02-16 PROCEDURE — 81514 NFCT DS BV&VAGINITIS DNA ALG: CPT

## 2024-02-16 PROCEDURE — 99213 OFFICE O/P EST LOW 20 MIN: CPT | Mod: S$GLB,,,

## 2024-02-16 PROCEDURE — 81001 URINALYSIS AUTO W/SCOPE: CPT

## 2024-02-16 PROCEDURE — 87086 URINE CULTURE/COLONY COUNT: CPT

## 2024-02-16 RX ORDER — NITROFURANTOIN 25; 75 MG/1; MG/1
100 CAPSULE ORAL 2 TIMES DAILY
Qty: 10 CAPSULE | Refills: 0 | Status: SHIPPED | OUTPATIENT
Start: 2024-02-16 | End: 2024-02-21

## 2024-02-16 NOTE — PROGRESS NOTES
CHIEF COMPLAINT     Chief Complaint   Patient presents with    Urinary Tract Infection       HPI     Nohemi Hull is a 63 y.o. female who presents for uti symptoms today.    PCP is Nataly Rose MD, patient is new to me. Pt reports heaviness/discomfort while urinating. Denies blood in urine or fever, flank pain.    Pt reports vaginal infection several months ago. Would like to be rechecked. Reports return of itching and irritation.    Past Medical History:  Past Medical History:   Diagnosis Date    Anemia     Anxiety     Arthritis     Clotting disorder     Depression 2000    Renal failure        Home Medications:  Prior to Admission medications    Medication Sig Start Date End Date Taking? Authorizing Provider   acetaminophen (TYLENOL) 500 MG tablet Take 1 tablet (500 mg total) by mouth 2 (two) times daily as needed for Pain. 11/2/23  Yes Luis Fernando Meehan MD   aspirin (ECOTRIN) 81 MG EC tablet Take 81 mg by mouth once daily.   Yes Provider, Historical   calcium carbonate (CALCIUM 300 ORAL) Take by mouth.   Yes Provider, Historical   cholecalciferol, vitamin D3, 10 mcg (400 unit) Chew Take 400 Units by mouth once daily.   Yes Provider, Historical   clonazePAM (KLONOPIN) 1 MG tablet Take 1 mg by mouth 2 (two) times daily as needed for Anxiety.   Yes Provider, Historical   cyanocobalamin, vitamin B-12, (VITAMIN B-12 ORAL) Take 1 tablet by mouth once daily.   Yes Provider, Historical   FEROSUL 325 mg (65 mg iron) Tab tablet Take by mouth once daily. 2/22/22  Yes Provider, Historical   magnesium 30 mg Tab Take by mouth once.   Yes Provider, Historical   multivit-min-FA-lycopen-lutein (CENTRUM SILVER) 0.4-300-250 mg-mcg-mcg Tab Take 1 tablet by mouth once daily.   Yes Provider, Historical   mupirocin (BACTROBAN) 2 % ointment Apply topically 2 (two) times daily. 8/15/23  Yes Nataly Rose MD   omega-3 fatty acids fish oil (OMEGA-3 2100) 1,050-1,200 mg Cap capsule Take 1 capsule by mouth once daily.  "  Yes Provider, Historical   oxybutynin (DITROPAN) 5 MG Tab TAKE 1 TABLET(5 MG) BY MOUTH EVERY DAY 10/1/23  Yes Nataly Rose MD   risperiDONE (RISPERDAL) 1 MG tablet Take 1 mg by mouth 2 (two) times daily.   Yes Provider, Historical   sertraline (ZOLOFT) 100 MG tablet Take 1 tablet by mouth every morning.   Yes Provider, Historical   DULoxetine (CYMBALTA) 60 MG capsule Take 120 mg by mouth once daily.    Provider, Historical   gabapentin (NEURONTIN) 600 MG tablet Take 1 tablet (600 mg total) by mouth 3 (three) times daily. 2/6/23 8/5/23  Nataly Rose MD   mirtazapine (REMERON) 45 MG tablet Take 45 mg by mouth every evening.    Provider, Historical   suvorexant (BELSOMRA) 20 mg Tab Take by mouth.    Provider, Historical   traZODone (DESYREL) 150 MG tablet Take 300 mg by mouth every evening.    Provider, Historical   triamcinolone acetonide 0.1% (KENALOG) 0.1 % cream Apply topically 2 (two) times daily. for 7 days 11/2/23 11/9/23  Loida Sagastume NP       Review of Systems:  Review of Systems   Constitutional: Negative.    Genitourinary:  Positive for dysuria and vaginal pain. Negative for flank pain.       Health Maintainence:   Immunizations:  Health Maintenance         Date Due Completion Date    Pneumococcal Vaccines (Age 0-64) (1 of 2 - PCV) Never done ---    RSV Vaccine (Age 60+ and Pregnant patients) (1 - 1-dose 60+ series) Never done ---    Influenza Vaccine (1) 09/01/2023 10/6/2022    COVID-19 Vaccine (5 - 2023-24 season) 09/01/2023 9/21/2022    Colorectal Cancer Screening 10/29/2023 10/29/2018    LDCT Lung Screen 01/10/2024 1/10/2023    Mammogram 02/05/2025 2/5/2024    DEXA Scan 07/18/2025 7/18/2022    Lipid Panel 06/16/2028 6/16/2023    TETANUS VACCINE 07/11/2032 7/11/2022             PHYSICAL EXAM     BP (!) 106/57   Pulse 103   Ht 5' 6" (1.676 m)   Wt 68.6 kg (151 lb 3.8 oz)   SpO2 98%   BMI 24.41 kg/m²     Physical Exam  Vitals reviewed.   Constitutional:       Appearance: She is " well-developed.   HENT:      Head: Normocephalic and atraumatic.   Eyes:      Conjunctiva/sclera: Conjunctivae normal.   Cardiovascular:      Rate and Rhythm: Normal rate.   Pulmonary:      Effort: Pulmonary effort is normal. No respiratory distress.   Skin:     General: Skin is warm and dry.      Findings: No rash.   Neurological:      Mental Status: She is alert and oriented to person, place, and time.      Coordination: Coordination normal.   Psychiatric:         Behavior: Behavior normal.         LABS     Lab Results   Component Value Date    HGBA1C 4.4 06/16/2023     CMP  Sodium   Date Value Ref Range Status   06/16/2023 139 136 - 145 mmol/L Final     Potassium   Date Value Ref Range Status   06/16/2023 4.0 3.5 - 5.1 mmol/L Final     Chloride   Date Value Ref Range Status   06/16/2023 105 95 - 110 mmol/L Final     CO2   Date Value Ref Range Status   06/16/2023 24 23 - 29 mmol/L Final     Glucose   Date Value Ref Range Status   06/16/2023 102 70 - 110 mg/dL Final     BUN   Date Value Ref Range Status   06/16/2023 15 8 - 23 mg/dL Final     Creatinine   Date Value Ref Range Status   06/16/2023 1.2 0.5 - 1.4 mg/dL Final     Calcium   Date Value Ref Range Status   06/16/2023 9.6 8.7 - 10.5 mg/dL Final     Total Protein   Date Value Ref Range Status   06/16/2023 7.4 6.0 - 8.4 g/dL Final     Albumin   Date Value Ref Range Status   06/16/2023 4.2 3.5 - 5.2 g/dL Final     Total Bilirubin   Date Value Ref Range Status   06/16/2023 1.0 0.1 - 1.0 mg/dL Final     Comment:     For infants and newborns, interpretation of results should be based  on gestational age, weight and in agreement with clinical  observations.    Premature Infant recommended reference ranges:  Up to 24 hours.............<8.0 mg/dL  Up to 48 hours............<12.0 mg/dL  3-5 days..................<15.0 mg/dL  6-29 days.................<15.0 mg/dL       Alkaline Phosphatase   Date Value Ref Range Status   06/16/2023 61 55 - 135 U/L Final     AST   Date  Value Ref Range Status   06/16/2023 16 10 - 40 U/L Final     ALT   Date Value Ref Range Status   06/16/2023 11 10 - 44 U/L Final     Anion Gap   Date Value Ref Range Status   06/16/2023 10 8 - 16 mmol/L Final     eGFR if    Date Value Ref Range Status   07/11/2022 >60 >60 mL/min/1.73 m^2 Final     eGFR if non    Date Value Ref Range Status   07/11/2022 >60 >60 mL/min/1.73 m^2 Final     Comment:     Calculation used to obtain the estimated glomerular filtration  rate (eGFR) is the CKD-EPI equation.        Lab Results   Component Value Date    WBC 3.44 (L) 06/16/2023    HGB 13.0 06/16/2023    HCT 40.5 06/16/2023     (H) 06/16/2023     06/16/2023     Lab Results   Component Value Date    CHOL 154 06/16/2023    CHOL 164 05/10/2022    CHOL 180 06/14/2021     Lab Results   Component Value Date    HDL 62 06/16/2023    HDL 74 05/10/2022    HDL 64 06/14/2021     Lab Results   Component Value Date    LDLCALC 80.8 06/16/2023    LDLCALC 79.8 05/10/2022    LDLCALC 99 06/14/2021     Lab Results   Component Value Date    TRIG 56 06/16/2023    TRIG 51 05/10/2022    TRIG 76 06/14/2021     Lab Results   Component Value Date    CHOLHDL 40.3 06/16/2023    CHOLHDL 45.1 05/10/2022    CHOLHDL 31.5 09/28/2006     Lab Results   Component Value Date    TSH 2.399 06/16/2023    I5KOKYQ 147 10/04/2004    G4GIEZW 8.1 10/04/2004       ASSESSMENT/PLAN   1. Vaginal irritation  -     Cancel: POCT Vaginosis Screen by DNA Probe (Affirm)  -     VAGINOSIS SCREEN BY DNA PROBE    2. Dysuria  -     Urinalysis, Reflex to Urine Culture Urine, Clean Catch    3. Urinary tract infection with hematuria, site unspecified  -     nitrofurantoin, macrocrystal-monohydrate, (MACROBID) 100 MG capsule; Take 1 capsule (100 mg total) by mouth 2 (two) times daily.  Dispense: 10 capsule; Refill: 0         Will f/u once vaginosis test returned.    Woody Huston NP   Department of Internal Medicine [facility]  10:21  AM

## 2024-02-18 LAB
BACTERIAL VAGINOSIS DNA: NEGATIVE
CANDIDA GLABRATA DNA: POSITIVE
CANDIDA KRUSEI DNA: NEGATIVE
CANDIDA RRNA VAG QL PROBE: NEGATIVE
T VAGINALIS RRNA GENITAL QL PROBE: NEGATIVE

## 2024-02-19 LAB — BACTERIA UR CULT: ABNORMAL

## 2024-02-21 ENCOUNTER — TELEPHONE (OUTPATIENT)
Dept: INTERNAL MEDICINE | Facility: CLINIC | Age: 64
End: 2024-02-21
Payer: MEDICARE

## 2024-02-21 DIAGNOSIS — N89.8 VAGINAL IRRITATION: ICD-10-CM

## 2024-02-21 DIAGNOSIS — N39.0 URINARY TRACT INFECTION WITH HEMATURIA, SITE UNSPECIFIED: Primary | ICD-10-CM

## 2024-02-21 DIAGNOSIS — R31.9 URINARY TRACT INFECTION WITH HEMATURIA, SITE UNSPECIFIED: Primary | ICD-10-CM

## 2024-02-21 DIAGNOSIS — N89.8 VAGINAL ITCHING: ICD-10-CM

## 2024-02-21 RX ORDER — TRIAMCINOLONE ACETONIDE 1 MG/G
CREAM TOPICAL 2 TIMES DAILY
Qty: 45 G | Refills: 0 | Status: SHIPPED | OUTPATIENT
Start: 2024-02-21 | End: 2024-02-28

## 2024-02-21 RX ORDER — FLUCONAZOLE 150 MG/1
150 TABLET ORAL DAILY
Qty: 1 TABLET | Refills: 0 | Status: SHIPPED | OUTPATIENT
Start: 2024-02-21 | End: 2024-02-22

## 2024-02-21 RX ORDER — NITROFURANTOIN 25; 75 MG/1; MG/1
100 CAPSULE ORAL 2 TIMES DAILY
Qty: 10 CAPSULE | Refills: 0 | Status: SHIPPED | OUTPATIENT
Start: 2024-02-21

## 2024-02-21 NOTE — TELEPHONE ENCOUNTER
----- Message from Vania Almaguer sent at 2/21/2024  8:08 AM CST -----  Regarding: Patient Advice                Name of Who is Calling: Nohemi Hull    Who Left The Message: Nohemi Hull      What is the request in detail:        Patient called to make aware that she's still experiencing slight pain when urinating and would like to have a vaginal cream called in to her local pharmacy for itching because of the antibiotics prescribed.  Please give the patient a call back at your earliest convenience and further advise.   Thank you      Reply by [facility]: NO      Preferred Call Back :  [phone] (L)

## 2024-02-21 NOTE — TELEPHONE ENCOUNTER
Please let her know that I called in another 5 days of the macrobid since it sounds like her UTI has not cleared. I have also called in a dose of diflucan to be taken the day after she completes her antibiotic. Thanks

## 2024-04-15 ENCOUNTER — OFFICE VISIT (OUTPATIENT)
Dept: INTERNAL MEDICINE | Facility: CLINIC | Age: 64
End: 2024-04-15
Payer: MEDICARE

## 2024-04-15 VITALS
HEART RATE: 94 BPM | DIASTOLIC BLOOD PRESSURE: 78 MMHG | OXYGEN SATURATION: 98 % | HEIGHT: 66 IN | SYSTOLIC BLOOD PRESSURE: 110 MMHG | BODY MASS INDEX: 24.52 KG/M2 | WEIGHT: 152.56 LBS

## 2024-04-15 DIAGNOSIS — F17.210 CIGARETTE NICOTINE DEPENDENCE WITHOUT COMPLICATION: ICD-10-CM

## 2024-04-15 DIAGNOSIS — J40 BRONCHITIS: Primary | ICD-10-CM

## 2024-04-15 PROCEDURE — 1160F RVW MEDS BY RX/DR IN RCRD: CPT | Mod: CPTII,S$GLB,,

## 2024-04-15 PROCEDURE — 99999 PR PBB SHADOW E&M-EST. PATIENT-LVL IV: CPT | Mod: PBBFAC,,,

## 2024-04-15 PROCEDURE — 99214 OFFICE O/P EST MOD 30 MIN: CPT | Mod: S$GLB,,,

## 2024-04-15 PROCEDURE — 3078F DIAST BP <80 MM HG: CPT | Mod: CPTII,S$GLB,,

## 2024-04-15 PROCEDURE — 3074F SYST BP LT 130 MM HG: CPT | Mod: CPTII,S$GLB,,

## 2024-04-15 PROCEDURE — 1159F MED LIST DOCD IN RCRD: CPT | Mod: CPTII,S$GLB,,

## 2024-04-15 PROCEDURE — 3008F BODY MASS INDEX DOCD: CPT | Mod: CPTII,S$GLB,,

## 2024-04-15 RX ORDER — VORTIOXETINE 20 MG/1
20 TABLET, FILM COATED ORAL DAILY
COMMUNITY

## 2024-04-15 RX ORDER — AZITHROMYCIN 250 MG/1
TABLET, FILM COATED ORAL
Qty: 6 TABLET | Refills: 0 | Status: SHIPPED | OUTPATIENT
Start: 2024-04-15 | End: 2024-04-20

## 2024-04-15 NOTE — PROGRESS NOTES
INTERNAL MEDICINE PROGRESS/URGENT CARE NOTE    Patient: Nohemi Hull  : 1960  MRN: [id number]  PCP: Nataly Rose MD    CHIEF COMPLAINT     Chief Complaint   Patient presents with    Cough       HPI     Nohemi is a 64 y.o. female with MDD, anemia, bilat knee OA, osteopenia, insomnia, tobacco use who presents for an urgent visit today with c/o chest congestion and cough. Main concern is phlegm x3 weeks. Phlegm has been brown. Did feel fever. No sob, wheezing, chest tightness, chills, body aches, n/v/d. Sore throat has improved. She has been taking zyrtec, dayquil, and nyquil, chloraseptic with some relief. She states when she coughs, she will leak urine at night.    She does smoke about 1/3 ppd. Patient states she has smoked at least since - timeline unclear. Trying to quit on her own. LDCT  showed mild emphysema.    F/w Psychiatry for depression/anxiety/insomnia. She states she is no longer taking zoloft and mirtazepine. She has since been prescribed Trintellix 20mg daily.        Past Medical History:  Past Medical History:   Diagnosis Date    Anemia     Anxiety     Arthritis     Clotting disorder     Depression     Renal failure         Past Surgical History:  Past Surgical History:   Procedure Laterality Date    COLONOSCOPY N/A 10/29/2018    Procedure: COLONOSCOPY;  Surgeon: Mp Alvarado MD;  Location: [address]);  Service: Endoscopy;  Laterality: N/A;  Referral received from Dr. SMOOTH Arias, [facility]  Last colonoscopy -recommended f/u 5 years-past due    DILATION AND CURETTAGE OF UTERUS      GASTRIC BYPASS      PARTIAL HYSTERECTOMY      AUB    RADIOFREQUENCY ABLATION Right 2022    Procedure: RADIOFREQUENCY ABLATION, GENICULAR RIGHT COOLED;  Surgeon: Radha Justin MD;  Location: [facility];  Service: Pain Management;  Laterality: Right;    TONSILLECTOMY      TUBAL LIGATION Bilateral         Allergies:  Review of patient's allergies  indicates:  No Known Allergies    Home Medications:    Current Outpatient Medications:     acetaminophen (TYLENOL) 500 MG tablet, Take 1 tablet (500 mg total) by mouth 2 (two) times daily as needed for Pain., Disp: 60 tablet, Rfl: 2    aspirin (ECOTRIN) 81 MG EC tablet, Take 81 mg by mouth once daily., Disp: , Rfl:     calcium carbonate (CALCIUM 300 ORAL), Take by mouth., Disp: , Rfl:     cholecalciferol, vitamin D3, 10 mcg (400 unit) Chew, Take 400 Units by mouth once daily., Disp: , Rfl:     clonazePAM (KLONOPIN) 1 MG tablet, Take 1 mg by mouth 2 (two) times daily as needed for Anxiety., Disp: , Rfl:     cyanocobalamin, vitamin B-12, (VITAMIN B-12 ORAL), Take 1 tablet by mouth once daily., Disp: , Rfl:     DULoxetine (CYMBALTA) 60 MG capsule, Take 120 mg by mouth once daily., Disp: , Rfl:     FEROSUL 325 mg (65 mg iron) Tab tablet, Take by mouth once daily., Disp: , Rfl:     magnesium 30 mg Tab, Take by mouth once., Disp: , Rfl:     multivit-min-FA-lycopen-lutein (CENTRUM SILVER) 0.4-300-250 mg-mcg-mcg Tab, Take 1 tablet by mouth once daily., Disp: , Rfl:     mupirocin (BACTROBAN) 2 % ointment, Apply topically 2 (two) times daily., Disp: 15 g, Rfl: 0    nitrofurantoin, macrocrystal-monohydrate, (MACROBID) 100 MG capsule, Take 1 capsule (100 mg total) by mouth 2 (two) times daily., Disp: 10 capsule, Rfl: 0    omega-3 fatty acids fish oil (OMEGA-3 2100) 1,050-1,200 mg Cap capsule, Take 1 capsule by mouth once daily., Disp: , Rfl:     oxybutynin (DITROPAN) 5 MG Tab, TAKE 1 TABLET(5 MG) BY MOUTH EVERY DAY, Disp: 90 tablet, Rfl: 2    risperiDONE (RISPERDAL) 1 MG tablet, Take 1 mg by mouth 2 (two) times daily., Disp: , Rfl:     suvorexant (BELSOMRA) 20 mg Tab, Take by mouth., Disp: , Rfl:     traZODone (DESYREL) 150 MG tablet, Take 300 mg by mouth every evening., Disp: , Rfl:     azithromycin (Z-EVIN) 250 MG tablet, Take 2 tablets by mouth on day 1; Take 1 tablet by mouth on days 2-5, Disp: 6 tablet, Rfl: 0    gabapentin  "(NEURONTIN) 600 MG tablet, Take 1 tablet (600 mg total) by mouth 3 (three) times daily., Disp: 270 tablet, Rfl: 1    triamcinolone acetonide 0.1% (KENALOG) 0.1 % cream, Apply topically 2 (two) times daily. for 7 days, Disp: 45 g, Rfl: 0    vortioxetine (TRINTELLIX) 20 mg Tab, Take 20 mg by mouth once daily., Disp: , Rfl:      Review of Systems:  Review of Systems   Constitutional:  Negative for fever.   Respiratory:  Positive for cough. Negative for chest tightness and shortness of breath.    Cardiovascular:  Negative for chest pain.   Gastrointestinal:  Negative for abdominal pain, blood in stool, diarrhea, nausea and vomiting.   Musculoskeletal:  Negative for myalgias.   Neurological:  Negative for dizziness, weakness and headaches.   Psychiatric/Behavioral:  Negative for suicidal ideas.          PHYSICAL EXAM     Vitals:    04/15/24 0941   BP: 110/78   BP Location: Left arm   Patient Position: Sitting   BP Method: Medium (Manual)   Pulse: 94   SpO2: 98%   Weight: 69.2 kg (152 lb 8.9 oz)   Height: 5' 6" (1.676 m)      Body mass index is 24.62 kg/m².     Physical Exam  Constitutional:       General: She is not in acute distress.     Appearance: Normal appearance. She is not ill-appearing, toxic-appearing or diaphoretic.      Comments: Speaking in complete sentences   HENT:      Head: Normocephalic.      Comments: Frontal and maxillary sinuses nttp     Right Ear: Tympanic membrane and ear canal normal.      Left Ear: Tympanic membrane and ear canal normal.      Nose: Nose normal. No congestion or rhinorrhea.      Mouth/Throat:      Mouth: Mucous membranes are moist.      Pharynx: Oropharynx is clear. No oropharyngeal exudate or posterior oropharyngeal erythema.   Eyes:      Extraocular Movements: Extraocular movements intact.      Pupils: Pupils are equal, round, and reactive to light.   Cardiovascular:      Rate and Rhythm: Normal rate and regular rhythm.      Heart sounds: Normal heart sounds.   Pulmonary:      " Effort: Pulmonary effort is normal. No respiratory distress.      Breath sounds: Normal breath sounds. No wheezing.   Abdominal:      General: Bowel sounds are normal.      Palpations: Abdomen is soft.   Musculoskeletal:         General: Normal range of motion.      Cervical back: Normal range of motion and neck supple. No rigidity or tenderness.   Lymphadenopathy:      Cervical: No cervical adenopathy.   Skin:     General: Skin is warm and dry.   Neurological:      General: No focal deficit present.      Mental Status: She is alert and oriented to person, place, and time.         LABS     Lab Results   Component Value Date    HGBA1C 4.4 06/16/2023     CMP  Sodium   Date Value Ref Range Status   06/16/2023 139 136 - 145 mmol/L Final     Potassium   Date Value Ref Range Status   06/16/2023 4.0 3.5 - 5.1 mmol/L Final     Chloride   Date Value Ref Range Status   06/16/2023 105 95 - 110 mmol/L Final     CO2   Date Value Ref Range Status   06/16/2023 24 23 - 29 mmol/L Final     Glucose   Date Value Ref Range Status   06/16/2023 102 70 - 110 mg/dL Final     BUN   Date Value Ref Range Status   06/16/2023 15 8 - 23 mg/dL Final     Creatinine   Date Value Ref Range Status   06/16/2023 1.2 0.5 - 1.4 mg/dL Final     Calcium   Date Value Ref Range Status   06/16/2023 9.6 8.7 - 10.5 mg/dL Final     Total Protein   Date Value Ref Range Status   06/16/2023 7.4 6.0 - 8.4 g/dL Final     Albumin   Date Value Ref Range Status   06/16/2023 4.2 3.5 - 5.2 g/dL Final     Total Bilirubin   Date Value Ref Range Status   06/16/2023 1.0 0.1 - 1.0 mg/dL Final     Comment:     For infants and newborns, interpretation of results should be based  on gestational age, weight and in agreement with clinical  observations.    Premature Infant recommended reference ranges:  Up to 24 hours.............<8.0 mg/dL  Up to 48 hours............<12.0 mg/dL  3-5 days..................<15.0 mg/dL  6-29 days.................<15.0 mg/dL       Alkaline Phosphatase    Date Value Ref Range Status   06/16/2023 61 55 - 135 U/L Final     AST   Date Value Ref Range Status   06/16/2023 16 10 - 40 U/L Final     ALT   Date Value Ref Range Status   06/16/2023 11 10 - 44 U/L Final     Anion Gap   Date Value Ref Range Status   06/16/2023 10 8 - 16 mmol/L Final     eGFR if    Date Value Ref Range Status   07/11/2022 >60 >60 mL/min/1.73 m^2 Final     eGFR if non    Date Value Ref Range Status   07/11/2022 >60 >60 mL/min/1.73 m^2 Final     Comment:     Calculation used to obtain the estimated glomerular filtration  rate (eGFR) is the CKD-EPI equation.        Lab Results   Component Value Date    WBC 3.44 (L) 06/16/2023    HGB 13.0 06/16/2023    HCT 40.5 06/16/2023     (H) 06/16/2023     06/16/2023     Lab Results   Component Value Date    CHOL 154 06/16/2023    CHOL 164 05/10/2022    CHOL 180 06/14/2021     Lab Results   Component Value Date    HDL 62 06/16/2023    HDL 74 05/10/2022    HDL 64 06/14/2021     Lab Results   Component Value Date    LDLCALC 80.8 06/16/2023    LDLCALC 79.8 05/10/2022    LDLCALC 99 06/14/2021     Lab Results   Component Value Date    TRIG 56 06/16/2023    TRIG 51 05/10/2022    TRIG 76 06/14/2021     Lab Results   Component Value Date    CHOLHDL 40.3 06/16/2023    CHOLHDL 45.1 05/10/2022    CHOLHDL 31.5 09/28/2006     Lab Results   Component Value Date    TSH 2.399 06/16/2023    K2LKEFO 147 10/04/2004    Q5GLATO 8.1 10/04/2004       ASSESSMENT & PLAN       1. Bronchitis  Comments:  PE and VSS. Zpak as prescribed. Consider mucinex, zyrtec as needed. Conservative mgmt. RTC if not improved.  Orders:  -     azithromycin (Z-EVIN) 250 MG tablet; Take 2 tablets by mouth on day 1; Take 1 tablet by mouth on days 2-5  Dispense: 6 tablet; Refill: 0    2. Cigarette nicotine dependence without complication  Comments:  Encouraged to quit. Risks discussed. Declines referral to smoking cessation clinic- states she will quit on her  own.     Please proceed with the following supportive management measures:  Get plenty of rest and drink plenty of water.  Warm saltwater gargles twice daily and warm teas with honey and lemon can help sore throat and cough.  Take an OTC allergy medication (Xyzal, Zyrtec, Allegra, Claritin) once per day, in the evening, for at least the next two weeks. Avoid allergy medications with decongestants (denoted with a -D) if Hx hypertension).  Recommend use of Robitussin-DM (Or Tussin-DM) as needed for cough.  You may rotate between tylenol (acetaminophen) and advil (ibuprofen) every four hours as needed for pain or fever.  May purchase over the counter nasal saline spray. Use up to four times per day. Wait at least 20 minutes after using nasal steroids before using saline rinses.    Follow-up prn    Patient was counseled on when to seek emergent care. Patient's plan/treatment was discussed including medications and possible side effects. Verbalized understanding of all instructions.            BUTCH Neri, FNP-C  Department of Internal Medicine  [facility]

## 2024-04-18 ENCOUNTER — TELEPHONE (OUTPATIENT)
Dept: ORTHOPEDICS | Facility: CLINIC | Age: 64
End: 2024-04-18
Payer: MEDICARE

## 2024-04-18 NOTE — TELEPHONE ENCOUNTER
Called and spoke to pt regarding sooner appt. Pt accepted information with no further questions.     ----- Message from Selina Green sent at 4/18/2024  2:01 PM CDT -----  Contact: Pt  [phone]  Would like to receive medical advice.  Would they like a call back or a response via [facility]:  Call Back   Additional information:      Pt is calling to see if the earlier appt she was offered through the portal is still available. She did not have access to the portal to accept or decline.

## 2024-04-25 ENCOUNTER — TELEPHONE (OUTPATIENT)
Dept: ORTHOPEDICS | Facility: CLINIC | Age: 64
End: 2024-04-25
Payer: MEDICARE

## 2024-04-25 ENCOUNTER — TELEPHONE (OUTPATIENT)
Dept: INTERNAL MEDICINE | Facility: CLINIC | Age: 64
End: 2024-04-25
Payer: MEDICARE

## 2024-04-25 NOTE — TELEPHONE ENCOUNTER
Called and spoke to pt regarding sooner apt. Added apt to wait list pt understood conversation with no further questions    ----- Message from Sherita Amador sent at 4/25/2024  7:48 AM CDT -----  Nohemi Hull calling regarding Appointment Access. PT is wanting to get a sooner appt to discuss setting up surgery of the right knee. PT is asking a morning appt if possible, call back to assist [phone]

## 2024-04-25 NOTE — TELEPHONE ENCOUNTER
----- Message from Aniamasoud Howell sent at 4/25/2024 10:21 AM CDT -----  Regarding: appt  Type:  Patient Returning Call      Name of who is calling:Nohemi        What is request in detail:patient is requesting a call back to see if she can be seen before her July appt. She would like to have knee replacement surgery while kids are out of school, needs clearance from doctor.        Can clinic reply by [facility]:no        What number to call back if not in [facility]: [phone]

## 2024-04-26 ENCOUNTER — OFFICE VISIT (OUTPATIENT)
Dept: INTERNAL MEDICINE | Facility: CLINIC | Age: 64
End: 2024-04-26
Payer: MEDICARE

## 2024-04-26 ENCOUNTER — LAB VISIT (OUTPATIENT)
Dept: LAB | Facility: OTHER | Age: 64
End: 2024-04-26
Attending: STUDENT IN AN ORGANIZED HEALTH CARE EDUCATION/TRAINING PROGRAM
Payer: MEDICARE

## 2024-04-26 VITALS
SYSTOLIC BLOOD PRESSURE: 112 MMHG | WEIGHT: 149.94 LBS | BODY MASS INDEX: 24.1 KG/M2 | OXYGEN SATURATION: 98 % | HEART RATE: 64 BPM | HEIGHT: 66 IN | DIASTOLIC BLOOD PRESSURE: 64 MMHG

## 2024-04-26 DIAGNOSIS — N39.41 URGE INCONTINENCE: Primary | ICD-10-CM

## 2024-04-26 DIAGNOSIS — R91.1 SOLITARY PULMONARY NODULE: ICD-10-CM

## 2024-04-26 DIAGNOSIS — D53.9 ANEMIA, MACROCYTIC: ICD-10-CM

## 2024-04-26 DIAGNOSIS — N18.2 STAGE 2 CHRONIC KIDNEY DISEASE: ICD-10-CM

## 2024-04-26 LAB
ALBUMIN SERPL BCP-MCNC: 4 G/DL (ref 3.5–5.2)
ALP SERPL-CCNC: 63 U/L (ref 55–135)
ALT SERPL W/O P-5'-P-CCNC: 17 U/L (ref 10–44)
ANION GAP SERPL CALC-SCNC: 11 MMOL/L (ref 8–16)
AST SERPL-CCNC: 23 U/L (ref 10–40)
BASOPHILS # BLD AUTO: 0.01 K/UL (ref 0–0.2)
BASOPHILS NFR BLD: 0.3 % (ref 0–1.9)
BILIRUB SERPL-MCNC: 0.6 MG/DL (ref 0.1–1)
BUN SERPL-MCNC: 27 MG/DL (ref 8–23)
CALCIUM SERPL-MCNC: 9.2 MG/DL (ref 8.7–10.5)
CHLORIDE SERPL-SCNC: 98 MMOL/L (ref 95–110)
CO2 SERPL-SCNC: 28 MMOL/L (ref 23–29)
CREAT SERPL-MCNC: 1.3 MG/DL (ref 0.5–1.4)
DIFFERENTIAL METHOD BLD: ABNORMAL
EOSINOPHIL # BLD AUTO: 0 K/UL (ref 0–0.5)
EOSINOPHIL NFR BLD: 0.5 % (ref 0–8)
ERYTHROCYTE [DISTWIDTH] IN BLOOD BY AUTOMATED COUNT: 11.6 % (ref 11.5–14.5)
EST. GFR  (NO RACE VARIABLE): 46 ML/MIN/1.73 M^2
GLUCOSE SERPL-MCNC: 92 MG/DL (ref 70–110)
HCT VFR BLD AUTO: 38.2 % (ref 37–48.5)
HGB BLD-MCNC: 12.2 G/DL (ref 12–16)
IMM GRANULOCYTES # BLD AUTO: 0.01 K/UL (ref 0–0.04)
IMM GRANULOCYTES NFR BLD AUTO: 0.3 % (ref 0–0.5)
LYMPHOCYTES # BLD AUTO: 1 K/UL (ref 1–4.8)
LYMPHOCYTES NFR BLD: 26.3 % (ref 18–48)
MCH RBC QN AUTO: 33.1 PG (ref 27–31)
MCHC RBC AUTO-ENTMCNC: 31.9 G/DL (ref 32–36)
MCV RBC AUTO: 104 FL (ref 82–98)
MONOCYTES # BLD AUTO: 0.2 K/UL (ref 0.3–1)
MONOCYTES NFR BLD: 5.8 % (ref 4–15)
NEUTROPHILS # BLD AUTO: 2.5 K/UL (ref 1.8–7.7)
NEUTROPHILS NFR BLD: 66.8 % (ref 38–73)
NRBC BLD-RTO: 0 /100 WBC
PLATELET # BLD AUTO: 172 K/UL (ref 150–450)
PMV BLD AUTO: 10 FL (ref 9.2–12.9)
POTASSIUM SERPL-SCNC: 3.9 MMOL/L (ref 3.5–5.1)
PROT SERPL-MCNC: 7.1 G/DL (ref 6–8.4)
RBC # BLD AUTO: 3.69 M/UL (ref 4–5.4)
SODIUM SERPL-SCNC: 137 MMOL/L (ref 136–145)
WBC # BLD AUTO: 3.77 K/UL (ref 3.9–12.7)

## 2024-04-26 PROCEDURE — 3008F BODY MASS INDEX DOCD: CPT | Mod: CPTII,S$GLB,,

## 2024-04-26 PROCEDURE — 3074F SYST BP LT 130 MM HG: CPT | Mod: CPTII,S$GLB,,

## 2024-04-26 PROCEDURE — 99214 OFFICE O/P EST MOD 30 MIN: CPT | Mod: S$GLB,,,

## 2024-04-26 PROCEDURE — 1159F MED LIST DOCD IN RCRD: CPT | Mod: CPTII,S$GLB,,

## 2024-04-26 PROCEDURE — 99999 PR PBB SHADOW E&M-EST. PATIENT-LVL IV: CPT | Mod: PBBFAC,,,

## 2024-04-26 PROCEDURE — 3078F DIAST BP <80 MM HG: CPT | Mod: CPTII,S$GLB,,

## 2024-04-26 PROCEDURE — 85025 COMPLETE CBC W/AUTO DIFF WBC: CPT

## 2024-04-26 PROCEDURE — 36415 COLL VENOUS BLD VENIPUNCTURE: CPT

## 2024-04-26 PROCEDURE — 80053 COMPREHEN METABOLIC PANEL: CPT

## 2024-04-26 NOTE — PROGRESS NOTES
CHIEF COMPLAINT     Chief Complaint   Patient presents with    Pre-op Exam       HPI     Nohemi Hull is a 64 y.o. female who presents for urinary symptoms, CKD today.    PCP is Nataly Rose MD, patient is known to me. Pt reports that she is going to have orthopedic surgery and she will meet with surgeon in 1-2 weeks to decide surgery date. She is here to be evaluated for existing issues.  Pt reports trouble holding urine. When she gets up at night she has some incontinence. She has been using pads. Happens frequently, but not every night. Denies daytime symptoms. Pt does not wish to start medication or see urology yet because she has not had the symptoms the past two nights. Will schedule for 1-2 weeks out and she can cancel if her symptoms do not return.    Pt would also like to have her kidney function assessed.    Pt with history of pulmonary nodule that was last assessed 1/23.     Past Medical History:  Past Medical History:   Diagnosis Date    Anemia     Anxiety     Arthritis     Clotting disorder     Depression 2000    Renal failure        Home Medications:  Prior to Admission medications    Medication Sig Start Date End Date Taking? Authorizing Provider   acetaminophen (TYLENOL) 500 MG tablet Take 1 tablet (500 mg total) by mouth 2 (two) times daily as needed for Pain. 11/2/23  Yes Luis Fernando Meehan MD   aspirin (ECOTRIN) 81 MG EC tablet Take 81 mg by mouth once daily.   Yes Provider, Historical   calcium carbonate (CALCIUM 300 ORAL) Take by mouth.   Yes Provider, Historical   cholecalciferol, vitamin D3, 10 mcg (400 unit) Chew Take 400 Units by mouth once daily.   Yes Provider, Historical   clonazePAM (KLONOPIN) 1 MG tablet Take 1 mg by mouth 2 (two) times daily as needed for Anxiety.   Yes Provider, Historical   cyanocobalamin, vitamin B-12, (VITAMIN B-12 ORAL) Take 1 tablet by mouth once daily.   Yes Provider, Historical   DULoxetine (CYMBALTA) 60 MG capsule Take 120 mg by mouth once  daily.   Yes Provider, Historical   FEROSUL 325 mg (65 mg iron) Tab tablet Take by mouth once daily. 2/22/22  Yes Provider, Historical   magnesium 30 mg Tab Take by mouth once.   Yes Provider, Historical   multivit-min-FA-lycopen-lutein (CENTRUM SILVER) 0.4-300-250 mg-mcg-mcg Tab Take 1 tablet by mouth once daily.   Yes Provider, Historical   mupirocin (BACTROBAN) 2 % ointment Apply topically 2 (two) times daily. 8/15/23  Yes Nataly Rose MD   nitrofurantoin, macrocrystal-monohydrate, (MACROBID) 100 MG capsule Take 1 capsule (100 mg total) by mouth 2 (two) times daily. 2/21/24  Yes Woody Huston NP   omega-3 fatty acids fish oil (OMEGA-3 2100) 1,050-1,200 mg Cap capsule Take 1 capsule by mouth once daily.   Yes Provider, Historical   oxybutynin (DITROPAN) 5 MG Tab TAKE 1 TABLET(5 MG) BY MOUTH EVERY DAY 10/1/23  Yes Nataly Rose MD   risperiDONE (RISPERDAL) 1 MG tablet Take 1 mg by mouth 2 (two) times daily.   Yes Provider, Historical   suvorexant (BELSOMRA) 20 mg Tab Take by mouth.   Yes Provider, Historical   traZODone (DESYREL) 150 MG tablet Take 300 mg by mouth every evening.   Yes Provider, Historical   vortioxetine (TRINTELLIX) 20 mg Tab Take 20 mg by mouth once daily.   Yes Provider, Historical   gabapentin (NEURONTIN) 600 MG tablet Take 1 tablet (600 mg total) by mouth 3 (three) times daily. 2/6/23 8/5/23  Nataly Rose MD   triamcinolone acetonide 0.1% (KENALOG) 0.1 % cream Apply topically 2 (two) times daily. for 7 days 2/21/24 2/28/24  Woody Huston NP       Review of Systems:  Review of Systems   Constitutional: Negative.    Respiratory: Negative.     Cardiovascular: Negative.    Genitourinary:  Positive for urgency.       Health Maintainence:   Immunizations:  Health Maintenance         Date Due Completion Date    RSV Vaccine (Age 60+ and Pregnant patients) (1 - 1-dose 60+ series) Never done ---    Influenza Vaccine (1) 09/01/2023 10/6/2022    COVID-19 Vaccine (5 - 2023-24  "season) 09/01/2023 9/21/2022    Colorectal Cancer Screening 10/29/2023 10/29/2018    LDCT Lung Screen 01/10/2024 1/10/2023    Mammogram 02/05/2025 2/5/2024    DEXA Scan 07/18/2025 7/18/2022    Lipid Panel 06/16/2028 6/16/2023    TETANUS VACCINE 07/11/2032 7/11/2022             PHYSICAL EXAM     /64   Pulse 64   Ht 5' 6" (1.676 m)   Wt 68 kg (149 lb 14.6 oz)   SpO2 98%   BMI 24.20 kg/m²     Physical Exam  Vitals reviewed.   Constitutional:       Appearance: She is well-developed.   HENT:      Head: Normocephalic and atraumatic.   Eyes:      Conjunctiva/sclera: Conjunctivae normal.   Cardiovascular:      Rate and Rhythm: Normal rate.   Pulmonary:      Effort: Pulmonary effort is normal. No respiratory distress.   Skin:     General: Skin is warm and dry.      Findings: No rash.   Neurological:      Mental Status: She is alert and oriented to person, place, and time.      Coordination: Coordination normal.   Psychiatric:         Behavior: Behavior normal.         LABS     Lab Results   Component Value Date    HGBA1C 4.4 06/16/2023     CMP  Sodium   Date Value Ref Range Status   06/16/2023 139 136 - 145 mmol/L Final     Potassium   Date Value Ref Range Status   06/16/2023 4.0 3.5 - 5.1 mmol/L Final     Chloride   Date Value Ref Range Status   06/16/2023 105 95 - 110 mmol/L Final     CO2   Date Value Ref Range Status   06/16/2023 24 23 - 29 mmol/L Final     Glucose   Date Value Ref Range Status   06/16/2023 102 70 - 110 mg/dL Final     BUN   Date Value Ref Range Status   06/16/2023 15 8 - 23 mg/dL Final     Creatinine   Date Value Ref Range Status   06/16/2023 1.2 0.5 - 1.4 mg/dL Final     Calcium   Date Value Ref Range Status   06/16/2023 9.6 8.7 - 10.5 mg/dL Final     Total Protein   Date Value Ref Range Status   06/16/2023 7.4 6.0 - 8.4 g/dL Final     Albumin   Date Value Ref Range Status   06/16/2023 4.2 3.5 - 5.2 g/dL Final     Total Bilirubin   Date Value Ref Range Status   06/16/2023 1.0 0.1 - 1.0 mg/dL " Final     Comment:     For infants and newborns, interpretation of results should be based  on gestational age, weight and in agreement with clinical  observations.    Premature Infant recommended reference ranges:  Up to 24 hours.............<8.0 mg/dL  Up to 48 hours............<12.0 mg/dL  3-5 days..................<15.0 mg/dL  6-29 days.................<15.0 mg/dL       Alkaline Phosphatase   Date Value Ref Range Status   06/16/2023 61 55 - 135 U/L Final     AST   Date Value Ref Range Status   06/16/2023 16 10 - 40 U/L Final     ALT   Date Value Ref Range Status   06/16/2023 11 10 - 44 U/L Final     Anion Gap   Date Value Ref Range Status   06/16/2023 10 8 - 16 mmol/L Final     eGFR if    Date Value Ref Range Status   07/11/2022 >60 >60 mL/min/1.73 m^2 Final     eGFR if non    Date Value Ref Range Status   07/11/2022 >60 >60 mL/min/1.73 m^2 Final     Comment:     Calculation used to obtain the estimated glomerular filtration  rate (eGFR) is the CKD-EPI equation.        Lab Results   Component Value Date    WBC 3.44 (L) 06/16/2023    HGB 13.0 06/16/2023    HCT 40.5 06/16/2023     (H) 06/16/2023     06/16/2023     Lab Results   Component Value Date    CHOL 154 06/16/2023    CHOL 164 05/10/2022    CHOL 180 06/14/2021     Lab Results   Component Value Date    HDL 62 06/16/2023    HDL 74 05/10/2022    HDL 64 06/14/2021     Lab Results   Component Value Date    LDLCALC 80.8 06/16/2023    LDLCALC 79.8 05/10/2022    LDLCALC 99 06/14/2021     Lab Results   Component Value Date    TRIG 56 06/16/2023    TRIG 51 05/10/2022    TRIG 76 06/14/2021     Lab Results   Component Value Date    CHOLHDL 40.3 06/16/2023    CHOLHDL 45.1 05/10/2022    CHOLHDL 31.5 09/28/2006     Lab Results   Component Value Date    TSH 2.399 06/16/2023    B4GJXYU 147 10/04/2004    V9XOLIT 8.1 10/04/2004       ASSESSMENT/PLAN   1. Urge incontinence  -     Ambulatory referral/consult to Urology; Future;  Expected date: 05/03/2024    2. Anemia, macrocytic  -     Comprehensive Metabolic Panel; Future; Expected date: 04/26/2024    3. Stage 2 chronic kidney disease  -     CBC Auto Differential; Future; Expected date: 04/26/2024    4. Solitary pulmonary nodule  -     CT Chest Without Contrast; Future; Expected date: 04/26/2024             Woody Huston NP   Department of Internal Medicine - [facility]  11:01 AM

## 2024-04-30 ENCOUNTER — TELEPHONE (OUTPATIENT)
Dept: INTERNAL MEDICINE | Facility: CLINIC | Age: 64
End: 2024-04-30
Payer: MEDICARE

## 2024-04-30 NOTE — TELEPHONE ENCOUNTER
Spoke to pt to discuss recent labs. Labs stable, but there does appear to be some dehydration. Pt is to have surgery next month. Instructed her to come see me for preop and we can draw another CMP. If she does not need to see me preop she can come postop and we will check another CMP.

## 2024-05-10 ENCOUNTER — TELEPHONE (OUTPATIENT)
Dept: INTERNAL MEDICINE | Facility: CLINIC | Age: 64
End: 2024-05-10
Payer: MEDICARE

## 2024-05-10 NOTE — TELEPHONE ENCOUNTER
----- Message from Estee Quijano MA sent at 4/26/2024 11:23 AM CDT -----  Regarding: check up on pt 2wk  Call Mrs. Mejia to see how's her urinary symptoms are doing. When we attempted to schedule urology here at [facility] in the next 2wks, the first available was June 12, 2024. Offered other locations but pt declined. Pt then asked if I can call her in 2wks instead to check on her.

## 2024-05-10 NOTE — TELEPHONE ENCOUNTER
Spoke with pt stating message below:    ----- Message from Estee Quijano MA sent at 4/26/2024 11:23 AM CDT -----  Regarding: check up on pt 2wk  Call Mrs. Mejia to see how's her urinary symptoms are doing. When we attempted to schedule urology here at [facility] in the next 2wks, the first available was June 12, 2024. Offered other locations but pt declined. Pt then asked if I can call her in 2wks instead to check on her.    Pt said symptoms have disappeared. She called cancelled urology appt.

## 2024-05-13 ENCOUNTER — TELEPHONE (OUTPATIENT)
Dept: BARIATRICS | Facility: CLINIC | Age: 64
End: 2024-05-13
Payer: MEDICARE

## 2024-05-13 NOTE — TELEPHONE ENCOUNTER
----- Message from Bella Jorge sent at 5/13/2024 11:27 AM CDT -----  Regarding: reschedule appt  Contact: [phone]  Pt is calling to speak with someone in provider office in regards to rescheduling her appt with the provider on 5/28 pt  is asking for a return call please call pt at   [phone]

## 2024-05-13 NOTE — TELEPHONE ENCOUNTER
Spoke to patient to let her know that, per her request, her appointment has been changed from 5/28 to 6/4. PRECIOUS Alvarado RN provided required access to reschedule.

## 2024-05-17 ENCOUNTER — HOSPITAL ENCOUNTER (OUTPATIENT)
Dept: RADIOLOGY | Facility: HOSPITAL | Age: 64
Discharge: HOME | End: 2024-05-17
Payer: MEDICARE

## 2024-05-17 ENCOUNTER — OFFICE VISIT (OUTPATIENT)
Dept: ORTHOPEDICS | Facility: CLINIC | Age: 64
End: 2024-05-17
Payer: MEDICARE

## 2024-05-17 VITALS — HEIGHT: 66 IN | WEIGHT: 146.5 LBS | BODY MASS INDEX: 23.54 KG/M2

## 2024-05-17 DIAGNOSIS — R91.1 SOLITARY PULMONARY NODULE: ICD-10-CM

## 2024-05-17 DIAGNOSIS — M17.11 PRIMARY OSTEOARTHRITIS OF RIGHT KNEE: Primary | ICD-10-CM

## 2024-05-17 PROCEDURE — 3008F BODY MASS INDEX DOCD: CPT | Mod: CPTII,S$GLB,, | Performed by: ORTHOPAEDIC SURGERY

## 2024-05-17 PROCEDURE — 1160F RVW MEDS BY RX/DR IN RCRD: CPT | Mod: CPTII,S$GLB,, | Performed by: ORTHOPAEDIC SURGERY

## 2024-05-17 PROCEDURE — 71250 CT THORAX DX C-: CPT | Mod: TC

## 2024-05-17 PROCEDURE — 1159F MED LIST DOCD IN RCRD: CPT | Mod: CPTII,S$GLB,, | Performed by: ORTHOPAEDIC SURGERY

## 2024-05-17 PROCEDURE — 99213 OFFICE O/P EST LOW 20 MIN: CPT | Mod: S$GLB,,, | Performed by: ORTHOPAEDIC SURGERY

## 2024-05-17 PROCEDURE — 71250 CT THORAX DX C-: CPT | Mod: 26,,, | Performed by: STUDENT IN AN ORGANIZED HEALTH CARE EDUCATION/TRAINING PROGRAM

## 2024-05-17 PROCEDURE — 99999 PR PBB SHADOW E&M-EST. PATIENT-LVL III: CPT | Mod: PBBFAC,,, | Performed by: ORTHOPAEDIC SURGERY

## 2024-05-22 ENCOUNTER — TELEPHONE (OUTPATIENT)
Dept: INTERNAL MEDICINE | Facility: CLINIC | Age: 64
End: 2024-05-22
Payer: MEDICARE

## 2024-05-22 NOTE — TELEPHONE ENCOUNTER
----- Message from Woody Huston NP sent at 5/22/2024  7:40 AM CDT -----  Please contact the patient and let them know that their results were fine. Thank you.

## 2024-05-22 NOTE — TELEPHONE ENCOUNTER
I was able to speak with the patient and given the listed message from Mr. Huston.  The patient verbalized understanding and had no further questions or concerns.        From Mr. Huston    ----- Message from Woody Huston NP sent at 5/22/2024  7:40 AM CDT -----  Please contact the patient and let them know that their results were fine. Thank you.

## 2024-05-23 ENCOUNTER — TELEPHONE (OUTPATIENT)
Dept: BARIATRICS | Facility: CLINIC | Age: 64
End: 2024-05-23
Payer: MEDICARE

## 2024-05-23 RX ORDER — IBUPROFEN 800 MG/1
800 TABLET ORAL 3 TIMES DAILY
Qty: 90 TABLET | Refills: 1 | Status: SHIPPED | OUTPATIENT
Start: 2024-05-23

## 2024-05-23 NOTE — TELEPHONE ENCOUNTER
----- Message from Elisa Troy sent at 5/23/2024 10:07 AM CDT -----  Regarding: Pt called states she needs to resche appt states she still needs it to be on a Tues  Contact: [phone]  Name of Who is Calling:HILARIO ALBERTS [[id number]]        What is the request in detail:Pt called states she needs to resche appt states she still needs it to be on a Tues. Please advise         Can the clinic reply by [facility]:No        [facility] Telephone Information:  Mobile          [phone]

## 2024-05-23 NOTE — PROGRESS NOTES
Subjective:      Patient ID: Nohemi Hull is a 64 y.o. female.    Chief Complaint:   Pain of the Right Knee    HPI  She usually comes in for injections for her right knee pain, but she did not get adequate duration or extent of relief from her previous injection and she is ready to discuss surgery.  She has been followed here for over 2 years with multiple injections performed.      Objective:        Ortho/SPM Exam    There is is a mild varus deformity, which is partially passively correctable.  Active range of motion is 5-115 degrees.  Anterior crepitus with active extension.  Patellar mobility is limited.  Boggy synovitis without effusion.  Medial joint line tenderness predominates.  No instability to varus/valgus/Lachman's stressing.  No pain in the groin with flexion and internal rotation of the hip which is not limited.  Skin intact.  Distal neurovascular intact.  Flip test negative.      IMAGING:  Weightbearing x-rays show Kellgren-Walt grade 3-4 tricompartmental arthrosis, with the most narrowing on the medial side, and large marginal osteophytes.  Assessment:   Advanced DJD, right knee      Plan:   She will see Dr. Campos to discuss arranging knee replacement.    The patient's pathophysiology was explained in detail with reference to x-rays, models, other visual aids as appropriate.  Treatment options were discussed in detail.  Questions were invited and answered to the patient's satisfaction.      Luis Fernando Meehan MD  Orthopedic Surgery

## 2024-06-03 DIAGNOSIS — M17.11 PRIMARY OSTEOARTHRITIS OF RIGHT KNEE: Primary | ICD-10-CM

## 2024-06-05 ENCOUNTER — OFFICE VISIT (OUTPATIENT)
Dept: ORTHOPEDICS | Facility: CLINIC | Age: 64
End: 2024-06-05
Payer: MEDICARE

## 2024-06-05 ENCOUNTER — TELEPHONE (OUTPATIENT)
Dept: ORTHOPEDICS | Facility: CLINIC | Age: 64
End: 2024-06-05
Payer: MEDICARE

## 2024-06-05 ENCOUNTER — HOSPITAL ENCOUNTER (OUTPATIENT)
Dept: RADIOLOGY | Facility: HOSPITAL | Age: 64
Discharge: HOME | End: 2024-06-05
Attending: ORTHOPAEDIC SURGERY
Payer: MEDICARE

## 2024-06-05 VITALS — BODY MASS INDEX: 23.81 KG/M2 | HEIGHT: 67 IN | WEIGHT: 151.69 LBS

## 2024-06-05 DIAGNOSIS — G89.29 CHRONIC PAIN OF RIGHT KNEE: ICD-10-CM

## 2024-06-05 DIAGNOSIS — M17.11 PRIMARY OSTEOARTHRITIS OF RIGHT KNEE: Primary | ICD-10-CM

## 2024-06-05 DIAGNOSIS — M25.561 CHRONIC PAIN OF RIGHT KNEE: ICD-10-CM

## 2024-06-05 DIAGNOSIS — M17.11 PRIMARY OSTEOARTHRITIS OF RIGHT KNEE: ICD-10-CM

## 2024-06-05 PROCEDURE — 99214 OFFICE O/P EST MOD 30 MIN: CPT | Mod: S$GLB,,, | Performed by: ORTHOPAEDIC SURGERY

## 2024-06-05 PROCEDURE — 73562 X-RAY EXAM OF KNEE 3: CPT | Mod: 26,RT,, | Performed by: RADIOLOGY

## 2024-06-05 PROCEDURE — 73560 X-RAY EXAM OF KNEE 1 OR 2: CPT | Mod: 26,59,LT, | Performed by: RADIOLOGY

## 2024-06-05 PROCEDURE — 73560 X-RAY EXAM OF KNEE 1 OR 2: CPT | Mod: TC,LT

## 2024-06-05 PROCEDURE — 99999 PR PBB SHADOW E&M-EST. PATIENT-LVL III: CPT | Mod: PBBFAC,,, | Performed by: ORTHOPAEDIC SURGERY

## 2024-06-05 PROCEDURE — 73562 X-RAY EXAM OF KNEE 3: CPT | Mod: TC,RT

## 2024-06-05 PROCEDURE — 3008F BODY MASS INDEX DOCD: CPT | Mod: CPTII,S$GLB,, | Performed by: ORTHOPAEDIC SURGERY

## 2024-06-05 PROCEDURE — 1159F MED LIST DOCD IN RCRD: CPT | Mod: CPTII,S$GLB,, | Performed by: ORTHOPAEDIC SURGERY

## 2024-06-05 NOTE — TELEPHONE ENCOUNTER
Called and left voicemail for patient to contact the office in regards to scheduling a virtual visit with Dr. Vela prior to her Iovera procedure on 6/21/24

## 2024-06-05 NOTE — PROGRESS NOTES
Subjective     Patient ID: Nohemi Hull is a 64 y.o. female.    Chief Complaint: Pain of the Right Knee    HPI    Nohemi Hull is a 64 y.o. female here with a with a past medical history of gastric bypass in 2004 ([facility]), CKD (last GFR 46). and current active tobacco user (3 cigarettes/day at this time, weaning in preparation for surgery) with a history of right knee pain. The patient is a  retiree, previously worked as sitter/phlebotomist. There was not a history of trauma.  The pain is moderate The pain is located in the medial aspect of the knee. There is is not radiation to the lower extremity.  The pain is described as burning. The patient has had prior surgery, knee arthroscopy for meniscus tear (1990's). It is aggravated by standing and walking.  It is alleviated by rest. There is not numbness or tingling of the lower extremity.  There is not back pain.  She  has tried medications or injections (last CSI 2/24, RFA 2022). They have helped.  She does not have difficulty getting in or out of a car, getting dressed, or going up or down stairs.  The patient does not use an assistive device.    Denies history of blood clots, excessive bleeding, or joint infection. Denies history of IVDU. Lives at home with her siblings, but is able to recover at her daughter's house in [place], [place].    Past Medical History:   Diagnosis Date    Anemia     Anxiety     Arthritis     Clotting disorder     Depression 2000    Renal failure        Past Surgical History:   Procedure Laterality Date    COLONOSCOPY N/A 10/29/2018    Procedure: COLONOSCOPY;  Surgeon: Mp Alvarado MD;  Location: [facility] ([address] [place] [address]);  Service: Endoscopy;  Laterality: N/A;  Referral received from Dr. SMOOTH Arias, [facility]  Last colonoscopy 2012-recommended f/u 5 years-past due    DILATION AND CURETTAGE OF UTERUS      GASTRIC BYPASS      PARTIAL HYSTERECTOMY      AUB    RADIOFREQUENCY ABLATION Right 9/13/2022     Procedure: RADIOFREQUENCY ABLATION, GENICULAR RIGHT COOLED;  Surgeon: Radha Justin MD;  Location: [facility];  Service: Pain Management;  Laterality: Right;    TONSILLECTOMY      TUBAL LIGATION Bilateral        Review of Systems   Constitutional: Negative for chills, fever and night sweats.   HENT:  Negative for hearing loss.    Eyes:  Negative for blurred vision and double vision.   Cardiovascular:  Negative for chest pain, claudication and leg swelling.   Respiratory:  Negative for shortness of breath.    Endocrine: Negative for polydipsia, polyphagia and polyuria.   Hematologic/Lymphatic: Negative for adenopathy and bleeding problem. Does not bruise/bleed easily.   Skin:  Negative for poor wound healing.   Gastrointestinal:  Negative for diarrhea and heartburn.   Genitourinary:  Negative for bladder incontinence.   Neurological:  Negative for focal weakness, headaches, numbness, paresthesias and sensory change.   Psychiatric/Behavioral:  The patient is not nervous/anxious.    Allergic/Immunologic: Negative for persistent infections.          Objective     General    Constitutional: She is oriented to person, place, and time. She appears well-developed and well-nourished.   HENT:   Head: Normocephalic and atraumatic.   Eyes: EOM are normal.   Cardiovascular:  Normal rate.            Pulmonary/Chest: Effort normal.   Neurological: She is alert and oriented to person, place, and time.   Psychiatric: She has a normal mood and affect. Her behavior is normal.     General Musculoskeletal Exam   Gait: normal       Right Knee Exam     Inspection   Erythema: absent  Scars: present  Swelling: absent  Effusion: absent  Deformity: absent  Bruising: absent    Tenderness   The patient is tender to palpation of the medial joint line.    Range of Motion   Extension:  0   Flexion:  110     Tests   Ligament Examination   Lachman: normal (-1 to 2mm)   MCL - Valgus: normal (0 to 2mm)  LCL - Varus: normal  Patella   Passive  Patellar Tilt: neutral    Other   Sensation: normal    Left Knee Exam     Inspection   Erythema: absent  Scars: absent  Swelling: absent  Effusion: absent  Deformity: absent  Bruising: absent    Tenderness   The patient is experiencing no tenderness.     Range of Motion   Extension:  0   Flexion:  130     Tests   Stability   Lachman: normal (-1 to 2mm)   MCL - Valgus: normal (0 to 2mm)  LCL - Varus: normal (0 to 2mm)  Patella   Passive Patellar Tilt: neutral    Other   Sensation: normal    Muscle Strength   Right Lower Extremity   Hip Abduction: 5/5   Quadriceps:  5/5   Hamstrin/5   Left Lower Extremity   Hip Abduction: 5/5   Quadriceps:  5/5   Hamstrin/5     Reflexes     Left Side  Quadriceps:  2+    Right Side   Quadriceps:  2+    Vascular Exam     Right Pulses  Dorsalis Pedis:      2+          Left Pulses  Dorsalis Pedis:      2+          Edema  Right Lower Leg: absent  Left Lower Leg: absent      Physical Exam  Constitutional:       Appearance: She is well-developed and well-nourished.   HENT:      Head: Normocephalic and atraumatic.   Eyes:      Extraocular Movements: EOM normal.   Cardiovascular:      Rate and Rhythm: Normal rate.      Pulses:           Dorsalis pedis pulses are 2+ on the right side and 2+ on the left side.   Pulmonary:      Effort: Pulmonary effort is normal.   Musculoskeletal:      Right knee: No swelling, deformity or effusion.      Left knee: No swelling, deformity or effusion.      Right lower leg: No edema.      Left lower leg: No edema.   Neurological:      Mental Status: She is alert and oriented to person, place, and time.   Psychiatric:         Mood and Affect: Mood and affect normal.         Behavior: Behavior normal.        Radiographs taken today and reviewed by me demonstrate severe arthritic change of the right KNEE(s).There  is not bone destruction.  There is not a fracture. The medial compartment is most involved.   The changes are tricompartmental.       Assessment  "and Plan     Encounter Diagnosis   Name Primary?    Primary osteoarthritis of right knee Yes         Nohemi Carrillo" was seen today for pain.    Diagnoses and all orders for this visit:    Primary osteoarthritis of right knee      Treatment options were discussed. The surgical process of right knee replacement was discussed in detail with the patient including a detailed discussion of the procedure itself (including visual model, x-ray review, and literature review). We discussed options including implant type and why I believe the implant selected is a good match for the patient's needs. The patient expressed understanding and agrees to proceed with the planned surgeryThe typical perioperative and post-operative course was discussed and perioperative risks were discussed to the patient's satisfaction.  Risks and complications discussed included but were not limited to the risks of anesthetic complications, infection, bleeding, wound healing complications, stiffness, aseptic loosening, instability, limb length inequality, neurologic dysfunction including numbness and weakness, additional surgery,  DVT, pulmonary embolism, perioperative medical risks (cardiac, pulmonary, renal, neurologic), and death and the patient elects to proceed.  The patient should get medically cleared and attend the joint seminar. SHe is aware that smoking raises the infection risk andis associated with increased pain.    Eliquis 3 months-history of DVT    Same day           "

## 2024-06-06 ENCOUNTER — TELEPHONE (OUTPATIENT)
Dept: ORTHOPEDICS | Facility: CLINIC | Age: 64
End: 2024-06-06
Payer: MEDICARE

## 2024-06-06 ENCOUNTER — TELEPHONE (OUTPATIENT)
Dept: PREADMISSION TESTING | Facility: HOSPITAL | Age: 64
End: 2024-06-06
Payer: MEDICARE

## 2024-06-06 DIAGNOSIS — M79.609 PAIN IN EXTREMITY, UNSPECIFIED EXTREMITY: Primary | ICD-10-CM

## 2024-06-06 NOTE — TELEPHONE ENCOUNTER
----- Message from Carey Lam RN sent at 6/6/2024  8:26 AM CDT -----  (7/2) Please schedule PT/INR.  Thanks,  Carey

## 2024-06-06 NOTE — TELEPHONE ENCOUNTER
Called pt and confirmed with patient when she should set up post-surgery PT appointments.  Pt verbalized understanding and has no further questions.    ----- Message from Betty Mack sent at 6/6/2024  8:30 AM CDT -----  Regarding: Appt  Contact: pt [phone]  Pt is requesting call back to discuss plan of care for physical therapy before and after surgery 7/2, please call pt @[phone]

## 2024-06-06 NOTE — TELEPHONE ENCOUNTER
Called pt and asked If she had a chance to decide on a joint class date. She stated she will show her daughter today and let us know. Pt verbalized understanding and has no further questions.

## 2024-06-06 NOTE — ANESTHESIA PAT ROS NOTE
06/06/2024  Nohemi Hull is a 64 y.o., female.  With Primary Osteoarthritis right knee, arrives for anesthesia assessment and preop instructions.    Pre-op Assessment    I have reviewed the Patient Summary Reports.     I have reviewed the Nursing Notes. I have reviewed the NPO Status.   I have reviewed the Medications.     Review of Systems  Anesthesia Hx:  No problems with previous Anesthesia   History of prior surgery of interest to airway management or planning: gastric bypass. Previous anesthesia: General 10/29/18 COLONOSCOPY with general anesthesia.       Airway issues documented on chart review include GETA     Denies Family Hx of Anesthesia complications.    Denies Personal Hx of Anesthesia complications.                    Social:  Smoker, Alcohol Use Smoking  Some Days, 0.25 ppd, 1.8 pack-years  Smokeless Tobacco  Current  Alcohol  2.0 standard drinks of alcohol/week        Hematology/Oncology:    Oncology Normal    -- Anemia:               Hematology Comments: Clotting disorder  Anemia, macrocytic  Vitamin D deficiency                         EENT/Dental:  denies chronic allergic rhinitis           Cardiovascular:  Exercise tolerance: good   Denies Pacemaker. Hypertension, well controlled       Denies Angina.       Denies EL.    Functional Capacity 4 METS        Deep Venous Thrombosis (DVT), right lower extremity deep vein thrombosis in the 1970s on right lower extremity                 Pulmonary:   COPD, mild      Denies Sleep Apnea.                Renal/:  Chronic Renal Disease, CKD no renal calculi  Frequent UTI  Dysuria             Hepatic/GI:   Denies PUD.   Denies GERD. Denies Liver Disease.  Denies Hepatitis. Gastric Bypass 2004          Musculoskeletal:  Arthritis    Musculoskeletal General/Symptoms: joint pain, joint stiffness, joint swelling, limited ROM. Functional capacity  is ambulatory without assistance.   Joint Disease:  Arthritis, OsteoarthritisDenies Gout   Bone Disorders:    Osteopenia        OB/GYN/PEDS:  9/13/2022  Radiofrequency ablation (Right)   Date Unknown  Dilation and curettage of uterus  Date Unknown  Partial hysterectomy              Neurological:  Denies TIA.  Denies CVA.    Denies Seizures.          Chronic Pain Syndrome Osteoarthritis  Denies Peripheral Neuropathy                          Endocrine:  Denies Diabetes. Denies Hypothyroidism.  Denies Hyperthyroidism. Vitamin D deficiency             Psych:  Psychiatric History  depression                Physical Exam  General: Cooperative, Well nourished and Alert    Airway:  Mouth Opening: Normal  Tongue: Normal  Neck ROM: Normal ROM    Dental:  Partial Dentures  Upper and lower partials  Chest/Lungs:  Clear to auscultation, Normal Respiratory Rate    Heart:  Rate: Normal  Rhythm: Regular Rhythm  Sounds: Normal          Anesthesia Assessment: Preoperative EQUATION    Planned Procedure: Procedure(s) (LRB):  ARTHROPLASTY, KNEE, TOTAL: RIGHT: SAME DAY (Right)  Requested Anesthesia Type:Regional  Surgeon: Avery Campos MD  Service: Orthopedics  Known or anticipated Date of Surgery:7/2/2024    Surgeon notes: reviewed    Electronic QUestionnaire Assessment completed via nurse interview with patient.        Triage considerations:     The patient has no apparent active cardiac condition (No unstable coronary Syndrome such as severe unstable angina or recent [<1 month] myocardial infarction, decompensated CHF, severe valvular   disease or significant arrhythmia)    Previous anesthesia records:GETA and No problems  10/29/18 COLONOSCOPY   Airway/Jaw/Neck:  Airway Findings: Mouth Opening: Normal Tongue: Normal  General Airway Assessment: Adult  Mallampati: I  TM Distance: 4 - 6 cm  Jaw/Neck Findings:  Neck ROM: Normal ROM      Last PCP note: [facility] 3 months [facility]   Subspecialty notes: Hematology/Oncology,  Ortho    Other important co-morbidities: Smoker and CKD2, Anemia, Pulmonary Nodule, Anxiety, Depression, Gastric Bypass, Urinary Incontinence, h/o UTIs       Tests already available:  Available tests,  [facility] , [facility] .   4/26/24 CMP, CBC   5/17/24 CT Chest   2/18/20 EKG  9/6/18 C-Spine XR             Instructions given. (See in Nurse's note)    Optimization:  Anesthesia Preop Clinic Assessment  Indicated POC    Medical Opinion Indicated       Sub-specialist consult indicated:   [facility] Cisneros        Plan:    Testing:  PT/INR   Pre-anesthesia  visit       Visit focus: possible regional anesthesia and/or nerve block      Consultation:[facility] Perioperative Hospitalist     Patient  has previously scheduled Medical Appointment: 6/17    Navigation: Tests Scheduled.              Consults scheduled.             Results will be tracked by Preop Clinic.       6/17/24 MEDICAL CLEARANCE  Patient is optimized     Patient/ care giver/ Family member was instructed to call and update me about any changes to health,  medication, office visits ,testing out side of the [facility] , hospitalizations between now and surgery       Fatimah Cisneros MD  Internal Medicine  [facility]   Cell Phone- [phone]     History of COVID - no   COVID vaccination status -Yes

## 2024-06-07 ENCOUNTER — TELEPHONE (OUTPATIENT)
Dept: ORTHOPEDICS | Facility: CLINIC | Age: 64
End: 2024-06-07
Payer: MEDICARE

## 2024-06-07 NOTE — TELEPHONE ENCOUNTER
Called pt and scheduled pt for the virtual joint class on 6/10/24. Pt verbalized understanding and has no further questions.    ----- Message from Therese Watson sent at 6/7/2024  4:48 PM CDT -----  Pt calling in regards to joining a video clarice on Monday for 9am , please call     Confirmed patient's contact info below:  Contact Name: Nohemi Hull  Phone Number: [phone]

## 2024-06-07 NOTE — TELEPHONE ENCOUNTER
Called pt to ask if she found a time to attend the joint class and pt stated she had not had time and will look this weekend and call back Monday with a time. Pt verbalized understanding and has no further questions.

## 2024-06-11 ENCOUNTER — TELEPHONE (OUTPATIENT)
Dept: ORTHOPEDICS | Facility: CLINIC | Age: 64
End: 2024-06-11
Payer: MEDICARE

## 2024-06-11 NOTE — TELEPHONE ENCOUNTER
Pt had questions about a commode with the height attachment - I stated I would check with our DME department if that is something we cn order. I will call back pt when I have answer for her. Pt verbalized understanding and has no further questions.       ----- Message from [place] sent at 6/11/2024 11:56 AM CDT -----  Regarding: PT IS WANTING A CALL BACK FROM STAFF TO DISCUSS SURGERY  Contact: PT  Confirmed contact info below:  Contact Name: Nohemi Hull  Phone Number: [phone]

## 2024-06-12 ENCOUNTER — TELEPHONE (OUTPATIENT)
Dept: ORTHOPEDICS | Facility: CLINIC | Age: 64
End: 2024-06-12
Payer: MEDICARE

## 2024-06-12 ENCOUNTER — PATIENT MESSAGE (OUTPATIENT)
Dept: SPORTS MEDICINE | Facility: CLINIC | Age: 64
End: 2024-06-12
Payer: MEDICARE

## 2024-06-12 NOTE — TELEPHONE ENCOUNTER
Called to inform her of the type of commode she would have. Left vm for pt to return call to clinic as well as the number to our DME department.     ----- Message from Therese Watson sent at 6/12/2024  9:44 AM CDT -----  Pt called insurance for her  raised toilet seat , trying to see if she qualifies , please call     Confirmed patient's contact info below:  Contact Name: Nohemi Hull  Phone Number: [phone]

## 2024-06-13 ENCOUNTER — TELEPHONE (OUTPATIENT)
Dept: SPORTS MEDICINE | Facility: CLINIC | Age: 64
End: 2024-06-13
Payer: MEDICARE

## 2024-06-13 NOTE — TELEPHONE ENCOUNTER
Called and spoke to patient in regards to scheduling a virtual visit for Iovera.  Patient has been schedule for 6/19/24 at 8am.

## 2024-06-14 NOTE — DISCHARGE INSTRUCTIONS
Your surgery has been scheduled for:_____7/2/24_____________________________________    You should report to:  ____[facility], located on the [place] side of the first floor of the           [facility] ([phone])  ____[facility], located on the [facility] side of the            Second floor of the [facility] ([phone])  ____3rd Floor [facility] located on the [facility] side of the [facility] [phone]  _x___[facility]: located at [address] [facility] [facility] [address] [place] [address]. [address]     Please Note   Tell your doctor if you take Aspirin, products containing Aspirin, herbal medications  or blood thinners, such as Coumadin, Ticlid, or Plavix.  (Consult your provider regarding holding or stopping before surgery).  Arrange for someone to drive you home following surgery.  You will not be allowed to leave the surgical facility alone or drive yourself home following sedation and anesthesia.    Before Surgery  Stop taking all herbal medications, vitamins, and supplements 7 days prior to surgery  No Motrin/Advil (Ibuprofen) 7 days before surgery  No Aleve (Naproxen) 7 days before surgery  Stop Taking Asprin, products containing Asprin __7___days before surgery  Stop taking blood thinners_______days before surgery  No Goody's/BC  Powder 7 days before surgery  Refrain from drinking alcoholic beverages for 24hours before and after surgery  Stop or limit smoking ____7_____days before surgery  You may take Tylenol for pain    Night before Surgery  Do not eat or drink after midnight  Take a shower or bath (shower is recommended).  Bathe with Hibiclens soap or an antibacterial soap from the neck down.  If not supplied by your surgeon, hibiclens soap will need to be purchased over the counter in pharmacy.  Rinse soap off thoroughly.  Shampoo your hair with your regular shampoo    The Day of  Surgery  Take another bath or shower with hibiclens or any antibacterial soap, to reduce the chance of infection.  Take heart and blood pressure medications with a small sip of water, as advised by the perioperative team.  Do not take fluid pills  You may brush your teeth and rinse your mouth, but do not swall any additional water.   Do not apply perfumes, powder, body lotions or deodorant on the day of surgery.  Nail polish should be removed.  Do not wear makeup or moisturizer  Wear comfortable clothes, such as a button front shirt and loose fitting pants.  Leave all jewelry, including body piercings, and valuables at home.    Bring any devices you will neeed after surgery such as crutches or canes.  If you have sleep apnea, please bring your CPAP machine  In the event that your physical condition changes including the onset of a cold or respiratory illness, or if you have to delay or cancel your surgery, please notify your surgeon.     Anesthesia: Regional Anesthesia    Youre scheduled for surgery. During surgery, youll receive medicine called anesthesia to keep you comfortable and pain-free. Your surgeon has decided that youll receive regional anesthesia. This sheet tells you what to expect with this type of anesthesia.  What is regional anesthesia?  Regional anesthesia numbs one region of your body. The anesthesia may be given around nerves or into veins in your arms, neck, or legs (nerve block or [place] block). Or it may be sent into the spinal fluid (spinal anesthesia) or into the space just outside the spinal fluid (epidural anesthesia). You may also be given sedatives to help you relax.  Nerve block or [place] block  A small area of the body, such as an arm or leg, can be numbed using a nerve block or [place] block.  Nerve block. During a nerve block, your skin is numbed. A needle is then inserted near nerves that serve the area to be numbed. Anesthetic is sent through the needle.  IV regional or [place] block. For  this type of block, an IV line is put into a vein. The blood flow to the area to be numbed is blocked for a short time. Anesthetic is sent through the IV.  Spinal anesthesia  Spinal anesthesia numbs your body from about the waist down.  Anesthetic is injected into the spinal fluid. This is a substance that surrounds the spinal cord in your spinal column. The anesthetic blocks pain traveling from the body to the brain.  To receive the anesthetic, your skin is numbed at the injection site on your back.  A needle is then inserted into the spinal space. Anesthetic is sent into the spinal fluid through the needle.  Epidural anesthesia  Epidural anesthesia is most commonly used during childbirth and may also be used after surgical procedures of the chest, belly, and legs.  Anesthetic is injected into the epidural space. This is just outside the dural sac which contains the spinal fluid.  To receive the anesthetic, your skin is numbed at the injection site on your back.  A needle is then inserted into the epidural space. Anesthetic is sent into the epidural space through the needle.  A small flexible catheter may be attached to the needle and left in place. This allows for continuous injections or infusions of anesthetic.  Anesthesia tools and medicines that might be near you during your procedure  Local anesthetic. This medicine is given through a needle numbs one region of your body.  Electrocardiography leads (electrodes). These are used to record your heart rate and rhythm.  Blood pressure cuff. A cuff is placed on your arm to keep track of your blood pressure.  Pulse oximeter. This small clip is placed on the end of the finger. It measures your blood oxygen level.  Sedatives. These medicines may be given through an IV. They help to relax you and keep you comfortable. You may stay awake or sleep lightly.  Oxygen. You may be given oxygen through a facemask.  Risks and possible complications  Regional anesthesia carries  some risks. These include:  Nausea and vomiting  Headache  Backache  Decreased blood pressure  Allergic reaction to the anesthetic  Ongoing numbness (rare)  Irregular heartbeat (rare)  Cardiac arrest (rare)   Date Last Reviewed: 12/1/2016  © 9218-8018 [employer]. [address] [place] [address]. All rights reserved. This information is not intended as a substitute for professional medical care. Always follow your healthcare professional's instructions.

## 2024-06-17 ENCOUNTER — HOSPITAL ENCOUNTER (OUTPATIENT)
Dept: PREADMISSION TESTING | Facility: HOSPITAL | Age: 64
Discharge: HOME | End: 2024-06-17
Attending: ANESTHESIOLOGY
Payer: MEDICARE

## 2024-06-17 ENCOUNTER — TELEPHONE (OUTPATIENT)
Dept: NEPHROLOGY | Facility: CLINIC | Age: 64
End: 2024-06-17
Payer: MEDICARE

## 2024-06-17 ENCOUNTER — OFFICE VISIT (OUTPATIENT)
Dept: ORTHOPEDICS | Facility: CLINIC | Age: 64
End: 2024-06-17
Payer: MEDICARE

## 2024-06-17 ENCOUNTER — OFFICE VISIT (OUTPATIENT)
Dept: INTERNAL MEDICINE | Facility: CLINIC | Age: 64
End: 2024-06-17
Payer: MEDICARE

## 2024-06-17 ENCOUNTER — HOSPITAL ENCOUNTER (OUTPATIENT)
Dept: CARDIOLOGY | Facility: CLINIC | Age: 64
Discharge: HOME | End: 2024-06-17
Payer: MEDICARE

## 2024-06-17 VITALS
BODY MASS INDEX: 23.81 KG/M2 | SYSTOLIC BLOOD PRESSURE: 117 MMHG | DIASTOLIC BLOOD PRESSURE: 59 MMHG | WEIGHT: 151.69 LBS | HEIGHT: 67 IN | HEART RATE: 51 BPM | TEMPERATURE: 98 F | OXYGEN SATURATION: 96 %

## 2024-06-17 VITALS — BODY MASS INDEX: 23.34 KG/M2 | HEIGHT: 67 IN | WEIGHT: 148.69 LBS

## 2024-06-17 DIAGNOSIS — I49.9 CARDIAC ARRHYTHMIA, UNSPECIFIED CARDIAC ARRHYTHMIA TYPE: ICD-10-CM

## 2024-06-17 DIAGNOSIS — Z98.84 HISTORY OF GASTRIC BYPASS: ICD-10-CM

## 2024-06-17 DIAGNOSIS — M17.11 PRIMARY OSTEOARTHRITIS OF RIGHT KNEE: Primary | ICD-10-CM

## 2024-06-17 DIAGNOSIS — N18.31 STAGE 3A CHRONIC KIDNEY DISEASE: ICD-10-CM

## 2024-06-17 DIAGNOSIS — Z01.818 PREOP EXAMINATION: Primary | ICD-10-CM

## 2024-06-17 DIAGNOSIS — R32 URINARY INCONTINENCE, UNSPECIFIED TYPE: ICD-10-CM

## 2024-06-17 DIAGNOSIS — Z96.651 S/P TOTAL KNEE REPLACEMENT, RIGHT: ICD-10-CM

## 2024-06-17 DIAGNOSIS — Z79.02 LONG TERM CURRENT USE OF ANTITHROMBOTICS/ANTIPLATELETS: ICD-10-CM

## 2024-06-17 DIAGNOSIS — F33.41 RECURRENT MAJOR DEPRESSIVE DISORDER, IN PARTIAL REMISSION: ICD-10-CM

## 2024-06-17 DIAGNOSIS — Z86.718 HISTORY OF DVT (DEEP VEIN THROMBOSIS): ICD-10-CM

## 2024-06-17 DIAGNOSIS — D72.819 LEUKOPENIA, UNSPECIFIED TYPE: ICD-10-CM

## 2024-06-17 DIAGNOSIS — M85.80 OSTEOPENIA, UNSPECIFIED LOCATION: ICD-10-CM

## 2024-06-17 DIAGNOSIS — Z12.11 SCREENING FOR COLON CANCER: ICD-10-CM

## 2024-06-17 DIAGNOSIS — J43.9 PULMONARY EMPHYSEMA, UNSPECIFIED EMPHYSEMA TYPE: ICD-10-CM

## 2024-06-17 DIAGNOSIS — M17.11 PRIMARY OSTEOARTHRITIS OF RIGHT KNEE: ICD-10-CM

## 2024-06-17 DIAGNOSIS — F17.200 CURRENTLY SMOKES TOBACCO: ICD-10-CM

## 2024-06-17 LAB
OHS QRS DURATION: 82 MS
OHS QTC CALCULATION: 449 MS

## 2024-06-17 PROCEDURE — 1159F MED LIST DOCD IN RCRD: CPT | Mod: CPTII,S$GLB,, | Performed by: HOSPITALIST

## 2024-06-17 PROCEDURE — 3008F BODY MASS INDEX DOCD: CPT | Mod: CPTII,S$GLB,,

## 2024-06-17 PROCEDURE — 93010 ELECTROCARDIOGRAM REPORT: CPT | Mod: S$GLB,,, | Performed by: INTERNAL MEDICINE

## 2024-06-17 PROCEDURE — 1160F RVW MEDS BY RX/DR IN RCRD: CPT | Mod: CPTII,S$GLB,,

## 2024-06-17 PROCEDURE — 1159F MED LIST DOCD IN RCRD: CPT | Mod: CPTII,S$GLB,,

## 2024-06-17 PROCEDURE — 99999 PR PBB SHADOW E&M-EST. PATIENT-LVL III: CPT | Mod: PBBFAC,,,

## 2024-06-17 PROCEDURE — 93005 ELECTROCARDIOGRAM TRACING: CPT | Mod: S$GLB,,, | Performed by: HOSPITALIST

## 2024-06-17 PROCEDURE — 99214 OFFICE O/P EST MOD 30 MIN: CPT | Mod: S$GLB,,,

## 2024-06-17 PROCEDURE — 99215 OFFICE O/P EST HI 40 MIN: CPT | Mod: S$GLB,,, | Performed by: HOSPITALIST

## 2024-06-17 PROCEDURE — 1160F RVW MEDS BY RX/DR IN RCRD: CPT | Mod: CPTII,S$GLB,, | Performed by: HOSPITALIST

## 2024-06-17 PROCEDURE — 99999 PR PBB SHADOW E&M-EST. PATIENT-LVL II: CPT | Mod: PBBFAC,,, | Performed by: HOSPITALIST

## 2024-06-17 RX ORDER — FENTANYL CITRATE 50 UG/ML
100 INJECTION, SOLUTION INTRAMUSCULAR; INTRAVENOUS
OUTPATIENT
Start: 2024-06-17 | End: 2024-06-18

## 2024-06-17 RX ORDER — MUPIROCIN 20 MG/G
1 OINTMENT TOPICAL 2 TIMES DAILY
OUTPATIENT
Start: 2024-06-17 | End: 2024-06-22

## 2024-06-17 RX ORDER — OXYCODONE HYDROCHLORIDE 5 MG/1
5 TABLET ORAL
OUTPATIENT
Start: 2024-06-17

## 2024-06-17 RX ORDER — BISACODYL 10 MG/1
10 SUPPOSITORY RECTAL EVERY 12 HOURS PRN
OUTPATIENT
Start: 2024-06-17

## 2024-06-17 RX ORDER — TALC
6 POWDER (GRAM) TOPICAL NIGHTLY PRN
OUTPATIENT
Start: 2024-06-17

## 2024-06-17 RX ORDER — PREGABALIN 75 MG/1
75 CAPSULE ORAL
OUTPATIENT
Start: 2024-06-17

## 2024-06-17 RX ORDER — POLYETHYLENE GLYCOL 3350 17 G/17G
17 POWDER, FOR SOLUTION ORAL DAILY
OUTPATIENT
Start: 2024-06-17

## 2024-06-17 RX ORDER — SODIUM CHLORIDE 9 MG/ML
INJECTION, SOLUTION INTRAVENOUS
OUTPATIENT
Start: 2024-06-17

## 2024-06-17 RX ORDER — CEFAZOLIN SODIUM 2 G/50ML
2 SOLUTION INTRAVENOUS
OUTPATIENT
Start: 2024-06-17 | End: 2024-06-18

## 2024-06-17 RX ORDER — LIDOCAINE HYDROCHLORIDE 10 MG/ML
1 INJECTION, SOLUTION EPIDURAL; INFILTRATION; INTRACAUDAL; PERINEURAL
OUTPATIENT
Start: 2024-06-17

## 2024-06-17 RX ORDER — PROCHLORPERAZINE EDISYLATE 5 MG/ML
5 INJECTION INTRAMUSCULAR; INTRAVENOUS EVERY 6 HOURS PRN
OUTPATIENT
Start: 2024-06-17

## 2024-06-17 RX ORDER — MUPIROCIN 20 MG/G
1 OINTMENT TOPICAL
OUTPATIENT
Start: 2024-06-17

## 2024-06-17 RX ORDER — ASPIRIN 81 MG/1
81 TABLET ORAL 2 TIMES DAILY
OUTPATIENT
Start: 2024-06-17

## 2024-06-17 RX ORDER — METHOCARBAMOL 750 MG/1
750 TABLET, FILM COATED ORAL 3 TIMES DAILY
OUTPATIENT
Start: 2024-06-17

## 2024-06-17 RX ORDER — FENTANYL CITRATE 50 UG/ML
25 INJECTION, SOLUTION INTRAMUSCULAR; INTRAVENOUS EVERY 5 MIN PRN
OUTPATIENT
Start: 2024-06-17

## 2024-06-17 RX ORDER — FAMOTIDINE 20 MG/1
20 TABLET, FILM COATED ORAL 2 TIMES DAILY
OUTPATIENT
Start: 2024-06-17

## 2024-06-17 RX ORDER — AMOXICILLIN 250 MG
1 CAPSULE ORAL 2 TIMES DAILY
OUTPATIENT
Start: 2024-06-17

## 2024-06-17 RX ORDER — NALOXONE HCL 0.4 MG/ML
0.02 VIAL (ML) INJECTION
OUTPATIENT
Start: 2024-06-17

## 2024-06-17 RX ORDER — MORPHINE SULFATE 2 MG/ML
2 INJECTION, SOLUTION INTRAMUSCULAR; INTRAVENOUS
OUTPATIENT
Start: 2024-06-17

## 2024-06-17 RX ORDER — SODIUM CHLORIDE 0.9 % (FLUSH) 0.9 %
10 SYRINGE (ML) INJECTION
OUTPATIENT
Start: 2024-06-17

## 2024-06-17 RX ORDER — ACETAMINOPHEN 500 MG
1000 TABLET ORAL
OUTPATIENT
Start: 2024-06-17

## 2024-06-17 RX ORDER — PREGABALIN 75 MG/1
75 CAPSULE ORAL NIGHTLY
OUTPATIENT
Start: 2024-06-17

## 2024-06-17 RX ORDER — ONDANSETRON HYDROCHLORIDE 2 MG/ML
4 INJECTION, SOLUTION INTRAVENOUS EVERY 8 HOURS PRN
OUTPATIENT
Start: 2024-06-17

## 2024-06-17 RX ORDER — ACETAMINOPHEN 500 MG
1000 TABLET ORAL EVERY 6 HOURS
OUTPATIENT
Start: 2024-06-17

## 2024-06-17 RX ORDER — CEFAZOLIN SODIUM 2 G/50ML
2 SOLUTION INTRAVENOUS
OUTPATIENT
Start: 2024-06-17

## 2024-06-17 RX ORDER — OXYCODONE HYDROCHLORIDE 5 MG/1
10 TABLET ORAL
OUTPATIENT
Start: 2024-06-17

## 2024-06-17 RX ORDER — SODIUM CHLORIDE 9 MG/ML
INJECTION, SOLUTION INTRAVENOUS CONTINUOUS
OUTPATIENT
Start: 2024-06-17 | End: 2024-06-18

## 2024-06-17 RX ORDER — MIDAZOLAM HYDROCHLORIDE 1 MG/ML
4 INJECTION, SOLUTION INTRAMUSCULAR; INTRAVENOUS
OUTPATIENT
Start: 2024-06-17 | End: 2024-06-18

## 2024-06-17 NOTE — OUTPATIENT SUBJECTIVE & OBJECTIVE
Outpatient Subjective & Objective     Chief complaint-Preoperative evaluation, Perioperative Medical management, complication reduction plan     Active cardiac conditions- none    Revised cardiac risk index predictors- none    Functional capacity -Examples of physical activity , she feels that she has an active person and she helps with housework, cleaning,  can take 1 flight of stairs----- She can undertake all the above activities without  chest pain,chest tightness, Shortness of breath ,dizziness,lightheadedness making her exercise tolerance more,   than 4 Mets.       Review of Systems   Constitutional:  Negative for chills and fever.        No unusual weight changes       HENT:          ALONSOG score  / 8      Age over 50        Eyes:         No sudden visual changes   Respiratory:          No cough , phlegm    No Hemoptysis   Cardiovascular:         As noted   Gastrointestinal:         No overt GI/ blood losses  Bowel movements- Regular / constipated   Endocrine:        Prednisone use > 20 mg daily for 3 weeks- none   Genitourinary:  Negative for dysuria.        No urinary hesitancy    Musculoskeletal:         She has pain in both knees, right more than the left   Skin:  Negative for rash.   Neurological:  Negative for syncope.        No unilateral weakness   Hematological:         Current use of Anticoagulants  None   Psychiatric/Behavioral:            No SI/HI   No stomach upset, ulcers    No past medical history pertinent negatives.        No anesthesia, bleeding, cardiac problems, PONV with previous surgeries/procedures.  Medications and Allergies reviewed in epic.   FH- No anesthesia,bleeding / venous thrombosis , early onset heart disease in family      Physical Exam    I offered a sheet and the presence of a chaperone during physical examination   She was comfortable to proceed with the exam without the the presence of a chaperone    Physical Exam  Constitutional- Vitals -\  General  appearance-Conscious,Coherent  Eyes- No conjunctival icterus,pupils  round  and reactive to light   ENT-Oral cavity- moist ,  upper partial denture , and lower partial denture    , Hearing grossly normal   Neck- No thyromegaly ,Trachea -central, No jugular venous distension,   No Carotid Bruit   Cardiovascular -Heart Sounds- Normal ,  no murmur , and  occasional irregularity suggestive of ectopy   , No gallop rhythm   Respiratory - Normal Respiratory Effort, Normal breath sounds,  no wheeze , and  no forced expiratory wheeze    Peripheral pitting pedal edema-- none , no calf pain   Gastrointestinal -Soft abdomen, No palpable masses, Non Tender,Liver,Spleen not palpable. No-- free fluid and shifting dullness  Musculoskeletal- No finger Clubbing. Strength grossly normal   Lymphatic-No Palpable cervical, axillary,Inguinal lymphadenopathy   Psychiatric - normal effect,Orientation  Rt Dorsalis pedis pulses-palpable    Lt Dorsalis pedis pulses- palpable   Rt Posterior tibial pulses -palpable   Left posterior tibial pulses -palpable   Miscellaneous -  no renal bruit   Investigations  Lab and Imaging have been reviewed in [facility].    Review of Medicine tests    EKG- I had independently reviewed the EKG from--2/18/2020  It was reported to be showing     Normal sinus rhythm   Nonspecific T wave abnormality   Abnormal ECG   When compared with ECG of 16-SEP-2010 07:21,   Vent. rate has increased BY  32 BPM     Review of clinical lab tests:  Lab Results   Component Value Date    CREATININE 1.3 04/26/2024    HGB 12.2 04/26/2024     04/26/2024         Review of old records- Was done and information gathered regards to events leading to surgery and health conditions of significance in the perioperative period         Review of old records- Was done and information gathered regards to events leading to surgery and health conditions of significance in the perioperative period.    Outpatient Subjective & Objective

## 2024-06-17 NOTE — HPI
History of present illness- I had the pleasure of meeting this pleasant 64 y.o. lady in the pre op clinic prior to her elective Orthopedic surgery. The patient is new to me . Offered to have family to be on the phone during the consultation    Had Ms Maldonado her daughter on the phone    I have obtained the history by speaking to the patient and by reviewing the electronic health records.    Events leading up to surgery / History of presenting illness -    She has been troubled with moderate rt knee pain for 2 years . Knee injections helped .   Pain increasing   Pain increases with activity and decreases with resting.  Gabapentin , acetaminophen is helping  She had knee meniscus surgery in the past on both sides and 1 of the knee required surgery 2 times and she was unsure which side-none recently  She did private duty helping patient is that she attributes her knee problems to be from    Relevant health conditions of significance for the perioperative period/ History of presenting illness -    Health conditions of significance for the perioperative period      Gastric bypass surgery in year 2004   Depression  Chronic kidney disease  Tobacco  Anemia  Urinary Incontinence  Previously worked as sitter/nursing aid      Not known to have heart problem , Prediabetes , Diabetes Mellitus, Lung problem, Thyroid problem,  pulmonary embolism, acid reflux, sleep apnea, COVID, fatty liver , vascular stenting , edema     Lives with 2 siblings- she has the youngest  Single story house   Retired early 40's -she took early jail due to mental health problems and the divorce  Pets- none  Children -2- 2 grown children-37-year-old son and a 31-year-old daughter-she plans on recovering at her daughter's place which is a single-level house  Pregnancies - 3- 1 miscarriage-1 month into the pregnancy  C sections - None  Has help post op

## 2024-06-17 NOTE — PROGRESS NOTES
[facility] [address]  Progress Note    Patient Name: Nohemi Hull  MRN: [id number]  Date of Evaluation- 06/17/2024  PCP- Nataly Rose MD    Future cases for Jackie Hull [[id number]]       Case ID Status Date Time [place] Procedure Provider Location    [id number] [facility] 7/2/2024 12:40  ARTHROPLASTY, KNEE, TOTAL: RIGHT: SAME DAY Avery Campos MD [[id number]] [facility] OR            HPI:  History of present illness- I had the pleasure of meeting this pleasant 64 y.o. lady in the pre op clinic prior to her elective Orthopedic surgery. The patient is new to me . Offered to have family to be on the phone during the consultation    Had Ms Maldonado her daughter on the phone    I have obtained the history by speaking to the patient and by reviewing the electronic health records.    Events leading up to surgery / History of presenting illness -    She has been troubled with moderate rt knee pain for 2 years . Knee injections helped .   Pain increasing   Pain increases with activity and decreases with resting.  Gabapentin , acetaminophen is helping  She had knee meniscus surgery in the past on both sides and 1 of the knee required surgery 2 times and she was unsure which side-none recently  She did private duty helping patient is that she attributes her knee problems to be from    Relevant health conditions of significance for the perioperative period/ History of presenting illness -    Health conditions of significance for the perioperative period      Gastric bypass surgery in year 2004   Depression  Chronic kidney disease  Tobacco  Anemia  Urinary Incontinence  Previously worked as sitter/nursing aid      Not known to have heart problem , Prediabetes , Diabetes Mellitus, Lung problem, Thyroid problem,  pulmonary embolism, acid reflux, sleep apnea, COVID, fatty liver , vascular stenting , edema     Lives with 2 siblings- she has the youngest  Single story house   Retired early 40's -she took early senior care  due to mental health problems and the divorce  Pets- none  Children -2- 2 grown children-37-year-old son and a 31-year-old daughter-she plans on recovering at her daughter's place which is a single-level house  Pregnancies - 3- 1 miscarriage-1 month into the pregnancy  C sections - None  Has help post op              Subjective/ Objective:     Chief complaint-Preoperative evaluation, Perioperative Medical management, complication reduction plan     Active cardiac conditions- none    Revised cardiac risk index predictors- none    Functional capacity -Examples of physical activity , she feels that she has an active person and she helps with housework, cleaning,  can take 1 flight of stairs----- She can undertake all the above activities without  chest pain,chest tightness, Shortness of breath ,dizziness,lightheadedness making her exercise tolerance more,   than 4 Mets.       Review of Systems   Constitutional:  Negative for chills and fever.        No unusual weight changes       HENT:          ALONSOG score  / 8      Age over 50        Eyes:         No sudden visual changes   Respiratory:          No cough , phlegm    No Hemoptysis   Cardiovascular:         As noted   Gastrointestinal:         No overt GI/ blood losses  Bowel movements- Regular / constipated   Endocrine:        Prednisone use > 20 mg daily for 3 weeks- none   Genitourinary:  Negative for dysuria.        No urinary hesitancy    Musculoskeletal:         She has pain in both knees, right more than the left   Skin:  Negative for rash.   Neurological:  Negative for syncope.        No unilateral weakness   Hematological:         Current use of Anticoagulants  None   Psychiatric/Behavioral:            No SI/HI   No stomach upset, ulcers    No past medical history pertinent negatives.        No anesthesia, bleeding, cardiac problems, PONV with previous surgeries/procedures.  Medications and Allergies reviewed in epic.   FH- No anesthesia,bleeding / venous  thrombosis , early onset heart disease in family      Physical Exam      Investigations  Lab and Imaging have been reviewed in [facility].    Review of Medicine tests    EKG- I had independently reviewed the EKG from--2/18/2020  It was reported to be showing     Normal sinus rhythm   Nonspecific T wave abnormality   Abnormal ECG   When compared with ECG of 16-SEP-2010 07:21,   Vent. rate has increased BY  32 BPM     Review of clinical lab tests:  Lab Results   Component Value Date    CREATININE 1.3 04/26/2024    HGB 12.2 04/26/2024     04/26/2024         Review of old records- Was done and information gathered regards to events leading to surgery and health conditions of significance in the perioperative period         Review of old records- Was done and information gathered regards to events leading to surgery and health conditions of significance in the perioperative period.        Preoperative cardiac risk assessment-  The patient does not have any active cardiac conditions . Revised cardiac risk index predictors- -0--.Functional capacity is more than 4 Mets. She will be undergoing a Orthopedic procedure that carries a Moderate Risk risk     Risk of a major Cardiac event ( Defined as death, myocardial infarction, or cardiac arrest at 30 days after noncardiac surgery), based on RCRI score     -3.9%          No further cardiac work up is indicated prior to proceeding with the surgery          American Society of Anesthesiologists Physical status classification ( ASA ) class: 2     Postoperative pulmonary complication risk assessment:      ARISCAT ( Canet) risk index- risk class -  Low, if duration of surgery is under 3 hours, intermediate, if duration of surgery is over 3 hours          Assessment/Plan:     Recurrent major depressive disorder, in partial remission  She is doing good from a mental health standpoint and is currently under psychiatric care    Doing good from a mental health stand point   Not under  Therapist care   Has supportive family   On Medication that is helping -she is currently taking Klonopin every night, trazodone every night, Trintellix AM, risperidone at nighttime.  I have suggested for her to Cross check the Trintellix does with her psychiatrist   No Suicidal / Homicidal ideation      She rightly brought up the opioid use around the time of surgery for pain control and her mental health medication as she feels that if she takes the mental health medication as is and combined with opioid she can have too much of drowsiness    She plans on changing her mental health medication to    She plans on changing     Trazodone 450 mg to 150 mg nightly  Klonopin 2 mg a night  to 1 mg at night while taking opioid     She has discussed the above plan with her psychiatrist Dr. Perkins    Leukopenia  She has no proneness for infection    History of gastric bypass  Gastric bypass surgery in year 2004    she is currently taking b12 , iron, Multi vitamin    She is doing good from a gastric bypass standpoint without low sugars or difficulty swallowing, reflux    I suggested to hold the multivitamin 1 week before surgery    Screening for colon cancer  She gets screening every 5 years and is doing good    Osteopenia  She had a hysterectomy at age 39 years for heavy menstrual cycles at that time  To her understanding, she is still has ovaries    She has no fracture history  She is on multivitamin vitamin-D    Primary osteoarthritis of right knee  She has an upcoming knee replacement surgery on July 2nd and plans on having the opposite knee replacement surgery at some point in future    Currently smokes tobacco  She is currently smoking 4 cigarettes a day  She, in the past was smoking half a pack which is 10 cigarettes a day  She plans on quitting tobacco for surgery by using patches  I have suggested for her to discuss the patch use with the orthopedic appointment she has later this morning     I suggested to consider  stopping  smoking tobacco for its benefits in the [place] operative period and in the long term    I  Informed about risk of wound healing problem ,infection,lung complications,thrombosis with tobacco use     Suggested quitting tobacco     Offered tobacco cessation service     CT scan from 2023 showed mild emphysema    Not known to have wheezing , chronic phlegm   No inhaler, oxygen use    She feels fine from a breathing standpoint    Emphysema of lung  She is not troubled with her breathing and we discussed smoking cessation    Long term current use of antithrombotics/antiplatelets  ASA 81 mg by mouth once a day    Urinary incontinence  At nighttime and she is using oxybutynin   She has no urinary pain    Stage 3a chronic kidney disease  The most recent creatinine has risen likely from anti-inflammatory medication use which she has since discontinued  I have reviewed her chart and noted plan for repeated chemistry and given that she is having labs done anyway I have asked for a comprehensive metabolic panel to check on the kidney function since she stopped taking anti-inflammatory medication    Stages of CKD discussed  Deleterious effects NSAID's , Beneficial effects of Hydration discussed   Tylenol ( If not intolerant ) as needed for pain     I  suggest monitoring renal function, in put and out put status [place]-operatively. I  suggest avoiding nephrotoxic medication including NSAIDs, COX2 inhibitors, intravenous contrast agent,avoiding hypotension to prevent further renal impairment.   Care with intravenous contrast discussed     She wants to see a nephrologists prior to her surgery and I made a referral for that  It is likely that we might be able to get her to see an nephrologists prior to surgery but if we are unable to, she wants to proceed with the surgery    History of DVT (deep vein thrombosis)  She has had a deep vein thrombosis in the 1970s on right lower extremity in the setting of a sprained right  ankle  This was from a mechanical reason  This was addressed non surgically  She was on crutches for some time and had reduced mobility  To her understanding, she had a deep vein thrombosis-this could have been from reduced mobility/trauma  She was given Coumadin for a few months  She had no recurrence of blood clot    No History of PE  Episode- one  Associated with reduced mobility, no long journeys, no cancer, no prior surgery, no Hospital stay, noHRT      Increased risk of thrombosis in the [place] operative period , compression stocking use discussed     2012- No sonographic evidence for deep venous thrombosis in the right lower   extremity.     She was comfortable to proceed with surgery without the IVC filter placement prophylactically        Preventive perioperative care    Thromboembolic prophylaxis:  Her risk factors for thrombosis include tobacco use, surgical procedure, previous history of thrombosis, and age.I suggest  thromboembolic prophylaxis ( mechanical/pharmacological, weighing the risk benefits of pharmacological agent use considering [place] procedural bleeding )  during the perioperative period.I suggested being active in the post operative period.   The patient is a candidate for extended DVT prophylaxis      Postoperative pulmonary complication prophylaxis-Risk factors for post operative pulmonary complications include tobacco use and COPD- I suggest incentive spirometry use, early ambulation, and end tidal carbon dioxide monitoring  , oral care , head end of bed elevation      Renal complication prophylaxis-Risk factors for renal complications include pre-existing renal disease . I suggest keeping her well hydrated and avoidance/ minimizing the use of  NSAID's,AMAYA 2 Inhibitors ,IV contrast if possible in the perioperative period.      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 I suggest using the lowest possible dose of opioids for the shortest duration possible in the perioperative period. I suggest to Keep shades/blinds open during the day, lights off and shades closed at night to encourage normal sleep/wake cycle.I encourage the presence of the family member with the patient at all times, if at all possible as mental status changes can be picked up early by the family members and they help with reorientation. I encouraged the presence of family to help with orientation in the perioperative period. Benadryl avoidance suggested      In view of regional anesthesia the patient  is at risk of postoperative urinary retention.  I suggest avoidance / minimizing the of  Benzodiazepines,Anticholinergic medication,antihistamines ( Benadryl) , if possible in the perioperative period. I suggest using the minimum possible use of opioids for the minimum period of time in the perioperative period. Benadryl avoidance suggested      This visit was focused on Preoperative evaluation, Perioperative Medical management, complication reduction plans. I suggest that the patient follows up with primary care or relevant sub specialists for ongoing health care.    I appreciate the opportunity to be involved in this patients care. Please feel free to contact me if there were any questions about this consultation.    Patient is optimized    Patient/ care giver/ Family member was instructed to call and update me about any changes  to health,  medication, office visits ,testing out side of the [place] operative care center , hospitalizations between now and surgery      Fatimah Cisneros MD  Internal Medicine  [facility]   Cell Phone- [phone]    History of COVID - no   COVID vaccination status -Yes    COVID screening     No fever   No cough   No SOB  No sore throat   No loss of taste or smell   No muscle aches   No nausea, vomiting , diarrhea -  --  6/17/2024- 1821          Checked for over-the-counter medication      INR normal  Creatinine better  EKG from today personally reviewed reportedly showed-Sinus bradycardia with sinus arrhythmia   Otherwise normal ECG   When compared with ECG of 18-FEB-2020 15:02,   Vent. rate has decreased BY  47 BPM   Nonspecific T wave abnormality no longer evident in Lateral leads     Clinically, she is doing good from a heart standpoint    Called and spoke to her  Encouraged hydration  With regards to bradycardia-no dizziness or lightheadedness  She stays very active and socializes and does housework and can take a flight of stairs without chest pain, chest tightness, short of breath or dizziness lightheadedness  Offered cardiac evaluation to give her the benefit of doubt but she was comfortable to proceed

## 2024-06-17 NOTE — H&P (VIEW-ONLY)
CC:  Right knee pain    Nohemi Hull is a 64 y.o. female with history of Right knee pain. Pain is worse with activity and weight bearing.  Patient has experienced interference of activities of daily living due to decreased range of motion and an increase in joint pain and swelling.  Patient has failed non-operative treatment including NSAIDs, corticosteroid injections, viscosupplement injections, and activity modification.  Nohemi Hull currently ambulates independently.     Relevant medical conditions of significance in perioperative period:  History of DVT- on medication managed by PCP  CKD 3a- monitored by PCP  Current smoker- 3-4 cigarettes per day     Given documented medical comorbidities including those listed above and based off of CMS criteria patient would meet inpatient admission status guidelines. This case has been approved as inpatient.     Past Medical History:   Diagnosis Date    Anemia     Anxiety     Arthritis     Deep vein thrombosis     Depression 2000    Renal failure        Past Surgical History:   Procedure Laterality Date    COLONOSCOPY N/A 10/29/2018    Procedure: COLONOSCOPY;  Surgeon: Mp Alvarado MD;  Location: [address]);  Service: Endoscopy;  Laterality: N/A;  Referral received from Dr. SMOOTH Arias, [facility]  Last colonoscopy 2012-recommended f/u 5 years-past due    DILATION AND CURETTAGE OF UTERUS      GASTRIC BYPASS      PARTIAL HYSTERECTOMY      AUB    RADIOFREQUENCY ABLATION Right 9/13/2022    Procedure: RADIOFREQUENCY ABLATION, GENICULAR RIGHT COOLED;  Surgeon: Radha Justin MD;  Location: [facility];  Service: Pain Management;  Laterality: Right;    TONSILLECTOMY      TUBAL LIGATION Bilateral        Family History   Problem Relation Name Age of Onset    Hypertension Mother      Depression Mother      Hyperlipidemia Mother      Dementia Mother      Gallbladder disease Mother      Hyperlipidemia Father      Hypertension Sister       Depression Sister      Hyperlipidemia Sister      Diabetes Sister      Hypertension Sister      Diabetes Sister      Hypertension Sister      Depression Brother      Diabetes Brother      Hyperlipidemia Brother      Hypertension Brother      Diabetes Brother      Hypertension Brother      Diabetes Brother      Hyperlipidemia Brother      Colon cancer Maternal Grandfather          rectal ca    Depression Maternal Grandfather      Breast cancer Paternal Grandmother      Depression Paternal Grandfather      Depression Son      Hypertension Maternal Aunt      Depression Maternal Aunt      Diabetes Paternal Uncle      Depression Paternal Uncle      Alcohol abuse Neg Hx      Ovarian cancer Neg Hx      Heart disease Neg Hx      Stroke Neg Hx         Review of patient's allergies indicates:  No Known Allergies      Current Outpatient Medications:     acetaminophen (TYLENOL) 500 MG tablet, Take 1 tablet (500 mg total) by mouth 2 (two) times daily as needed for Pain. (Patient taking differently: Take 500 mg by mouth 3 (three) times daily as needed for Pain.), Disp: 60 tablet, Rfl: 2    aspirin (ECOTRIN) 81 MG EC tablet, Take 81 mg by mouth once daily., Disp: , Rfl:     calcium carbonate (CALCIUM 300 ORAL), Take by mouth., Disp: , Rfl:     cholecalciferol, vitamin D3, 10 mcg (400 unit) Chew, Take 400 Units by mouth once daily., Disp: , Rfl:     clonazePAM (KLONOPIN) 1 MG tablet, Take 2 mg by mouth every evening. To help with sleeping, Disp: , Rfl:     cyanocobalamin, vitamin B-12, (VITAMIN B-12 ORAL), Take 1 tablet by mouth once daily., Disp: , Rfl:     DULoxetine (CYMBALTA) 60 MG capsule, Take 120 mg by mouth once daily., Disp: , Rfl:     FEROSUL 325 mg (65 mg iron) Tab tablet, Take by mouth every other day., Disp: , Rfl:     gabapentin (NEURONTIN) 600 MG tablet, Take 1 tablet (600 mg total) by mouth 3 (three) times daily., Disp: 270 tablet, Rfl: 1    ibuprofen (ADVIL,MOTRIN) 800 MG tablet, Take 1 tablet (800 mg total)  "by mouth 3 (three) times daily., Disp: 90 tablet, Rfl: 1    magnesium 30 mg Tab, Take by mouth once., Disp: , Rfl:     multivit-min-FA-lycopen-lutein (CENTRUM SILVER) 0.4-300-250 mg-mcg-mcg Tab, Take 1 tablet by mouth once daily., Disp: , Rfl:     mupirocin (BACTROBAN) 2 % ointment, Apply topically 2 (two) times daily., Disp: 15 g, Rfl: 0    nitrofurantoin, macrocrystal-monohydrate, (MACROBID) 100 MG capsule, Take 1 capsule (100 mg total) by mouth 2 (two) times daily., Disp: 10 capsule, Rfl: 0    omega-3 fatty acids fish oil (OMEGA-3 2100) 1,050-1,200 mg Cap capsule, Take 1 capsule by mouth once daily., Disp: , Rfl:     oxybutynin (DITROPAN) 5 MG Tab, TAKE 1 TABLET(5 MG) BY MOUTH EVERY DAY, Disp: 90 tablet, Rfl: 2    risperiDONE (RISPERDAL) 1 MG tablet, Take 1 mg by mouth every evening., Disp: , Rfl:     suvorexant (BELSOMRA) 20 mg Tab, Take by mouth nightly., Disp: , Rfl:     traZODone (DESYREL) 150 MG tablet, Take 450 mg by mouth nightly., Disp: , Rfl:     vortioxetine (TRINTELLIX) 20 mg Tab, Take 20 mg by mouth once daily., Disp: , Rfl:     triamcinolone acetonide 0.1% (KENALOG) 0.1 % cream, Apply topically 2 (two) times daily. for 7 days, Disp: 45 g, Rfl: 0    Review of Systems:  Constitutional: no fever or chills  Eyes: no visual changes  ENT: no nasal congestion or sore throat  Respiratory: no cough or shortness of breath  Cardiovascular: no chest pain or palpitations  Gastrointestinal: no nausea or vomiting, tolerating diet  Genitourinary: no hematuria or dysuria  Integument/Breast: no rash or pruritis  Hematologic/Lymphatic: no easy bruising or lymphadenopathy  Musculoskeletal: positive for knee pain  Neurological: no seizures or tremors  Behavioral/Psych: no auditory or visual hallucinations  Endocrine: no heat or cold intolerance    PE:  Ht 5' 6.77" (1.696 m)   Wt 67.4 kg (148 lb 11.2 oz)   BMI 23.45 kg/m²   General: Pleasant, cooperative, NAD   Gait: antalgic  HEENT: NCAT, sclera nonicteric   Lungs: " Respirations clear bilaterally; equal and unlabored.   CV: S1S2; 2+ bilateral upper and lower extremity pulses.   Skin: Intact throughout with no rashes, erythema, or lesions  Extremities: No LE edema,  no erythema or warmth of the skin in either lower extremity.    Right knee exam:  Knee Range of Motion:  0-120, pain with passive range of motion  Effusion:  Mild  Condition of skin:intact  Location of tenderness:Medial joint line   Strength:normal  Stability: stable to testing    Hip Examination: painless PROM of hip     Radiographs: Radiographs reveal advanced degenerative changes including subchondral cyst formation, subchondral sclerosis, osteophyte formation, joint space narrowing.     Knee Alignment: normal    Diagnosis: Primary osteoarthritis Right knee    Plan: Right total knee arthroplasty    Due to the serious nature of total joint infection and the high prevalence of community acquired MRSA, vancomycin will be used perioperatively.

## 2024-06-17 NOTE — ASSESSMENT & PLAN NOTE
She is doing good from a mental health standpoint and is currently under psychiatric care    Doing good from a mental health stand point   Not under Therapist care   Has supportive family   On Medication that is helping -she is currently taking Klonopin every night, trazodone every night, Trintellix AM, risperidone at nighttime.  I have suggested for her to Cross check the Trintellix does with her psychiatrist   No Suicidal / Homicidal ideation      She rightly brought up the opioid use around the time of surgery for pain control and her mental health medication as she feels that if she takes the mental health medication as is and combined with opioid she can have too much of drowsiness    She plans on changing her mental health medication to    She plans on changing     Trazodone 450 mg to 150 mg nightly  Klonopin 2 mg a night  to 1 mg at night while taking opioid     She has discussed the above plan with her psychiatrist Dr. Perkins

## 2024-06-17 NOTE — ASSESSMENT & PLAN NOTE
She had a hysterectomy at age 39 years for heavy menstrual cycles at that time  To her understanding, she is still has ovaries    She has no fracture history  She is on multivitamin vitamin-D

## 2024-06-17 NOTE — H&P
CC:  Right knee pain    Nohemi Hull is a 64 y.o. female with history of Right knee pain. Pain is worse with activity and weight bearing.  Patient has experienced interference of activities of daily living due to decreased range of motion and an increase in joint pain and swelling.  Patient has failed non-operative treatment including NSAIDs, corticosteroid injections, viscosupplement injections, and activity modification.  Nohemi Hull currently ambulates independently.     Relevant medical conditions of significance in perioperative period:  History of DVT- on medication managed by PCP  CKD 3a- monitored by PCP  Current smoker- 3-4 cigarettes per day     Given documented medical comorbidities including those listed above and based off of CMS criteria patient would meet inpatient admission status guidelines. This case has been approved as inpatient.     Past Medical History:   Diagnosis Date    Anemia     Anxiety     Arthritis     Deep vein thrombosis     Depression 2000    Renal failure        Past Surgical History:   Procedure Laterality Date    COLONOSCOPY N/A 10/29/2018    Procedure: COLONOSCOPY;  Surgeon: Mp Alvarado MD;  Location: [address]);  Service: Endoscopy;  Laterality: N/A;  Referral received from [facility] SMOOTH [facility]as, [facility]  Last colonoscopy 2012-recommended f/u 5 years-past due    DILATION AND CURETTAGE OF UTERUS      GASTRIC BYPASS      PARTIAL HYSTERECTOMY      AUB    RADIOFREQUENCY ABLATION Right 9/13/2022    Procedure: RADIOFREQUENCY ABLATION, GENICULAR RIGHT COOLED;  Surgeon: Radha Justin MD;  Location: [facility];  Service: Pain Management;  Laterality: Right;    TONSILLECTOMY      TUBAL LIGATION Bilateral        Family History   Problem Relation Name Age of Onset    Hypertension Mother      Depression Mother      Hyperlipidemia Mother      Dementia Mother      Gallbladder disease Mother      Hyperlipidemia Father      Hypertension Sister       Depression Sister      Hyperlipidemia Sister      Diabetes Sister      Hypertension Sister      Diabetes Sister      Hypertension Sister      Depression Brother      Diabetes Brother      Hyperlipidemia Brother      Hypertension Brother      Diabetes Brother      Hypertension Brother      Diabetes Brother      Hyperlipidemia Brother      Colon cancer Maternal Grandfather          rectal ca    Depression Maternal Grandfather      Breast cancer Paternal Grandmother      Depression Paternal Grandfather      Depression Son      Hypertension Maternal Aunt      Depression Maternal Aunt      Diabetes Paternal Uncle      Depression Paternal Uncle      Alcohol abuse Neg Hx      Ovarian cancer Neg Hx      Heart disease Neg Hx      Stroke Neg Hx         Review of patient's allergies indicates:  No Known Allergies      Current Outpatient Medications:     acetaminophen (TYLENOL) 500 MG tablet, Take 1 tablet (500 mg total) by mouth 2 (two) times daily as needed for Pain. (Patient taking differently: Take 500 mg by mouth 3 (three) times daily as needed for Pain.), Disp: 60 tablet, Rfl: 2    aspirin (ECOTRIN) 81 MG EC tablet, Take 81 mg by mouth once daily., Disp: , Rfl:     calcium carbonate (CALCIUM 300 ORAL), Take by mouth., Disp: , Rfl:     cholecalciferol, vitamin D3, 10 mcg (400 unit) Chew, Take 400 Units by mouth once daily., Disp: , Rfl:     clonazePAM (KLONOPIN) 1 MG tablet, Take 2 mg by mouth every evening. To help with sleeping, Disp: , Rfl:     cyanocobalamin, vitamin B-12, (VITAMIN B-12 ORAL), Take 1 tablet by mouth once daily., Disp: , Rfl:     DULoxetine (CYMBALTA) 60 MG capsule, Take 120 mg by mouth once daily., Disp: , Rfl:     FEROSUL 325 mg (65 mg iron) Tab tablet, Take by mouth every other day., Disp: , Rfl:     gabapentin (NEURONTIN) 600 MG tablet, Take 1 tablet (600 mg total) by mouth 3 (three) times daily., Disp: 270 tablet, Rfl: 1    ibuprofen (ADVIL,MOTRIN) 800 MG tablet, Take 1 tablet (800 mg total)  "by mouth 3 (three) times daily., Disp: 90 tablet, Rfl: 1    magnesium 30 mg Tab, Take by mouth once., Disp: , Rfl:     multivit-min-FA-lycopen-lutein (CENTRUM SILVER) 0.4-300-250 mg-mcg-mcg Tab, Take 1 tablet by mouth once daily., Disp: , Rfl:     mupirocin (BACTROBAN) 2 % ointment, Apply topically 2 (two) times daily., Disp: 15 g, Rfl: 0    nitrofurantoin, macrocrystal-monohydrate, (MACROBID) 100 MG capsule, Take 1 capsule (100 mg total) by mouth 2 (two) times daily., Disp: 10 capsule, Rfl: 0    omega-3 fatty acids fish oil (OMEGA-3 2100) 1,050-1,200 mg Cap capsule, Take 1 capsule by mouth once daily., Disp: , Rfl:     oxybutynin (DITROPAN) 5 MG Tab, TAKE 1 TABLET(5 MG) BY MOUTH EVERY DAY, Disp: 90 tablet, Rfl: 2    risperiDONE (RISPERDAL) 1 MG tablet, Take 1 mg by mouth every evening., Disp: , Rfl:     suvorexant (BELSOMRA) 20 mg Tab, Take by mouth nightly., Disp: , Rfl:     traZODone (DESYREL) 150 MG tablet, Take 450 mg by mouth nightly., Disp: , Rfl:     vortioxetine (TRINTELLIX) 20 mg Tab, Take 20 mg by mouth once daily., Disp: , Rfl:     triamcinolone acetonide 0.1% (KENALOG) 0.1 % cream, Apply topically 2 (two) times daily. for 7 days, Disp: 45 g, Rfl: 0    Review of Systems:  Constitutional: no fever or chills  Eyes: no visual changes  ENT: no nasal congestion or sore throat  Respiratory: no cough or shortness of breath  Cardiovascular: no chest pain or palpitations  Gastrointestinal: no nausea or vomiting, tolerating diet  Genitourinary: no hematuria or dysuria  Integument/Breast: no rash or pruritis  Hematologic/Lymphatic: no easy bruising or lymphadenopathy  Musculoskeletal: positive for knee pain  Neurological: no seizures or tremors  Behavioral/Psych: no auditory or visual hallucinations  Endocrine: no heat or cold intolerance    PE:  Ht 5' 6.77" (1.696 m)   Wt 67.4 kg (148 lb 11.2 oz)   BMI 23.45 kg/m²   General: Pleasant, cooperative, NAD   Gait: antalgic  HEENT: NCAT, sclera nonicteric   Lungs: " Respirations clear bilaterally; equal and unlabored.   CV: S1S2; 2+ bilateral upper and lower extremity pulses.   Skin: Intact throughout with no rashes, erythema, or lesions  Extremities: No LE edema,  no erythema or warmth of the skin in either lower extremity.    Right knee exam:  Knee Range of Motion:  0-120, pain with passive range of motion  Effusion:  Mild  Condition of skin:intact  Location of tenderness:Medial joint line   Strength:normal  Stability: stable to testing    Hip Examination: painless PROM of hip     Radiographs: Radiographs reveal advanced degenerative changes including subchondral cyst formation, subchondral sclerosis, osteophyte formation, joint space narrowing.     Knee Alignment: normal    Diagnosis: Primary osteoarthritis Right knee    Plan: Right total knee arthroplasty    Due to the serious nature of total joint infection and the high prevalence of community acquired MRSA, vancomycin will be used perioperatively.

## 2024-06-17 NOTE — ASSESSMENT & PLAN NOTE
She has had a deep vein thrombosis in the 1970s on right lower extremity in the setting of a sprained right ankle  This was from a mechanical reason  This was addressed non surgically  She was on crutches for some time and had reduced mobility  To her understanding, she had a deep vein thrombosis-this could have been from reduced mobility/trauma  She was given Coumadin for a few months  She had no recurrence of blood clot    No History of PE  Episode- one  Associated with reduced mobility, no long journeys, no cancer, no prior surgery, no Hospital stay, noHRT      Increased risk of thrombosis in the [place] operative period , compression stocking use discussed     2012- No sonographic evidence for deep venous thrombosis in the right lower   extremity.     She was comfortable to proceed with surgery without the IVC filter placement prophylactically

## 2024-06-17 NOTE — ASSESSMENT & PLAN NOTE
She has an upcoming knee replacement surgery on July 2nd and plans on having the opposite knee replacement surgery at some point in future

## 2024-06-17 NOTE — ASSESSMENT & PLAN NOTE
Gastric bypass surgery in year 2004    she is currently taking b12 , iron, Multi vitamin    She is doing good from a gastric bypass standpoint without low sugars or difficulty swallowing, reflux    I suggested to hold the multivitamin 1 week before surgery

## 2024-06-18 ENCOUNTER — OFFICE VISIT (OUTPATIENT)
Dept: BARIATRICS | Facility: CLINIC | Age: 64
End: 2024-06-18
Payer: MEDICARE

## 2024-06-18 VITALS
SYSTOLIC BLOOD PRESSURE: 126 MMHG | WEIGHT: 147.5 LBS | DIASTOLIC BLOOD PRESSURE: 62 MMHG | BODY MASS INDEX: 23.26 KG/M2 | OXYGEN SATURATION: 98 % | HEART RATE: 70 BPM | TEMPERATURE: 98 F

## 2024-06-18 DIAGNOSIS — K45.8 INTERNAL HERNIA: Primary | ICD-10-CM

## 2024-06-18 DIAGNOSIS — Z98.84 HISTORY OF GASTRIC BYPASS: ICD-10-CM

## 2024-06-18 PROCEDURE — 1159F MED LIST DOCD IN RCRD: CPT | Mod: CPTII,S$GLB,, | Performed by: SURGERY

## 2024-06-18 PROCEDURE — 99214 OFFICE O/P EST MOD 30 MIN: CPT | Mod: S$GLB,,, | Performed by: SURGERY

## 2024-06-18 PROCEDURE — 3008F BODY MASS INDEX DOCD: CPT | Mod: CPTII,S$GLB,, | Performed by: SURGERY

## 2024-06-18 PROCEDURE — 99999 PR PBB SHADOW E&M-EST. PATIENT-LVL IV: CPT | Mod: PBBFAC,,, | Performed by: SURGERY

## 2024-06-18 PROCEDURE — 3074F SYST BP LT 130 MM HG: CPT | Mod: CPTII,S$GLB,, | Performed by: SURGERY

## 2024-06-18 PROCEDURE — 1160F RVW MEDS BY RX/DR IN RCRD: CPT | Mod: CPTII,S$GLB,, | Performed by: SURGERY

## 2024-06-18 PROCEDURE — 3078F DIAST BP <80 MM HG: CPT | Mod: CPTII,S$GLB,, | Performed by: SURGERY

## 2024-06-18 NOTE — PROGRESS NOTES
64 y.o. female presents with s/p bypass by me 2004 (bmi 44, essential htn (states she didn't have it that it was stress), hld (resolved), gerd (states she didn't have it but it is in our record), osteoarthritis, hx dvt (no further trouble) and depression).  She has ckd 3 possibly from nsaid which she was put on for her knee and she stopped them after a week and is smoking.    History 5/10/22: She has not been taking her vitamins and has some dietary concerns.  She wants to have her blood drawn to check levels.  She is remaining on her diet and remains very low carb and sugars.  She goes to the gym twice a week and walks 3 times a week.    Interval history 6/18/24: bmi 23/147 lb.  She says she can't eat and drink at the same time.  She has lost 7lb recently with dieting and exercise.      shows no recent narcotics    Laboratory  CBC: Reviewed  CMP: Reviewed  Basically ok 6/2021     Diagnostic Results:  Cscope reviewed, 2018, diverticula  Ekg 2020 reviewed, nssttw changes    Assessment and plan  S/p bypass and doing great from a weight standpoint.      Can only at a little and can't eat and drink at the same time.  Obtain ct to check for gastric bypass    Awaiting knee replacement in July.      She is working on smoking cessation.      I reiterated she should not take nsaids after gastric bypass.    She is feeling cold and can follow up with her pcp.  Consider thyroid.

## 2024-06-18 NOTE — LETTER
June 18, 2024        Nataly Rose MD  [address]  [address]  [facility] [address]             [facility] 2nd Fl  [address]  [facility] [address]  Phone: [phone]  Fax: [phone]   Patient: Nohemi Hull   MR Number: [id number]   YOB: 1960   Date of Visit: 6/18/2024       Dear Dr. Rose:    Thank you for referring Nohemi Hull to me for evaluation. Attached you will find relevant portions of my assessment and plan of care.    If you have questions, please do not hesitate to call me. I look forward to following Nohemi Hull along with you.    Sincerely,      Ruddy Quijano MD            CC  No Recipients    Enclosure

## 2024-06-18 NOTE — PROGRESS NOTES
Telemedicine/Virtual Visit Documentation:     The patient location is: home    The chief complaint leading to consultation is: see HPI    VISIT TYPE X   Virtual visit with synchronous audio and video X   Telephone E/M service      Total time spent with patient: see X adria on chart below.   More than half of the time was spent counseling or coordinating care including prognosis, differential diagnosis, risks and benefits of treatment, instructions, compliance risk reductions     EST MINUTES X   [id number] 5    05130 10    38722 15    14370 25 X   [id number] 40    NEW     72667 10    05769 20    34198 30    38608 45    46405 60    PHONE      5-10    10769 11-20    85307 21-30      H&P  Orthopaedics      SUBJECTIVE:     History of Present Illness:  Patient is a 64 y.o. female with right knee pain who presents today for Iovera consultation.  Patient is scheduled for right total knee arthroplasty on 07/02/2024 with Dr. Campos.    Review of patient's allergies indicates:  No Known Allergies    Past Medical History:   Diagnosis Date    Anemia     Anxiety     Arthritis     Deep vein thrombosis     Depression 2000    Renal failure      Past Surgical History:   Procedure Laterality Date    COLONOSCOPY N/A 10/29/2018    Procedure: COLONOSCOPY;  Surgeon: Mp Alvarado MD;  Location: [facility] ([address] [place] [address]);  Service: Endoscopy;  Laterality: N/A;  Referral received from Dr. SMOOTH Arias, [facility]  Last colonoscopy 2012-recommended f/u 5 years-past due    DILATION AND CURETTAGE OF UTERUS      GASTRIC BYPASS      PARTIAL HYSTERECTOMY      AUB    RADIOFREQUENCY ABLATION Right 9/13/2022    Procedure: RADIOFREQUENCY ABLATION, GENICULAR RIGHT COOLED;  Surgeon: Radha Justin MD;  Location: [facility];  Service: Pain Management;  Laterality: Right;    TONSILLECTOMY      TUBAL LIGATION Bilateral      Family History   Problem Relation Name Age of Onset    Hypertension Mother      Depression Mother      Hyperlipidemia  Mother      Dementia Mother      Gallbladder disease Mother      Hyperlipidemia Father      Hypertension Sister      Depression Sister      Hyperlipidemia Sister      Diabetes Sister      Hypertension Sister      Diabetes Sister      Hypertension Sister      Depression Brother      Diabetes Brother      Hyperlipidemia Brother      Hypertension Brother      Diabetes Brother      Hypertension Brother      Diabetes Brother      Hyperlipidemia Brother      Colon cancer Maternal Grandfather          rectal ca    Depression Maternal Grandfather      Breast cancer Paternal Grandmother      Depression Paternal Grandfather      Depression Son      Hypertension Maternal Aunt      Depression Maternal Aunt      Diabetes Paternal Uncle      Depression Paternal Uncle      Alcohol abuse Neg Hx      Ovarian cancer Neg Hx      Heart disease Neg Hx      Stroke Neg Hx       Social History     Tobacco Use    Smoking status: Every Day     Current packs/day: 0.25     Average packs/day: 0.3 packs/day for 7.0 years (1.8 ttl pk-yrs)     Types: Cigarettes    Smokeless tobacco: Never   Substance Use Topics    Alcohol use: Not Currently     Alcohol/week: 2.0 standard drinks of alcohol     Types: 2 Glasses of wine per week    Drug use: No        Review of Systems:  Patient denies constitutional symptoms, cardiac symptoms, respiratory symptoms, GI symptoms.  The remainder of the musculoskeletal ROS is included in the HPI.      OBJECTIVE:     Physical Exam:  Physical exam limited as this was a virtual visit, but it is apparent that the individual is in no acute distress, well groomed, well kept.  Speech is normal.  Patient is alert and oriented x3.  Mood and affect appear normal.     IMAGIN. X-ray ordered due to right knee pain. 4 views taken on 24.   2. X-ray images were reviewed personally by me and then directly with patient.  3. FINDINGS: Mild DJD. The medial tibiofemoral joint spaces are narrowed bilaterally. No fracture or  dislocation. No bone destruction identified. No significant changes.     4. IMPRESSION:  See above     ASSESSMENT/PLAN:     A/P: Nohemi Hull is a 64 y.o. with chronic right knee pain secondary to primary osteoarthritis presenting for Iovera consultation.     Time was spent discussing the cryoanalgesia procedure Iovera with the patient.  Risks and benefits were also discussed with patient.  X-rays were reviewed to use as a diagram to explain procedure. A detailed description of landmarks and placement of anesthetic used during procedure were also explained to patient.  Contraindications for procedure were discussed with patient.    More than 50% of the total time was spent face to face for counseling on diagnosis and treatment options. I also counseled patient  on common and most usual side effect of prescribed medications.  I reviewed Primary care, and other specialty's notes to better coordinate patient's care. Patient voiced understanding.     Iovera is scheduled for 06/21/2024 at 9:30 a.m.

## 2024-06-19 ENCOUNTER — OFFICE VISIT (OUTPATIENT)
Dept: ORTHOPEDICS | Facility: CLINIC | Age: 64
End: 2024-06-19
Payer: MEDICARE

## 2024-06-19 ENCOUNTER — TELEPHONE (OUTPATIENT)
Dept: ORTHOPEDICS | Facility: CLINIC | Age: 64
End: 2024-06-19
Payer: MEDICARE

## 2024-06-19 DIAGNOSIS — G89.29 CHRONIC PAIN OF RIGHT KNEE: Primary | ICD-10-CM

## 2024-06-19 DIAGNOSIS — M25.561 CHRONIC PAIN OF RIGHT KNEE: Primary | ICD-10-CM

## 2024-06-19 PROCEDURE — 1160F RVW MEDS BY RX/DR IN RCRD: CPT | Mod: CPTII,95,, | Performed by: STUDENT IN AN ORGANIZED HEALTH CARE EDUCATION/TRAINING PROGRAM

## 2024-06-19 PROCEDURE — 1159F MED LIST DOCD IN RCRD: CPT | Mod: CPTII,95,, | Performed by: STUDENT IN AN ORGANIZED HEALTH CARE EDUCATION/TRAINING PROGRAM

## 2024-06-19 PROCEDURE — 99214 OFFICE O/P EST MOD 30 MIN: CPT | Mod: 95,,, | Performed by: STUDENT IN AN ORGANIZED HEALTH CARE EDUCATION/TRAINING PROGRAM

## 2024-06-19 NOTE — TELEPHONE ENCOUNTER
"Called pt and she stated that she is no longer taking the Cymbalta and wanted to make sure we knew. From Dr. Cisneros's note     On Medication that is helping -she is currently taking Klonopin every night, trazodone every night, Trintellix AM, risperidone at nighttime.  I have suggested for her to Cross check the Trintellix does with her psychiatrist   No Suicidal / Homicidal ideation       She rightly brought up the opioid use around the time of surgery for pain control and her mental health medication as she feels that if she takes the mental health medication as is and combined with opioid she can have too much of drowsiness     She plans on changing her mental health medication to     She plans on changing      Trazodone 450 mg to 150 mg nightly  Klonopin 2 mg a night  to 1 mg at night while taking opioid      She has discussed the above plan with her psychiatrist Dr. Perkins"     Pt stated the other medications she I still taking and she received instruction on which ones to take and hold prior to surgery. Pt verbalized understanding and has no further questions.     ----- Message from Leo Acosta sent at 6/19/2024 12:18 PM CDT -----  Type: General Call Back     Name of Caller:Pt   Would the patient rather a call back or a response via [facility]? Call back  Best Call Back Number:[phone]   Additional Information: Pt states she received and anti depressant and she throw it away. Pt would like to have the nurse to call her with more information.  "

## 2024-06-20 ENCOUNTER — DOCUMENTATION ONLY (OUTPATIENT)
Dept: INTERNAL MEDICINE | Facility: CLINIC | Age: 64
End: 2024-06-20
Payer: MEDICARE

## 2024-06-20 ENCOUNTER — TELEPHONE (OUTPATIENT)
Dept: UROLOGY | Facility: CLINIC | Age: 64
End: 2024-06-20

## 2024-06-20 ENCOUNTER — OFFICE VISIT (OUTPATIENT)
Dept: UROLOGY | Facility: CLINIC | Age: 64
End: 2024-06-20
Payer: MEDICARE

## 2024-06-20 VITALS
HEART RATE: 64 BPM | SYSTOLIC BLOOD PRESSURE: 94 MMHG | BODY MASS INDEX: 23.61 KG/M2 | DIASTOLIC BLOOD PRESSURE: 65 MMHG | WEIGHT: 149.69 LBS

## 2024-06-20 DIAGNOSIS — R39.15 URGENCY OF URINATION: ICD-10-CM

## 2024-06-20 DIAGNOSIS — R35.1 NOCTURIA: ICD-10-CM

## 2024-06-20 DIAGNOSIS — N39.41 URGE INCONTINENCE: Primary | ICD-10-CM

## 2024-06-20 PROCEDURE — 87086 URINE CULTURE/COLONY COUNT: CPT

## 2024-06-20 PROCEDURE — 99999 PR PBB SHADOW E&M-EST. PATIENT-LVL IV: CPT | Mod: PBBFAC,,,

## 2024-06-20 RX ORDER — RISPERIDONE 2 MG/1
1 TABLET ORAL NIGHTLY
COMMUNITY
Start: 2024-05-30

## 2024-06-20 RX ORDER — SUVOREXANT 20 MG/1
1 TABLET, FILM COATED ORAL NIGHTLY
COMMUNITY
Start: 2024-05-30

## 2024-06-20 RX ORDER — GABAPENTIN 300 MG/1
300-600 CAPSULE ORAL
COMMUNITY
Start: 2024-05-30

## 2024-06-20 NOTE — PROGRESS NOTES
[facility]      New Overactive Bladder (OAB)    6/20/2024    Referred by:  Woody Huston, *    HPI: Nohemi Hull is a very pleasant 64 y.o. female who is a new patient to our department referred for evaluation of symptoms suggestive of Overactive Bladder (OAB).      She reports bother is associated without urinary daytime frequency (3-4x daily), with nocturia (2-3x per night) and with urgency that results in urinary incontinence 3x/weekly. She does not reports symptoms suggestive of stress incontinence.  She requires daily pads (1 pads/day). She reports urinary incontinence is only at night. She has decreased overall fluid intake.       She reports symptoms of irritative voiding including incontinence and urgency. She reports no symptoms suggestive of obstructive voiding including weak stream, hesitancy, intermittency, post-void dribbling, straining to empty, or history of elevated post-void residuals. Catheterized PVR today is 40 mL. Her history includes prior pelvic surgery (hysterectomy (for benign disease)) and no notation of urolithiasis, hematuria, prior pelvic surgery, previous prolapse or incontinence procedures or neurological symptoms/diagnoses. She denies all other prior pelvic surgeries or neurologic diagnoses. She denies a history of constipation. She denies a history suggestive of glaucoma.  She denies a history of hypertension.  She does not report symptoms suggestive of advanced POP.     Previous OAB therapies:  Behavioral Therapies  : (fluid/dietary modification) -  provided some but insufficient benefit  Medications  oral oxybutynin IR 5mg Qday 12 months (provided no benefit)  Other Therapies  No Other Therapies    Previous VERA therapies:  no previous therapy    A review of 10+ systems was conducted with pertinent positive and negative findings documented in HPI with all other systems reviewed and negative.    Past medical, family, surgical and social history  reviewed as documented in chart with pertinent positive medical, family, surgical and social history detailed in HPI.    Exam Findings:    Vaginal Mucosa: mild atrophy  VERA: no VERA  Normal external female genitalia  Urethral meatus is normal  Urethra and bladder are nontender to bimanual exam  Well supported anteriorly and posteriorly   Uterus and cervix are surgically absent  No adnexal masses   Cath PVR 40 mL    NOTE:  the exam was carried out with a nurse chaperone present Const: no acute distress, conversant and alert  Eyes: anicteric, extraocular muscles intact  ENMT: normocephalic, Nl oral membranes  Cardio: no cyanosis, nl cap refill  Pulm: no tachypnea; no resp distress  Abd: soft, no tenderness  Musc: no laceration, no tenderness  Neuro: alert; oriented x 3  Skin: warm, dry; no petichiae  Psych: no anxiety; normal speech     UA 1.020, 5 pH, otherwise negative    Assessment/Plan:    Overactive Bladder with Urgency Incontinence (new, addt'l workup): Her history is suggestive of Overactive Bladder (OAB) with predominantly Urgency Urinary Incontinence (UUI). The presence or absence of incontinence as well as the relative contribution of stress or urgency incontinence can be further clarified with a 3-day volume-based voiding/incontinence diary provided today. This will also help to screen for polyuria and help to guide us on further counseling on behavioral therapy including timed voiding and bladder training. We will review this on her return visit.     Patient was given a 3-day voiding diary to complete before next visit. We will further discuss behavioral therapy at that time.   We discussed behavioral therapies including fluid/dietary modification, timed voiding with bladder training, and pelvic floor exercises.  We will plan to discontinue her oxybutynin for now since it does not appear to be effective in symptom treatment. After completion of her 3 day voiding diary, we can try another OAB medication such  as Gemtesa or Vesicare depending on formulary coverage, comorbid conditions, and patient response.   Catheterized urine was sent for culture and sensitivity.  Follow up in 4 - 6 weeks to discuss voiding diary/new OAB med. She does have upcoming ortho surgery scheduled for early July so it's okay to do a virtual f/u.     I spent a total of 60 minutes on the day of the visit.  This includes face to face time and non-face to face time preparing to see the patient (eg, review of tests), obtaining and/or reviewing separately obtained history, documenting clinical information in the electronic or other health record, independently interpreting results and communicating results to the patient/family/caregiver, or care coordinator.

## 2024-06-20 NOTE — TELEPHONE ENCOUNTER
Spoke w pt informed management for scheduling July1st----- Message from Chaka Parish sent at 6/20/2024 11:00 AM CDT -----  Contact: [phone]  Nohemi Hull calling regarding Patient Advice (message) Pt asking for a call back to speak with the nurse about the date that was giving to her.

## 2024-06-21 ENCOUNTER — PROCEDURE VISIT (OUTPATIENT)
Dept: SPORTS MEDICINE | Facility: CLINIC | Age: 64
End: 2024-06-21
Payer: MEDICARE

## 2024-06-21 VITALS
WEIGHT: 148.06 LBS | HEART RATE: 95 BPM | DIASTOLIC BLOOD PRESSURE: 62 MMHG | HEIGHT: 66 IN | BODY MASS INDEX: 23.8 KG/M2 | SYSTOLIC BLOOD PRESSURE: 89 MMHG

## 2024-06-21 DIAGNOSIS — M25.561 CHRONIC PAIN OF RIGHT KNEE: Primary | ICD-10-CM

## 2024-06-21 DIAGNOSIS — G89.29 CHRONIC PAIN OF RIGHT KNEE: Primary | ICD-10-CM

## 2024-06-21 LAB — BACTERIA UR CULT: NO GROWTH

## 2024-06-21 NOTE — PROCEDURES
CC: right knee pain    64 y.o. Female presents today for Iovera procedure for right knee pain.    INFORMED CONSENT: I verify that I personally obtained Nohemi Hull's consent which was signed and uploaded into [employer].     PAIN SCORE:  Pre-procedure:  5/10  Gait: wnl for age    Inspection/Palpation:   +Skin warm and dry without open wounds or erythema  -Rubor   -Calor    Procedure Note Iovera:    DATE OF PROCEDURE:  06/21/2024    PREOPERATIVE DIAGNOSIS: right knee pain.     POSTOPERATIVE DIAGNOSIS: right knee pain.     PROCEDURE: Iovera treatment of anterior femoral cutaneous nerve, Lateral femoral cut nerve, and superior and inferior branches of infrapatellar saphenous nerve using at least 4+ different punctures to treat all 4 nerves. (cpt 64640 x4)    COMPLICATIONS: None.     IMPLANTS:  None    ESTIMATED BLOOD LOSS:  < 5cc    SPECIMENS REMOVED:  None    ANESTHESIA: 20cc Local xylocaine with epinephrine    INDICATIONS FOR PROCEDURE: This is a 64 y.o. female with longstanding knee pain. They have failed non operative management including injections.I discussed a new treatment therapy called Iovera, which is cryotherapy, to provide symptomatic relief along the sensory distribution of the infrapatellar tendon branch of the saphenous nerve  and AFCN. The patient elected to move forward with this  We did discuss the fact that this is a fairly novel procedure and there is very limited scientific data around this.  However, it FDA approved.  The patient was given patient information and literature to review prior to the procedure as well.  Based on this, the patient agreed to move forward with doing the procedure.    Time was spent discussing the cryoanalgesia procedure Iovera with the patient.  Risks and benefits were also discussed with patient.  X-rays were reviewed to use as a diagram to explain procedure. A detailed description of landmarks and placement of anesthetic used during procedure were also explained  to patient.  Contraindications for procedure were discussed with patient.    CONSENT:  Verbal and written consent were obtained from the patient.     PROCEDURE:    A surgical time out was performed, including verification of patient ID, procedure to be performed, site and side, availability of information and equipment, review of safety issues, and the patients agreement and consent.  The patient was placed supine on the exam table and the proximal medial aspect of the right tibia and anterior aspect of distal femur was prepped with sterile Chlorhexidine and alcohol.  A line was drawn extending approximately 5 cm medial to inferior pole of the patella distally to a point approximately 5 cm medial to the tibial tubercle.  A second line was drawn in a medial to lateral direction the width of the patella approximately 7 cm proximal to the patella. We then infiltrated the skin with lidocaine with epinephrine along both lines using a 25g needle. We then introduced the Iovera device along these lines and this device penetrated the skin, creating cryotherapy to the superior and inferior branches of the infrapatellar saphenous nerve, a third treatment to the anterior femoral cutaneous nerve, and fourth LFCN. 7 punctures of the skin were made to treat the superior and inferior branches of the ISN and another 7 punctures were made to treat the AFCN and LFCN. There were a total of 4 nerves treated with iovera. The patient tolerated the procedure well with no problems.    PAIN SCORES:  Pre-procedure:  5/10  Post-procedure:  0/10  Gait: wnl      ASSESSMENT:      ICD-10-CM ICD-9-CM   1. Chronic pain of right knee  M25.561 719.46    G89.29 338.29         PLAN:    All questions were answered to the best of my ability and all concerns were addressed at this time.    This note is dictated using the M*Modal Fluency Direct word recognition program. There are word recognition mistakes that are occasionally missed on review.

## 2024-06-21 NOTE — PATIENT INSTRUCTIONS
You had the Iovera procedure done to your knee today.  There may be bruising over the procedure area for the next few days.  Avoid soaking in standing water (ie bath or hot tub) for the next 24hrs.  Bandages can be removed this evening.

## 2024-06-24 DIAGNOSIS — N18.31 STAGE 3A CHRONIC KIDNEY DISEASE: Primary | ICD-10-CM

## 2024-06-24 RX ORDER — OXYBUTYNIN CHLORIDE 5 MG/1
5 TABLET ORAL DAILY
Qty: 90 TABLET | Refills: 2 | Status: SHIPPED | OUTPATIENT
Start: 2024-06-24

## 2024-06-24 NOTE — TELEPHONE ENCOUNTER
Care Due:                  Date            Visit Type   Department     Provider  --------------------------------------------------------------------------------                                EP -                              PRIMARY      [facility]  Last Visit: 06-      CARE ([facility])   MEDICINE       Nataly Rose  Next Visit: None Scheduled  None         None Found                                                            Last  Test          Frequency    Reason                     Performed    Due Date  --------------------------------------------------------------------------------    Office Visit  15 months..  oxybutynin...............  06- 09-    [facility] Embedded Care Due Messages. Reference number: [id number].   6/24/2024 10:53:06 AM CDT

## 2024-06-24 NOTE — TELEPHONE ENCOUNTER
----- Message from Aubree Erickson sent at 6/24/2024  8:35 AM CDT -----  Contact: [phone]  Patient want to sent message as follow  I'm having surgery on 07/02/24 I had appointment the 07/17 but cancel it, but I need refill on medications. Look out my chart where I had blood work and preop. Thank you.

## 2024-06-27 ENCOUNTER — TELEPHONE (OUTPATIENT)
Dept: ORTHOPEDICS | Facility: CLINIC | Age: 64
End: 2024-06-27
Payer: MEDICARE

## 2024-06-27 ENCOUNTER — TELEPHONE (OUTPATIENT)
Dept: UROLOGY | Facility: CLINIC | Age: 64
End: 2024-06-27
Payer: MEDICARE

## 2024-06-27 NOTE — TELEPHONE ENCOUNTER
Spoke to pt, she states she is having thigh pain and requests 500 mg Tylenol to be sent to her pharmacy. Informed she can purchase OTC, she states that Dr. Meehan has prescribed it for her in the past. Informed her that she will receive 650 mg Tylenol for her surgery scheduled on Tuesday 7/2/2024. Pt will wait until surgery. Pt pleased and verbalized understanding.

## 2024-06-27 NOTE — TELEPHONE ENCOUNTER
----- Message from Deja Muñoz sent at 6/27/2024 12:06 PM CDT -----  Contact: [phone] Patient    ----- Message -----  From: Deja Muñoz  Sent: 6/24/2024   2:29 PM CDT  To: Deja Muñoz      ----- Message -----  From: Rosemarie Badillo  Sent: 6/24/2024  10:59 AM CDT  To: Andres CARCAMO Staff    Patient would like to get medical advice.  Symptoms (please be specific):   Pt states she was advised by her PCP's office when she called for her medication refills she would need pre op clearance for her upcoming surgery on 07/02/24. Pt is asking if this is needed.   How long have you had these symptoms: N/A  Would you like a call back, or a response through your [facility] portal?:  call back    Pharmacy name and phone # (copy from chart):   N/A  Comments:

## 2024-06-27 NOTE — TELEPHONE ENCOUNTER
Spoke w pt, pt will keep appt ----- Message from Christine Lancaster sent at 6/27/2024 11:50 AM CDT -----  Regarding: Follow up  Contact: [phone]  Pt stated she needs to reschedule the virtual call  [phone]  Requesting a call back

## 2024-06-30 DIAGNOSIS — Z96.651 S/P TOTAL KNEE REPLACEMENT, RIGHT: Primary | ICD-10-CM

## 2024-06-30 RX ORDER — METHOCARBAMOL 750 MG/1
750 TABLET, FILM COATED ORAL 4 TIMES DAILY PRN
Qty: 40 TABLET | Refills: 0 | Status: SHIPPED | OUTPATIENT
Start: 2024-06-30 | End: 2024-07-13

## 2024-06-30 RX ORDER — PANTOPRAZOLE SODIUM 40 MG/1
40 TABLET, DELAYED RELEASE ORAL DAILY
Qty: 30 TABLET | Refills: 0 | Status: SHIPPED | OUTPATIENT
Start: 2024-06-30 | End: 2024-08-02

## 2024-06-30 RX ORDER — DEXTROMETHORPHAN HYDROBROMIDE, GUAIFENESIN 5; 100 MG/5ML; MG/5ML
650 LIQUID ORAL EVERY 8 HOURS
Qty: 120 TABLET | Refills: 0 | Status: SHIPPED | OUTPATIENT
Start: 2024-06-30

## 2024-06-30 RX ORDER — OXYCODONE HYDROCHLORIDE 5 MG/1
TABLET ORAL
Qty: 50 TABLET | Refills: 0 | Status: SHIPPED | OUTPATIENT
Start: 2024-06-30

## 2024-06-30 RX ORDER — AMOXICILLIN 250 MG
1 CAPSULE ORAL DAILY
Qty: 30 TABLET | Refills: 0 | Status: SHIPPED | OUTPATIENT
Start: 2024-06-30

## 2024-07-01 ENCOUNTER — TELEPHONE (OUTPATIENT)
Dept: ORTHOPEDICS | Facility: CLINIC | Age: 64
End: 2024-07-01
Payer: MEDICARE

## 2024-07-01 ENCOUNTER — ANESTHESIA EVENT (OUTPATIENT)
Dept: SURGERY | Facility: HOSPITAL | Age: 64
End: 2024-07-01
Payer: MEDICARE

## 2024-07-01 NOTE — TELEPHONE ENCOUNTER
I called the patient today regarding surgery on 07/02/2024 with Dr. Avery Campos. I informed the patient that her surgery will be at  [facility] ([facility] [place] [address]). I informed the patient they must arrive at 5:00am and their surgery will start at 7:00am.     Per the [facility] COVID-19 Pre-Procedural Testing Policy (administered 10/26/2022), patients do not need tested for COVID-19 regardless of vaccination status.    I reminded the patient that they cannot eat or drink after midnight, the night before surgery.      I reminded the patient to be careful of their skin over the next few days to make sure they do not get any cuts, scratches or scrapes.    The patient has not yet received their walker, bedside commode or shower chair from [facility]. I told the patient that she needs to call [facility] today at [phone].    The patient verbalized understanding and has no further questions.

## 2024-07-02 ENCOUNTER — HOSPITAL ENCOUNTER (OUTPATIENT)
Facility: HOSPITAL | Age: 64
Discharge: HOME | End: 2024-07-03
Attending: ORTHOPAEDIC SURGERY | Admitting: ORTHOPAEDIC SURGERY
Payer: MEDICARE

## 2024-07-02 ENCOUNTER — ANESTHESIA (OUTPATIENT)
Dept: SURGERY | Facility: HOSPITAL | Age: 64
End: 2024-07-02
Payer: MEDICARE

## 2024-07-02 DIAGNOSIS — Z96.651 S/P TOTAL KNEE REPLACEMENT, RIGHT: ICD-10-CM

## 2024-07-02 DIAGNOSIS — M17.11 PRIMARY OSTEOARTHRITIS OF RIGHT KNEE: ICD-10-CM

## 2024-07-02 PROCEDURE — C1776 JOINT DEVICE (IMPLANTABLE): HCPCS | Performed by: ORTHOPAEDIC SURGERY

## 2024-07-02 PROCEDURE — 63600175 PHARM REV CODE 636 W HCPCS: Performed by: SURGERY

## 2024-07-02 PROCEDURE — 25000003 PHARM REV CODE 250

## 2024-07-02 PROCEDURE — 36000711: Performed by: ORTHOPAEDIC SURGERY

## 2024-07-02 PROCEDURE — 27447 TOTAL KNEE ARTHROPLASTY: CPT | Mod: RT,,, | Performed by: ORTHOPAEDIC SURGERY

## 2024-07-02 PROCEDURE — 27201423 OPTIME MED/SURG SUP & DEVICES STERILE SUPPLY: Performed by: ORTHOPAEDIC SURGERY

## 2024-07-02 PROCEDURE — 37000008 HC ANESTHESIA 1ST 15 MINUTES: Performed by: ORTHOPAEDIC SURGERY

## 2024-07-02 PROCEDURE — 51798 US URINE CAPACITY MEASURE: CPT

## 2024-07-02 PROCEDURE — 37000009 HC ANESTHESIA EA ADD 15 MINS: Performed by: ORTHOPAEDIC SURGERY

## 2024-07-02 PROCEDURE — 97530 THERAPEUTIC ACTIVITIES: CPT

## 2024-07-02 PROCEDURE — 25000003 PHARM REV CODE 250: Performed by: NURSE ANESTHETIST, CERTIFIED REGISTERED

## 2024-07-02 PROCEDURE — 97162 PT EVAL MOD COMPLEX 30 MIN: CPT

## 2024-07-02 PROCEDURE — 99900035 HC TECH TIME PER 15 MIN (STAT)

## 2024-07-02 PROCEDURE — 27100025 HC TUBING, SET FLUID WARMER: Performed by: SURGERY

## 2024-07-02 PROCEDURE — 25000003 PHARM REV CODE 250: Performed by: ORTHOPAEDIC SURGERY

## 2024-07-02 PROCEDURE — 94799 UNLISTED PULMONARY SVC/PX: CPT

## 2024-07-02 PROCEDURE — 97165 OT EVAL LOW COMPLEX 30 MIN: CPT

## 2024-07-02 PROCEDURE — 63600175 PHARM REV CODE 636 W HCPCS

## 2024-07-02 PROCEDURE — 63600175 PHARM REV CODE 636 W HCPCS: Performed by: NURSE ANESTHETIST, CERTIFIED REGISTERED

## 2024-07-02 PROCEDURE — 64448 NJX AA&/STRD FEM NRV NFS IMG: CPT | Performed by: SURGERY

## 2024-07-02 PROCEDURE — 71000039 HC RECOVERY, EACH ADD'L HOUR: Performed by: ORTHOPAEDIC SURGERY

## 2024-07-02 PROCEDURE — 71000033 HC RECOVERY, INTIAL HOUR: Performed by: ORTHOPAEDIC SURGERY

## 2024-07-02 PROCEDURE — 97535 SELF CARE MNGMENT TRAINING: CPT

## 2024-07-02 PROCEDURE — 94761 N-INVAS EAR/PLS OXIMETRY MLT: CPT

## 2024-07-02 PROCEDURE — 36000710: Performed by: ORTHOPAEDIC SURGERY

## 2024-07-02 PROCEDURE — 25000003 PHARM REV CODE 250: Performed by: SURGERY

## 2024-07-02 PROCEDURE — 63600175 PHARM REV CODE 636 W HCPCS: Performed by: PHYSICIAN ASSISTANT

## 2024-07-02 PROCEDURE — C1713 ANCHOR/SCREW BN/BN,TIS/BN: HCPCS | Performed by: ORTHOPAEDIC SURGERY

## 2024-07-02 DEVICE — TIBIAL BEARING INSERT
Type: IMPLANTABLE DEVICE | Site: KNEE | Status: FUNCTIONAL
Brand: TRIATHLON

## 2024-07-02 DEVICE — POSTERIOR STABILIZED FEMORAL
Type: IMPLANTABLE DEVICE | Site: KNEE | Status: FUNCTIONAL
Brand: TRIATHLON

## 2024-07-02 DEVICE — PRIMARY TIBIAL BASEPLATE
Type: IMPLANTABLE DEVICE | Site: KNEE | Status: FUNCTIONAL
Brand: TRIATHLON

## 2024-07-02 DEVICE — PATELLA
Type: IMPLANTABLE DEVICE | Site: KNEE | Status: FUNCTIONAL
Brand: TRIATHLON

## 2024-07-02 DEVICE — FULL DOSE BONE CEMENT, 10 PACK CATALOG NUMBER IS 6191-1-010
Type: IMPLANTABLE DEVICE | Site: KNEE | Status: FUNCTIONAL
Brand: SIMPLEX

## 2024-07-02 RX ORDER — SODIUM CHLORIDE 0.9 % (FLUSH) 0.9 %
10 SYRINGE (ML) INJECTION
Status: DISCONTINUED | OUTPATIENT
Start: 2024-07-02 | End: 2024-07-02

## 2024-07-02 RX ORDER — FENTANYL CITRATE 50 UG/ML
25 INJECTION, SOLUTION INTRAMUSCULAR; INTRAVENOUS EVERY 5 MIN PRN
Status: DISCONTINUED | OUTPATIENT
Start: 2024-07-02 | End: 2024-07-02

## 2024-07-02 RX ORDER — PROCHLORPERAZINE EDISYLATE 5 MG/ML
5 INJECTION INTRAMUSCULAR; INTRAVENOUS EVERY 30 MIN PRN
Status: DISCONTINUED | OUTPATIENT
Start: 2024-07-02 | End: 2024-07-02

## 2024-07-02 RX ORDER — AMOXICILLIN 250 MG
1 CAPSULE ORAL 2 TIMES DAILY
Status: DISCONTINUED | OUTPATIENT
Start: 2024-07-02 | End: 2024-07-03 | Stop reason: HOSPADM

## 2024-07-02 RX ORDER — MORPHINE SULFATE 2 MG/ML
2 INJECTION, SOLUTION INTRAMUSCULAR; INTRAVENOUS
Status: DISCONTINUED | OUTPATIENT
Start: 2024-07-02 | End: 2024-07-03 | Stop reason: HOSPADM

## 2024-07-02 RX ORDER — MUPIROCIN 20 MG/G
1 OINTMENT TOPICAL 2 TIMES DAILY
Status: DISCONTINUED | OUTPATIENT
Start: 2024-07-02 | End: 2024-07-03 | Stop reason: HOSPADM

## 2024-07-02 RX ORDER — TALC
6 POWDER (GRAM) TOPICAL NIGHTLY PRN
Status: DISCONTINUED | OUTPATIENT
Start: 2024-07-02 | End: 2024-07-03 | Stop reason: HOSPADM

## 2024-07-02 RX ORDER — BISACODYL 10 MG/1
10 SUPPOSITORY RECTAL EVERY 12 HOURS PRN
Status: DISCONTINUED | OUTPATIENT
Start: 2024-07-02 | End: 2024-07-03 | Stop reason: HOSPADM

## 2024-07-02 RX ORDER — LIDOCAINE HYDROCHLORIDE 10 MG/ML
1 INJECTION, SOLUTION EPIDURAL; INFILTRATION; INTRACAUDAL; PERINEURAL
Status: DISCONTINUED | OUTPATIENT
Start: 2024-07-02 | End: 2024-07-02

## 2024-07-02 RX ORDER — SODIUM CHLORIDE 0.9 % (FLUSH) 0.9 %
10 SYRINGE (ML) INJECTION
Status: DISCONTINUED | OUTPATIENT
Start: 2024-07-02 | End: 2024-07-03 | Stop reason: HOSPADM

## 2024-07-02 RX ORDER — METHOCARBAMOL 750 MG/1
750 TABLET, FILM COATED ORAL 3 TIMES DAILY
Status: DISCONTINUED | OUTPATIENT
Start: 2024-07-02 | End: 2024-07-03 | Stop reason: HOSPADM

## 2024-07-02 RX ORDER — OXYCODONE HYDROCHLORIDE 5 MG/1
5 TABLET ORAL
Status: DISCONTINUED | OUTPATIENT
Start: 2024-07-02 | End: 2024-07-03 | Stop reason: HOSPADM

## 2024-07-02 RX ORDER — RISPERIDONE 1 MG/1
1 TABLET ORAL NIGHTLY
Status: DISCONTINUED | OUTPATIENT
Start: 2024-07-02 | End: 2024-07-03 | Stop reason: HOSPADM

## 2024-07-02 RX ORDER — CLONAZEPAM 0.5 MG/1
2 TABLET ORAL NIGHTLY
Status: DISCONTINUED | OUTPATIENT
Start: 2024-07-02 | End: 2024-07-03 | Stop reason: HOSPADM

## 2024-07-02 RX ORDER — CELECOXIB 200 MG/1
200 CAPSULE ORAL DAILY
Status: CANCELLED | OUTPATIENT
Start: 2024-07-03

## 2024-07-02 RX ORDER — OXYBUTYNIN CHLORIDE 5 MG/1
5 TABLET ORAL DAILY
Status: DISCONTINUED | OUTPATIENT
Start: 2024-07-02 | End: 2024-07-03 | Stop reason: HOSPADM

## 2024-07-02 RX ORDER — KETAMINE HCL IN 0.9 % NACL 50 MG/5 ML
SYRINGE (ML) INTRAVENOUS
Status: DISCONTINUED | OUTPATIENT
Start: 2024-07-02 | End: 2024-07-02

## 2024-07-02 RX ORDER — PROCHLORPERAZINE EDISYLATE 5 MG/ML
5 INJECTION INTRAMUSCULAR; INTRAVENOUS EVERY 6 HOURS PRN
Status: DISCONTINUED | OUTPATIENT
Start: 2024-07-02 | End: 2024-07-03 | Stop reason: HOSPADM

## 2024-07-02 RX ORDER — FAMOTIDINE 20 MG/1
20 TABLET, FILM COATED ORAL 2 TIMES DAILY
Status: DISCONTINUED | OUTPATIENT
Start: 2024-07-02 | End: 2024-07-03 | Stop reason: HOSPADM

## 2024-07-02 RX ORDER — TALC
6 POWDER (GRAM) TOPICAL NIGHTLY PRN
Status: DISCONTINUED | OUTPATIENT
Start: 2024-07-02 | End: 2024-07-02

## 2024-07-02 RX ORDER — ONDANSETRON HYDROCHLORIDE 2 MG/ML
4 INJECTION, SOLUTION INTRAVENOUS EVERY 8 HOURS PRN
Status: DISCONTINUED | OUTPATIENT
Start: 2024-07-02 | End: 2024-07-03 | Stop reason: HOSPADM

## 2024-07-02 RX ORDER — FENTANYL CITRATE 50 UG/ML
100 INJECTION, SOLUTION INTRAMUSCULAR; INTRAVENOUS
Status: DISCONTINUED | OUTPATIENT
Start: 2024-07-02 | End: 2024-07-02

## 2024-07-02 RX ORDER — LIDOCAINE HYDROCHLORIDE 20 MG/ML
INJECTION INTRAVENOUS
Status: DISCONTINUED | OUTPATIENT
Start: 2024-07-02 | End: 2024-07-02

## 2024-07-02 RX ORDER — PREGABALIN 75 MG/1
75 CAPSULE ORAL NIGHTLY
Status: DISCONTINUED | OUTPATIENT
Start: 2024-07-02 | End: 2024-07-03 | Stop reason: HOSPADM

## 2024-07-02 RX ORDER — TRANEXAMIC ACID 100 MG/ML
INJECTION, SOLUTION INTRAVENOUS
Status: DISCONTINUED | OUTPATIENT
Start: 2024-07-02 | End: 2024-07-02

## 2024-07-02 RX ORDER — METHOCARBAMOL 750 MG/1
750 TABLET, FILM COATED ORAL 3 TIMES DAILY
Status: DISCONTINUED | OUTPATIENT
Start: 2024-07-02 | End: 2024-07-02

## 2024-07-02 RX ORDER — SODIUM CHLORIDE 9 MG/ML
INJECTION, SOLUTION INTRAVENOUS CONTINUOUS
Status: DISCONTINUED | OUTPATIENT
Start: 2024-07-02 | End: 2024-07-03 | Stop reason: HOSPADM

## 2024-07-02 RX ORDER — POLYETHYLENE GLYCOL 3350 17 G/17G
17 POWDER, FOR SOLUTION ORAL DAILY
Status: DISCONTINUED | OUTPATIENT
Start: 2024-07-02 | End: 2024-07-03 | Stop reason: HOSPADM

## 2024-07-02 RX ORDER — MUPIROCIN 20 MG/G
1 OINTMENT TOPICAL
Status: COMPLETED | OUTPATIENT
Start: 2024-07-02 | End: 2024-07-02

## 2024-07-02 RX ORDER — MIDAZOLAM HYDROCHLORIDE 1 MG/ML
4 INJECTION, SOLUTION INTRAMUSCULAR; INTRAVENOUS
Status: DISCONTINUED | OUTPATIENT
Start: 2024-07-02 | End: 2024-07-02

## 2024-07-02 RX ORDER — SODIUM CHLORIDE 9 MG/ML
INJECTION, SOLUTION INTRAVENOUS
Status: COMPLETED | OUTPATIENT
Start: 2024-07-02 | End: 2024-07-02

## 2024-07-02 RX ORDER — PROPOFOL 10 MG/ML
VIAL (ML) INTRAVENOUS CONTINUOUS PRN
Status: DISCONTINUED | OUTPATIENT
Start: 2024-07-02 | End: 2024-07-02

## 2024-07-02 RX ORDER — ACETAMINOPHEN 500 MG
1000 TABLET ORAL EVERY 6 HOURS
Status: DISCONTINUED | OUTPATIENT
Start: 2024-07-02 | End: 2024-07-03 | Stop reason: HOSPADM

## 2024-07-02 RX ORDER — PROPOFOL 10 MG/ML
VIAL (ML) INTRAVENOUS
Status: DISCONTINUED | OUTPATIENT
Start: 2024-07-02 | End: 2024-07-02

## 2024-07-02 RX ORDER — PREGABALIN 75 MG/1
75 CAPSULE ORAL
Status: COMPLETED | OUTPATIENT
Start: 2024-07-02 | End: 2024-07-02

## 2024-07-02 RX ORDER — OXYCODONE HYDROCHLORIDE 10 MG/1
10 TABLET ORAL
Status: DISCONTINUED | OUTPATIENT
Start: 2024-07-02 | End: 2024-07-03 | Stop reason: HOSPADM

## 2024-07-02 RX ORDER — DEXAMETHASONE SODIUM PHOSPHATE 4 MG/ML
INJECTION, SOLUTION INTRA-ARTICULAR; INTRALESIONAL; INTRAMUSCULAR; INTRAVENOUS; SOFT TISSUE
Status: DISCONTINUED | OUTPATIENT
Start: 2024-07-02 | End: 2024-07-02

## 2024-07-02 RX ORDER — NALOXONE HCL 0.4 MG/ML
0.02 VIAL (ML) INJECTION
Status: DISCONTINUED | OUTPATIENT
Start: 2024-07-02 | End: 2024-07-03 | Stop reason: HOSPADM

## 2024-07-02 RX ORDER — ROPIVACAINE HYDROCHLORIDE 2 MG/ML
INJECTION, SOLUTION EPIDURAL; INFILTRATION; PERINEURAL CONTINUOUS
Status: DISCONTINUED | OUTPATIENT
Start: 2024-07-02 | End: 2024-07-02

## 2024-07-02 RX ORDER — ROPIVACAINE HYDROCHLORIDE 5 MG/ML
INJECTION, SOLUTION EPIDURAL; INFILTRATION; PERINEURAL
Status: DISCONTINUED | OUTPATIENT
Start: 2024-07-02 | End: 2024-07-02

## 2024-07-02 RX ORDER — ASPIRIN 81 MG/1
81 TABLET ORAL 2 TIMES DAILY
Status: DISCONTINUED | OUTPATIENT
Start: 2024-07-02 | End: 2024-07-03 | Stop reason: HOSPADM

## 2024-07-02 RX ORDER — ACETAMINOPHEN 500 MG
1000 TABLET ORAL
Status: COMPLETED | OUTPATIENT
Start: 2024-07-02 | End: 2024-07-02

## 2024-07-02 RX ORDER — ONDANSETRON HYDROCHLORIDE 2 MG/ML
INJECTION, SOLUTION INTRAVENOUS
Status: DISCONTINUED | OUTPATIENT
Start: 2024-07-02 | End: 2024-07-02

## 2024-07-02 RX ADMIN — METHOCARBAMOL 750 MG: 750 TABLET ORAL at 09:07

## 2024-07-02 RX ADMIN — OXYCODONE 5 MG: 5 TABLET ORAL at 09:07

## 2024-07-02 RX ADMIN — VANCOMYCIN HYDROCHLORIDE 1000 MG: 1 INJECTION, POWDER, LYOPHILIZED, FOR SOLUTION INTRAVENOUS at 05:07

## 2024-07-02 RX ADMIN — SODIUM CHLORIDE: 9 INJECTION, SOLUTION INTRAVENOUS at 04:07

## 2024-07-02 RX ADMIN — FENTANYL CITRATE 25 MCG: 50 INJECTION INTRAMUSCULAR; INTRAVENOUS at 11:07

## 2024-07-02 RX ADMIN — DEXTROSE MONOHYDRATE 2 G: 50 INJECTION, SOLUTION INTRAVENOUS at 07:07

## 2024-07-02 RX ADMIN — PROPOFOL 20 MG: 10 INJECTION, EMULSION INTRAVENOUS at 07:07

## 2024-07-02 RX ADMIN — MIDAZOLAM HYDROCHLORIDE 2 MG: 1 INJECTION, SOLUTION INTRAMUSCULAR; INTRAVENOUS at 06:07

## 2024-07-02 RX ADMIN — METHOCARBAMOL 750 MG: 750 TABLET ORAL at 08:07

## 2024-07-02 RX ADMIN — PREGABALIN 75 MG: 75 CAPSULE ORAL at 08:07

## 2024-07-02 RX ADMIN — CLONAZEPAM 2 MG: 0.5 TABLET ORAL at 08:07

## 2024-07-02 RX ADMIN — PREGABALIN 75 MG: 75 CAPSULE ORAL at 05:07

## 2024-07-02 RX ADMIN — TRANEXAMIC ACID 1000 MG: 100 INJECTION INTRAVENOUS at 08:07

## 2024-07-02 RX ADMIN — RISPERIDONE 1 MG: 1 TABLET, FILM COATED ORAL at 08:07

## 2024-07-02 RX ADMIN — ROPIVACAINE HYDROCHLORIDE 7.5 ML: 5 INJECTION EPIDURAL; INFILTRATION; PERINEURAL at 06:07

## 2024-07-02 RX ADMIN — ACETAMINOPHEN 1000 MG: 500 TABLET ORAL at 11:07

## 2024-07-02 RX ADMIN — ACETAMINOPHEN 1000 MG: 500 TABLET ORAL at 05:07

## 2024-07-02 RX ADMIN — ONDANSETRON 4 MG: 2 INJECTION INTRAMUSCULAR; INTRAVENOUS at 07:07

## 2024-07-02 RX ADMIN — METHOCARBAMOL 750 MG: 750 TABLET ORAL at 02:07

## 2024-07-02 RX ADMIN — CEFAZOLIN 2 G: 2 INJECTION, POWDER, FOR SOLUTION INTRAMUSCULAR; INTRAVENOUS at 06:07

## 2024-07-02 RX ADMIN — FENTANYL CITRATE 100 MCG: 50 INJECTION INTRAMUSCULAR; INTRAVENOUS at 06:07

## 2024-07-02 RX ADMIN — SODIUM CHLORIDE, SODIUM GLUCONATE, SODIUM ACETATE, POTASSIUM CHLORIDE, MAGNESIUM CHLORIDE, SODIUM PHOSPHATE, DIBASIC, AND POTASSIUM PHOSPHATE: .53; .5; .37; .037; .03; .012; .00082 INJECTION, SOLUTION INTRAVENOUS at 07:07

## 2024-07-02 RX ADMIN — DEXAMETHASONE SODIUM PHOSPHATE 8 MG: 4 INJECTION, SOLUTION INTRAMUSCULAR; INTRAVENOUS at 07:07

## 2024-07-02 RX ADMIN — MUPIROCIN 1 G: 20 OINTMENT TOPICAL at 05:07

## 2024-07-02 RX ADMIN — PROPOFOL 125 MCG/KG/MIN: 10 INJECTION, EMULSION INTRAVENOUS at 07:07

## 2024-07-02 RX ADMIN — SODIUM CHLORIDE: 9 INJECTION, SOLUTION INTRAVENOUS at 05:07

## 2024-07-02 RX ADMIN — LIDOCAINE HYDROCHLORIDE 60 MG: 20 INJECTION INTRAVENOUS at 07:07

## 2024-07-02 RX ADMIN — MUPIROCIN 1 G: 20 OINTMENT TOPICAL at 08:07

## 2024-07-02 RX ADMIN — ACETAMINOPHEN 1000 MG: 500 TABLET ORAL at 06:07

## 2024-07-02 RX ADMIN — SENNOSIDES AND DOCUSATE SODIUM 1 TABLET: 50; 8.6 TABLET ORAL at 08:07

## 2024-07-02 RX ADMIN — MEPIVACAINE HYDROCHLORIDE 3 ML: 15 INJECTION, SOLUTION EPIDURAL; INFILTRATION at 07:07

## 2024-07-02 RX ADMIN — SODIUM CHLORIDE: 9 INJECTION, SOLUTION INTRAVENOUS at 10:07

## 2024-07-02 RX ADMIN — OXYBUTYNIN CHLORIDE 5 MG: 5 TABLET ORAL at 06:07

## 2024-07-02 RX ADMIN — SODIUM CHLORIDE 500 ML: 9 INJECTION, SOLUTION INTRAVENOUS at 11:07

## 2024-07-02 RX ADMIN — TRAZODONE HYDROCHLORIDE 450 MG: 100 TABLET ORAL at 11:07

## 2024-07-02 RX ADMIN — Medication 20 MG: at 07:07

## 2024-07-02 RX ADMIN — Medication: at 09:07

## 2024-07-02 RX ADMIN — TRANEXAMIC ACID 1000 MG: 100 INJECTION INTRAVENOUS at 07:07

## 2024-07-02 RX ADMIN — SODIUM CHLORIDE: 0.9 INJECTION, SOLUTION INTRAVENOUS at 06:07

## 2024-07-02 RX ADMIN — FAMOTIDINE 20 MG: 20 TABLET ORAL at 08:07

## 2024-07-02 RX ADMIN — ASPIRIN 81 MG: 81 TABLET, COATED ORAL at 08:07

## 2024-07-02 NOTE — PLAN OF CARE
Pt A&OX4. Tolerating PO intake with no c/o N&V a this time. Encouraged to utilized pain pump for discomfort. +void. Seen by MD.  Family updated on room assignment #317,. All belongings with pt. BP remains slightly low but close to baseline. Report called to Meagan LEYVA

## 2024-07-02 NOTE — ANESTHESIA PREPROCEDURE EVALUATION
Pre-operative evaluation for Procedure(s) (LRB):  ARTHROPLASTY, KNEE, TOTAL: RIGHT: SAME DAY (Right)    Nohemi Hull is a 64 y.o. female     Patient Active Problem List   Diagnosis    Screening for colon cancer    Primary osteoarthritis of right knee    Primary osteoarthritis of left knee    Primary insomnia    History of gastric bypass    Recurrent major depressive disorder, in partial remission    Osteopenia    Vitamin D deficiency    Currently smokes tobacco    Anemia, macrocytic    Leukopenia    Emphysema of lung    Long term current use of antithrombotics/antiplatelets    Urinary incontinence    Stage 3a chronic kidney disease    History of DVT (deep vein thrombosis)    Cardiac arrhythmia       Review of patient's allergies indicates:  No Known Allergies    No current facility-administered medications on file prior to encounter.     Current Outpatient Medications on File Prior to Encounter   Medication Sig Dispense Refill    acetaminophen (TYLENOL) 500 MG tablet Take 1 tablet (500 mg total) by mouth 2 (two) times daily as needed for Pain. (Patient taking differently: Take 500 mg by mouth 3 (three) times daily as needed for Pain.) 60 tablet 2    clonazePAM (KLONOPIN) 1 MG tablet Take 2 mg by mouth every evening. To help with sleeping      risperiDONE (RISPERDAL) 1 MG tablet Take 1 mg by mouth every evening.      traZODone (DESYREL) 150 MG tablet Take 450 mg by mouth nightly.      vortioxetine (TRINTELLIX) 20 mg Tab Take 20 mg by mouth once daily.      aspirin (ECOTRIN) 81 MG EC tablet Take 81 mg by mouth once daily.      calcium carbonate (CALCIUM 300 ORAL) Take by mouth.      cholecalciferol, vitamin D3, 10 mcg (400 unit) Chew Take 400 Units by mouth once daily.      cyanocobalamin, vitamin B-12, (VITAMIN B-12 ORAL) Take 1 tablet by mouth once daily.      DULoxetine (CYMBALTA) 60 MG capsule  Take 120 mg by mouth once daily. (Patient not taking: Reported on 6/20/2024)      FEROSUL 325 mg (65 mg iron) Tab tablet Take by mouth every other day.      ibuprofen (ADVIL,MOTRIN) 800 MG tablet Take 1 tablet (800 mg total) by mouth 3 (three) times daily. (Patient not taking: Reported on 6/20/2024) 90 tablet 1    magnesium 30 mg Tab Take by mouth once.      multivit-min-FA-lycopen-lutein (CENTRUM SILVER) 0.4-300-250 mg-mcg-mcg Tab Take 1 tablet by mouth once daily.      mupirocin (BACTROBAN) 2 % ointment Apply topically 2 (two) times daily. (Patient not taking: Reported on 6/20/2024) 15 g 0    nitrofurantoin, macrocrystal-monohydrate, (MACROBID) 100 MG capsule Take 1 capsule (100 mg total) by mouth 2 (two) times daily. (Patient not taking: Reported on 6/20/2024) 10 capsule 0    omega-3 fatty acids fish oil (OMEGA-3 2100) 1,050-1,200 mg Cap capsule Take 1 capsule by mouth once daily. (Patient not taking: Reported on 6/20/2024)      suvorexant (BELSOMRA) 20 mg Tab Take by mouth nightly. (Patient not taking: Reported on 6/20/2024)      triamcinolone acetonide 0.1% (KENALOG) 0.1 % cream Apply topically 2 (two) times daily. for 7 days 45 g 0       Past Surgical History:   Procedure Laterality Date    COLONOSCOPY N/A 10/29/2018    Procedure: COLONOSCOPY;  Surgeon: Mp Alvarado MD;  Location: [address]);  Service: Endoscopy;  Laterality: N/A;  Referral received from Dr. SMOOTH Arias, [facility]  Last colonoscopy 2012-recommended f/u 5 years-past due    DILATION AND CURETTAGE OF UTERUS      GASTRIC BYPASS      PARTIAL HYSTERECTOMY      AUB    RADIOFREQUENCY ABLATION Right 9/13/2022    Procedure: RADIOFREQUENCY ABLATION, GENICULAR RIGHT COOLED;  Surgeon: Radha Justin MD;  Location: [facility];  Service: Pain Management;  Laterality: Right;    TONSILLECTOMY      TUBAL LIGATION Bilateral          CBC:  Lab Results   Component Value Date    WBC 3.77 (L) 04/26/2024    RBC 3.69 (L) 04/26/2024     "HGB 12.2 04/26/2024    HCT 38.2 04/26/2024     04/26/2024     (H) 04/26/2024    MCH 33.1 (H) 04/26/2024    MCHC 31.9 (L) 04/26/2024       CMP:   Lab Results   Component Value Date     06/17/2024    K 4.2 06/17/2024     06/17/2024    CO2 29 06/17/2024    BUN 27 (H) 06/17/2024    CREATININE 1.1 06/17/2024     06/17/2024    CALCIUM 9.6 06/17/2024    ALBUMIN 4.0 06/17/2024    PROT 7.0 06/17/2024    ALKPHOS 69 06/17/2024    ALT 14 06/17/2024    AST 19 06/17/2024    BILITOT 0.9 06/17/2024       INR:  Lab Results   Component Value Date    INR 1.0 06/17/2024         Diagnostic Studies:      EKG:   Results for orders placed or performed during the hospital encounter of 06/17/24   SCHEDULED EKG 12-LEAD (to Muse)    Collection Time: 06/17/24 11:59 AM   Result Value Ref Range    QRS Duration 82 ms    [facility] QTC Calculation 449 ms    Narrative    Test Reason : I49.9,    Vent. Rate : 049 BPM     Atrial Rate : 049 BPM     P-R Int : 130 ms          QRS Dur : 082 ms      QT Int : 498 ms       P-R-T Axes : 074 046 030 degrees     QTc Int : 449 ms    Sinus bradycardia with sinus arrhythmia  Otherwise normal ECG  When compared with ECG of 18-FEB-2020 15:02,  Vent. rate has decreased BY  47 BPM  Nonspecific T wave abnormality no longer evident in Lateral leads  Confirmed by Goldy Blanc MD ([id number]) on 6/17/2024 1:37:19 PM    Referred By: TOMASA ALEMAN           Confirmed By:Goldy Blanc MD        2D Echo:  No results found for this or any previous visit.    Stress Test:   No results found for this or any previous visit.      Pre-op Vitals [07/02/24 0541]   BP Pulse Resp Temp SpO2   (!) 103/53 (!) 52 18 36.8 °C (98.2 °F) 98 %      Height Weight BMI (Calculated)     5' 6" 145 lb 23.4         Pre-op Vitals [07/02/24 0541]   BP Pulse Resp Temp SpO2   (!) 103/53 (!) 52 18 36.8 °C (98.2 °F) 98 %      Height Weight BMI (Calculated)     5' 6" 145 lb 23.4            Pre-op Assessment    I have reviewed the " Patient Summary Reports.     I have reviewed the Nursing Notes. I have reviewed the NPO Status.   I have reviewed the Medications.     Review of Systems  Anesthesia Hx:  No problems with previous Anesthesia   History of prior surgery of interest to airway management or planning: gastric bypass. Previous anesthesia: General 10/29/18 COLONOSCOPY with general anesthesia.       Airway issues documented on chart review include GETA     Denies Family Hx of Anesthesia complications.    Denies Personal Hx of Anesthesia complications.                    Social:  Smoker, Alcohol Use Smoking  Some Days, 0.25 ppd, 1.8 pack-years  Smokeless Tobacco  Current  Alcohol  2.0 standard drinks of alcohol/week        Hematology/Oncology:    Oncology Normal    -- Anemia:               Hematology Comments: Clotting disorder  Anemia, macrocytic  Vitamin D deficiency                         EENT/Dental:  denies chronic allergic rhinitis           Cardiovascular:  Exercise tolerance: good   Denies Pacemaker. Hypertension, well controlled       Denies Angina.       Denies EL.    Functional Capacity 4 METS        Deep Venous Thrombosis (DVT), right lower extremity deep vein thrombosis in the 1970s on right lower extremity                 Pulmonary:   COPD, mild      Denies Sleep Apnea.                Renal/:  Chronic Renal Disease, CKD no renal calculi  Frequent UTI  Dysuria             Hepatic/GI:   Denies PUD.   Denies GERD. Denies Liver Disease.  Denies Hepatitis. Gastric Bypass 2004          Musculoskeletal:  Arthritis    Musculoskeletal General/Symptoms: joint pain, joint stiffness, joint swelling, limited ROM. Functional capacity is ambulatory without assistance.   Joint Disease:  Arthritis, OsteoarthritisDenies Gout   Bone Disorders:    Osteopenia        OB/GYN/PEDS:  9/13/2022  Radiofrequency ablation (Right)   Date Unknown  Dilation and curettage of uterus  Date Unknown  Partial hysterectomy              Neurological:  Denies TIA.   Denies CVA.    Denies Seizures.          Chronic Pain Syndrome Osteoarthritis  Denies Peripheral Neuropathy                          Endocrine:  Denies Diabetes. Denies Hypothyroidism.  Denies Hyperthyroidism. Vitamin D deficiency             Psych:  Psychiatric History  depression                Physical Exam  General: Well nourished, Cooperative, Alert and Oriented    Airway:  Mallampati: II   Mouth Opening: Normal  TM Distance: Normal  Tongue: Normal  Neck ROM: Normal ROM    Dental:  Intact    Heart:  Rate: Normal  Rhythm: Regular Rhythm        Anesthesia Plan  Type of Anesthesia, risks & benefits discussed:    Anesthesia Type: Spinal, CSE, Gen ETT  Intra-op Monitoring Plan: Standard ASA Monitors  Post Op Pain Control Plan: multimodal analgesia, peripheral nerve block and IV/PO Opioids PRN  Induction:  IV  Airway Plan: Video  Informed Consent: Informed consent signed with the Patient and all parties understand the risks and agree with anesthesia plan.  All questions answered.   ASA Score: 2  Day of Surgery Review of History & Physical: H&P Update referred to the surgeon/provider.    Ready For Surgery From Anesthesia Perspective.     .

## 2024-07-02 NOTE — PROGRESS NOTES
"Pt reporting feeling "too lightheaded" while sitting on side of bed with OT. BP 99/54 while sitting with NAD noted. Pt assisted back to bed and returned to monitor. OT will return   "

## 2024-07-02 NOTE — PT/OT/SLP EVAL
Physical Therapy Evaluation and Treatment    Patient Name: Nohemi Hull   MRN: [id number]  Recent Surgery: Procedure(s) (LRB):  ARTHROPLASTY, KNEE, TOTAL: RIGHT: SAME DAY (Right) Day of Surgery    Recommendations:     Discharge Recommendations: Low Intensity Therapy   Discharge Equipment Recommendations: bedside commode, walker, rolling, shower chair   Barriers to discharge: Inaccessible home- will go to her dtr's home with entry step only    Assessment:     Nohemi Hull is a 64 y.o. female admitted with a medical diagnosis of Primary osteoarthritis of right knee. She presents with the following impairments/functional limitations: weakness, impaired endurance, impaired self care skills, impaired functional mobility, gait instability, decreased lower extremity function, pain, decreased ROM, edema. Pt presents s/p R TKA with expected post-op deficits.  Pt was limited  with PT today due to symptomatic hypotension both with PT and OT. PT saw pt after 2 OT attempts and fluid bolus.   She was only able to nichelle sitting EOB and then became symptomatic with hypotension from 130/58 supine to 108/51 in sitting. Pt's symptoms were increasing despite LE muscle activation to attempt to assist with elevating her BP.  She will benefit from  cont PT to maximize  functional recovery, strength and ROM.      Rehab Prognosis: Good; patient would benefit from acute PT services to address these deficits and reach maximum level of function.    Plan:     During this hospitalization, patient to be seen daily to address the above listed problems via therapeutic activities, gait training, therapeutic exercises    Plan of Care Expires: 08/01/24    Subjective     Chief Complaint: Pt c/o feeling lightheaded in sitting at EOB.  Pt reports symptoms increasing despite LE muscle activation to attempt to assist with elevating her BP.   Patient Comments/Goals: to go home  Pain/Comfort:  Pain Rating 1: 5/10  Location - Side 1:  Right  Location 1: knee  Pain Addressed 1: Pre-medicate for activity, Reposition, Distraction  Pain Rating Post-Intervention 1: 5/10    Social History:  Living Environment: Patient lives with their family  in a single story home with number of outside stair(s): 5 JOSE and B rails.  Pt will go to her dtr's single story home with entry step only after d/c.   Prior Level of Function: Prior to admission, patient was independent  Equipment Used at Home: wheelchair, shower chair  DME owned (not currently used): Pt issued rolling walker, bedside commode today and has a bath bench  Assistance Upon Discharge:  her dtr    Objective:     Communicated with RN and OT prior to session. Patient found HOB elevated with blood pressure cuff, FCD, perineural catheter, peripheral IV, PureWick, pulse ox (continuous), telemetry upon PT entry to room.  Pt's dtr present in room but had to leave due to her young dtr with her and she remained present during eval via face time call on pt's phone.    General Precautions: Standard, fall   Orthopedic Precautions: RLE weight bearing as tolerated   Braces: N/A    Respiratory Status: Room air    Exams:  RLE ROM: WFL except decreased knee flex/ext due to sx, post op bandages.   RLE Strength:  at least 3/5  LLE ROM: WNL  LLE Strength: WNL  Cognitive: Patient is oriented to Person, Place, Time, Situation  Gross Motor Coordination: WFL  Postural Exam: Patient presented with the following abnormalities:    -       Rounded shoulders  -       Forward head  Sensation:    -       Intact    Functional Mobility:  Gait belt applied - Yes  Bed Mobility  Supine to Sit: contact guard assistance for LE management and trunk management  Sit to Supine: contact guard assistance for LE management  Transfers  Sit to Stand: unable to attempt due to pt with symptomatic hypotension    Gait: unable to attempt due to pt with symptomatic hypotension    Balance  Sitting: GOOD: Maintains balance through MODERATE excursions of  active trunk movement  Standing: unable to assess due to pt with symptomatic hypotension      Therapeutic Activities and Exercises:  Patient educated on role of acute care PT and PT POC, safety while in hospital including calling nurse for mobility, and call light usage.  Educated about weightbearing as nichelle R LE and provided cuing for adherence as appropriate during session.  Educated about importance of OOB mobility and remaining up in chair most of the day.  Pt instructed on  therapeutic ex's for TKA HEP (QS, AP, GS, HS, Hip abd, SLR, SAQ, LAQ) x 10 reps x 3 sets each for muscle strengthening, endurance and increase knee ROM. Pt demonstrated good understanding back to PT. Patient required skilled PT for instruction of exercises and appropriate cues to perform exercises safely and appropriately.  Issued written handout of TKA HEP and reviewed with pt.  Pt ed on importance of elevating  LE above level of her heart 3-4 times a day and proper placement of pillows to keep knee extended and not flexed.  Pt verbalized good understanding back to PT.    Pt ed on mobility expectations at home after d/c and she verbalized good understanding back to PT  Pt's VS during eval:  Supine /58 and MAP 83;     Seated /51 and MAP 70     Pt instructed on and performed AP and LAQ for muscle activation to attempt to assist with elevating her BP but pt's /52 and MAP 75 and reports increase symptoms of dizziness, lightheadedness and pt returned to supine.  RN and MD notified     AM-PAC 6 CLICK MOBILITY  Total Score:14    Patient left supine with all lines intact, call button in reach, and RN notified.    GOALS:   Multidisciplinary Problems       Physical Therapy Goals          Problem: Physical Therapy    Goal Priority Disciplines Outcome Goal Variances Interventions   Physical Therapy Goal     PT, PT/OT Progressing     Description: Goals to be met by: 7/3/24     Patient will increase functional independence with mobility  by performin. Supine to sit with Supervision  2. Sit to stand transfer with Stand-by Assistance  3. Gait  x 150 feet with Stand-by Assistance using Rolling Walker.   4. Ascend/Descend 6 inch curb step with Contact Guard Assistance using Rolling Walker.  5. Lower extremity  TKA home exercise program x 30 reps per handout, with supervision    Patient demonstrates a mobility limitation that significantly impairs their ability to participate in one or more mobility related activities of daily living. Patient's mobility limitation cannot be sufficiently resolved with the use of a cane, but can be sufficiently resolved with the use of a rolling walker.The use of a rolling walker will considerably improve their ability to participate in MRADLs. Patient will use the walker on a regular basis at home.                            History:     Past Medical History:   Diagnosis Date    Anemia     Anxiety     Arthritis     Deep vein thrombosis     Depression 2000    Renal failure        Past Surgical History:   Procedure Laterality Date    COLONOSCOPY N/A 10/29/2018    Procedure: COLONOSCOPY;  Surgeon: Mp Alvarado MD;  Location: [address];  Service: Endoscopy;  Laterality: N/A;  Referral received from Dr. SMOOTH Arias, [facility]  Last colonoscopy 2012-recommended f/u 5 years-past due    DILATION AND CURETTAGE OF UTERUS      GASTRIC BYPASS      PARTIAL HYSTERECTOMY      AUB    RADIOFREQUENCY ABLATION Right 2022    Procedure: RADIOFREQUENCY ABLATION, GENICULAR RIGHT COOLED;  Surgeon: Radha Justin MD;  Location: [facility];  Service: Pain Management;  Laterality: Right;    TONSILLECTOMY      TUBAL LIGATION Bilateral        Time Tracking:     PT Received On: 24  PT Start Time: 1327  PT Stop Time: 1357  PT Total Time (min): 30 min     Billable Minutes: Evaluation 7 and Therapeutic Activity 2024

## 2024-07-02 NOTE — OP NOTE
DATE OF PROCEDURE: 7/2/24  PREOPERATIVE DIAGNOSIS: Arthritis, Right knee.   POSTOPERATIVE DIAGNOSIS: Arthritis, Right knee.   PROCEDURES PERFORMED: Right total knee arthroplasty.   SURGEON: Avery Campos M.D.   ASSISTANT: SHAHZAD Danielle  ANESTHESIA: Regional.   COMPLICATIONS: None.   COUNTS: Correct.   DISPOSITION: Recovery Room, stable.   SPECIMENS: Bone and cartilage.   FINDINGS: Tricompartmental degenerative change.   FLUIDS: 2000 mL.   BLOOD LOSS: Less than 50 mL.   IMPLANTS: [place] [employer], size 5 Right posterior stabilized   cemented femoral component with a 4 cemented tibial tray, a 31 mm 3-peg   patella and a size 4, 9 mm posterior stabilized polyethylene insert.   INDICATIONS FOR PROCEDURE: Nohemi Hull is a 64-year-old female who has   severe arthritis in the Right knee . She is having continued pain in the   Right knee and did not respond to nonoperative measures, decided to undergo   Right knee replacement. She is aware of reasonable treatment options as   well as risks and benefits. She did not respond to NSAIDS or injections. She received a course or pre-operative physical therapy.  PROCEDURE IN DETAIL: After appropriate consent was obtained, the patient   Was brought in the Operating Room, anesthesia was administered. She received   antibiotic prophylaxis. Cast   padding and tourniquet was applied to the proximal Right thigh. Right  lower extremity was prepped and draped in usual sterile fashion. A tme out ws called.  All team members participated.  The proper sight and side was confirmed.  No concerns were expressed. Limb was   elevated, tourniquet was inflated. The knee was flexed. An incision was   made from the tibial tubercle just proximal to the superior pole of the   patella. It was taken down through the skin and retinacular. A medial   parapatellar arthrotomy was performed followed by a standard medial   release. Patella was measured and found to   be 24 mm thick, 9 mm of bone  removed. The patella was sized and found to be a 41.  The 3-peg holes were drilled.  Knee was flexed.  Patellar fat pad was partially excised. ACL was resected.   Femoral punch was used to make the guide hole for the femoral drill. The   distal cutting guide was set at 6 degrees valgus right.  A standard distal femoral cut was made.We were pleased with the alignment and quality of the cut.  The PCL retractor was used to bring   the tibia into the field. Meniscal remnants were resected. The   extramedullary tibial guide was pinned in place using the medial side, as most   defective, 2 mm bone was taken off of this. We checked the alignment in   both planes both before and after making the cut. We were satisfied with the   alignment of the cut and the amount of bone removed.The femur was then  sized, found to be a size 5. A size 5 cutting guide was pinned in 3   degrees of external rotation. This was based off the posterior condyle,   checked the transepicondylar axis. Anterior, posterior and chamfer cuts   were made. The box guide was pinned in position. Femoral box cut was   made. Bone fragments were removed. Lamina spreaders were   then used to open up posteriorly. The PCL was resected and some soft   tissue debris was removed.  A 9 mm spacer block gave excellent flexion-extension gap balance.   I was also satisfied with the medial and lateral stability. At this   point, the femoral trial was placed. The tibia was sized, found to be a   Size 4. A size 4 tibial trial was pinned in appropriate alignment and   rotation. This was based on the medial one third of the tibial tubercle. A keel hole was punched and a   trial reduction was performed with a size 4, 9 mm insert. She  achieved full   extension. There was no recurvatum. She easily had 130 degrees of flexion.   The femoral component ranged symmetrically in the tibial tray. There was   no lift off. There was no instability of full extension, 30 degrees of    flexion, mid flexion and full flexion. The knee was brought through range of motion. The patella trial was placed. The   patella tracked extremely well. Therefore, at this point, we were   satisfied with knee range of motion and stability, component position,   sizing and alignment as well as patella tracking. All trial components   were removed. Bone was prepared for cementing by pulsatile lavage and   drying. Components were cemented into place, femur followed by tibia.   Meticulous care was taken to remove excess cement. Tibial insert was   firmly seated in the tibial tray. The knee was inspected. There was no   loose body, foreign body or soft tissue interposition. Knee was then   reduced, brought into extension. Patella button was cemented in place.   Excess cement was removed. The wound was irrigated with a dilute sterile betadine solution which was left in place for three minutes, then irrigated again with saline. The arthrotomy was closed with #1 Vicryl. Once the   arthrotomy was closed, the knee was brought through range of motion, stable   as previously described. Patella tracked very well. Retinacular was   closed with 0 Vicryl, subcutaneous layer with 2-0 Vicryl, skin with   Monocryl and dermabond. Sterile dressing was applied. Tourniquet was let down.  She was   transferred to a stretcher, brought to Recovery Room in stable condition,   tolerated the procedure well, no known complications. One gram of IV TXA was given prior to incision and at closure

## 2024-07-02 NOTE — SUBJECTIVE & OBJECTIVE
"Principal Problem:Primary osteoarthritis of right knee    Principal Orthopedic Problem: R TKA    Interval History: Patient seen and examined at bedside. NAEON. Patient doing well. Pain well controlled. Patient mobilized yesterday with PT.      Review of patient's allergies indicates:  No Known Allergies    Current Facility-Administered Medications   Medication    0.9%  NaCl infusion    acetaminophen tablet 1,000 mg    ceFAZolin 2 g in 0.9% NaCl 50 mL IVPB (MB+)    ceFAZolin 2 g in D5W 50 mL IVPB (MB+)    fentaNYL 50 mcg/mL injection 100 mcg    fentaNYL 50 mcg/mL injection 25 mcg    fentaNYL 50 mcg/mL injection 25 mcg    LIDOcaine (PF) 10 mg/ml (1%) injection 10 mg    melatonin tablet 6 mg    methocarbamoL tablet 750 mg    midazolam injection 4 mg    morphine injection 2 mg    naloxone 0.4 mg/mL injection 0.02 mg    ondansetron injection 4 mg    oxyCODONE immediate release tablet 10 mg    oxyCODONE immediate release tablet 5 mg    pregabalin capsule 75 mg    prochlorperazine injection Soln 5 mg    prochlorperazine injection Soln 5 mg    ropivacaine 0.2% Perineural Pump infusion 500 ML    sodium chloride 0.9% flush 10 mL    sodium chloride 0.9% flush 10 mL    tranexamic acid (CYKLOKAPRON) 1,000 mg in 0.9% NaCl 100 mL IVPB (MB+)    tranexamic acid (CYKLOKAPRON) 1,000 mg in 0.9% NaCl 100 mL IVPB (MB+)     Objective:     Vital Signs (Most Recent):  Temp: 97.5 °F (36.4 °C) (07/02/24 0859)  Pulse: 60 (07/02/24 1430)  Resp: 20 (07/02/24 1430)  BP: 119/66 (07/02/24 1430)  SpO2: (!) 94 % (07/02/24 1430) Vital Signs (24h Range):  Temp:  [97.5 °F (36.4 °C)-98.2 °F (36.8 °C)] 97.5 °F (36.4 °C)  Pulse:  [45-82] 60  Resp:  [11-38] 20  SpO2:  [91 %-100 %] 94 %  BP: ()/(50-74) 119/66     Weight: 65.8 kg (145 lb)  Height: 5' 6" (167.6 cm)  Body mass index is 23.4 kg/m².      Intake/Output Summary (Last 24 hours) at 7/2/2024 1521  Last data filed at 7/2/2024 1437  Gross per 24 hour   Intake 2000 ml   Output 500 ml   Net 1500 ml "        Ortho/SPM Exam  AAOx4  NAD  Reg rate  No increased WOB    RLE:  Dressing c/d/i  Polar ice in place  SILT T/SP/DP/Pearson/Sa  Motor intact T/SP/DP  WWP extremities  FCDs in place and functioning      Significant Labs: All pertinent labs within the past 24 hours have been reviewed.    Significant Imaging: I have reviewed all pertinent imaging results/findings.

## 2024-07-02 NOTE — PLAN OF CARE
Pre op complete. Pt resting comfortably. Call light in reach. Belongings placed in locker. Pt needs walker and bedside commode after surgery. She says she paid for both over the phone yesterday.   Need anesth consent, site adria, and update.

## 2024-07-02 NOTE — PROGRESS NOTES
Care assumed. Pt resting comfortably in bed. No distress noted. VSS. IVFs infusing. Daughter at BS and aware of pt getting dizzy earlier and will have OT/PT reassess later. Pt denies any needs. Call light in reach

## 2024-07-02 NOTE — HPI
Nohemi Hull is a 64 y.o. female with history of Right knee pain. Pain is worse with activity and weight bearing.  Patient has experienced interference of activities of daily living due to decreased range of motion and an increase in joint pain and swelling.  Patient has failed non-operative treatment including NSAIDs, corticosteroid injections, viscosupplement injections, and activity modification.  Nohemi Hull currently ambulates independently.      Relevant medical conditions of significance in perioperative period:  History of DVT- on medication managed by PCP  CKD 3a- monitored by PCP  Current smoker- 3-4 cigarettes per day     Given documented medical comorbidities including those listed above and based off of CMS criteria patient would meet inpatient admission status guidelines. This case has been approved as inpatient.

## 2024-07-02 NOTE — NURSING
Report received. Care assumed. Patient arrived to unit AAOx4 in hospital bed from PACU. VSS, IVF infusing. Dressing to (R) knee, CDI.  Pt lying supine in bed. Pt c/o 5/10 pain.  Pain management discussed with pt. No other concerns at this time. See assessment. Patient oriented to room. Bed in lowest position, side rails up x2, bed wheels locked and call light within reach.  Pt instructed to call for assistance, verbalized understanding. NADN. Will continue to monitor.

## 2024-07-02 NOTE — PLAN OF CARE
Problem: Occupational Therapy  Goal: Occupational Therapy Goal  Description: Goals to be met by: 7/3/24     Patient will increase functional independence with ADLs by performing:    UE Dressing with Modified [place].  LE Dressing with Modified [place] and Assistive Devices as needed.  Grooming while standing at sink with Modified [place].  Toileting from bedside commode with Modified [place] for hygiene and clothing management.   Bathing from  shower chair/bench with Modified [place].  Toilet transfer to bedside commode with Modified [place].    Outcome: Progressing

## 2024-07-02 NOTE — ANESTHESIA PROCEDURE NOTES
Spinal    Diagnosis: right knee oA  Patient location during procedure: OR  Start time: 7/2/2024 7:03 AM  Timeout: 7/2/2024 7:00 AM  End time: 7/2/2024 7:07 AM    Staffing  Authorizing Provider: Luis Fernando Diaz MD  Performing Provider: Luis Fernando Diaz MD    Staffing  Performed by: Luis Fernando Diaz MD  Authorized by: Luis Fernando Diaz MD    Preanesthetic Checklist  Completed: patient identified, IV checked, site marked, risks and benefits discussed, surgical consent, monitors and equipment checked, pre-op evaluation and timeout performed  Spinal Block  Patient position: sitting  Prep: ChloraPrep  Patient monitoring: heart rate, continuous pulse ox and frequent blood pressure checks  Approach: midline  Location: L3-4  Injection technique: single shot  CSF Fluid: clear free-flowing CSF  Needle  Needle type: Madonna   Needle gauge: 25 G  Needle length: 3.5 in  Additional Documentation: incremental injection, no paresthesia on injection and negative aspiration for heme  Needle localization: anatomical landmarks  Assessment   Dermatomal levels determined by alcohol wipe  Ease of block: moderate  Patient's tolerance of the procedure: comfortable throughout block  Additional Notes  17 g Tuohy needle used to access epidural space.    Medications:    Medications: mepivacaine (CARBOCAINE) injection 15 mg/mL (1.5%) - Other   3 mL - 7/2/2024 7:07:00 AM

## 2024-07-02 NOTE — PROGRESS NOTES
"Dr. Campos to bedside. Aware that pt has attempted to get up with therapy x3 and reported feeling "lightheaded". Pt to stay overnight.   "

## 2024-07-02 NOTE — PROGRESS NOTES
PT/OT notified that pt is strong  bilaterally with both plantar/dorsi flexion with sensation to toes. They will be to bedside

## 2024-07-02 NOTE — PROGRESS NOTES
Hospitalist Progress Note      SYNOPSIS: Patient admitted on 2021   Patient with end-stage renal disease on peritoneal dialysis. Missed dialysis on several days. Generalized edema. Shortness of breath and cough. Has an abscess in the groin area. : Current status summarized  Nephrology indicates that she has been undergoing dialysis treatments  Hydralazine increased to 75 mg 3 times a day   Blood sugars need better control  Left groin abscess has been drained   There are no current labs we will update these  Her last hemoglobin was 7.8  Last BUN 21 and last creatinine 6.4 these are dated day before yesterday    SUBJECTIVE:  Patient seen and examined  Records reviewed. Glucose better  Patient feels okay today and voices no complaints      Temp (24hrs), Av.8 °F (36.6 °C), Min:97.5 °F (36.4 °C), Max:98.3 °F (36.8 °C)    DIET: DIET RENAL; Renal  CODE: Full Code    Intake/Output Summary (Last 24 hours) at 2021 0912  Last data filed at 2021 0515  Gross per 24 hour   Intake 1080 ml   Output 0 ml   Net 1080 ml       OBJECTIVE:    BP (!) 150/63   Pulse 113   Temp 97.6 °F (36.4 °C) (Temporal)   Resp 18   Ht 4' 11\" (1.499 m)   Wt 160 lb 14.4 oz (73 kg)   SpO2 93%   BMI 32.50 kg/m²     General appearance: Obese no apparent distress, appears stated age and cooperative. HEENT:  Conjunctivae/corneas clear. Neck: Supple. No jugular venous distention.    Respiratory: Bilateral rhonchi  Cardiovascular: Regular rate rhythm, normal S1-S2  Abdomen: Soft, nontender, nondistended  Musculoskeletal: Edema  Skin:  No rashes  on visible skin  Neurologic: awake, alert and following commands     ASSESSMENT:  ESRD needing dialysis  Volume overload being addressed by dialysis  Acute on chronic diastolic congestive heart failure  Acute respiratory failure with hypoxia resolved  Thigh/groin abscess  History of hidradenitis suppurativa  Hyperkalemia  Elevated troponin in the setting of ESRD  Poorly XRAY at bedside    labs:    Lab Results   Component Value Date    CHOL 155 10/08/2013    TRIG 68 10/08/2013    HDL 40 01/29/2021    LDLCALC 41 01/29/2021    TSH 3.850 01/27/2021    INR 1.1 11/26/2018    LABA1C 9.1 (H) 01/27/2021       Radiology: REVIEWED DAILY    +++++++++++++++++++++++++++++++++++++++++++++++++  [address], [place]  +++++++++++++++++++++++++++++++++++++++++++++++++  NOTE: This report was transcribed using voice recognition software. Every effort was made to ensure accuracy; however, inadvertent computerized transcription errors may be present.

## 2024-07-02 NOTE — PLAN OF CARE
Problem: Physical Therapy  Goal: Physical Therapy Goal  Description: Goals to be met by: 7/3/24     Patient will increase functional independence with mobility by performin. Supine to sit with Supervision  2. Sit to stand transfer with Stand-by Assistance  3. Gait  x 150 feet with Stand-by Assistance using Rolling Walker.   4. Ascend/Descend 6 inch curb step with Contact Guard Assistance using Rolling Walker.  5. Lower extremity  TKA home exercise program x 30 reps per handout, with supervision    Patient demonstrates a mobility limitation that significantly impairs their ability to participate in one or more mobility related activities of daily living. Patient's mobility limitation cannot be sufficiently resolved with the use of a cane, but can be sufficiently resolved with the use of a rolling walker.The use of a rolling walker will considerably improve their ability to participate in MRADLs. Patient will use the walker on a regular basis at home.       2024 1611 by Nazia Welsh, PT  Outcome: Progressing

## 2024-07-02 NOTE — ASSESSMENT & PLAN NOTE
Nohemi Hull is a 64 y.o. female is s/p R TKA on 7/2/24    Surgical dressing C/D/I, bulky dressing taken down today, ice in place  Pain control: multimodal, Anesthesia Surgical Home following  PT/OT: WBAT RLE  DVT PPx: Eliquis, FCDs at all times when not ambulating  Abx: postop Ancef  Drain: none  Ni: none    Dispo: plan to dc to home today once cleared by PT/OT

## 2024-07-02 NOTE — ANESTHESIA PROCEDURE NOTES
Right Adductor Canal Catheter    Patient location during procedure: pre-op   Block not for primary anesthetic.  Reason for block: at surgeon's request and post-op pain management   Post-op Pain Location: Right Knee   Start time: 7/2/2024 6:40 AM  Timeout: 7/2/2024 6:40 AM   End time: 7/2/2024 6:50 AM    Staffing  Authorizing Provider: Luis Fernando Diaz MD  Performing Provider: Luis Fernando Diaz MD    Staffing  Performed by: Luis Fernando Diaz MD  Authorized by: Luis Fernando Diaz MD    Preanesthetic Checklist  Completed: patient identified, IV checked, site marked, risks and benefits discussed, surgical consent, monitors and equipment checked, pre-op evaluation and timeout performed  Peripheral Block  Patient position: supine  Prep: ChloraPrep and site prepped and draped  Patient monitoring: heart rate, cardiac monitor, continuous pulse ox, continuous capnometry and frequent blood pressure checks  Block type: adductor canal  Laterality: right  Injection technique: continuous  Needle  Needle type: Tuohy   Needle gauge: 17 G  Needle length: 3.5 in  Needle localization: anatomical landmarks and ultrasound guidance  Catheter type: spring wound  Catheter size: 19 G  Test dose: lidocaine 1.5% with Epi 1-to-200,000 and negative   -ultrasound image captured on disc.  Assessment  Injection assessment: negative aspiration, negative parasthesia and local visualized surrounding nerve  Paresthesia pain: none  Heart rate change: no  Slow fractionated injection: yes    Medications:    Medications: ropivacaine (NAROPIN) injection 0.5% - Perineural   7.5 mL - 7/2/2024 6:45:00 AM    Additional Notes  VSS.  [facility] RN monitoring vitals throughout procedure.  Patient tolerated procedure well.  15 cc of 0.25% ropivacaine with epi 1:300k administered for the block.

## 2024-07-02 NOTE — PROGRESS NOTES
Pt reporting increase in R knee discomfort despite pushing her bolus button on pump. BP remains 90s-low 100s/50s-70s.Dr. Ricketts with anesthesia notified. New order obtained for 500ml NS bolus  and per MD MOY for pt to have PRN IV medication. please see MAR

## 2024-07-02 NOTE — PT/OT/SLP EVAL
"Occupational Therapy Evaluation and Treatment    Name: Nohemi Hull  MRN: [id number]  Admitting Diagnosis: Primary osteoarthritis of right knee Day of Surgery  Recent Surgery: Procedure(s) (LRB):  ARTHROPLASTY, KNEE, TOTAL: RIGHT: SAME DAY (Right) Day of Surgery    Recommendations:     Discharge Recommendations: Low Intensity Therapy  Level of Assistance Recommended: 24 hours supervision  Discharge Equipment Recommendations: bedside commode, walker, rolling  Barriers to discharge: None    Assessment:     Nohemi Hull is a 64 y.o. female with a medical diagnosis of Primary osteoarthritis of right knee. She presents with performance deficits affecting function including impaired self care skills, impaired functional mobility, orthopedic precautions. Attempted to see pt x2.  On both visits pt became hypotensive while sitting EOB.  On first visit pt was also very lethargic and drowsy.  Returned on second visit and pt's BP decreased from 99/61 to 87/54 and pt had max c/o lightheadedness and dizziness.  Able to perform supine/sit T/F c S.      Rehab Prognosis: Good; patient would benefit from acute OT services to address these deficits and reach maximum level of function.    Plan:     Patient to be seen daily to address the above listed problems via self-care/home management, therapeutic activities, therapeutic exercises  Plan of Care Expires: 07/03/24  Plan of Care Reviewed with: patient, daughter    Subjective     Chief Complaint: R TKA  Patient Comments/Goals: "I feel dizzy."  Pain/Comfort:       Patients cultural, spiritual, Hindu conflicts given the current situation: no    Social History:  Living Environment: Patient lives with their daughter in a single story home with number of outside stair(s): 1 and tub-shower combo  Prior Level of Function: Prior to admission, patient was independent.  Roles and Routines: Patient was driving and retired prior to admission.  Equipment Used at Home: " wheelchair, shower chair  DME owned (not currently used): shower chair and wheelchair  Assistance Upon Discharge:  Daughter    Objective:     Communicated with RN prior to session. Patient found supine with blood pressure cuff, FCD, perineural catheter, peripheral IV, pulse ox (continuous), telemetry upon OT entry to room.    General Precautions: Standard, fall   Orthopedic Precautions: RLE weight bearing as tolerated   Braces: N/A    Respiratory Status: Room air    Occupational Performance    Gait belt applied - Yes    Bed Mobility:   Supine to sit from left side of bed with supervision    Functional Mobility/Transfers:    Functional Mobility: Pt was unable to stand on either visit d/t hypotension and dizziness.    Activities of Daily Living:  Upper Body Dressing: maximal assistance to don hospital gown.      Physical Exam:  Upper Extremity Range of Motion:     -       Right Upper Extremity: WFL  -       Left Upper Extremity: WFL  Upper Extremity Strength:    -       Right Upper Extremity: WFL  -       Left Upper Extremity: WFL    AMPAC 6 Click ADL:  AMPAC Total Score: 13    Treatment & Education:  Educated on the importance of mobility to maximize recovery  Educated on the importance of OOB mobility within safe range in order to decrease adverse effects of prolonged bedrest  Educated on safety with functional mobility; hand placement to ensure safe transfers to various surfaces in prep for ADLs  Educated on performing functional mobility and ADLs in adherence to orthopedic precautions  Educated on weight bearing status  Will continue to educate as needed      Patient not clear to transfer with RN/PCT.    Patient left supine with all lines intact, call button in reach, and RN notified.    GOALS:   Multidisciplinary Problems       Occupational Therapy Goals          Problem: Occupational Therapy    Goal Priority Disciplines Outcome Interventions   Occupational Therapy Goal     OT, PT/OT Progressing    Description:  Goals to be met by: 7/3/24     Patient will increase functional independence with ADLs by performing:    UE Dressing with Modified [place].  LE Dressing with Modified [place] and Assistive Devices as needed.  Grooming while standing at sink with Modified [place].  Toileting from bedside commode with Modified [place] for hygiene and clothing management.   Bathing from  shower chair/bench with Modified [place].  Toilet transfer to bedside commode with Modified [place].                         History:     Past Medical History:   Diagnosis Date    Anemia     Anxiety     Arthritis     Deep vein thrombosis     Depression 2000    Renal failure          Past Surgical History:   Procedure Laterality Date    COLONOSCOPY N/A 10/29/2018    Procedure: COLONOSCOPY;  Surgeon: Mp Alvarado MD;  [facility]  Service: Endoscopy;  Laterality: N/A;  Referral received from Dr. SMOOTH Arias, [facility]  Last colonoscopy 2012-recommended f/u 5 years-past due    DILATION AND CURETTAGE OF UTERUS      GASTRIC BYPASS      PARTIAL HYSTERECTOMY      AUB    RADIOFREQUENCY ABLATION Right 9/13/2022    Procedure: RADIOFREQUENCY ABLATION, GENICULAR RIGHT COOLED;  Surgeon: Radha Justin MD;  [facility]: [facility];  Service: Pain Management;  Laterality: Right;    TONSILLECTOMY      TUBAL LIGATION Bilateral        Time Tracking:     OT Date of Treatment: 07/02/24  OT Start Time: 1056 (1200)  OT Stop Time: 1109 (1220)  OT Total Time (min): 13 min  OT Second Visit Start Time: 1200  OT Second Visit Stop Time: 1220  OT Total Time: 33 min    Billable Minutes: Evaluation 10, Self Care/Home Management 12, and Therapeutic Activity 11    7/2/2024

## 2024-07-02 NOTE — NURSING TRANSFER
Nursing Transfer Note      7/2/2024   4:15 PM    Nurse giving handoff:manjinder phillips   Nurse receiving handoff:kristofer phillips     Reason patient is being transferred: overnight obs    Transfer To: 317    Transfer via stretcher    Transfer with IV fluids     Transported by rnx2    Patient belongings transferred with patient: Yes    Chart send with patient: Yes    Notified: daughter    Patient reassessed at: 07/02/24 @1530   Upon arrival to floor: patient oriented to room, call bell in reach, and bed in lowest position

## 2024-07-02 NOTE — TRANSFER OF CARE
"Anesthesia Transfer of Care Note    Patient: Nohemi Hull    Procedure(s) Performed: Procedure(s) (LRB):  ARTHROPLASTY, KNEE, TOTAL: RIGHT: SAME DAY (Right)    Patient location: PACU    Anesthesia Type: MAC and spinal    Transport from OR: Transported from OR on 6-10 L/min O2 by face mask with adequate spontaneous ventilation    Post pain: adequate analgesia    Post assessment: no apparent anesthetic complications    Post vital signs: stable    Level of consciousness: alert, awake and oriented    Nausea/Vomiting: no nausea/vomiting    Complications: none    Transfer of care protocol was followed      Last vitals: Visit Vitals  BP (!) 89/53 (BP Location: Left arm, Patient Position: Lying)   Pulse 73   Temp 36.8 °C (98.2 °F) (Oral)   Resp 18   Ht 5' 6" (1.676 m)   Wt 65.8 kg (145 lb)   SpO2 100%   Breastfeeding No   BMI 23.40 kg/m²     "

## 2024-07-02 NOTE — INTERVAL H&P NOTE
Nohemikulwant Andujar Kurtis was interviewed, examined and the H and P reviewed.  There has been no interval change in her History and Physical.

## 2024-07-02 NOTE — HOSPITAL COURSE
On 7/2/24, the patient arrived to the [facility] pre-operative management.  Upon completion of pre-operative preparation, the patient was taken back to the operative theatre. R TKA was performed without complication and the patient was transported to the post anesthesia care unit in stable condition.  After appropriate recovery from the anaesthetic agents used during the surgery, the patient was then transported to the hospital inpatient floor.  The interim of the hospital stay from arrival on the floor up to discharge has been uncomplicated. The patient has tolerated regular diet.  The patient's pain has been controlled using a multimodal approach. Currently, the patient's pain is well controlled on an oral regimen.  The patient has been voiding without difficulty.  The patient began participation in physical therapy after surgery and has progressed throughout the entire hospital stay.  Currently, the patient's progress is sufficient to allow the them to be discharged to home safely.  The patient agrees with this assessment and desires a discharge today.

## 2024-07-02 NOTE — PLAN OF CARE
Ortho Post-Op Progress Note:    Patient seen and examined at bedside. Nohemi Hull is a 64 y.o. female who is POD0 from right TKA. At this time, they are comfortable and reports mild pain. Epidural has completely worn off by now. Vital signs are stable. Dressings are c/d/i with ice in place.    RLE: 5/5 strength with ankle DF/PF and great toe flexion/extension. Sensation to light touch intact throughout lower extremity. Palpable DP pulse.    LLE: 5/5 strength with ankle DF/PF and great toe flexion/extension. Sensation to light touch intact throughout lower extremity. Palpable DP pulse.    Labs: reviewed  Imaging: reviewed    Plan:  -- Continue with current pain control regimen  -- PT/OT. WBAT RLE  -- DVT prophylaxis: ASA 81mg BID, FCD's to be worn at all times  -- Anticipate discharge pending progress with PT/OT

## 2024-07-03 VITALS
OXYGEN SATURATION: 97 % | RESPIRATION RATE: 18 BRPM | HEART RATE: 70 BPM | TEMPERATURE: 98 F | HEIGHT: 66 IN | WEIGHT: 145 LBS | BODY MASS INDEX: 23.3 KG/M2 | DIASTOLIC BLOOD PRESSURE: 58 MMHG | SYSTOLIC BLOOD PRESSURE: 120 MMHG

## 2024-07-03 PROCEDURE — 25000003 PHARM REV CODE 250

## 2024-07-03 PROCEDURE — 97535 SELF CARE MNGMENT TRAINING: CPT

## 2024-07-03 PROCEDURE — 97110 THERAPEUTIC EXERCISES: CPT

## 2024-07-03 PROCEDURE — 51798 US URINE CAPACITY MEASURE: CPT

## 2024-07-03 PROCEDURE — 97530 THERAPEUTIC ACTIVITIES: CPT

## 2024-07-03 PROCEDURE — 99212 OFFICE O/P EST SF 10 MIN: CPT | Mod: FS,,, | Performed by: ANESTHESIOLOGY

## 2024-07-03 PROCEDURE — 63600175 PHARM REV CODE 636 W HCPCS

## 2024-07-03 PROCEDURE — 97116 GAIT TRAINING THERAPY: CPT

## 2024-07-03 RX ADMIN — CEFAZOLIN 2 G: 2 INJECTION, POWDER, FOR SOLUTION INTRAMUSCULAR; INTRAVENOUS at 02:07

## 2024-07-03 RX ADMIN — SENNOSIDES AND DOCUSATE SODIUM 1 TABLET: 50; 8.6 TABLET ORAL at 09:07

## 2024-07-03 RX ADMIN — FAMOTIDINE 20 MG: 20 TABLET ORAL at 09:07

## 2024-07-03 RX ADMIN — METHOCARBAMOL 750 MG: 750 TABLET ORAL at 09:07

## 2024-07-03 RX ADMIN — POLYETHYLENE GLYCOL 3350 17 G: 17 POWDER, FOR SOLUTION ORAL at 09:07

## 2024-07-03 RX ADMIN — APIXABAN 2.5 MG: 2.5 TABLET, FILM COATED ORAL at 09:07

## 2024-07-03 RX ADMIN — ACETAMINOPHEN 1000 MG: 500 TABLET ORAL at 06:07

## 2024-07-03 RX ADMIN — MUPIROCIN 1 G: 20 OINTMENT TOPICAL at 09:07

## 2024-07-03 NOTE — NURSING
InfuBlock Pump teaching done w/patient & patients sister.  Instructed to remove on day 5, Sunday, July 7, 2024 at 12 noon or when pump alarms infusion complete. Demonstrated and explained how to remove catheter.  Pt and pts sister verbalized understanding.  All questions answered. InfuBlock Pump home care instruction pamphlet given, home care supplies provided to patient and placed in discharge folder. Encouraged to contact on call nurse and/or anesthesia using number on brochure with any questions/concerns regarding pain, side effects. Instructed to call InfuBlock for any pump malfunction related issues. Informed that pt and family they will receive follow up calls and surveys while InfuBlock is in place.

## 2024-07-03 NOTE — ANESTHESIA POSTPROCEDURE EVALUATION
Anesthesia Post Evaluation    Patient: Nohemi Hull    Procedure(s) Performed: Procedure(s) (LRB):  ARTHROPLASTY, KNEE, TOTAL: RIGHT: SAME DAY (Right)    Final Anesthesia Type: spinal      Patient location during evaluation: PACU  Patient participation: Yes- Able to Participate  Level of consciousness: awake and alert and oriented  Post-procedure vital signs: reviewed and stable  Pain management: adequate  Airway patency: patent  NISSA mitigation strategies: Multimodal analgesia  PONV status at discharge: No PONV  Anesthetic complications: no      Cardiovascular status: blood pressure returned to baseline and hemodynamically stable  Respiratory status: unassisted, spontaneous ventilation and room air  Hydration status: euvolemic  Follow-up not needed.              Vitals Value Taken Time   /58 07/03/24 1054   Temp 36.5 °C (97.7 °F) 07/03/24 1054   Pulse 70 07/03/24 1054   Resp 18 07/03/24 1054   SpO2 97 % 07/03/24 1054         Event Time   Out of Recovery 16:10:00         Pain/Hussein Score: Pain Rating Prior to Med Admin: 2 (7/3/2024  6:01 AM)  Pain Rating Post Med Admin: 3 (7/3/2024  7:01 AM)  Hussein Score: 10 (7/2/2024  9:15 AM)

## 2024-07-03 NOTE — PROGRESS NOTES
Acute Pain Service and Perioperative Surgical Home Progress Note    HPI  Nohemi Hull is a 64 y.o., female,     Interval history    Did well overnight.  Lower extremities NV intact.  Good strength in lower extremities.  Able to stand and ambulate to restroom with assistance.  Good urine output.  Eating and drinking with no nausea or vomiting.  Pain well managed with CADD and multimodal regimen.      Surgery:  Procedure(s) (LRB):  ARTHROPLASTY, KNEE, TOTAL: RIGHT: SAME DAY (Right)    Post Op Day #: 1    Catheter type: Perineural Adductor Canal    Infusion type: Ropivacaine 0.2%, with a 7cc automatic bolus every 3 hours, combined with a 5 cc patient controlled bolus available [id number]    Problem List:    Active Hospital Problems    Diagnosis  POA    *Primary osteoarthritis of right knee [M17.11]  Yes      Resolved Hospital Problems   No resolved problems to display.       Subjective:       General appearance of alert, oriented, no complaints   Pain with rest: 3    Numbers   Pain with movement: 3    Numbers   Side Effects    1. Pruritis No    2. Nausea No    3. Motor Blockade Yes, 0=Ability to raise lower extremities off bed    4. Sedation Yes, 1=awake and alert    Schedule Medications:    acetaminophen  1,000 mg Oral Q6H    apixaban  2.5 mg Oral BID    aspirin  81 mg Oral BID    clonazePAM  2 mg Oral QHS    famotidine  20 mg Oral BID    methocarbamoL  750 mg Oral TID    mupirocin  1 g Nasal BID    oxybutynin  5 mg Oral Daily    polyethylene glycol  17 g Oral Daily    pregabalin  75 mg Oral QHS    risperiDONE  1 mg Oral QHS    senna-docusate 8.6-50 mg  1 tablet Oral BID    traZODone  450 mg Oral Nightly        Continuous Infusions:   0.9% NaCl   Intravenous Continuous 150 mL/hr at 07/02/24 2247 New Bag at 07/02/24 2247        PRN Medications:    Current Facility-Administered Medications:     bisacodyL, 10 mg, Rectal, Q12H PRN    melatonin, 6 mg, Oral, Nightly PRN    morphine, 2 mg, Intravenous, Q3H PRN     "naloxone, 0.02 mg, Intravenous, PRN    ondansetron, 4 mg, Intravenous, Q8H PRN    oxyCODONE, 5 mg, Oral, Q3H PRN    oxyCODONE, 10 mg, Oral, Q3H PRN    prochlorperazine, 5 mg, Intravenous, Q6H PRN    sodium chloride 0.9%, 10 mL, Intravenous, PRN       Antibiotics:  Antibiotics (From admission, onward)      Start     Stop Route Frequency Ordered    07/02/24 2100  mupirocin 2 % ointment 1 g         07/07/24 2059 Nasl 2 times daily 07/02/24 1548               Objective:     Catheter site clean, dry, intact          Vital Signs (Most Recent):  Temp: 98 °F (36.7 °C) (07/03/24 0417)  Pulse: (!) 56 (07/03/24 0417)  Resp: 16 (07/03/24 0417)  BP: (!) 107/53 (07/03/24 0417)  SpO2: 96 % (07/03/24 0417) Vital Signs Range (Last 24H):  Temp:  [97.5 °F (36.4 °C)-98.3 °F (36.8 °C)]   Pulse:  [45-82]   Resp:  [11-38]   BP: ()/(50-74)   SpO2:  [91 %-100 %]          I & O (Last 24H):  Intake/Output Summary (Last 24 hours) at 7/3/2024 0533  Last data filed at 7/3/2024 0420  Gross per 24 hour   Intake 2626.05 ml   Output 1600 ml   Net 1026.05 ml       Physical Exam:    GA: Alert, comfortable, no acute distress.   Pulmonary: Clear to auscultation. Normal RR.    Cardiac: regular rate and rhythm.      Laboratory: reviewed in chart  CBC: No results for input(s): "WBC", "RBC", "HGB", "HCT", "PLT", "MCV", "MCH", "MCHC" in the last 72 hours.    BMP: No results for input(s): "NA", "K", "CO2", "CL", "BUN", "CREATININE", "GLU", "MG", "PHOS", "CALCIUM" in the last 72 hours.    No results for input(s): "PT", "INR", "PROTIME", "APTT" in the last 72 hours.          Assessment:         Pain control adequate    Plan:     1) Pain: Adductor canal perineural catheter in place and infusing. Dressing in tact.  Multimodal pain regimen ordered which includes acetaminophen, celecoxib, pregabalin, and prn oxycodone.  Will continue to monitor. Plan to discharge with Perineural Pain Pump.   2) depression, emphysema: continue home regimen   3) FEN/GI: " Tolerating regular diet.     4) Dispo: Pt working well with PT/OT. Case management and SW following along for setting up home health and physical therapy. Plan to discharge home this am.          Evaluator Carey Boss PA-C

## 2024-07-03 NOTE — PLAN OF CARE
Plan of care reviewed with patient; verbalized understanding.   Medications reviewed and administered as ordered.  Rounding for safety and patient care per policy.   Safety precautions maintained. Patient working appropriately with PT/OT.  DME at bedside.  Call light within reach, bed wheels locked, bed in lowest position, side rails ^x2, safety maintained. NADN, Will continue monitor.      Problem: Adult Inpatient Plan of Care  Goal: Absence of Hospital-Acquired Illness or Injury  Outcome: Progressing  Intervention: Identify and Manage Fall Risk  Flowsheets (Taken 7/3/2024 0054)  Safety Promotion/Fall Prevention:   assistive device/personal item within reach   Fall Risk reviewed with patient/family   medications reviewed   nonskid shoes/socks when out of bed   side rails raised x 2   instructed to call staff for mobility  Intervention: Prevent and Manage VTE (Venous Thromboembolism) Risk  Flowsheets (Taken 7/3/2024 0054)  VTE Prevention/Management:   dorsiflexion/plantar flexion performed   fluids promoted   remove, assess skin, and reapply foot pump   intravenous hydration  Intervention: Prevent Infection  Flowsheets (Taken 7/3/2024 0054)  Infection Prevention:   hand hygiene promoted   single patient room provided   rest/sleep promoted

## 2024-07-03 NOTE — PT/OT/SLP PROGRESS
"Physical Therapy Treatment and Discharge Summary    Patient Name:  Nohemi Hull   MRN:  [id number]    Recommendations:     Discharge Recommendations: Low Intensity Therapy  Discharge Equipment Recommendations: bedside commode, walker, rolling, shower chair  Barriers to discharge: None    Assessment:     Nohemi Hull is a 64 y.o. female admitted with a medical diagnosis of Primary osteoarthritis of right knee.  She presents with the following impairments/functional limitations: weakness, impaired endurance, impaired self care skills, impaired functional mobility, gait instability, decreased lower extremity function, pain, decreased ROM, edema   Pt progressed eally well with PT today and was able to amb 200' with RW and SBA.    She ascended/descended 6"curb step with RW and CGA. Pt did really well with no BP issues  today and no c/o dizziness.     Pt is ready for d/c home today from PT standpoint with her dtr  to assist PRN and  will benefit from OPPT for cont PT to maximize  functional recovery, strength and ROM.  .    Rehab Prognosis: Good; patient would benefit from cont  skilled PT services post d/c  to address these deficits and reach maximum level of function.    Recent Surgery: Procedure(s) (LRB):  ARTHROPLASTY, KNEE, TOTAL: RIGHT: SAME DAY (Right) 1 Day Post-Op    Plan:     During this hospitalization, patient to be d/c home with her dtr to assist PRN and OPPT to address the identified rehab impairments via gait training, therapeutic activities, therapeutic exercises and progress toward the following goals:    Plan of Care Expires:  08/01/24    Subjective     Chief Complaint: " I am feeling much better today"  Patient/Family Comments/goals: to go home today  Pain/Comfort:  Pain Rating 1: 4/10  Location - Side 1: Right  Location 1: knee  Pain Addressed 1: Pre-medicate for activity, Reposition, Distraction  Pain Rating Post-Intervention 1: 4/10      Objective:     Communicated with RNDiane,  " "prior to session.  Patient found supine with perineural catheter, peripheral IV, FCD, cryotherapy upon PT entry to room. Pt's friend present in room.     General Precautions: Standard, fall  Orthopedic Precautions: RLE weight bearing as tolerated  Braces: N/A  Respiratory Status: Room air     Functional Mobility:  Bed Mobility:     Supine to Sit: supervision  Sit to Supine: stand by assistance  Transfers:     Sit to Stand:  stand by assistance with rolling walker with cues for hand placement  Gait: Pt amb 80 ' with RW and CGA and then 200' with RW and SBA on level surfaces with cues for sequencing, heel strike, increase R knee flex during swing phase of gait and posture.  She amb with decrease gabino and step length.   Stairs:  Pt ascended/descended 6" curb step with Rolling Walker with no handrails with Contact Guard Assistance.       AM-PAC 6 CLICK MOBILITY  Turning over in bed (including adjusting bedclothes, sheets and blankets)?: 3  Sitting down on and standing up from a chair with arms (e.g., wheelchair, bedside commode, etc.): 3  Moving from lying on back to sitting on the side of the bed?: 3  Moving to and from a bed to a chair (including a wheelchair)?: 3  Need to walk in hospital room?: 3  Climbing 3-5 steps with a railing?: 3  Basic Mobility Total Score: 18       Treatment & Education:  Patient educated on role of acute care PT and PT POC, safety while in hospital including calling nurse for mobility, and call light usage.  Educated about weightbearing as nichelle and provided cuing for adherence as appropriate during session.  Educated about importance of OOB mobility and remaining up in chair most of the day.  Pt instructed on and performed therapeutic ex's for TKA HEP (QS, AP, GS, HS, Hip abd, SLR, SAQ, LAQ) x 10 reps x 3 sets each for muscle strengthening, endurance and increase knee ROM. Pt demonstrated good understanding back to PT. Patient required skilled PT for instruction of exercises and " appropriate cues to perform exercises safely and appropriately.  Issued written handout of TKA HEP and reviewed with pt.  Pt ed on importance of elevating  LE above level of her heart 3-4 times a day and proper placement of pillows to keep knee extended and not flexed.  Pt verbalized good understanding back to PT.    Pt ed on mobility expectations at home after d/c and she verbalized good understanding back to PT  Pt ed on use of cryotherapy for edema and pain control, and on safety awareness with use of RW in the home and pt verbalized good understanding back to PT.   PT reviewed and demonstrated car transfers with pt and caregiver and answered all questions.  Pt verbalized good understanding back to PT.   PT answered all of pt's questions within PT scope of practice.    Patient left up in chair with all lines intact, call button in reach, RN, OT notified, and her friend present..    GOALS:   Multidisciplinary Problems       Physical Therapy Goals       Not on file              Multidisciplinary Problems (Resolved)          Problem: Physical Therapy    Goal Priority Disciplines Outcome Goal Variances Interventions   Physical Therapy Goal   (Resolved)     PT, PT/OT Met     Description: Goals to be met by: 7/3/24     Patient will increase functional independence with mobility by performin. Supine to sit with Supervision- Met  2. Sit to stand transfer with Stand-by Assistance - Met  3. Gait  x 150 feet with Stand-by Assistance using Rolling Walker. Met  4. Ascend/Descend 6 inch curb step with Contact Guard Assistance using Rolling Walker.-- Met  5. Lower extremity  TKA home exercise program x 30 reps per handout, with supervision- Met    Patient demonstrates a mobility limitation that significantly impairs their ability to participate in one or more mobility related activities of daily living. Patient's mobility limitation cannot be sufficiently resolved with the use of a cane, but can be sufficiently resolved  with the use of a rolling walker.The use of a rolling walker will considerably improve their ability to participate in MRADLs. Patient will use the walker on a regular basis at home.                            Time Tracking:     PT Received On: 07/03/24  PT Start Time: 0745     PT Stop Time: 0852  PT Total Time (min): 67 min     Billable Minutes: Gait Training 26, Therapeutic Activity 14, and Therapeutic Exercise 20    Treatment Type: Treatment  PT/PTA: PT     Number of PTA visits since last PT visit: 0     07/03/2024

## 2024-07-03 NOTE — PT/OT/SLP PROGRESS
"Occupational Therapy   Treatment and Discharge Note    Name: Nohemi Hull  MRN: [id number]  Admitting Diagnosis:  Primary osteoarthritis of right knee  1 Day Post-Op    Recommendations:     Discharge Recommendations: Low Intensity Therapy  Discharge Equipment Recommendations:  bedside commode, walker, rolling  Barriers to discharge:  None    Assessment:     Nohemi Hull is a 64 y.o. female with a medical diagnosis of Primary osteoarthritis of right knee.  She presents with R TKA. Performance deficits affecting function are impaired self care skills, impaired functional mobility, orthopedic precautions. Pt was able to perform Sit <> Stand Transfer with contact guard assistance with rolling walker Toilet Transfer Stand Pivot technique with contact guard assistance with rolling walker.  Able to perform UB/LB dressing c modified independence  Educated pt on bathing, car transfers, and cryotherapy.       Rehab Prognosis:  Good; patient would benefit from acute skilled OT services to address these deficits and reach maximum level of function.       Plan:     Patient to be seen daily to address the above listed problems via self-care/home management, therapeutic activities, therapeutic exercises  Plan of Care Expires: 07/03/24  Plan of Care Reviewed with: patient, family    Subjective     Chief Complaint: R TKA  Patient/Family Comments/goals: {"I feel better than yesterday."  Pain/Comfort:  Pain Rating 1: 4/10    Objective:     Communicated with: RN prior to session.  Patient found up in chair with FCD, cryotherapy, perineural catheter upon OT entry to room.    General Precautions: Standard, fall    Orthopedic Precautions:RLE weight bearing as tolerated  Braces: N/A  Respiratory Status: Room air     Occupational Performance:     Functional Mobility/Transfers:  Patient completed Sit <> Stand Transfer with contact guard assistance  with  rolling walker   Patient completed Toilet Transfer Stand Pivot " technique with contact guard assistance with  rolling walker and bedside commode  Functional Mobility: Pt was able to walk to bathroom c CGA and RW.    Activities of Daily Living:  Grooming: modified independence to wash hands while standing at sink c RW.  Upper Body Dressing: modified independence to don dress.  Lower Body Dressing: modified independence to don underwear, socks, and shoes.  Toileting: modified independence for toilet hygiene.  Bathing: mod I to wash off while sitting in chair.      [facility] 6 Click ADL: 24        Patient left up in chair with all lines intact, call button in reach, RN notified, and friend present    GOALS:   Multidisciplinary Problems       Occupational Therapy Goals          Problem: Occupational Therapy    Goal Priority Disciplines Outcome Interventions   Occupational Therapy Goal     OT, PT/OT Progressing    Description: Goals to be met by: 7/3/24     Patient will increase functional independence with ADLs by performing:    UE Dressing with Modified [place].  LE Dressing with Modified [place] and Assistive Devices as needed.  Grooming while standing at sink with Modified [place].  Toileting from bedside commode with Modified [place] for hygiene and clothing management.   Bathing from  shower chair/bench with Modified [place].  Toilet transfer to bedside commode with Modified [place].                         Time Tracking:     OT Date of Treatment: 07/03/24  OT Start Time: 0945  OT Stop Time: 1030  OT Total Time (min): 45 min    Billable Minutes:Self Care/Home Management 30  Therapeutic Activity 15               7/3/2024

## 2024-07-03 NOTE — PLAN OF CARE
Problem: Physical Therapy  Goal: Physical Therapy Goal  Description: Goals to be met by: 7/3/24     Patient will increase functional independence with mobility by performin. Supine to sit with Supervision- Met  2. Sit to stand transfer with Stand-by Assistance - Met  3. Gait  x 150 feet with Stand-by Assistance using Rolling Walker. Met  4. Ascend/Descend 6 inch curb step with Contact Guard Assistance using Rolling Walker.-- Met  5. Lower extremity  TKA home exercise program x 30 reps per handout, with supervision- Met    Patient demonstrates a mobility limitation that significantly impairs their ability to participate in one or more mobility related activities of daily living. Patient's mobility limitation cannot be sufficiently resolved with the use of a cane, but can be sufficiently resolved with the use of a rolling walker.The use of a rolling walker will considerably improve their ability to participate in MRADLs. Patient will use the walker on a regular basis at home.       Outcome: Met

## 2024-07-03 NOTE — PROGRESS NOTES
[facility])  Orthopedics  Progress Note    Patient Name: Nohemi Hull  MRN: [id number]  Admission Date: 7/2/2024  Hospital Length of Stay: 0 days  Attending Provider: Avery Campos MD  Primary Care Provider: Nataly Rose MD  Follow-up For: Procedure(s) (LRB):  ARTHROPLASTY, KNEE, TOTAL: RIGHT: SAME DAY (Right)    Post-Operative Day: 1 Day Post-Op  Subjective:     Principal Problem:Primary osteoarthritis of right knee    Principal Orthopedic Problem: R TKA    Interval History: Patient seen and examined at bedside. NAEON. Patient doing well. Pain well controlled. Patient mobilized yesterday with PT.      Review of patient's allergies indicates:  No Known Allergies    Current Facility-Administered Medications   Medication    0.9%  NaCl infusion    acetaminophen tablet 1,000 mg    ceFAZolin 2 g in 0.9% NaCl 50 mL IVPB (MB+)    ceFAZolin 2 g in D5W 50 mL IVPB (MB+)    fentaNYL 50 mcg/mL injection 100 mcg    fentaNYL 50 mcg/mL injection 25 mcg    fentaNYL 50 mcg/mL injection 25 mcg    LIDOcaine (PF) 10 mg/ml (1%) injection 10 mg    melatonin tablet 6 mg    methocarbamoL tablet 750 mg    midazolam injection 4 mg    morphine injection 2 mg    naloxone 0.4 mg/mL injection 0.02 mg    ondansetron injection 4 mg    oxyCODONE immediate release tablet 10 mg    oxyCODONE immediate release tablet 5 mg    pregabalin capsule 75 mg    prochlorperazine injection Soln 5 mg    prochlorperazine injection Soln 5 mg    ropivacaine 0.2% Perineural Pump infusion 500 ML    sodium chloride 0.9% flush 10 mL    sodium chloride 0.9% flush 10 mL    tranexamic acid (CYKLOKAPRON) 1,000 mg in 0.9% NaCl 100 mL IVPB (MB+)    tranexamic acid (CYKLOKAPRON) 1,000 mg in 0.9% NaCl 100 mL IVPB (MB+)     Objective:     Vital Signs (Most Recent):  Temp: 97.5 °F (36.4 °C) (07/02/24 0859)  Pulse: 60 (07/02/24 1430)  Resp: 20 (07/02/24 1430)  BP: 119/66 (07/02/24 1430)  SpO2: (!) 94 % (07/02/24 1430) Vital Signs (24h  "Range):  Temp:  [97.5 °F (36.4 °C)-98.2 °F (36.8 °C)] 97.5 °F (36.4 °C)  Pulse:  [45-82] 60  Resp:  [11-38] 20  SpO2:  [91 %-100 %] 94 %  BP: ()/(50-74) 119/66     Weight: 65.8 kg (145 lb)  Height: 5' 6" (167.6 cm)  Body mass index is 23.4 kg/m².      Intake/Output Summary (Last 24 hours) at 7/2/2024 1521  Last data filed at 7/2/2024 1437  Gross per 24 hour   Intake 2000 ml   Output 500 ml   Net 1500 ml        Ortho/SPM Exam  AAOx4  NAD  Reg rate  No increased WOB    RLE:  Dressing c/d/i  Polar ice in place  SILT T/SP/DP/Pearson/Sa  Motor intact T/SP/DP  WWP extremities  FCDs in place and functioning      Significant Labs: All pertinent labs within the past 24 hours have been reviewed.    Significant Imaging: I have reviewed all pertinent imaging results/findings.  Assessment/Plan:     * Primary osteoarthritis of right knee  Nohemi Hull is a 64 y.o. female is s/p R TKA on 7/2/24    Surgical dressing C/D/I, bulky dressing taken down today, ice in place  Pain control: multimodal, Anesthesia Surgical Home following  PT/OT: WBAT RLE  DVT PPx: Eliquis, FCDs at all times when not ambulating  Abx: postop Ancef  Drain: none  Ni: none    Dispo: plan to dc to home today once cleared by PT/OT            Foreign Huitron MD  Orthopedics  [facility] [place] [facility]    "

## 2024-07-03 NOTE — NURSING
Has met unit/department guidelines for discharge from each phase of the post procedure continuum. Patient discharged.  Instructions, placed in dc folder and Prescriptions given.  IV removed, tolerated well, w/ catheter intact, no redness or swelling to area. . Dressing to (R) knee remains CDI. Patient verbalized understanding instructions.  AAOx3, VSS, NADN, no complaints of pain noted at this time.  Wheelchair to private vehicle in care of family.  All personal belongings sent with pt.  Blue Bracelet given applied to pts wrist and instructions given to call # on bracelet w/any surgery related issues.

## 2024-07-05 ENCOUNTER — CLINICAL SUPPORT (OUTPATIENT)
Dept: ORTHOPEDICS | Facility: CLINIC | Age: 64
End: 2024-07-05
Payer: MEDICARE

## 2024-07-05 ENCOUNTER — CLINICAL SUPPORT (OUTPATIENT)
Dept: REHABILITATION | Facility: HOSPITAL | Age: 64
End: 2024-07-05
Payer: MEDICARE

## 2024-07-05 DIAGNOSIS — R26.9 GAIT ABNORMALITY: ICD-10-CM

## 2024-07-05 DIAGNOSIS — M25.661 DECREASED RANGE OF MOTION OF RIGHT KNEE: Primary | ICD-10-CM

## 2024-07-05 DIAGNOSIS — Z96.651 S/P TOTAL KNEE REPLACEMENT, RIGHT: Primary | ICD-10-CM

## 2024-07-05 PROBLEM — M25.669 DECREASED RANGE OF MOTION (ROM) OF KNEE: Status: ACTIVE | Noted: 2024-07-05

## 2024-07-05 PROCEDURE — 97161 PT EVAL LOW COMPLEX 20 MIN: CPT | Mod: PO

## 2024-07-05 PROCEDURE — 97110 THERAPEUTIC EXERCISES: CPT | Mod: PO

## 2024-07-05 NOTE — PLAN OF CARE
[facility]   Physical Therapy Initial Evaluation        Date: 7/5/2024   Name: Nohemi Andujar Henderson  Clinic Number: [id number]    Therapy Diagnosis:    Encounter Diagnoses   Name Primary?    Decreased range of motion of right knee Yes    Gait abnormality       Physician: Avery Campos MD     Physician Orders: PT Eval and Treat  Medical Diagnosis from Referral: Primary osteoarthritis of right knee [M17.11]   Evaluation Date: 7/5/2024  Authorization Period Expiration: 06/05/2025  Plan of Care Expiration: 9/5/24  Visit # / Visits authorized: 1/1  FOTO: 1/3    Progress Note Due on 8/5/24    Precautions: Standard and Fall    Time In: 11:00 am  Time Out: 12:00 pm  Total Billable Time (timed & untimed codes): 60 minutes    SUBJECTIVE   Date of onset: R TKA 7/2/24  History of current condition -  is a 64 y.o. female whom reports She is doing well at home following surgery. She is up on her feet 2-3x/hour trying to stay mobile. She has no issues with transferring. Her daughter is living with her and helping with ADL's. Pt has been following HEP from the doctor with no issues. Pt denies any radiating pain, calf tenderness, fever, or nausea/vomiting. Pt states that most of her pain is in the AM but alleviates as she gets moving.       Falls: no    Pain:  Current 0/10, worst 5/10, best 0/10   Location: R knee   Description: Throbbing  Aggravating Factors: walking, exercises   Easing Factors: ice    Imaging: XRAY R knee performed on 7/2/24    Prior Therapy: N/A  Social History: Pt lives with their family  Occupation: Retired   Prior Level of Function: Independent and pain free with all ADL, IADL, community mobility and functional activities.   Current Level of Function: Patient experiences considerable difficulty with their typical ADL's.     Dominant Extremity: right    Pts goals: Pt reported goals are to decrease overall pain levels in order to return to prior functional level.        Medical History:   Past Medical History:   Diagnosis Date    Anemia     Anxiety     Arthritis     Deep vein thrombosis     Depression 2000    Renal failure        Surgical History:   Nohemi Hull  has a past surgical history that includes Gastric bypass; Tonsillectomy; Dilation and curettage of uterus; Partial hysterectomy; Colonoscopy (N/A, 10/29/2018); Tubal ligation (Bilateral); Radiofrequency ablation (Right, 9/13/2022); and Total knee arthroplasty (Right, 7/2/2024).    Medications:   Nohemi has a current medication list which includes the following prescription(s): acetaminophen, apixaban, aspirin, calcium carbonate, cholecalciferol (vitamin d3), clonazepam, cyanocobalamin (vitamin b-12), ferosul, gabapentin, magnesium, methocarbamol, centrum silver, oxybutynin, oxycodone, pantoprazole, risperidone, senna-docusate 8.6-50 mg, belsomra, trazodone, triamcinolone acetonide 0.1%, and trintellix.    Allergies:   Review of patient's allergies indicates:  No Known Allergies     OBJECTIVE     Observation: Moderate swelling R knee, minor redness surrounding, lack of extension.  Pt presents to clinic with bleeding from pain pump insertion.    RANGE OF MOTION:  *denotes pain         Knee  (degrees) Right Left Goal   Knee Flexion  70 135 130   Knee Extension -10 0 -2         STRENGTH:  *denotes pain    L/E MMT Right Left Goal   Hip Flexion  NT/5 5/5 5/5   Hip Abduction  NT/5 5/5 5/5   Knee Extension NT/5 5/5 5/5   Knee Flexion NT/5 5/5 5/5   Hip IR NT/5 5/5 5/5   Hip ER NT/5 5/5 5/5   Ankle DF NT/5 5/5 5/5   Ankle PF NT/5 5/5 5/5   Ankle Inversion NT/5 5/5 5/5   Ankle Eversion NT/5 5/5 5/5       Sensation:  Sensation is intact to light touch in B lower extremities     Gait Analysis: The patient presents with the following gait abnormalities: decreased step length on right, decreased stance time on right, decreased heel strike on right, decreased push off on right, decreased knee flexion on right,  decreased knee extension on right, and decreased pelvic/trunk rotation      FUNCTION:     Intake Outcome Measure for FOTO Knee Survey    Therapist reviewed FOTO scores for Nohemi Hull on 7/5/2024.   FOTO report - see Media section or FOTO account episode details.    Intake Score: 47.5%         TREATMENT           MANUAL THERAPY TECHNIQUES to improve pain, ROM for (00) minutes including:  Intervention Performed Today                                 THERAPEUTIC EXERCISES to develop strength, endurance, ROM, flexibility, posture, and core stabilization for (15) minutes including:  Intervention Performed Today     Heel prop x 5:00 w/ ice   Quad set strap assist x 2x10   Seated heel slide x 2x10 5s hold   Seated HS stretch  x 20sx4           Home Exercises and Patient Education Provided    Education/Self-Care provided: (5) minutes  - Importance of gaining extension ROM  - Importance of keeping leg elevated to manage swelling  - HEP    Written Home Exercises Provided: yes.  Exercises were reviewed and  was able to demonstrate them prior to the end of the session.   demonstrated good understanding of the education provided.     See EMR under Patient Instructions for exercises provided 7/5/2024.    ASSESSMENT   Nohemi is a 64 y.o. female referred to outpatient Physical Therapy with a medical diagnosis of Primary osteoarthritis of right knee [M17.11] . Pt presents with impairments including: impairments list: ROM, strength, endurance, joint mobility, muscle length, balance, posture, gait mechanics, functional movement patterns, and post-operative precautions.    Pt presents to clinic with bleeding at pain pump port. Dressing was changed and pt educated to call surgeons office to have dressing replaced. Pt educated to go to urgent care if bleeding continues over weekend.     Pt prognosis is Good.   Pt will benefit from skilled outpatient Physical Therapy to address the deficits stated above and in  the chart below, provide pt/family education, and to maximize pt's level of independence.     Plan of care discussed with patient: Yes  Pt's spiritual, cultural and educational needs considered and patient is agreeable to the plan of care and goals as stated below:     Anticipated Barriers for therapy: co-morbidities    Medical Necessity is demonstrated by the following  History  Co-morbidities and personal factors that may impact the plan of care [] LOW: no personal factors / co-morbidities  [x] MODERATE: 1-2 personal factors / co-morbidities  [] HIGH: 3+ personal factors / co-morbidities    Moderate / High Support Documentation: See Past Medical History     Examination  Body Structures and Functions, activity limitations and participation restrictions that may impact the plan of care [] LOW: addressing 1-2 elements  [x] MODERATE: 3+ elements  [] HIGH: 4+ elements (please support below)    Moderate / High Support Documentation: See Evaluation Above     Clinical Presentation [x] LOW: stable  [] MODERATE: Evolving  [] HIGH: Unstable     Decision Making/ Complexity Score: low         GOALS:    Short Term Goals:  4 weeks Progress      Patient will report decreased pain to <3/10 at worst on VAS to progress toward Prior Level of Function.    Patient will report improved function by a functional limitation score of <57.5 out of 100 on FOTO.    Patient will improve AROM to 50% of stated goals in order to progress towards Prior Level of Function.    Patient will improve strength to 50% of stated goals in order to progress towards Prior Level of Function.      Long Term Goals:  8 weeks Progress     Patient will report decreased pain to <1/10 at worst on VAS to progress toward Prior Level of Function.      Patient will report improved function by a functional limitation score of <67.5 out of 100 on FOTO.    Patient will improve ROM and Functional Mobility to stated goals to return to Prior Level of Function.    Patient will  improve strength to stated goals in order to return to Prior Level of Function.         PLAN   Plan of care Certification: 7/5/2024 to 9/5/2024     Outpatient Physical Therapy 3 times weekly for 8 weeks to include any combination of the following interventions: virtual visits, dry needling, modalities, electrical stimulation (IFC, Pre-Mod, Attended with Functional Dry Needling), Cervical/Lumbar Traction, Gait Training, Manual Therapy, Neuromuscular Re-ed, Patient Education, Self Care, Therapeutic Activites, and Therapeutic Exercise     Thank you for this referral.    These services are reasonable and necessary for the conditions set forth above while under my care.    Quinton Villa, PT, DPT

## 2024-07-05 NOTE — PROGRESS NOTES
I called the patient today regarding her surgery with Dr. Avery Campos. The patient had a right TKA on 7/2/2024.     Pain Scale: 3 / 10    Any issues with Fever: No.    Any issues with medications (specifically DVT prophylaxis): No. Eliquis 2.5 BID  Celebrex : no  Protonix : yes  Resume home meds : Yes    Any issues with bowel movements:  Passing yuli: No.                                                                 Urination: No.                                                                 Constipation: No.    Completing at home exercises: Yes:     Any concerns regarding their dressing/bandage:  No.    Patient confirmed first OP-PT appointment:  Shamika on  7/5/2024 at 1100.     Any other concerns: No.    Blue Bracelet : yes    The patient was informed that if they have any urgent issues with their bandage, medications or any other health concerns regarding their surgery to call the 24/7 Orthopedic Post-op Hot Line at [phone]. The patient was reminded that if they have any chest pain or shortness of breath to call 911 or go to the ER.    The patient verbalized understanding and does not have any other questions

## 2024-07-07 NOTE — DISCHARGE SUMMARY
[facility])  Orthopedics  Discharge Summary      Patient Name: Nohemi Hull  MRN: [id number]  Admission Date: 7/2/2024  Hospital Length of Stay: 0 days  Discharge Date and Time: 7/3/2024 11:30 AM  Attending Physician: Dacia att. providers found   Discharging Provider: Foreign Huitron MD  Primary Care Provider: Nataly Rose MD    HPI:   Nohemi Hull is a 64 y.o. female with history of Right knee pain. Pain is worse with activity and weight bearing.  Patient has experienced interference of activities of daily living due to decreased range of motion and an increase in joint pain and swelling.  Patient has failed non-operative treatment including NSAIDs, corticosteroid injections, viscosupplement injections, and activity modification.  Nohemi Hull currently ambulates independently.      Relevant medical conditions of significance in perioperative period:  History of DVT- on medication managed by PCP  CKD 3a- monitored by PCP  Current smoker- 3-4 cigarettes per day     Given documented medical comorbidities including those listed above and based off of CMS criteria patient would meet inpatient admission status guidelines. This case has been approved as inpatient.     Procedure(s) (LRB):  ARTHROPLASTY, KNEE, TOTAL: RIGHT: SAME DAY (Right)      Hospital Course:  On 7/2/24, the patient arrived to the [place] or proper pre-operative management.  Upon completion of pre-operative preparation, the patient was taken back to the operative theatre. R TKA was performed without complication and the patient was transported to the post anesthesia care unit in stable condition.  After appropriate recovery from the anaesthetic agents used during the surgery, the patient was then transported to the hospital inpatient floor.  The interim of the hospital stay from arrival on the floor up to discharge has been uncomplicated. The patient has tolerated regular diet.  The patient's pain has  been controlled using a multimodal approach. Currently, the patient's pain is well controlled on an oral regimen.  The patient has been voiding without difficulty.  The patient began participation in physical therapy after surgery and has progressed throughout the entire hospital stay.  Currently, the patient's progress is sufficient to allow the them to be discharged to home safely.  The patient agrees with this assessment and desires a discharge today.      Goals of Care Treatment Preferences:  Code Status: Full Code          Significant Diagnostic Studies: No pertinent studies.    Pending Diagnostic Studies:       None          Final Active Diagnoses:    Diagnosis Date Noted POA    PRINCIPAL PROBLEM:  Primary osteoarthritis of right knee [M17.11] 01/19/2022 Yes      Problems Resolved During this Admission:      Discharged Condition: good    Disposition: Home    Follow Up:    Patient Instructions:      Activity as tolerated     Sponge bath only until clinic visit     Keep surgical extremity elevated     Lifting restrictions   Order Comments: No strenuous exercise or lifting of > 10 lbs     Weight bearing as tolerated     No driving, operating heavy equipment or signing legal documents while taking pain medication     Leave dressing on - Keep it clean, dry, and intact until clinic visit   Order Comments: Do not remove surgical dressing for 2 weeks post-op. This will be done only by MD at initial post-op visit. If dressing is completely saturated, replace with identical dressing - silver-impregnated hydrocolloid dressing.     Do not get dressings wet. Do not shower.     If dressing continues to be saturated or there are signs of infection, please call [facility] [phone] for further instructions and to make appt to be seen.     Call MD for:  temperature >100.4     Call MD for:  persistent nausea and vomiting     Call MD for:  severe uncontrolled pain     Call MD for:  difficulty breathing, headache or visual  disturbances     Call MD for:  redness, tenderness, or signs of infection (pain, swelling, redness, odor or green/yellow discharge around incision site)     Call MD for:  hives     Call MD for:  persistent dizziness or light-headedness     Call MD for:  extreme fatigue     Medications:  Reconciled Home Medications:      Medication List        CHANGE how you take these medications      acetaminophen 650 MG Tbsr  Commonly known as: TYLENOL  Take 1 tablet (650 mg total) by mouth every 8 (eight) hours.  What changed: Another medication with the same name was removed. Continue taking this medication, and follow the directions you see here.            CONTINUE taking these medications      aspirin 81 MG EC tablet  Commonly known as: ECOTRIN  Take 81 mg by mouth once daily.     BELSOMRA 20 mg Tab  Generic drug: suvorexant  Take 1 tablet by mouth every evening.     CALCIUM 300 ORAL  Take by mouth.     CENTRUM SILVER 0.4 mg-300 mcg- 250 mcg Tab  Generic drug: multivit-min-FA-lycopen-lutein  Take 1 tablet by mouth once daily.     cholecalciferol (vitamin D3) 10 mcg (400 unit) Chew  Take 400 Units by mouth once daily.     clonazePAM 1 MG tablet  Commonly known as: KlonoPIN  Take 2 mg by mouth every evening. To help with sleeping     ELIQUIS 2.5 mg Tab  Generic drug: apixaban  Take 1 tablet (2.5 mg total) by mouth 2 (two) times daily. Take for 3 months post op     FeroSuL 325 mg (65 mg iron) Tab tablet  Generic drug: ferrous sulfate  Take by mouth every other day.     gabapentin 300 MG capsule  Commonly known as: NEURONTIN  Take 300-600 mg by mouth.     magnesium 30 mg Tab  Take by mouth once.     methocarbamoL 750 MG Tab  Commonly known as: ROBAXIN  Take 1 tablet (750 mg total) by mouth 4 (four) times daily as needed (muscle spasms).     oxybutynin 5 MG Tab  Commonly known as: DITROPAN  Take 1 tablet (5 mg total) by mouth Daily.     oxyCODONE 5 MG immediate release tablet  Commonly known as: ROXICODONE  Take 1-2 tablets every  4-6 hours as needed for pain     pantoprazole 40 MG tablet  Commonly known as: PROTONIX  Take 1 tablet (40 mg total) by mouth once daily.     risperiDONE 1 MG tablet  Commonly known as: RISPERDAL  Take 1 mg by mouth every evening.     SENEXON-S 8.6-50 mg per tablet  Generic drug: senna-docusate 8.6-50 mg  Take 1 tablet by mouth once daily.     traZODone 150 MG tablet  Commonly known as: DESYREL  Take 450 mg by mouth nightly.     triamcinolone acetonide 0.1% 0.1 % cream  Commonly known as: KENALOG  Apply topically 2 (two) times daily. for 7 days     TRINTELLIX 20 mg Tab  Generic drug: vortioxetine  Take 20 mg by mouth once daily.     VITAMIN B-12 ORAL  Take 1 tablet by mouth once daily.              Foreign Huitron MD  Orthopedics  [facility]

## 2024-07-08 ENCOUNTER — CLINICAL SUPPORT (OUTPATIENT)
Dept: REHABILITATION | Facility: HOSPITAL | Age: 64
End: 2024-07-08
Payer: MEDICARE

## 2024-07-08 DIAGNOSIS — M25.661 DECREASED RANGE OF MOTION OF RIGHT KNEE: Primary | ICD-10-CM

## 2024-07-08 DIAGNOSIS — R26.9 GAIT ABNORMALITY: ICD-10-CM

## 2024-07-08 PROCEDURE — 97110 THERAPEUTIC EXERCISES: CPT | Mod: PO

## 2024-07-08 PROCEDURE — 97112 NEUROMUSCULAR REEDUCATION: CPT | Mod: PO

## 2024-07-08 NOTE — PROGRESS NOTES
[facility]   Physical Therapy Treatment Note      Name: Nohemi Andujar [place]  Clinic Number: [id number]    Therapy Diagnosis:   Encounter Diagnoses   Name Primary?    Decreased range of motion of right knee Yes    Gait abnormality      Physician: Avery Campos MD    Visit Date: 7/8/2024    Physician Orders: PT Eval and Treat  Medical Diagnosis from Referral: Primary osteoarthritis of right knee [M17.11]   Evaluation Date: 7/5/2024  Authorization Period Expiration: 06/05/2025  Plan of Care Expiration: 8/16/24  Visit # / Visits authorized: 1/20 (+1/1 Eval)  FOTO: 1/3     Progress Note Due on 8/5/24     Precautions: Standard and Fall     Time In: 11:00 am  Time Out: 11:50 am  Total Billable Time: 45 minutes - 25' 1:1    PTA Visit #: 0/5       Subjective     Patient reports: Hurting a little more today since removing my pain pump.  She was compliant with home exercise program.  Response to previous treatment: Some soreness.  Functional change: In progress    Pain: 7/10  Location: right knee      Objective      Objective Measures updated at progress report unless specified.     Treatment      received the treatments listed below:      therapeutic exercises to develop strength, endurance, ROM, and flexibility for 20 minutes including:  - Heel prop w/ ice 5:00  - Seated calf raise 3x15  - Seated HS stretch 15sx4  - Seated calf stretch 15sx4    manual therapy techniques: Joint mobilizations were applied to the: R knee for 10 minutes, including:  - PROM flexion/extension     neuromuscular re-education activities to improve: Balance, Coordination, Kinesthetic, Sense, Proprioception, and Posture for 15 minutes. The following activities were included:  - Quad set with strap 2x10 5s hold  - Strap assisted SAQ 2x10 5s hold  - Seated heel slide 3:00 5s hold    therapeutic activities to improve functional performance for 00 minutes, including:      Patient Education and Home Exercises        Education provided:   - Proper positioning for symptom relief.  - Importance of extension ROM  - RICE principals  - HEP    Written Home Exercises Provided: yes. Exercises were reviewed and  was able to demonstrate them prior to the end of the session.   demonstrated good  understanding of the education provided. See Electronic Medical Record under Patient Instructions for exercises provided during therapy sessions    Assessment     Pt tolerates first treatment session well with moderate complaints of pain. Pt able to actively flex the knee to 90 degrees with assistance. Pt continues to demonstrate moderate lack of extension ROM and considerable swelling. Will continue to reduce swelling and improve knee ROM while recruiting quadricept activation.      Is progressing well towards her goals.   Patient prognosis is Good.     Patient will continue to benefit from skilled outpatient physical therapy to address the deficits listed in the problem list box on initial evaluation, provide pt/family education and to maximize pt's level of independence in the home and community environment.     Patient's spiritual, cultural and educational needs considered and pt agreeable to plan of care and goals.     Anticipated barriers to physical therapy: co-morbidities     Goals:   Short Term Goals:  4 weeks Progress       Patient will report decreased pain to <3/10 at worst on VAS to progress toward Prior Level of Function. Progressing, not met    Patient will report improved function by a functional limitation score of <57.5 out of 100 on FOTO. Progressing, not met     Patient will improve AROM to 50% of stated goals in order to progress towards Prior Level of Function. Progressing, not met     Patient will improve strength to 50% of stated goals in order to progress towards Prior Level of Function. Progressing, not met        Long Term Goals:  8 weeks Progress      Patient will report decreased pain to <1/10 at  worst on VAS to progress toward Prior Level of Function.   Progressing, not met     Patient will report improved function by a functional limitation score of <67.5 out of 100 on FOTO. Progressing, not met     Patient will improve ROM and Functional Mobility to stated goals to return to Prior Level of Function. Progressing, not met     Patient will improve strength to stated goals in order to return to Prior Level of Function. Progressing, not met         Plan     Plan of care Certification: 7/5/2024 to 8/16/2024      Outpatient Physical Therapy 3 times weekly for 6 weeks to include any combination of the following interventions: virtual visits, dry needling, modalities, electrical stimulation (IFC, Pre-Mod, Attended with Functional Dry Needling), Cervical/Lumbar Traction, Gait Training, Manual Therapy, Neuromuscular Re-ed, Patient Education, Self Care, Therapeutic Activites, and Therapeutic Exercise        Quinton Villa, PT

## 2024-07-09 ENCOUNTER — PATIENT MESSAGE (OUTPATIENT)
Dept: ORTHOPEDICS | Facility: CLINIC | Age: 64
End: 2024-07-09
Payer: MEDICARE

## 2024-07-09 ENCOUNTER — TELEPHONE (OUTPATIENT)
Dept: ORTHOPEDICS | Facility: CLINIC | Age: 64
End: 2024-07-09
Payer: MEDICARE

## 2024-07-09 NOTE — TELEPHONE ENCOUNTER
Pt's daughter and pt called joint line today to advise pt has diarrhea and gas. Advised she could take imodium to stop the diarrhea. Also spoke to Kiara about symptoms she is having and recommened to stop taking Senna-Doc as well. Pt's daughter and pt understood conversation with no further questions.

## 2024-07-10 ENCOUNTER — CLINICAL SUPPORT (OUTPATIENT)
Dept: REHABILITATION | Facility: HOSPITAL | Age: 64
End: 2024-07-10
Payer: MEDICARE

## 2024-07-10 DIAGNOSIS — M25.661 DECREASED RANGE OF MOTION OF RIGHT KNEE: Primary | ICD-10-CM

## 2024-07-10 DIAGNOSIS — R26.9 GAIT ABNORMALITY: ICD-10-CM

## 2024-07-10 PROCEDURE — 97110 THERAPEUTIC EXERCISES: CPT | Mod: PO

## 2024-07-10 PROCEDURE — 97112 NEUROMUSCULAR REEDUCATION: CPT | Mod: PO

## 2024-07-12 ENCOUNTER — CLINICAL SUPPORT (OUTPATIENT)
Dept: REHABILITATION | Facility: HOSPITAL | Age: 64
End: 2024-07-12
Payer: MEDICARE

## 2024-07-12 DIAGNOSIS — R26.9 GAIT ABNORMALITY: ICD-10-CM

## 2024-07-12 DIAGNOSIS — M25.661 DECREASED RANGE OF MOTION OF RIGHT KNEE: Primary | ICD-10-CM

## 2024-07-12 PROCEDURE — 97112 NEUROMUSCULAR REEDUCATION: CPT | Mod: PO

## 2024-07-12 PROCEDURE — 97110 THERAPEUTIC EXERCISES: CPT | Mod: PO

## 2024-07-12 NOTE — PLAN OF CARE
07/02/24 2004   ARAAN Message   Medicare Outpatient and Observation Notification regarding financial responsibility Given to patient/caregiver;Explained to patient/caregiver;Signed/date by patient/caregiver   Date ARANA was signed 07/02/24   Time ARANA was signed 2004     Per Glynn lowery

## 2024-07-12 NOTE — PROGRESS NOTES
[facility]   Physical Therapy Treatment Note      Name: Nohemi Andujar [facility] Number: [id number]    Therapy Diagnosis:   Encounter Diagnoses   Name Primary?    Decreased range of motion of right knee Yes    Gait abnormality      Physician: Avery Campos MD    Visit Date: 7/12/2024    Physician Orders: PT Eval and Treat  Medical Diagnosis from Referral: Primary osteoarthritis of right knee [M17.11]   Evaluation Date: 7/5/2024  Authorization Period Expiration: 06/05/2025  Plan of Care Expiration: 8/16/24  Visit # / Visits authorized: 3/20 (+1/1 Eval)  FOTO: 1/3     Progress Note Due on 8/5/24     Precautions: Standard and Fall     Time In: 10:00 am  Time Out: 11:00 am  Total Billable Time: 55 minutes - 30' 1:1    PTA Visit #: 0/5       Subjective     Patient reports: Feeling much better today.   She was compliant with home exercise program.  Response to previous treatment: Some soreness.  Functional change: In progress    Pain: 7/10  Location: right knee      Objective      Objective Measures updated at progress report unless specified.     Treatment      received the treatments listed below:      therapeutic exercises to develop strength, endurance, ROM, and flexibility for 30 minutes including:  - Heel prop 1# 5:00  - Seated calf raise 3x15  - Seated HS stretch 15sx4  - Seated calf stretch 15sx4  - Ankle pumps GTB 3:00     manual therapy techniques: Joint mobilizations were applied to the: R knee for 10 minutes, including:  - PROM flexion/extension     neuromuscular re-education activities to improve: Balance, Coordination, Kinesthetic, Sense, Proprioception, and Posture for 15 minutes. The following activities were included:  - Quad set with strap NMES 30:10 5:00  - Strap assisted SAQ NMES 30:10 5:00  - Seated heel slide 3:00 5s hold    therapeutic activities to improve functional performance for 00 minutes, including:      Patient Education and Home Exercises        Education provided:   - Proper positioning for symptom relief.  - Importance of extension ROM  - RICE principals  - HEP    Written Home Exercises Provided: yes. Exercises were reviewed and  was able to demonstrate them prior to the end of the session.   demonstrated good  understanding of the education provided. See Electronic Medical Record under Patient Instructions for exercises provided during therapy sessions    Assessment     Pt tolerated session well with minor complaints of pain. NMES added to quad sets and SAQ today to facilitate quad activation and knee extension. Patient continues to make progress in weight bearing and ROM. Will continue to progress ROM and strengthening per pt response.      Is progressing well towards her goals.   Patient prognosis is Good.     Patient will continue to benefit from skilled outpatient physical therapy to address the deficits listed in the problem list box on initial evaluation, provide pt/family education and to maximize pt's level of independence in the home and community environment.     Patient's spiritual, cultural and educational needs considered and pt agreeable to plan of care and goals.     Anticipated barriers to physical therapy: co-morbidities     Goals:   Short Term Goals:  4 weeks Progress       Patient will report decreased pain to <3/10 at worst on VAS to progress toward Prior Level of Function. Progressing, not met    Patient will report improved function by a functional limitation score of <57.5 out of 100 on FOTO. Progressing, not met     Patient will improve AROM to 50% of stated goals in order to progress towards Prior Level of Function. Progressing, not met     Patient will improve strength to 50% of stated goals in order to progress towards Prior Level of Function. Progressing, not met        Long Term Goals:  8 weeks Progress      Patient will report decreased pain to <1/10 at worst on VAS to progress toward Prior Level of  Function.   Progressing, not met     Patient will report improved function by a functional limitation score of <67.5 out of 100 on FOTO. Progressing, not met     Patient will improve ROM and Functional Mobility to stated goals to return to Prior Level of Function. Progressing, not met     Patient will improve strength to stated goals in order to return to Prior Level of Function. Progressing, not met         Plan     Plan of care Certification: 7/5/2024 to 8/16/2024      Outpatient Physical Therapy 3 times weekly for 6 weeks to include any combination of the following interventions: virtual visits, dry needling, modalities, electrical stimulation (IFC, Pre-Mod, Attended with Functional Dry Needling), Cervical/Lumbar Traction, Gait Training, Manual Therapy, Neuromuscular Re-ed, Patient Education, Self Care, Therapeutic Activites, and Therapeutic Exercise        Quinton Villa, PT

## 2024-07-12 NOTE — PLAN OF CARE
[place] [facility]  Discharge Final Note    Primary Care Provider: Nataly Rose MD    Expected Discharge Date: 7/3/2024    Final Discharge Note (most recent)       Final Note - 07/03/24 1130          Final Note    Assessment Type Final Discharge Note     Anticipated Discharge Disposition Home or Self Care     Hospital Resources/Appts/Education Provided Provided patient/caregiver with written discharge plan information;Provided education on problems/symptoms using teachback                   Future Appointments   Date Time Provider Department Center   7/15/2024 11:00 AM Quinton Villa, PT [facility] [place]   7/17/2024 11:00 AM Woody Hunter, PTA [facility] [place]   7/17/2024  1:40 PM Kiara Machado NP [facility] [address]   7/19/2024 11:00 AM Quinton Villa, PT [facility] [place]   7/22/2024 11:00 AM Quinton Villa, PT [facility] [place]   7/24/2024 11:00 AM Woody Hunter, PTA [facility] [place]   7/26/2024 11:00 AM Quinton Villa, PT [facility] [place]   7/29/2024 11:00 AM Quinton Villa, PT [facility] [place]   8/14/2024  1:40 PM Kiara Machado NP [facility] [address]   9/6/2024  8:30 AM Bahman Brown MD [facility] [place]   9/25/2024  2:45 PM Avery Campos MD [facility] [address]

## 2024-07-13 DIAGNOSIS — Z96.651 S/P TOTAL KNEE REPLACEMENT, RIGHT: ICD-10-CM

## 2024-07-13 RX ORDER — OXYCODONE HYDROCHLORIDE 5 MG/1
TABLET ORAL
Qty: 50 TABLET | Refills: 0 | Status: SHIPPED | OUTPATIENT
Start: 2024-07-13

## 2024-07-15 ENCOUNTER — TELEPHONE (OUTPATIENT)
Dept: ORTHOPEDICS | Facility: CLINIC | Age: 64
End: 2024-07-15
Payer: MEDICARE

## 2024-07-15 ENCOUNTER — CLINICAL SUPPORT (OUTPATIENT)
Dept: REHABILITATION | Facility: HOSPITAL | Age: 64
End: 2024-07-15
Payer: MEDICARE

## 2024-07-15 DIAGNOSIS — R26.9 GAIT ABNORMALITY: ICD-10-CM

## 2024-07-15 DIAGNOSIS — Z96.651 S/P TOTAL KNEE REPLACEMENT, RIGHT: Primary | ICD-10-CM

## 2024-07-15 DIAGNOSIS — M25.661 DECREASED RANGE OF MOTION OF RIGHT KNEE: Primary | ICD-10-CM

## 2024-07-15 PROCEDURE — 97112 NEUROMUSCULAR REEDUCATION: CPT | Mod: KX,PO

## 2024-07-15 PROCEDURE — 97110 THERAPEUTIC EXERCISES: CPT | Mod: KX,PO

## 2024-07-15 RX ORDER — METHOCARBAMOL 750 MG/1
750 TABLET, FILM COATED ORAL 4 TIMES DAILY PRN
Qty: 40 TABLET | Refills: 0 | Status: SHIPPED | OUTPATIENT
Start: 2024-07-15

## 2024-07-15 NOTE — TELEPHONE ENCOUNTER
Pt called [facility] this morning for refill on muscle relaxer's. Pt verified pharmacy, understood conversation with no further questions.

## 2024-07-15 NOTE — PROGRESS NOTES
[facility]   Physical Therapy Treatment Note      Name: Nohemi Andujar [place]  Clinic Number: [id number]    Therapy Diagnosis:   Encounter Diagnoses   Name Primary?    Decreased range of motion of right knee Yes    Gait abnormality      Physician: Avery Campos MD    Visit Date: 7/15/2024    Physician Orders: PT Eval and Treat  Medical Diagnosis from Referral: Primary osteoarthritis of right knee [M17.11]   Evaluation Date: 7/5/2024  Authorization Period Expiration: 06/05/2025  Plan of Care Expiration: 8/16/24  Visit # / Visits authorized: 4/20 (+1/1 Eval)  FOTO: 1/3     Progress Note Due on 8/5/24     Precautions: Standard and Fall     Time In: 11:00 am  Time Out: 12:00 pm  Total Billable Time: 55 minutes - 30' 1:1    PTA Visit #: 0/5       Subjective     Patient reports: Doing better, trying to spend more time weight bearing.   She was compliant with home exercise program.  Response to previous treatment: Some soreness.  Functional change: In progress    Pain: 7/10  Location: right knee      Objective      Objective Measures updated at progress report unless specified.     Treatment      received the treatments listed below:      therapeutic exercises to develop strength, endurance, ROM, and flexibility for 30 minutes including:  - Heel prop 2# 5:00  - Seated calf raise 3x15 (educated to use BW for load)  - Seated HS stretch 20sx4  - Seated calf stretch 20sx4  - Ankle pumps GTB 3x10    manual therapy techniques: Joint mobilizations were applied to the: R knee for 10 minutes, including:  - PROM flexion/extension     neuromuscular re-education activities to improve: Balance, Coordination, Kinesthetic, Sense, Proprioception, and Posture for 15 minutes. The following activities were included:  - Quad set with strap NMES 30:10 5:00  - Strap assisted LAQ NMES 30:10 5:00  - Supine heel slide 2x10 5-10s    therapeutic activities to improve functional performance for 00 minutes,  including:      Patient Education and Home Exercises       Education provided:   - Proper positioning for symptom relief.  - Importance of extension ROM  - RICE principals  - HEP    Written Home Exercises Provided: yes. Exercises were reviewed and  was able to demonstrate them prior to the end of the session.   demonstrated good  understanding of the education provided. See Electronic Medical Record under Patient Instructions for exercises provided during therapy sessions    Assessment     Pt tolerated session well with minor complaints of pain. LAQ with NMES and supine heel slides added today and performed well with proper cueing. Load added to heel prop and tolerated well. Pt continues to make improvements in ROM each session. Will continue to progress ROM and strengthening as pt progresses towards goals.      Is progressing well towards her goals.   Patient prognosis is Good.     Patient will continue to benefit from skilled outpatient physical therapy to address the deficits listed in the problem list box on initial evaluation, provide pt/family education and to maximize pt's level of independence in the home and community environment.     Patient's spiritual, cultural and educational needs considered and pt agreeable to plan of care and goals.     Anticipated barriers to physical therapy: co-morbidities     Goals:   Short Term Goals:  4 weeks Progress       Patient will report decreased pain to <3/10 at worst on VAS to progress toward Prior Level of Function. Progressing, not met    Patient will report improved function by a functional limitation score of <57.5 out of 100 on FOTO. Progressing, not met     Patient will improve AROM to 50% of stated goals in order to progress towards Prior Level of Function. Progressing, not met     Patient will improve strength to 50% of stated goals in order to progress towards Prior Level of Function. Progressing, not met        Long Term Goals:  8 weeks  Progress      Patient will report decreased pain to <1/10 at worst on VAS to progress toward Prior Level of Function.   Progressing, not met     Patient will report improved function by a functional limitation score of <67.5 out of 100 on FOTO. Progressing, not met     Patient will improve ROM and Functional Mobility to stated goals to return to Prior Level of Function. Progressing, not met     Patient will improve strength to stated goals in order to return to Prior Level of Function. Progressing, not met         Plan     Plan of care Certification: 7/5/2024 to 8/16/2024      Outpatient Physical Therapy 3 times weekly for 6 weeks to include any combination of the following interventions: virtual visits, dry needling, modalities, electrical stimulation (IFC, Pre-Mod, Attended with Functional Dry Needling), Cervical/Lumbar Traction, Gait Training, Manual Therapy, Neuromuscular Re-ed, Patient Education, Self Care, Therapeutic Activites, and Therapeutic Exercise        Quinton Villa, PT, DPT

## 2024-07-16 NOTE — PROGRESS NOTES
[facility]   Physical Therapy Treatment Note      Name: Nohemi Andujar [place]  Clinic Number: [id number]    Therapy Diagnosis:   Encounter Diagnoses   Name Primary?    Decreased range of motion of right knee Yes    Gait abnormality      Physician: Avery Campos MD    Visit Date: 7/17/2024    Physician Orders: PT Eval and Treat  Medical Diagnosis from Referral: Primary osteoarthritis of right knee [M17.11]   Evaluation Date: 7/5/2024  Authorization Period Expiration: 06/05/2025  Plan of Care Expiration: 8/16/24  Visit # / Visits authorized: 5/20 (+1/1 Eval)    FOTO: 48%  FOTO 2: 59%  7/17/24  FOTO 3: incomplete     Progress Note Due on 8/5/24     Precautions: Standard and Fall     Time In: 11:00 am  Time Out: 12:00 pm  Total Billable Time: 55 minutes - 30' 1:1    Subjective     Patient reports: improvement in the amount of swelling   She was compliant with home exercise program.  Response to previous treatment: Some soreness.  Functional change: improved functional knee bend    Pain: 4/10  Location: right knee      Objective          7/5/24 7/17/24     Knee  (degrees) Right Right  (2 weeks and 1 day post op) Left Goal   Knee Flexion  70 100 135 130   Knee Extension -10 -10 0 -2        Treatment      received the treatments listed below:      therapeutic exercises to develop strength, endurance, ROM, and flexibility for 30 minutes including:  - Heel prop 2# 5:00  - Seated calf raise 3x15 (educated to use BW for load)  - Seated HS stretch 20sx4  - Seated calf stretch 20sx4  - Ankle pumps GTB 3x10    manual therapy techniques: Joint mobilizations were applied to the: R knee for 10 minutes, including:  - PROM flexion/extension     neuromuscular re-education activities to improve: Balance, Coordination, Kinesthetic, Sense, Proprioception, and Posture for 15 minutes. The following activities were included:  - Quad set with strap NMES 30:10 5:00  - Strap assisted LAQ NMES 30:10  5:00  - Supine heel slide 2x10 5-10s    therapeutic activities to improve functional performance for 00 minutes, including:    Patient Education and Home Exercises       Education provided:   - Proper positioning for symptom relief.  - Importance of extension ROM  - RICE principals  - HEP    Written Home Exercises Provided: yes. Exercises were reviewed and  was able to demonstrate them prior to the end of the session.   demonstrated good  understanding of the education provided. See Electronic Medical Record under Patient Instructions for exercises provided during therapy sessions    Assessment     Ms Mejia is 2 weeks and 1 day post op and is making early gains in functional knee bend. She still has extensor lag due to tightness and pain. She performs quad sets with good/fair quad control but is working hard to improve upon.     Is progressing well towards her goals.   Patient prognosis is Good.     Patient will continue to benefit from skilled outpatient physical therapy to address the deficits listed in the problem list box on initial evaluation, provide pt/family education and to maximize pt's level of independence in the home and community environment.     Patient's spiritual, cultural and educational needs considered and pt agreeable to plan of care and goals.     Anticipated barriers to physical therapy: co-morbidities     Goals:   Short Term Goals:  4 weeks Progress       Patient will report decreased pain to <3/10 at worst on VAS to progress toward Prior Level of Function. Progressing, not met    Patient will report improved function by a functional limitation score of <57.5 out of 100 on FOTO. Progressing, not met     Patient will improve AROM to 50% of stated goals in order to progress towards Prior Level of Function. Progressing, not met     Patient will improve strength to 50% of stated goals in order to progress towards Prior Level of Function. Progressing, not met        Long Term  Goals:  8 weeks Progress      Patient will report decreased pain to <1/10 at worst on VAS to progress toward Prior Level of Function.   Progressing, not met     Patient will report improved function by a functional limitation score of <67.5 out of 100 on FOTO. Progressing, not met     Patient will improve ROM and Functional Mobility to stated goals to return to Prior Level of Function. Progressing, not met     Patient will improve strength to stated goals in order to return to Prior Level of Function. Progressing, not met         Plan     Plan of care Certification: 7/5/2024 to 8/16/2024      Outpatient Physical Therapy 3 times weekly for 6 weeks to include any combination of the following interventions: virtual visits, dry needling, modalities, electrical stimulation (IFC, Pre-Mod, Attended with Functional Dry Needling), Cervical/Lumbar Traction, Gait Training, Manual Therapy, Neuromuscular Re-ed, Patient Education, Self Care, Therapeutic Activites, and Therapeutic Exercise        Woody Hunter PTA, ATC

## 2024-07-17 ENCOUNTER — CLINICAL SUPPORT (OUTPATIENT)
Dept: REHABILITATION | Facility: HOSPITAL | Age: 64
End: 2024-07-17
Payer: MEDICARE

## 2024-07-17 ENCOUNTER — OFFICE VISIT (OUTPATIENT)
Dept: ORTHOPEDICS | Facility: CLINIC | Age: 64
End: 2024-07-17
Payer: MEDICARE

## 2024-07-17 DIAGNOSIS — R21 RASH OF HAND: Primary | ICD-10-CM

## 2024-07-17 DIAGNOSIS — Z96.651 STATUS POST RIGHT KNEE REPLACEMENT: ICD-10-CM

## 2024-07-17 DIAGNOSIS — M25.661 DECREASED RANGE OF MOTION OF RIGHT KNEE: Primary | ICD-10-CM

## 2024-07-17 DIAGNOSIS — R26.9 GAIT ABNORMALITY: ICD-10-CM

## 2024-07-17 DIAGNOSIS — M17.11 PRIMARY OSTEOARTHRITIS OF RIGHT KNEE: ICD-10-CM

## 2024-07-17 PROCEDURE — 1159F MED LIST DOCD IN RCRD: CPT | Mod: CPTII,S$GLB,, | Performed by: NURSE PRACTITIONER

## 2024-07-17 PROCEDURE — 1160F RVW MEDS BY RX/DR IN RCRD: CPT | Mod: CPTII,S$GLB,, | Performed by: NURSE PRACTITIONER

## 2024-07-17 PROCEDURE — 97140 MANUAL THERAPY 1/> REGIONS: CPT | Mod: PO,CQ

## 2024-07-17 PROCEDURE — 97112 NEUROMUSCULAR REEDUCATION: CPT | Mod: PO,CQ

## 2024-07-17 PROCEDURE — 97110 THERAPEUTIC EXERCISES: CPT | Mod: PO,CQ

## 2024-07-17 PROCEDURE — 99999 PR PBB SHADOW E&M-EST. PATIENT-LVL III: CPT | Mod: PBBFAC,,, | Performed by: NURSE PRACTITIONER

## 2024-07-17 PROCEDURE — 99024 POSTOP FOLLOW-UP VISIT: CPT | Mod: S$GLB,,, | Performed by: NURSE PRACTITIONER

## 2024-07-17 RX ORDER — HYDROMORPHONE HYDROCHLORIDE 2 MG/1
TABLET ORAL
Qty: 40 TABLET | Refills: 0 | Status: SHIPPED | OUTPATIENT
Start: 2024-07-17

## 2024-07-17 RX ORDER — HYDROCORTISONE 25 MG/G
OINTMENT TOPICAL 2 TIMES DAILY
Qty: 28.35 G | Refills: 1 | Status: SHIPPED | OUTPATIENT
Start: 2024-07-17

## 2024-07-17 NOTE — PROGRESS NOTES
Nohemi Hull presents for initial post-operative visit following a right total knee arthroplasty performed by Dr. Campos on 7/2/2024.      Exam:     Ambulating well with assistive device.  Incision is clean and dry without drainage or erythema.   ROM:-10-95    Initial post-operative radiographs reviewed today revealing a well fixed and aligned prosthesis.    A/P:  2 weeks s/p right total knee replacement    - The patient was advised to keep the incision clean and dry for the next 24 hours after which she may wash the area with antibacterial soap in the shower. Will not submerge until the incision is completely healed.   - Outpatient PT ongoing at the [facility] location  - Continue eliquis for 3 months post op  - Pain medication refilled with dilaudid due to bilateral hand itching which she attributes to the pain medication because she has had that in the past    - Follow up in 4 weeks with new x-rays. Pt will call clinic with problems/concerns.

## 2024-07-19 ENCOUNTER — CLINICAL SUPPORT (OUTPATIENT)
Dept: REHABILITATION | Facility: HOSPITAL | Age: 64
End: 2024-07-19
Payer: MEDICARE

## 2024-07-19 DIAGNOSIS — R26.9 GAIT ABNORMALITY: ICD-10-CM

## 2024-07-19 DIAGNOSIS — M25.661 DECREASED RANGE OF MOTION OF RIGHT KNEE: Primary | ICD-10-CM

## 2024-07-19 PROCEDURE — 97110 THERAPEUTIC EXERCISES: CPT | Mod: KX,PO,CQ

## 2024-07-19 PROCEDURE — 97112 NEUROMUSCULAR REEDUCATION: CPT | Mod: KX,PO,CQ

## 2024-07-19 PROCEDURE — 97014 ELECTRIC STIMULATION THERAPY: CPT | Mod: KX,PO,CQ

## 2024-07-19 PROCEDURE — 97140 MANUAL THERAPY 1/> REGIONS: CPT | Mod: KX,PO,CQ

## 2024-07-19 NOTE — PROGRESS NOTES
"[facility]   Physical Therapy Treatment Note      Name: Nohemi Andujar [place]  Clinic Number: [id number]    Therapy Diagnosis:   Encounter Diagnoses   Name Primary?    Decreased range of motion of right knee Yes    Gait abnormality      Physician: Avery Campos MD    Visit Date: 7/19/2024    Physician Orders: PT Eval and Treat  Medical Diagnosis from Referral: Primary osteoarthritis of right knee [M17.11]   Evaluation Date: 7/5/2024  Authorization Period Expiration: 06/05/2025  Plan of Care Expiration: 8/16/24  Visit # / Visits authorized: 5/20 (+1/1 Eval)    FOTO: 48%  FOTO 2: 59%  7/17/24  FOTO 3: incomplete     Progress Note Due on 8/5/24     Precautions: Standard and Fall     Time In: 11:00 am  Time Out: 12:00 pm  Total Billable Time: 55 minutes (nmes, 1MT, 2 TE,  1 NMR) (KX MODIFIERS)    Subjective     Patient reports:"I have trouble sleeping because of pain in the knee". Pt agreeable to PT session  She was compliant with home exercise program.  Response to previous treatment: Some soreness.  Functional change: improved functional knee bend    Pain: 4/10  Location: right knee      Objective          7/5/24 7/17/24     Knee  (degrees) Right Right  (2 weeks and 1 day post op) Left Goal   Knee Flexion  70 100 135 130   Knee Extension -10 -10 0 -2        Treatment      received the treatments listed below:      therapeutic exercises to develop strength, endurance, ROM, and flexibility for 30 minutes including:  - Heel prop 2# 5:00  - Seated calf raise 3x15 (educated to use BW for load)  - Seated HS stretch 20sx4  - Seated calf stretch 20sx4  - Ankle pumps GTB 3x10    manual therapy techniques: Joint mobilizations were applied to the: R knee for 10 minutes, including:  - PROM flexion/extension     neuromuscular re-education activities to improve: Balance, Coordination, Kinesthetic, Sense, Proprioception, and Posture for 15 minutes. The following activities were included:  - " "Quad set with strap NMES 30:10 5:00  - Strap assisted LAQ NMES 30:10 5:00  - Supine heel slide 2x10 5-10s  - hip adduction isometric 10"x10 with theraball  therapeutic activities to improve functional performance for 00 minutes, including:    Patient Education and Home Exercises       Education provided:   - Proper positioning for symptom relief.  - Importance of extension ROM  - RICE principals  - HEP    Written Home Exercises Provided: yes. Exercises were reviewed and  was able to demonstrate them prior to the end of the session.   demonstrated good  understanding of the education provided. See Electronic Medical Record under Patient Instructions for exercises provided during therapy sessions    Assessment     Ms Mejia is 2 weeks and 1 day post op and is making early gains in functional knee flexion. Encouraged pt to cont HEP with focus on knee extension to combat extensor lag. Reviewed ambulation at home with use SPC, adjusted to appropriate height. Pt verbally confirmed understanding, good tolerance to session overall.      Is progressing well towards her goals.   Patient prognosis is Good.     Patient will continue to benefit from skilled outpatient physical therapy to address the deficits listed in the problem list box on initial evaluation, provide pt/family education and to maximize pt's level of independence in the home and community environment.     Patient's spiritual, cultural and educational needs considered and pt agreeable to plan of care and goals.     Anticipated barriers to physical therapy: co-morbidities     Goals:   Short Term Goals:  4 weeks Progress       Patient will report decreased pain to <3/10 at worst on VAS to progress toward Prior Level of Function. Progressing, not met    Patient will report improved function by a functional limitation score of <57.5 out of 100 on FOTO. Progressing, not met     Patient will improve AROM to 50% of stated goals in order to progress " towards Prior Level of Function. Progressing, not met     Patient will improve strength to 50% of stated goals in order to progress towards Prior Level of Function. Progressing, not met        Long Term Goals:  8 weeks Progress      Patient will report decreased pain to <1/10 at worst on VAS to progress toward Prior Level of Function.   Progressing, not met     Patient will report improved function by a functional limitation score of <67.5 out of 100 on FOTO. Progressing, not met     Patient will improve ROM and Functional Mobility to stated goals to return to Prior Level of Function. Progressing, not met     Patient will improve strength to stated goals in order to return to Prior Level of Function. Progressing, not met         Plan     Plan of care Certification: 7/5/2024 to 8/16/2024      Outpatient Physical Therapy 3 times weekly for 6 weeks to include any combination of the following interventions: virtual visits, dry needling, modalities, electrical stimulation (IFC, Pre-Mod, Attended with Functional Dry Needling), Cervical/Lumbar Traction, Gait Training, Manual Therapy, Neuromuscular Re-ed, Patient Education, Self Care, Therapeutic Activites, and Therapeutic Exercise        Vinayak Borden, PTA

## 2024-07-19 NOTE — PROGRESS NOTES
[facility]   Physical Therapy Treatment Note      Name: Nohemi Andujar [place]  Clinic Number: [id number]    Therapy Diagnosis:   Encounter Diagnoses   Name Primary?    Decreased range of motion of right knee Yes    Gait abnormality      Physician: Avery Campos MD    Visit Date: 7/19/2024    Physician Orders: PT Eval and Treat  Medical Diagnosis from Referral: Primary osteoarthritis of right knee [M17.11]   Evaluation Date: 7/5/2024  Authorization Period Expiration: 06/05/2025  Plan of Care Expiration: 8/16/24  Visit # / Visits authorized: 6/20 (+1/1 Eval)    FOTO: 48%  FOTO 2: 59%  7/17/24  FOTO 3: incomplete     Progress Note Due on 8/5/24     Precautions: Standard and Fall     Time In: 11:00 am  Time Out: 12:00 pm  Total Billable Time: 55 minutes - 30' 1:1    Subjective     Patient reports: improvement in the amount of swelling   She was compliant with home exercise program.  Response to previous treatment: Some soreness.  Functional change: improved functional knee bend    Pain: 4/10  Location: right knee      Objective          7/5/24 7/17/24     Knee  (degrees) Right Right  (2 weeks and 1 day post op) Left Goal   Knee Flexion  70 100 135 130   Knee Extension -10 -10 0 -2        Treatment      received the treatments listed below:      therapeutic exercises to develop strength, endurance, ROM, and flexibility for 30 minutes including:  - Heel prop 2# 5:00  - Seated calf raise 3x15 (educated to use BW for load)  - Seated HS stretch 20sx4  - Seated calf stretch 20sx4  - Ankle pumps GTB 3x10    manual therapy techniques: Joint mobilizations were applied to the: R knee for 10 minutes, including:  - PROM flexion/extension     neuromuscular re-education activities to improve: Balance, Coordination, Kinesthetic, Sense, Proprioception, and Posture for 15 minutes. The following activities were included:  - Quad set with strap NMES 30:10 5:00  - Strap assisted LAQ NMES 30:10  5:00  - Supine heel slide 2x10 5-10s    therapeutic activities to improve functional performance for 00 minutes, including:    Patient Education and Home Exercises       Education provided:   - Proper positioning for symptom relief.  - Importance of extension ROM  - RICE principals  - HEP    Written Home Exercises Provided: yes. Exercises were reviewed and  was able to demonstrate them prior to the end of the session.   demonstrated good  understanding of the education provided. See Electronic Medical Record under Patient Instructions for exercises provided during therapy sessions    Assessment     Ms Mejia is 2 weeks and 1 day post op and is making early gains in functional knee bend. She still has extensor lag due to tightness and pain. She performs quad sets with good/fair quad control but is working hard to improve upon.     Is progressing well towards her goals.   Patient prognosis is Good.     Patient will continue to benefit from skilled outpatient physical therapy to address the deficits listed in the problem list box on initial evaluation, provide pt/family education and to maximize pt's level of independence in the home and community environment.     Patient's spiritual, cultural and educational needs considered and pt agreeable to plan of care and goals.     Anticipated barriers to physical therapy: co-morbidities     Goals:   Short Term Goals:  4 weeks Progress       Patient will report decreased pain to <3/10 at worst on VAS to progress toward Prior Level of Function. Progressing, not met    Patient will report improved function by a functional limitation score of <57.5 out of 100 on FOTO. Progressing, not met     Patient will improve AROM to 50% of stated goals in order to progress towards Prior Level of Function. Progressing, not met     Patient will improve strength to 50% of stated goals in order to progress towards Prior Level of Function. Progressing, not met        Long Term  Goals:  8 weeks Progress      Patient will report decreased pain to <1/10 at worst on VAS to progress toward Prior Level of Function.   Progressing, not met     Patient will report improved function by a functional limitation score of <67.5 out of 100 on FOTO. Progressing, not met     Patient will improve ROM and Functional Mobility to stated goals to return to Prior Level of Function. Progressing, not met     Patient will improve strength to stated goals in order to return to Prior Level of Function. Progressing, not met         Plan     Plan of care Certification: 7/5/2024 to 8/16/2024      Outpatient Physical Therapy 3 times weekly for 6 weeks to include any combination of the following interventions: virtual visits, dry needling, modalities, electrical stimulation (IFC, Pre-Mod, Attended with Functional Dry Needling), Cervical/Lumbar Traction, Gait Training, Manual Therapy, Neuromuscular Re-ed, Patient Education, Self Care, Therapeutic Activites, and Therapeutic Exercise        Quinton Villa, PT, DPT

## 2024-07-22 ENCOUNTER — CLINICAL SUPPORT (OUTPATIENT)
Dept: REHABILITATION | Facility: HOSPITAL | Age: 64
End: 2024-07-22
Payer: MEDICARE

## 2024-07-22 DIAGNOSIS — Z96.651 S/P TOTAL KNEE REPLACEMENT, RIGHT: ICD-10-CM

## 2024-07-22 DIAGNOSIS — R26.9 GAIT ABNORMALITY: ICD-10-CM

## 2024-07-22 DIAGNOSIS — M25.661 DECREASED RANGE OF MOTION OF RIGHT KNEE: Primary | ICD-10-CM

## 2024-07-22 PROCEDURE — 97112 NEUROMUSCULAR REEDUCATION: CPT | Mod: KX,PO

## 2024-07-22 RX ORDER — AMOXICILLIN 250 MG
1 CAPSULE ORAL DAILY
Qty: 30 TABLET | Refills: 0 | Status: SHIPPED | OUTPATIENT
Start: 2024-07-22 | End: 2024-07-27 | Stop reason: SDUPTHER

## 2024-07-22 NOTE — PROGRESS NOTES
[facility]   Physical Therapy Treatment Note      Name: Nohemi Andujar Soldier  Clinic Number: [id number]    Therapy Diagnosis:   Encounter Diagnoses   Name Primary?    Decreased range of motion of right knee Yes    Gait abnormality      Physician: Avery Campos MD    Visit Date: 7/22/2024    Physician Orders: PT Eval and Treat  Medical Diagnosis from Referral: Primary osteoarthritis of right knee [M17.11]   Evaluation Date: 7/5/2024  Authorization Period Expiration: 06/05/2025  Plan of Care Expiration: 8/16/24  Visit # / Visits authorized: 7/20 (+1/1 Eval)    FOTO: 48%  FOTO 2: 59%  7/17/24  FOTO 3: incomplete     Progress Note Due on 8/5/24     Precautions: Standard and Fall     Time In: 11:00 am  Time Out: 12:00 pm  Total Billable Time: 55 minutes - 30' 1:1    Subjective     Patient reports:Feeling much better today, been trying to prop my heel this weekend.   She was compliant with home exercise program.  Response to previous treatment: Some soreness.  Functional change: improved functional knee bend    Pain: 2/10  Location: right knee      Objective          7/5/24 7/17/24 7/22/24    Knee  (degrees) Right Right  (2 weeks and 1 day post op) Right Goal   Knee Flexion  70 100 112 130   Knee Extension -10 -10 -6 +2        Treatment      received the treatments listed below:      therapeutic exercises to develop strength, endurance, ROM, and flexibility for 30 minutes including:  - Heel prop w/ hot pack 5:00  - Seated calf raise 3x15 (educated to use BW for load)  - Seated HS stretch 20sx4   NT   NT  - TKE OTB 2x10    manual therapy techniques: Joint mobilizations were applied to the: R knee for 00 minutes, including:  - PROM flexion/extension     neuromuscular re-education activities to improve: Balance, Coordination, Kinesthetic, Sense, Proprioception, and Posture for 25 minutes. The following activities were included:  - Quad set with strap NMES 10:10 5:00  - Strap  assisted LAQ NMES 10:10 5:00  - Strap assisted SAQ NMES 10:10 5:00  - Supine heel slide 2x10 5-10s    therapeutic activities to improve functional performance for 00 minutes, including:    Patient Education and Home Exercises       Education provided:   - Proper positioning for symptom relief.  - Importance of extension ROM  - RICE principals  - HEP    Written Home Exercises Provided: yes. Exercises were reviewed and  was able to demonstrate them prior to the end of the session.   demonstrated good  understanding of the education provided. See Electronic Medical Record under Patient Instructions for exercises provided during therapy sessions    Assessment     Pt tolerated session well with minor complaints of pain. Pt able to tolerate heel prop today with increased load and duration. TKE added today to further promote quad activation and knee extension AROM. Pt demonstrates improvements in active knee extension and flexion today. Will continue to progress ROM and strengthening per pt response.      Is progressing well towards her goals.   Patient prognosis is Good.     Patient will continue to benefit from skilled outpatient physical therapy to address the deficits listed in the problem list box on initial evaluation, provide pt/family education and to maximize pt's level of independence in the home and community environment.     Patient's spiritual, cultural and educational needs considered and pt agreeable to plan of care and goals.     Anticipated barriers to physical therapy: co-morbidities     Goals:   Short Term Goals:  4 weeks Progress       Patient will report decreased pain to <3/10 at worst on VAS to progress toward Prior Level of Function. Progressing, not met    Patient will report improved function by a functional limitation score of <57.5 out of 100 on FOTO. Progressing, not met     Patient will improve AROM to 50% of stated goals in order to progress towards Prior Level of Function.  Progressing, not met     Patient will improve strength to 50% of stated goals in order to progress towards Prior Level of Function. Progressing, not met        Long Term Goals:  8 weeks Progress      Patient will report decreased pain to <1/10 at worst on VAS to progress toward Prior Level of Function.   Progressing, not met     Patient will report improved function by a functional limitation score of <67.5 out of 100 on FOTO. Progressing, not met     Patient will improve ROM and Functional Mobility to stated goals to return to Prior Level of Function. Progressing, not met     Patient will improve strength to stated goals in order to return to Prior Level of Function. Progressing, not met         Plan     Plan of care Certification: 7/5/2024 to 8/16/2024      Outpatient Physical Therapy 3 times weekly for 6 weeks to include any combination of the following interventions: virtual visits, dry needling, modalities, electrical stimulation (IFC, Pre-Mod, Attended with Functional Dry Needling), Cervical/Lumbar Traction, Gait Training, Manual Therapy, Neuromuscular Re-ed, Patient Education, Self Care, Therapeutic Activites, and Therapeutic Exercise        Quinton Villa, PT

## 2024-07-23 NOTE — PROGRESS NOTES
"[facility]   Physical Therapy Treatment Note      Name: Nohemi Andujar Pulaski  Clinic Number: [id number]    Therapy Diagnosis:   Encounter Diagnoses   Name Primary?    Decreased range of motion of right knee Yes    Gait abnormality      Physician: Avery Campos MD    Visit Date: 7/24/2024    Physician Orders: PT Eval and Treat  Medical Diagnosis from Referral: Primary osteoarthritis of right knee [M17.11]   Evaluation Date: 7/5/2024  Authorization Period Expiration: 06/05/2025  Plan of Care Expiration: 8/16/24  Visit # / Visits authorized: 8/20 (+1/1 Eval)    FOTO: 48%  FOTO 2: 59%  7/17/24  FOTO 3: incomplete     Progress Note Due on 8/5/24     Precautions: Standard and Fall     Time In: 11:00 am  Time Out: 12:00 pm  Total Billable Time: 55 minutes - 30' 1:1    Subjective     Patient reports: "I've been doing my stretches at home, too. I can bend my knee more."  She was compliant with home exercise program.  Response to previous treatment: Some soreness.  Functional change: able to get in and out of car better    Pain: 2/10  Location: right knee      Objective          7/5/24 7/17/24 7/22/24    Knee  (degrees) Right Right  (2 weeks and 1 day post op) Right Goal   Knee Flexion  70 100 112 130   Knee Extension -10 -10 -6 +2        Treatment      received the treatments listed below:      therapeutic exercises to develop strength, endurance, ROM, and flexibility for 30 minutes including:  - Nu Stepper  - clams 2x10 each RED  - Bridges  3x10  - Heel prop w/ hot pack 5:00  - Seated calf raise 3x15 (educated to use BW for load)  - Seated HS stretch 20sx4   NT   NT  - TKE OTB 2x10    manual therapy techniques: Joint mobilizations were applied to the: R knee for 00 minutes, including:  - PROM flexion/extension     neuromuscular re-education activities to improve: Balance, Coordination, Kinesthetic, Sense, Proprioception, and Posture for 25 minutes. The following activities were " included:  - Quad set with strap NMES 10:10 5:00  - Strap assisted LAQ NMES 10:10 5:00  - Strap assisted SAQ NMES 10:10 5:00  - Supine heel slide 2x10 5-10s    therapeutic activities to improve functional performance for 00 minutes, including:    Patient Education and Home Exercises       Education provided:   - Proper positioning for symptom relief.  - Importance of extension ROM  - RICE principals  - HEP    Written Home Exercises Provided: yes. Exercises were reviewed and  was able to demonstrate them prior to the end of the session.   demonstrated good  understanding of the education provided. See Electronic Medical Record under Patient Instructions for exercises provided during therapy sessions    Assessment     Ms Mejia was introduced to Nu Stepper to improve joint mobility and CV endurance. She tolerated well with no increase aggravation. She also displays improving functional knee extension-flexion.      Is progressing well towards her goals.   Patient prognosis is Good.     Patient will continue to benefit from skilled outpatient physical therapy to address the deficits listed in the problem list box on initial evaluation, provide pt/family education and to maximize pt's level of independence in the home and community environment.     Patient's spiritual, cultural and educational needs considered and pt agreeable to plan of care and goals.     Anticipated barriers to physical therapy: co-morbidities     Goals:   Short Term Goals:  4 weeks Progress       Patient will report decreased pain to <3/10 at worst on VAS to progress toward Prior Level of Function. Progressing, not met    Patient will report improved function by a functional limitation score of <57.5 out of 100 on FOTO. Progressing, not met     Patient will improve AROM to 50% of stated goals in order to progress towards Prior Level of Function. Progressing, not met     Patient will improve strength to 50% of stated goals in order  to progress towards Prior Level of Function. Progressing, not met        Long Term Goals:  8 weeks Progress      Patient will report decreased pain to <1/10 at worst on VAS to progress toward Prior Level of Function.   Progressing, not met     Patient will report improved function by a functional limitation score of <67.5 out of 100 on FOTO. Progressing, not met     Patient will improve ROM and Functional Mobility to stated goals to return to Prior Level of Function. Progressing, not met     Patient will improve strength to stated goals in order to return to Prior Level of Function. Progressing, not met         Plan     Plan of care Certification: 7/5/2024 to 8/16/2024      Outpatient Physical Therapy 3 times weekly for 6 weeks to include any combination of the following interventions: virtual visits, dry needling, modalities, electrical stimulation (IFC, Pre-Mod, Attended with Functional Dry Needling), Cervical/Lumbar Traction, Gait Training, Manual Therapy, Neuromuscular Re-ed, Patient Education, Self Care, Therapeutic Activites, and Therapeutic Exercise        Woody Hunter PTA, ATC

## 2024-07-24 ENCOUNTER — CLINICAL SUPPORT (OUTPATIENT)
Dept: REHABILITATION | Facility: HOSPITAL | Age: 64
End: 2024-07-24
Payer: MEDICARE

## 2024-07-24 DIAGNOSIS — M25.661 DECREASED RANGE OF MOTION OF RIGHT KNEE: Primary | ICD-10-CM

## 2024-07-24 DIAGNOSIS — R26.9 GAIT ABNORMALITY: ICD-10-CM

## 2024-07-24 PROCEDURE — 97112 NEUROMUSCULAR REEDUCATION: CPT | Mod: PO,CQ

## 2024-07-24 PROCEDURE — 97110 THERAPEUTIC EXERCISES: CPT | Mod: PO,CQ

## 2024-07-26 ENCOUNTER — CLINICAL SUPPORT (OUTPATIENT)
Dept: REHABILITATION | Facility: HOSPITAL | Age: 64
End: 2024-07-26
Payer: MEDICARE

## 2024-07-26 DIAGNOSIS — R26.9 GAIT ABNORMALITY: ICD-10-CM

## 2024-07-26 DIAGNOSIS — M25.661 DECREASED RANGE OF MOTION OF RIGHT KNEE: Primary | ICD-10-CM

## 2024-07-26 PROCEDURE — 97140 MANUAL THERAPY 1/> REGIONS: CPT | Mod: KX,PO

## 2024-07-26 PROCEDURE — 97112 NEUROMUSCULAR REEDUCATION: CPT | Mod: KX,PO

## 2024-07-26 PROCEDURE — 97110 THERAPEUTIC EXERCISES: CPT | Mod: KX,PO

## 2024-07-26 NOTE — PROGRESS NOTES
[facility]   Physical Therapy Treatment Note      Name: Nohemi Andujar Champion  Clinic Number: [id number]    Therapy Diagnosis:   Encounter Diagnoses   Name Primary?    Decreased range of motion of right knee Yes    Gait abnormality      Physician: Avery Campos MD    Visit Date: 7/26/2024    Physician Orders: PT Eval and Treat  Medical Diagnosis from Referral: Primary osteoarthritis of right knee [M17.11]   Evaluation Date: 7/5/2024  Authorization Period Expiration: 06/05/2025  Plan of Care Expiration: 8/16/24  Visit # / Visits authorized: 8/20 (+1/1 Eval)    FOTO: 48%  FOTO 2: 59%  7/17/24  FOTO 3: incomplete     Progress Note Due on 8/5/24     Precautions: Standard and Fall     Time In: 11:00 am  Time Out: 12:00 am  Total Billable Time: 55 minutes     Subjective     Patient reports: Was pretty sore after last session.   She was compliant with home exercise program.  Response to previous treatment: Some soreness.  Functional change: able to get in and out of car better    Pain: 2/10  Location: right knee      Objective          7/5/24 7/17/24 7/22/24    Knee  (degrees) Right Right  (2 weeks and 1 day post op) Right Goal   Knee Flexion  70 100 112 130   Knee Extension -10 -10 -6 +2        Treatment      received the treatments listed below:      therapeutic exercises to develop strength, endurance, ROM, and flexibility for 20 minutes including:  - Nu Stepper 10:00 NT  - clams 2x10 each RED  - Bridges  3x10  - Heel prop w/ hot pack 5:00  - TKE OTB 3x10    NT          manual therapy techniques: Joint mobilizations were applied to the: R knee for 10 minutes, including:  - PROM flexion/extension   - Patellar mobilizations with heel prop Grade III/IV    neuromuscular re-education activities to improve: Balance, Coordination, Kinesthetic, Sense, Proprioception, and Posture for 25 minutes. The following activities were included:  - Quad set with strap NMES 10:10 5:00  - LAQ NMES  10:10 5:00  - Strap assisted SAQ NMES 10:10 5:00  - Supine heel slide 3x10 5-10s    therapeutic activities to improve functional performance for 00 minutes, including:    Patient Education and Home Exercises       Education provided:   - Proper positioning for symptom relief.  - Importance of extension ROM  - RICE principals  - HEP review     Written Home Exercises Provided: yes. Exercises were reviewed and  was able to demonstrate them prior to the end of the session.   demonstrated good  understanding of the education provided. See Electronic Medical Record under Patient Instructions for exercises provided during therapy sessions    Assessment     Pt tolerated session well with minor complaints of pain. Patellar mobilizations introduced today to allow for improved flexion/extension ROM and quad contraction. Pt heavily educated on importance of limiting knee flexion at rest in the home by setting pillows under knee. Pt educated to maintain heel prop throughout the day to allow for extension tolerance. Pt provided HEP including seated passive knee extension stretch to work on over the weekend, pt understanding and agreeable.      Is progressing well towards her goals.   Patient prognosis is Good.     Patient will continue to benefit from skilled outpatient physical therapy to address the deficits listed in the problem list box on initial evaluation, provide pt/family education and to maximize pt's level of independence in the home and community environment.     Patient's spiritual, cultural and educational needs considered and pt agreeable to plan of care and goals.     Anticipated barriers to physical therapy: co-morbidities     Goals:   Short Term Goals:  4 weeks Progress       Patient will report decreased pain to <3/10 at worst on VAS to progress toward Prior Level of Function. Progressing, not met    Patient will report improved function by a functional limitation score of <57.5 out of 100 on  FOTO. Progressing, not met     Patient will improve AROM to 50% of stated goals in order to progress towards Prior Level of Function. Progressing, not met     Patient will improve strength to 50% of stated goals in order to progress towards Prior Level of Function. Progressing, not met        Long Term Goals:  8 weeks Progress      Patient will report decreased pain to <1/10 at worst on VAS to progress toward Prior Level of Function.   Progressing, not met     Patient will report improved function by a functional limitation score of <67.5 out of 100 on FOTO. Progressing, not met     Patient will improve ROM and Functional Mobility to stated goals to return to Prior Level of Function. Progressing, not met     Patient will improve strength to stated goals in order to return to Prior Level of Function. Progressing, not met         Plan     Plan of care Certification: 7/5/2024 to 8/16/2024      Outpatient Physical Therapy 3 times weekly for 6 weeks to include any combination of the following interventions: virtual visits, dry needling, modalities, electrical stimulation (IFC, Pre-Mod, Attended with Functional Dry Needling), Cervical/Lumbar Traction, Gait Training, Manual Therapy, Neuromuscular Re-ed, Patient Education, Self Care, Therapeutic Activites, and Therapeutic Exercise        Quinton Villa, PT, DPT

## 2024-07-27 DIAGNOSIS — Z96.651 STATUS POST RIGHT KNEE REPLACEMENT: ICD-10-CM

## 2024-07-27 DIAGNOSIS — Z96.651 S/P TOTAL KNEE REPLACEMENT, RIGHT: ICD-10-CM

## 2024-07-27 RX ORDER — METHOCARBAMOL 750 MG/1
750 TABLET, FILM COATED ORAL 4 TIMES DAILY PRN
Qty: 40 TABLET | Refills: 0 | Status: SHIPPED | OUTPATIENT
Start: 2024-07-27

## 2024-07-27 RX ORDER — AMOXICILLIN 250 MG
1 CAPSULE ORAL DAILY
Qty: 30 TABLET | Refills: 0 | Status: SHIPPED | OUTPATIENT
Start: 2024-07-27

## 2024-07-27 RX ORDER — HYDROMORPHONE HYDROCHLORIDE 2 MG/1
TABLET ORAL
Qty: 30 TABLET | Refills: 0 | Status: SHIPPED | OUTPATIENT
Start: 2024-07-27

## 2024-07-29 ENCOUNTER — TELEPHONE (OUTPATIENT)
Dept: ORTHOPEDICS | Facility: CLINIC | Age: 64
End: 2024-07-29
Payer: MEDICARE

## 2024-07-29 ENCOUNTER — CLINICAL SUPPORT (OUTPATIENT)
Dept: REHABILITATION | Facility: HOSPITAL | Age: 64
End: 2024-07-29
Payer: MEDICARE

## 2024-07-29 DIAGNOSIS — Z96.651 S/P TOTAL KNEE REPLACEMENT, RIGHT: ICD-10-CM

## 2024-07-29 DIAGNOSIS — M25.661 DECREASED RANGE OF MOTION OF RIGHT KNEE: Primary | ICD-10-CM

## 2024-07-29 DIAGNOSIS — R26.9 GAIT ABNORMALITY: ICD-10-CM

## 2024-07-29 PROCEDURE — 97112 NEUROMUSCULAR REEDUCATION: CPT | Mod: KX,PO

## 2024-07-29 PROCEDURE — 97110 THERAPEUTIC EXERCISES: CPT | Mod: KX,PO

## 2024-07-29 RX ORDER — DEXTROMETHORPHAN HYDROBROMIDE, GUAIFENESIN 5; 100 MG/5ML; MG/5ML
650 LIQUID ORAL EVERY 8 HOURS
Qty: 120 TABLET | Refills: 0 | Status: SHIPPED | OUTPATIENT
Start: 2024-07-29 | End: 2024-07-30 | Stop reason: SDUPTHER

## 2024-07-29 NOTE — PROGRESS NOTES
[facility]   Physical Therapy Treatment Note      Name: Nohemi Andujar Henning  Clinic Number: [id number]    Therapy Diagnosis:   Encounter Diagnoses   Name Primary?    Decreased range of motion of right knee Yes    Gait abnormality      Physician: Avery Campos MD    Visit Date: 7/29/2024    Physician Orders: PT Eval and Treat  Medical Diagnosis from Referral: Primary osteoarthritis of right knee [M17.11]   Evaluation Date: 7/5/2024  Authorization Period Expiration: 06/05/2025  Plan of Care Expiration: 8/16/24  Visit # / Visits authorized: 9/20 (+1/1 Eval)    FOTO: 48%  FOTO 2: 59%  7/17/24  FOTO 3: incomplete     Progress Note Due on 8/5/24     Precautions: Standard and Fall     Time In: 11:00 am  Time Out: 12:00 am  Total Billable Time: 55 minutes - 30' 1:1    Subjective     Patient reports: Was able to do passive extension stretch 10x each day this weekend with no ^ in sx.   She was compliant with home exercise program.  Response to previous treatment: Some soreness.  Functional change: able to get in and out of car better    Pain: 2/10  Location: right knee      Objective          7/5/24 7/17/24 7/22/24 7/29/24    Knee  (degrees) Right Right  (2 weeks and 1 day post op) Right Right Goal   Knee Flexion  70 100 112 115 130   Knee Extension -10 -10 -6 -3 +2        Treatment      received the treatments listed below:      therapeutic exercises to develop strength, endurance, ROM, and flexibility for 25 minutes including:  - Nu Stepper 10:00 NT  - clams 3x10 each red  - Bridges 3x10  - Heel prop 6lb 5:00  - TKE OTB 3x10  - Seated HS stretch 20sx4    manual therapy techniques: Joint mobilizations were applied to the: R knee for 10 minutes, including:  - Patellar mobilizations with heel prop Grade III/IV    neuromuscular re-education activities to improve: Balance, Coordination, Kinesthetic, Sense, Proprioception, and Posture for 20 minutes. The following activities were  included:  - Quad set NMES 10:10 5:00  - LAQ NMES 10:10 5:00 (in chair to limit trunk extension)  - Supine heel slide 3x10 5-10s  - Supine HS curl on ball 2x10 5s    therapeutic activities to improve functional performance for 00 minutes, including:    Patient Education and Home Exercises       Education provided:   - Proper positioning for symptom relief.  - Importance of extension ROM  - RICE principals  - HEP review     Written Home Exercises Provided: yes. Exercises were reviewed and  was able to demonstrate them prior to the end of the session.   demonstrated good  understanding of the education provided. See Electronic Medical Record under Patient Instructions for exercises provided during therapy sessions    Assessment     Pt tolerated session well with minor complaints of pain. Pt able to tolerate heel prop with increased load today. Supine HS curl on ball added today to further promote LE strength/ROM and performed well following continued verbal/tactile cueing. Pt demonstrates improved knee extension ROM today following increased volume of extension stretching at home. Will continue to progress LE strengthening and ROM per pt response.      Is progressing well towards her goals.   Patient prognosis is Good.     Patient will continue to benefit from skilled outpatient physical therapy to address the deficits listed in the problem list box on initial evaluation, provide pt/family education and to maximize pt's level of independence in the home and community environment.     Patient's spiritual, cultural and educational needs considered and pt agreeable to plan of care and goals.     Anticipated barriers to physical therapy: co-morbidities     Goals:   Short Term Goals:  4 weeks Progress       Patient will report decreased pain to <3/10 at worst on VAS to progress toward Prior Level of Function. Progressing, not met    Patient will report improved function by a functional limitation score of  <57.5 out of 100 on FOTO. Progressing, not met     Patient will improve AROM to 50% of stated goals in order to progress towards Prior Level of Function. Progressing, not met     Patient will improve strength to 50% of stated goals in order to progress towards Prior Level of Function. Progressing, not met        Long Term Goals:  8 weeks Progress      Patient will report decreased pain to <1/10 at worst on VAS to progress toward Prior Level of Function.   Progressing, not met     Patient will report improved function by a functional limitation score of <67.5 out of 100 on FOTO. Progressing, not met     Patient will improve ROM and Functional Mobility to stated goals to return to Prior Level of Function. Progressing, not met     Patient will improve strength to stated goals in order to return to Prior Level of Function. Progressing, not met         Plan     Plan of care Certification: 7/5/2024 to 8/16/2024      Outpatient Physical Therapy 3 times weekly for 6 weeks to include any combination of the following interventions: virtual visits, dry needling, modalities, electrical stimulation (IFC, Pre-Mod, Attended with Functional Dry Needling), Cervical/Lumbar Traction, Gait Training, Manual Therapy, Neuromuscular Re-ed, Patient Education, Self Care, Therapeutic Activites, and Therapeutic Exercise        Quinton Villa, PT, DPT

## 2024-07-30 DIAGNOSIS — Z96.651 S/P TOTAL KNEE REPLACEMENT, RIGHT: ICD-10-CM

## 2024-07-30 RX ORDER — DEXTROMETHORPHAN HYDROBROMIDE, GUAIFENESIN 5; 100 MG/5ML; MG/5ML
650 LIQUID ORAL EVERY 8 HOURS
Qty: 120 TABLET | Refills: 0 | Status: SHIPPED | OUTPATIENT
Start: 2024-07-30

## 2024-07-31 ENCOUNTER — CLINICAL SUPPORT (OUTPATIENT)
Dept: REHABILITATION | Facility: HOSPITAL | Age: 64
End: 2024-07-31
Payer: MEDICARE

## 2024-07-31 DIAGNOSIS — R26.9 GAIT ABNORMALITY: ICD-10-CM

## 2024-07-31 DIAGNOSIS — M25.661 DECREASED RANGE OF MOTION OF RIGHT KNEE: Primary | ICD-10-CM

## 2024-07-31 PROCEDURE — 97110 THERAPEUTIC EXERCISES: CPT | Mod: KX,PO

## 2024-07-31 PROCEDURE — 97112 NEUROMUSCULAR REEDUCATION: CPT | Mod: KX,PO

## 2024-07-31 NOTE — PROGRESS NOTES
[facility]   Physical Therapy Treatment Note      Name: Nohemi Andujar Anchor  Clinic Number: [id number]    Therapy Diagnosis:   Encounter Diagnoses   Name Primary?    Decreased range of motion of right knee Yes    Gait abnormality      Physician: Avery Campos MD    Visit Date: 7/31/2024    Physician Orders: PT Eval and Treat  Medical Diagnosis from Referral: Primary osteoarthritis of right knee [M17.11]   Evaluation Date: 7/5/2024  Authorization Period Expiration: 06/05/2025  Plan of Care Expiration: 8/16/24  Visit # / Visits authorized: 10/20 (+1/1 Eval)    FOTO: 48%  FOTO 2: 59%  7/17/24  FOTO 3: incomplete     Progress Note Due on 8/5/24     Precautions: Standard and Fall     Time In: 10:40 am  Time Out: 11:10 am (had to leave due to transportation)  Total Billable Time: 25 minutes    Subjective     Patient reports: She is feeling better today.   She was compliant with home exercise program.  Response to previous treatment: Some soreness.  Functional change: able to get in and out of car better    Pain: 2/10  Location: right knee      Objective          7/5/24 7/17/24 7/22/24 7/29/24    Knee  (degrees) Right Right  (2 weeks and 1 day post op) Right Right Goal   Knee Flexion  70 100 112 115 130   Knee Extension -10 -10 -6 -3 +2        Treatment      received the treatments listed below:      therapeutic exercises to develop strength, endurance, ROM, and flexibility for 15 minutes including:  - Nu Stepper 10:00   - Heel prop 6lb 5:00    manual therapy techniques: Joint mobilizations were applied to the: R knee for 00 minutes, including:  - Patellar mobilizations with heel prop Grade III/IV    neuromuscular re-education activities to improve: Balance, Coordination, Kinesthetic, Sense, Proprioception, and Posture for 10 minutes. The following activities were included:  - Quad set NMES 10:10 5:00  - LAQ NMES 2lb 10:10 5:00 (in chair to limit trunk extension)    therapeutic  activities to improve functional performance for 00 minutes, including:    Patient Education and Home Exercises       Education provided:   - Proper positioning for symptom relief.  - Importance of extension ROM  - RICE principals  - HEP review     Written Home Exercises Provided: yes. Exercises were reviewed and  was able to demonstrate them prior to the end of the session.   demonstrated good  understanding of the education provided. See Electronic Medical Record under Patient Instructions for exercises provided during therapy sessions    Assessment     Pt tolerated session well with minor complaints of pain. Load added to LAQ today to further promote quad strength and performed well. Pt continues to make gains in passive extension. Will continue to progress ROM and strengthening as indicated.      Is progressing well towards her goals.   Patient prognosis is Good.     Patient will continue to benefit from skilled outpatient physical therapy to address the deficits listed in the problem list box on initial evaluation, provide pt/family education and to maximize pt's level of independence in the home and community environment.     Patient's spiritual, cultural and educational needs considered and pt agreeable to plan of care and goals.     Anticipated barriers to physical therapy: co-morbidities     Goals:   Short Term Goals:  4 weeks Progress       Patient will report decreased pain to <3/10 at worst on VAS to progress toward Prior Level of Function. Progressing, not met    Patient will report improved function by a functional limitation score of <57.5 out of 100 on FOTO. Progressing, not met     Patient will improve AROM to 50% of stated goals in order to progress towards Prior Level of Function. Progressing, not met     Patient will improve strength to 50% of stated goals in order to progress towards Prior Level of Function. Progressing, not met        Long Term Goals:  8 weeks Progress       Patient will report decreased pain to <1/10 at worst on VAS to progress toward Prior Level of Function.   Progressing, not met     Patient will report improved function by a functional limitation score of <67.5 out of 100 on FOTO. Progressing, not met     Patient will improve ROM and Functional Mobility to stated goals to return to Prior Level of Function. Progressing, not met     Patient will improve strength to stated goals in order to return to Prior Level of Function. Progressing, not met         Plan     Plan of care Certification: 7/5/2024 to 8/16/2024      Outpatient Physical Therapy 3 times weekly for 6 weeks to include any combination of the following interventions: virtual visits, dry needling, modalities, electrical stimulation (IFC, Pre-Mod, Attended with Functional Dry Needling), Cervical/Lumbar Traction, Gait Training, Manual Therapy, Neuromuscular Re-ed, Patient Education, Self Care, Therapeutic Activites, and Therapeutic Exercise        Quinton Villa, PT, DPT

## 2024-08-02 ENCOUNTER — CLINICAL SUPPORT (OUTPATIENT)
Dept: REHABILITATION | Facility: HOSPITAL | Age: 64
End: 2024-08-02
Payer: MEDICARE

## 2024-08-02 DIAGNOSIS — R26.9 GAIT ABNORMALITY: ICD-10-CM

## 2024-08-02 DIAGNOSIS — M25.661 DECREASED RANGE OF MOTION OF RIGHT KNEE: Primary | ICD-10-CM

## 2024-08-02 PROCEDURE — 97110 THERAPEUTIC EXERCISES: CPT | Mod: KX,PO

## 2024-08-02 PROCEDURE — 97112 NEUROMUSCULAR REEDUCATION: CPT | Mod: KX,PO

## 2024-08-02 NOTE — PROGRESS NOTES
[facility]   Physical Therapy Treatment Note      Name: Nohemi Andujar [facility] Number: [id number]    Therapy Diagnosis:   Encounter Diagnoses   Name Primary?    Decreased range of motion of right knee Yes    Gait abnormality      Physician: Avery Campos MD    Visit Date: 8/2/2024    Physician Orders: PT Eval and Treat  Medical Diagnosis from Referral: Primary osteoarthritis of right knee [M17.11]   Evaluation Date: 7/5/2024  Authorization Period Expiration: 06/05/2025  Plan of Care Expiration: 8/16/24  Visit # / Visits authorized: 10/20 (+1/1 Eval)    FOTO: 48%  FOTO 2: 59%  7/17/24  FOTO 3: incomplete     Progress Note Due on 8/5/24     Precautions: Standard and Fall     Time In: 11:00 am  Time Out: 12:00 pm  Total Billable Time: 55 minutes - 30' 1:1    Subjective     Patient reports: Has been propping a lot at home.   She was compliant with home exercise program.  Response to previous treatment: Some soreness.  Functional change: able to get in and out of car better    Pain: 2/10  Location: right knee      Objective          7/5/24 7/17/24 7/22/24 7/29/24    Knee  (degrees) Right Right  (2 weeks and 1 day post op) Right Right Goal   Knee Flexion  70 100 112 115 130   Knee Extension -10 -10 -6 -3 +2        Treatment      received the treatments listed below:      therapeutic exercises to develop strength, endurance, ROM, and flexibility for 35 minutes including:  - Nu Stepper 10:00   - clams 3x10 each RTB  - Bridges 3x10 RTB  - Heel prop 8lb 5:00  - TKE Green power band 3x10  - Seated HS stretch 20sx4  - Prone HS curl 2x10    manual therapy techniques: Joint mobilizations were applied to the: R knee for 00 minutes, including:  - Patellar mobilizations with heel prop Grade III/IV    neuromuscular re-education activities to improve: Balance, Coordination, Kinesthetic, Sense, Proprioception, and Posture for 20 minutes. The following activities were included:  - Quad  set NMES 10:10 5:00  - LAQ NMES 2lb 10:10 5:00 (in chair to limit trunk extension)  - Supine heel slide 3x10 5-10s  - Supine HS curl on ball 3x10 5s    therapeutic activities to improve functional performance for 00 minutes, including:    Patient Education and Home Exercises       Education provided:   - Proper positioning for symptom relief.  - Importance of extension ROM  - RICE principals  - HEP review     Written Home Exercises Provided: yes. Exercises were reviewed and  was able to demonstrate them prior to the end of the session.   demonstrated good  understanding of the education provided. See Electronic Medical Record under Patient Instructions for exercises provided during therapy sessions    Assessment     Pt tolerated session well with minor complaints of pain. =Load added to heel prop and tolerated well. Prone HS curl added today to promote hamstring strength and knee extension ROM and performed well with proper cueing. Will continue to progress ROM and strengthening as tolerated.      Is progressing well towards her goals.   Patient prognosis is Good.     Patient will continue to benefit from skilled outpatient physical therapy to address the deficits listed in the problem list box on initial evaluation, provide pt/family education and to maximize pt's level of independence in the home and community environment.     Patient's spiritual, cultural and educational needs considered and pt agreeable to plan of care and goals.     Anticipated barriers to physical therapy: co-morbidities     Goals:   Short Term Goals:  4 weeks Progress       Patient will report decreased pain to <3/10 at worst on VAS to progress toward Prior Level of Function. Progressing, not met    Patient will report improved function by a functional limitation score of <57.5 out of 100 on FOTO. Progressing, not met     Patient will improve AROM to 50% of stated goals in order to progress towards Prior Level of Function.  Progressing, not met     Patient will improve strength to 50% of stated goals in order to progress towards Prior Level of Function. Progressing, not met        Long Term Goals:  8 weeks Progress      Patient will report decreased pain to <1/10 at worst on VAS to progress toward Prior Level of Function.   Progressing, not met     Patient will report improved function by a functional limitation score of <67.5 out of 100 on FOTO. Progressing, not met     Patient will improve ROM and Functional Mobility to stated goals to return to Prior Level of Function. Progressing, not met     Patient will improve strength to stated goals in order to return to Prior Level of Function. Progressing, not met         Plan     Plan of care Certification: 7/5/2024 to 8/16/2024      Outpatient Physical Therapy 3 times weekly for 6 weeks to include any combination of the following interventions: virtual visits, dry needling, modalities, electrical stimulation (IFC, Pre-Mod, Attended with Functional Dry Needling), Cervical/Lumbar Traction, Gait Training, Manual Therapy, Neuromuscular Re-ed, Patient Education, Self Care, Therapeutic Activites, and Therapeutic Exercise        Quinton Villa, PT, DPT

## 2024-08-05 ENCOUNTER — CLINICAL SUPPORT (OUTPATIENT)
Dept: REHABILITATION | Facility: HOSPITAL | Age: 64
End: 2024-08-05
Payer: MEDICARE

## 2024-08-05 DIAGNOSIS — M25.661 DECREASED RANGE OF MOTION OF RIGHT KNEE: Primary | ICD-10-CM

## 2024-08-05 DIAGNOSIS — R26.9 GAIT ABNORMALITY: ICD-10-CM

## 2024-08-05 PROCEDURE — 97110 THERAPEUTIC EXERCISES: CPT | Mod: PO

## 2024-08-05 PROCEDURE — 97112 NEUROMUSCULAR REEDUCATION: CPT | Mod: PO

## 2024-08-07 ENCOUNTER — TELEPHONE (OUTPATIENT)
Dept: ORTHOPEDICS | Facility: CLINIC | Age: 64
End: 2024-08-07
Payer: MEDICARE

## 2024-08-07 ENCOUNTER — CLINICAL SUPPORT (OUTPATIENT)
Dept: REHABILITATION | Facility: HOSPITAL | Age: 64
End: 2024-08-07
Payer: MEDICARE

## 2024-08-07 DIAGNOSIS — M25.661 DECREASED RANGE OF MOTION OF RIGHT KNEE: Primary | ICD-10-CM

## 2024-08-07 DIAGNOSIS — R26.9 GAIT ABNORMALITY: ICD-10-CM

## 2024-08-07 PROCEDURE — 97110 THERAPEUTIC EXERCISES: CPT | Mod: KX,PO

## 2024-08-07 PROCEDURE — 97112 NEUROMUSCULAR REEDUCATION: CPT | Mod: KX,PO

## 2024-08-09 ENCOUNTER — CLINICAL SUPPORT (OUTPATIENT)
Dept: REHABILITATION | Facility: HOSPITAL | Age: 64
End: 2024-08-09
Payer: MEDICARE

## 2024-08-09 DIAGNOSIS — R26.9 GAIT ABNORMALITY: ICD-10-CM

## 2024-08-09 DIAGNOSIS — M25.661 DECREASED RANGE OF MOTION OF RIGHT KNEE: Primary | ICD-10-CM

## 2024-08-09 PROCEDURE — 97112 NEUROMUSCULAR REEDUCATION: CPT | Mod: KX,PO,CQ

## 2024-08-10 RX ORDER — METHOCARBAMOL 750 MG/1
750 TABLET, FILM COATED ORAL 4 TIMES DAILY
Qty: 40 TABLET | Refills: 0 | Status: SHIPPED | OUTPATIENT
Start: 2024-08-10 | End: 2024-08-20

## 2024-08-12 ENCOUNTER — CLINICAL SUPPORT (OUTPATIENT)
Dept: REHABILITATION | Facility: HOSPITAL | Age: 64
End: 2024-08-12
Payer: MEDICARE

## 2024-08-12 DIAGNOSIS — R26.9 GAIT ABNORMALITY: ICD-10-CM

## 2024-08-12 DIAGNOSIS — M25.661 DECREASED RANGE OF MOTION OF RIGHT KNEE: Primary | ICD-10-CM

## 2024-08-12 PROCEDURE — 97112 NEUROMUSCULAR REEDUCATION: CPT | Mod: KX,PO,CQ

## 2024-08-12 PROCEDURE — 97140 MANUAL THERAPY 1/> REGIONS: CPT | Mod: KX,PO,CQ

## 2024-08-12 PROCEDURE — 97110 THERAPEUTIC EXERCISES: CPT | Mod: KX,PO,CQ

## 2024-08-12 NOTE — PROGRESS NOTES
"[facility] OUTPATIENT THERAPY AND WELLNESS   Physical Therapy Treatment Note      Name: Nohemi Andujar [place]  Clinic Number: [id number]    Therapy Diagnosis:   Encounter Diagnoses   Name Primary?    Decreased range of motion of right knee Yes    Gait abnormality        Physician: Avery Campos MD    Visit Date: 8/12/2024    Physician Orders: PT Eval and Treat  Medical Diagnosis from Referral: Primary osteoarthritis of right knee [M17.11]   Evaluation Date: 7/5/2024  Authorization Period Expiration: 06/05/2025  Plan of Care Expiration: 8/16/24  Visit # / Visits authorized: 14/20 (+1/1 Eval)    NEXT MD appointment: 8/14/24    FOTO: 48%  FOTO 2: 59%  7/17/24  FOTO 3: incomplete     Progress Note Due on 8/5/24     Precautions: Standard and Fall     Time In: 11:00 am  Time Out: 12:00 pm  Total Billable Time: 60 minutes - ** KX MODIFIERS**    Subjective     Patient reports: "I do my bike in the morning."  She was compliant with home exercise program.  Response to previous treatment: Some soreness.  Functional change: Reports she is walking better    Pain: 2/10  Location: right knee      Objective          7/5/24 7/17/24 7/22/24 7/29/24    Knee  (degrees) Right Right  (2 weeks and 1 day post op) Right Right Goal   Knee Flexion  70 100 112 115 130   Knee Extension -10 -10 -6 -3 +2        Treatment      received the treatments listed below:      therapeutic exercises to develop strength, endurance, ROM, and flexibility for 25 minutes including:  - Nu Stepper 10:00 NT  - clams 3x10 each RTB NT  - Bridges 3x10 RTB NT  - Heel prop 8lb 5:00  - TKE Green power band 3x10  - Seated HS stretch 20sx4  - Prone HS curl x10 30s hold in extension     manual therapy techniques: Joint mobilizations were applied to the: R knee for 10 minutes, including:  - Patellar mobilizations with heel prop Grade III/IV    neuromuscular re-education activities to improve: Balance, Coordination, Kinesthetic, Sense, Proprioception, and Posture " "for 25 minutes. The following activities were included:  - Quad set NMES 10:10 5:00  - LAQ NMES 2lb 10:10 5:00 (in chair to limit trunk extension)  - Supine heel slide 3x10 5-10s  - Supine HS curl on ball 3x10 5s  -Step down w/ 4 "step 2x10  - B heel / toe raises 2x10     therapeutic activities to improve functional performance for 00 minutes, including:    Patient Education and Home Exercises       Education provided:   - Proper positioning for standing TKE    Written Home Exercises Provided: yes. Exercises were reviewed and  was able to demonstrate them prior to the end of the session.   demonstrated good  understanding of the education provided. See Electronic Medical Record under Patient Instructions for exercises provided during therapy sessions    Assessment     Pt cooperative throughout session. She performs step with neutral R knee and no instability. She requires two hand support and SBA for safety. She remains with extensor lag during ambulation with SPC but is working hard to improve upon.     Is progressing well towards her goals.   Patient prognosis is Good.     Patient will continue to benefit from skilled outpatient physical therapy to address the deficits listed in the problem list box on initial evaluation, provide pt/family education and to maximize pt's level of independence in the home and community environment.     Patient's spiritual, cultural and educational needs considered and pt agreeable to plan of care and goals.     Anticipated barriers to physical therapy: co-morbidities     Goals:   Short Term Goals:  4 weeks Progress       Patient will report decreased pain to <3/10 at worst on VAS to progress toward Prior Level of Function. Progressing, not met    Patient will report improved function by a functional limitation score of <57.5 out of 100 on FOTO. Progressing, not met     Patient will improve AROM to 50% of stated goals in order to progress towards Prior Level of " Function. Progressing, not met     Patient will improve strength to 50% of stated goals in order to progress towards Prior Level of Function. Progressing, not met        Long Term Goals:  8 weeks Progress      Patient will report decreased pain to <1/10 at worst on VAS to progress toward Prior Level of Function.   Progressing, not met     Patient will report improved function by a functional limitation score of <67.5 out of 100 on FOTO. Progressing, not met     Patient will improve ROM and Functional Mobility to stated goals to return to Prior Level of Function. Progressing, not met     Patient will improve strength to stated goals in order to return to Prior Level of Function. Progressing, not met         Plan     Plan of care Certification: 7/5/2024 to 8/16/2024      Outpatient Physical Therapy 3 times weekly for 6 weeks to include any combination of the following interventions: virtual visits, dry needling, modalities, electrical stimulation (IFC, Pre-Mod, Attended with Functional Dry Needling), Cervical/Lumbar Traction, Gait Training, Manual Therapy, Neuromuscular Re-ed, Patient Education, Self Care, Therapeutic Activites, and Therapeutic Exercise        Woody Hunter PTA, ATC

## 2024-08-13 DIAGNOSIS — Z96.651 S/P TOTAL KNEE REPLACEMENT, RIGHT: Primary | ICD-10-CM

## 2024-08-14 ENCOUNTER — HOSPITAL ENCOUNTER (OUTPATIENT)
Dept: RADIOLOGY | Facility: HOSPITAL | Age: 64
Discharge: HOME | End: 2024-08-14
Attending: NURSE PRACTITIONER
Payer: MEDICARE

## 2024-08-14 ENCOUNTER — OFFICE VISIT (OUTPATIENT)
Dept: ORTHOPEDICS | Facility: CLINIC | Age: 64
End: 2024-08-14
Payer: MEDICARE

## 2024-08-14 ENCOUNTER — TELEPHONE (OUTPATIENT)
Dept: ORTHOPEDICS | Facility: CLINIC | Age: 64
End: 2024-08-14
Payer: MEDICARE

## 2024-08-14 ENCOUNTER — CLINICAL SUPPORT (OUTPATIENT)
Dept: REHABILITATION | Facility: HOSPITAL | Age: 64
End: 2024-08-14
Payer: MEDICARE

## 2024-08-14 DIAGNOSIS — M25.661 DECREASED RANGE OF MOTION OF RIGHT KNEE: Primary | ICD-10-CM

## 2024-08-14 DIAGNOSIS — R26.9 GAIT ABNORMALITY: ICD-10-CM

## 2024-08-14 DIAGNOSIS — Z96.651 S/P TOTAL KNEE REPLACEMENT, RIGHT: ICD-10-CM

## 2024-08-14 DIAGNOSIS — Z96.651 STATUS POST RIGHT KNEE REPLACEMENT: ICD-10-CM

## 2024-08-14 PROCEDURE — 97112 NEUROMUSCULAR REEDUCATION: CPT | Mod: KX,PO

## 2024-08-14 PROCEDURE — 1160F RVW MEDS BY RX/DR IN RCRD: CPT | Mod: CPTII,S$GLB,, | Performed by: NURSE PRACTITIONER

## 2024-08-14 PROCEDURE — 73560 X-RAY EXAM OF KNEE 1 OR 2: CPT | Mod: 26,59,LT, | Performed by: RADIOLOGY

## 2024-08-14 PROCEDURE — 73562 X-RAY EXAM OF KNEE 3: CPT | Mod: TC,RT

## 2024-08-14 PROCEDURE — 99999 PR PBB SHADOW E&M-EST. PATIENT-LVL III: CPT | Mod: PBBFAC,,, | Performed by: NURSE PRACTITIONER

## 2024-08-14 PROCEDURE — 99024 POSTOP FOLLOW-UP VISIT: CPT | Mod: S$GLB,,, | Performed by: NURSE PRACTITIONER

## 2024-08-14 PROCEDURE — 1159F MED LIST DOCD IN RCRD: CPT | Mod: CPTII,S$GLB,, | Performed by: NURSE PRACTITIONER

## 2024-08-14 PROCEDURE — 73562 X-RAY EXAM OF KNEE 3: CPT | Mod: 26,RT,, | Performed by: RADIOLOGY

## 2024-08-14 RX ORDER — HYDROMORPHONE HYDROCHLORIDE 2 MG/1
TABLET ORAL
Qty: 30 TABLET | Refills: 0 | Status: SHIPPED | OUTPATIENT
Start: 2024-08-14

## 2024-08-14 NOTE — PROGRESS NOTES
Nohemi Hull presents for 6 week post-operative visit following a right total knee arthroplasty performed by Dr. Campos on 7/2/2024.    Exam:     Ambulating well with assistive device for long distances.  Incision is clean and dry without drainage or erythema.   ROM:0-120    Post-operative radiographs reviewed today revealing a well fixed and aligned prosthesis.    A/P:  6 weeks s/p right total knee replacement     - Outpatient PT ongoing at the [facility] location. She will be ending next week as she is 6 weeks post op  - Pain medication refilled     - Follow up in 6 weeks with Dr. Campos. Pt will call clinic with problems/concerns.

## 2024-08-14 NOTE — TELEPHONE ENCOUNTER
"----- Message from Kaleb Cope MA sent at 8/14/2024  4:26 PM CDT -----    ----- Message -----  From: Re Dumont  Sent: 8/14/2024   3:53 PM CDT  To: Jenanette Craig Staff    Pt Requesting Call Back    Who called: pt  Who called for pt:  Best call back #:  [phone]  Add notes: pt said she would like to speak to NAT Machado regarding her "recovery"; pt said she wants to know  when will staff stop rehab and that she have a question regarding her "exercises"  "

## 2024-08-14 NOTE — TELEPHONE ENCOUNTER
Returned call to patient and left a voicemail advising her to call the joint hotline with questions about her recovery.

## 2024-08-14 NOTE — PROGRESS NOTES
[facility]   Physical Therapy Treatment Note      Name: Nohemi Andujar Ovid  Clinic Number: [id number]    Therapy Diagnosis:   Encounter Diagnoses   Name Primary?    Decreased range of motion of right knee Yes    Gait abnormality        Physician: Avery Campos MD    Visit Date: 8/14/2024    Physician Orders: PT Eval and Treat  Medical Diagnosis from Referral: Primary osteoarthritis of right knee [M17.11]   Evaluation Date: 7/5/2024  Authorization Period Expiration: 06/05/2025  Plan of Care Expiration: 8/16/24  Visit # / Visits authorized: 16/20 (+1/1 Eval)    NEXT MD appointment: 8/14/24    FOTO: 48%  FOTO 2: 59%  7/17/24  FOTO 3: incomplete     Progress Note Due on 8/5/24     Precautions: Standard and Fall     Time In: 9:55 am  Time Out: 11:00 am  Total Billable Time: 60 minutes - 30' 1:1 ** KX MODIFIERS**    Subjective     Patient reports: Did some exercises this morning.   She was compliant with home exercise program.  Response to previous treatment: Some soreness.  Functional change: Reports she is walking better    Pain: 2/10  Location: right knee      Objective          7/5/24 7/17/24 7/22/24 7/29/24 8/14/24    Knee  (degrees) Right Right  (2 weeks and 1 day post op) Right Right Right Goal   Knee Flexion  70 100 112 115 115 130   Knee Extension -10 -10 -6 -3 -2 +2        Treatment      received the treatments listed below:      therapeutic exercises to develop strength, endurance, ROM, and flexibility for 20 minutes including:  - Nu Stepper 10:00 NT  - LAQ 3lb 3x10  - Heel prop 10lb 5:00  - Seated HS stretch 20sx4  - Prone hang 3:00 1.5lb (hot pack to hamstring muscle belly to promote relaxation)  - Shuttle press B 3x10 2 bands     manual therapy techniques: Joint mobilizations were applied to the: R knee for 00 minutes, including:  - Patellar mobilizations with heel prop Grade III/IV    neuromuscular re-education activities to improve: Balance, Coordination,  "Kinesthetic, Sense, Proprioception, and Posture for 40 minutes. The following activities were included:  - Quad set NMES 10:10 5:00 heel prop   - clams 3x10 each GTB   - Bridges 3x10 GTB   - Supine heel slide 3x10 5-10s  - Supine HS curl on ball 3x10 5s  - Step down w/ 4 "step 2x10  - B standing calf raise 3x10 on HR  - Sit to stand 2x10  - TKE Black TB band 3x10    therapeutic activities to improve functional performance for 00 minutes, including:    Patient Education and Home Exercises       Education provided:   - Proper positioning for standing TKE    Written Home Exercises Provided: yes. Exercises were reviewed and  was able to demonstrate them prior to the end of the session.   demonstrated good  understanding of the education provided. See Electronic Medical Record under Patient Instructions for exercises provided during therapy sessions    Assessment     Pt tolerated session well with no complaints of pain. Sit to stand and shuttle press added today in order to further promote closed chain LE strengthening and performed well with proper verbal/visual cueing. Load added to LAQ, TKE, and heel prop and tolerated well. Pt demonstrates improved lateral step down with decreased hip drop while using mirror for external cueing. Will continue to progress ROM and strengthening as tolerated.      Is progressing well towards her goals.   Patient prognosis is Good.     Patient will continue to benefit from skilled outpatient physical therapy to address the deficits listed in the problem list box on initial evaluation, provide pt/family education and to maximize pt's level of independence in the home and community environment.     Patient's spiritual, cultural and educational needs considered and pt agreeable to plan of care and goals.     Anticipated barriers to physical therapy: co-morbidities     Goals:   Short Term Goals:  4 weeks Progress       Patient will report decreased pain to <3/10 at worst on " VAS to progress toward Prior Level of Function. Progressing, not met    Patient will report improved function by a functional limitation score of <57.5 out of 100 on FOTO. Progressing, not met     Patient will improve AROM to 50% of stated goals in order to progress towards Prior Level of Function. Progressing, not met     Patient will improve strength to 50% of stated goals in order to progress towards Prior Level of Function. Progressing, not met        Long Term Goals:  8 weeks Progress      Patient will report decreased pain to <1/10 at worst on VAS to progress toward Prior Level of Function.   Progressing, not met     Patient will report improved function by a functional limitation score of <67.5 out of 100 on FOTO. Progressing, not met     Patient will improve ROM and Functional Mobility to stated goals to return to Prior Level of Function. Progressing, not met     Patient will improve strength to stated goals in order to return to Prior Level of Function. Progressing, not met         Plan     Plan of care Certification: 7/5/2024 to 8/16/2024      Outpatient Physical Therapy 3 times weekly for 6 weeks to include any combination of the following interventions: virtual visits, dry needling, modalities, electrical stimulation (IFC, Pre-Mod, Attended with Functional Dry Needling), Cervical/Lumbar Traction, Gait Training, Manual Therapy, Neuromuscular Re-ed, Patient Education, Self Care, Therapeutic Activites, and Therapeutic Exercise        Quinton Villa, PT, DPT

## 2024-08-15 ENCOUNTER — TELEPHONE (OUTPATIENT)
Dept: ORTHOPEDICS | Facility: CLINIC | Age: 64
End: 2024-08-15
Payer: MEDICARE

## 2024-08-15 NOTE — TELEPHONE ENCOUNTER
Patient called the total [facility] line with a few questions that she forgot to ask during her 6 week postop visit yesterday.  Patient asked went to stop going to outpatient physical therapy.  She was instructed to stop going after her visit next Friday.  She also had a question about how long she can you have her medications refilled, mainly concerned about acetaminophen 650 mg and methocarbamol.  I noted that we typically refill these meds up to 3 months after surgery, and if needed after, the medication refills can be transferred to her PCP.    She denied having any other questions.  She will call the [facility] line for any further questions or concerns.

## 2024-08-15 NOTE — TELEPHONE ENCOUNTER
Pt called [facility] line this afternoon with questions about PT, medications, and crossing legs. Phone call passed over to Gigi to take over discussion.

## 2024-08-16 ENCOUNTER — TELEPHONE (OUTPATIENT)
Dept: ORTHOPEDICS | Facility: CLINIC | Age: 64
End: 2024-08-16
Payer: MEDICARE

## 2024-08-19 ENCOUNTER — CLINICAL SUPPORT (OUTPATIENT)
Dept: REHABILITATION | Facility: HOSPITAL | Age: 64
End: 2024-08-19
Payer: MEDICARE

## 2024-08-19 DIAGNOSIS — R26.9 GAIT ABNORMALITY: ICD-10-CM

## 2024-08-19 DIAGNOSIS — M25.661 DECREASED RANGE OF MOTION OF RIGHT KNEE: Primary | ICD-10-CM

## 2024-08-19 PROCEDURE — 97110 THERAPEUTIC EXERCISES: CPT | Mod: KX,PO,CQ

## 2024-08-19 PROCEDURE — 97112 NEUROMUSCULAR REEDUCATION: CPT | Mod: KX,PO,CQ

## 2024-08-19 NOTE — PROGRESS NOTES
[facility]   Physical Therapy Treatment Note      Name: Nohemi Andujar [place]  Clinic Number: [id number]    Therapy Diagnosis:   Encounter Diagnoses   Name Primary?    Decreased range of motion of right knee Yes    Gait abnormality          Physician: Avery Campos MD    Visit Date: 8/19/2024    Physician Orders: PT Eval and Treat  Medical Diagnosis from Referral: Primary osteoarthritis of right knee [M17.11]   Evaluation Date: 7/5/2024  Authorization Period Expiration: 06/05/2025  Plan of Care Expiration: 8/16/24  Visit # / Visits authorized: 17/20 (+1/1 Eval)    NEXT MD appointment: 9/25/24    FOTO: 48%  FOTO 2: 59%  7/17/24  FOTO 3: incomplete     Progress Note Due on 8/5/24     Precautions: Standard and Fall     Time In: 9:00 am  Time Out: 10:00 am  Total Billable Time: 60 minutes  ** KX MODIFIERS**    Subjective     Patient reports:I had a bad pain on Sunday morning and I've been doing exercises twice a day  She was compliant with home exercise program.  Response to previous treatment: Some soreness.  Functional change: Reports she is walking better    Pain: 2/10  Location: right knee      Objective          7/5/24 7/17/24 7/22/24 7/29/24 8/14/24 GOAL   Knee  (degrees) Right Right  (2 weeks and 1 day post op) Right Right Right    Knee Flexion  70 100 112 115 115  130   Knee Extension -10 -10 -6 -3 -2   +2        Treatment      received the treatments listed below:      therapeutic exercises to develop strength, endurance, ROM, and flexibility for 20 minutes including:  - Nu Stepper 10:00 NT  - LAQ 3lb 3x10 with NMES  - Heel prop 10lb 5:00  - Seated HS stretch 20sx4  - Prone hang 3:00 1.5lb (hot pack to hamstring muscle belly to promote relaxation)  - Shuttle press B 3x10 2 bands     manual therapy techniques: Joint mobilizations were applied to the: R knee for 00 minutes, including:  - Patellar mobilizations with heel prop Grade III/IV    neuromuscular re-education  "activities to improve: Balance, Coordination, Kinesthetic, Sense, Proprioception, and Posture for 40 minutes. The following activities were included:  - Quad set NMES 10:10 5:00 heel prop   - clams 3x10 each GTB   - Bridges 3x10 GTB   - Supine heel slide 3x10 5-10s  - Supine HS curl on ball 3x10 5s  - Step down w/ 4 "step 2x10  - B standing calf raise 3x10 on HR  - Sit to stand 2x10  - TKE Black TB band 3x10 with NMES  - Retro walks at // for improved gait x 3-5 min (emphasis on improving functional TKE)    therapeutic activities to improve functional performance for 00 minutes, including:    Patient Education and Home Exercises       Education provided:   - Proper positioning for standing TKE    Written Home Exercises Provided: yes. Exercises were reviewed and  was able to demonstrate them prior to the end of the session.   demonstrated good  understanding of the education provided. See Electronic Medical Record under Patient Instructions for exercises provided during therapy sessions    Assessment     Retro included today to produce greater quad emphasis for functional TKE. She responded well but needs further practice for familiarization.     Is progressing well towards her goals.   Patient prognosis is Good.     Patient will continue to benefit from skilled outpatient physical therapy to address the deficits listed in the problem list box on initial evaluation, provide pt/family education and to maximize pt's level of independence in the home and community environment.     Patient's spiritual, cultural and educational needs considered and pt agreeable to plan of care and goals.     Anticipated barriers to physical therapy: co-morbidities     Goals:   Short Term Goals:  4 weeks Progress       Patient will report decreased pain to <3/10 at worst on VAS to progress toward Prior Level of Function. Progressing, not met    Patient will report improved function by a functional limitation score of " <57.5 out of 100 on FOTO. Progressing, not met     Patient will improve AROM to 50% of stated goals in order to progress towards Prior Level of Function. Progressing, not met     Patient will improve strength to 50% of stated goals in order to progress towards Prior Level of Function. Progressing, not met        Long Term Goals:  8 weeks Progress      Patient will report decreased pain to <1/10 at worst on VAS to progress toward Prior Level of Function.   Progressing, not met     Patient will report improved function by a functional limitation score of <67.5 out of 100 on FOTO. Progressing, not met     Patient will improve ROM and Functional Mobility to stated goals to return to Prior Level of Function. Progressing, not met     Patient will improve strength to stated goals in order to return to Prior Level of Function. Progressing, not met         Plan     Plan of care Certification: 7/5/2024 to 8/16/2024      Outpatient Physical Therapy 3 times weekly for 6 weeks to include any combination of the following interventions: virtual visits, dry needling, modalities, electrical stimulation (IFC, Pre-Mod, Attended with Functional Dry Needling), Cervical/Lumbar Traction, Gait Training, Manual Therapy, Neuromuscular Re-ed, Patient Education, Self Care, Therapeutic Activites, and Therapeutic Exercise        Woody Hunter, PTA, ATC

## 2024-08-20 ENCOUNTER — TELEPHONE (OUTPATIENT)
Dept: ORTHOPEDICS | Facility: CLINIC | Age: 64
End: 2024-08-20
Payer: MEDICARE

## 2024-08-20 NOTE — TELEPHONE ENCOUNTER
Called pt. She says she is supposed to stop rehab Friday under GC's orders, but wants to continue doing rehab. Told her I would get back to her on next steps.     ----- Message from Lenin Crenshaw sent at 8/20/2024  2:44 PM CDT -----  Regarding: pt advice  Contact: [phone]  Pt is requesting to speak with someone in your office . Please contact pt

## 2024-08-20 NOTE — PROGRESS NOTES
[facility]   Physical Therapy Treatment Note      Name: Nohemi Andujar [place]  Clinic Number: [id number]    Therapy Diagnosis:   Encounter Diagnoses   Name Primary?    Decreased range of motion of right knee Yes    Gait abnormality        Physician: Avery Campos MD    Visit Date: 8/21/2024    Physician Orders: PT Eval and Treat  Medical Diagnosis from Referral: Primary osteoarthritis of right knee [M17.11]   Evaluation Date: 7/5/2024  Authorization Period Expiration: 06/05/2025  Plan of Care Expiration: 8/16/24  Visit # / Visits authorized: updated 18/32 (+1/1 Eval)    NEXT MD appointment: 9/25/24    FOTO: 48%  FOTO 2: 59%  7/17/24  FOTO 3: incomplete     Progress Note Due on 8/5/24     Precautions: Standard and Fall     Time In: 1000 am  Time Out: 1055 am  Total Billable Time: 55 minutes  ** KX MODIFIERS**    Subjective     Patient reports: pleased with current results. She was compliant with home exercise program.  Response to previous treatment: Some soreness.  Functional change: less use of single point cane around the house with family present    Pain: 2/10  Location: right knee      Objective          7/5/24 7/17/24 7/22/24 7/29/24 8/14/24 GOAL   Knee  (degrees) Right Right  (2 weeks and 1 day post op) Right Right Right    Knee Flexion  70 100 112 115 115  130   Knee Extension -10 -10 -6 -3 -2   +2        Treatment      received the treatments listed below:      therapeutic exercises to develop strength, endurance, ROM, and flexibility for 20 minutes including:  - [] Nu Stepper 10:00 NT  - [x] LAQ 3lb 3x10 with NMES  - [x] Heel prop 10lb 5:00  - [] Seated HS stretch 20sx4  - [x] Prone hang 3:00 1.5lb (hot pack to hamstring muscle belly to promote relaxation)  - [x] Shuttle press B 3x10 2 bands     manual therapy techniques: Joint mobilizations were applied to the: R knee for 00 minutes, including:  - Patellar mobilizations with heel prop Grade III/IV    neuromuscular  "re-education activities to improve: Balance, Coordination, Kinesthetic, Sense, Proprioception, and Posture for 35 minutes. The following activities were included:  - [x] Quad set NMES 10:10 5:00 heel prop   - [] clams 3x10 each GTB   - [] Bridges 3x10 GTB   - [] Supine heel slide 3x10 5-10s  - [] Supine HS curl on ball 3x10 5s  - [] Step down w/ 4 "step 2x10  - [x] B standing calf raise 3x10 on HR  - [x] Sit to stand 2x10  - [x] TKE Black TB band 3x10 with NMES  - [] Retro walks at // for improved gait x 3-5 min (emphasis on improving functional TKE)  - [x] Forward step up 6" step 2x10  - [x] Tandem stance on foam pad for balance 2x30 sec each   - [x] Lateral walkouts with tband around ankles RED x 5 laps at P bars    therapeutic activities to improve functional performance for 00 minutes, including:    Patient Education and Home Exercises       Education provided:   - cues for increased hip extension upon rising from a seated position for maximizing PT gains. She responded well.    Written Home Exercises Provided: yes. Exercises were reviewed and  was able to demonstrate them prior to the end of the session.   demonstrated good  understanding of the education provided. See Electronic Medical Record under Patient Instructions for exercises provided during therapy sessions    Assessment     Ms Mejia presents with improving functional knee bend to extension and less waddle gait pattern. She also presents with significant less amount of edema affected knee. Some therex's included today to address unmet functional goals as listed. She tolerated well.     Is progressing well towards her goals.   Patient prognosis is Good.     Patient will continue to benefit from skilled outpatient physical therapy to address the deficits listed in the problem list box on initial evaluation, provide pt/family education and to maximize pt's level of independence in the home and community environment.     Patient's " spiritual, cultural and educational needs considered and pt agreeable to plan of care and goals.     Anticipated barriers to physical therapy: co-morbidities     Goals:   Short Term Goals:  4 weeks Progress       Patient will report decreased pain to <3/10 at worst on VAS to progress toward Prior Level of Function. Progressing, not met    Patient will report improved function by a functional limitation score of <57.5 out of 100 on FOTO. Progressing, not met     Patient will improve AROM to 50% of stated goals in order to progress towards Prior Level of Function. Progressing, not met     Patient will improve strength to 50% of stated goals in order to progress towards Prior Level of Function. Progressing, not met        Long Term Goals:  8 weeks Progress      Patient will report decreased pain to <1/10 at worst on VAS to progress toward Prior Level of Function.   Progressing, not met     Patient will report improved function by a functional limitation score of <67.5 out of 100 on FOTO. Progressing, not met     Patient will improve ROM and Functional Mobility to stated goals to return to Prior Level of Function. Progressing, not met     Patient will improve strength to stated goals in order to return to Prior Level of Function. Progressing, not met         Plan     Plan of care Certification: 7/5/2024 to 8/16/2024      Outpatient Physical Therapy 3 times weekly for 6 weeks to include any combination of the following interventions: virtual visits, dry needling, modalities, electrical stimulation (IFC, Pre-Mod, Attended with Functional Dry Needling), Cervical/Lumbar Traction, Gait Training, Manual Therapy, Neuromuscular Re-ed, Patient Education, Self Care, Therapeutic Activites, and Therapeutic Exercise        Woody Hunter, PTA, ATC

## 2024-08-21 ENCOUNTER — CLINICAL SUPPORT (OUTPATIENT)
Dept: REHABILITATION | Facility: HOSPITAL | Age: 64
End: 2024-08-21
Payer: MEDICARE

## 2024-08-21 DIAGNOSIS — M25.661 DECREASED RANGE OF MOTION OF RIGHT KNEE: Primary | ICD-10-CM

## 2024-08-21 DIAGNOSIS — R26.9 GAIT ABNORMALITY: ICD-10-CM

## 2024-08-21 PROCEDURE — 97110 THERAPEUTIC EXERCISES: CPT | Mod: PO,CQ

## 2024-08-21 PROCEDURE — 97112 NEUROMUSCULAR REEDUCATION: CPT | Mod: PO,CQ

## 2024-08-22 ENCOUNTER — TELEPHONE (OUTPATIENT)
Dept: ORTHOPEDICS | Facility: CLINIC | Age: 64
End: 2024-08-22
Payer: MEDICARE

## 2024-08-22 DIAGNOSIS — Z96.651 STATUS POST RIGHT KNEE REPLACEMENT: ICD-10-CM

## 2024-08-22 RX ORDER — HYDROMORPHONE HYDROCHLORIDE 2 MG/1
TABLET ORAL
Qty: 30 TABLET | Refills: 0 | Status: SHIPPED | OUTPATIENT
Start: 2024-08-22

## 2024-08-22 NOTE — TELEPHONE ENCOUNTER
Called pt and r/s her appt for 1300 on 9/25/24 - sated that was the only time I could offer. Pt requested a print out and stated we would send in the mail. I also discussed with pt her dissatisfaction with PT and that I would send a message to Dr. Campos asking is he wanted to end her PT or see her first. Pt verbalized understanding and has no further questions.    Pt verbalized understanding and has no further questions.    ----- Message from Sherita Amador sent at 8/22/2024  7:49 AM CDT -----       Nature of Call:  requesting a earlier time slot for appt    Best Call Back Number: [phone]     Additional Information:  Nohemi Hull calling regarding Appointment Access. PT is asking rescheduled for a earlier time slot around 9 am to 12 noon for the appt set for 09/25/24, stating that she will not have a ride for the late time of the appt, please call back to inform

## 2024-08-23 ENCOUNTER — TELEPHONE (OUTPATIENT)
Dept: ORTHOPEDICS | Facility: CLINIC | Age: 64
End: 2024-08-23
Payer: MEDICARE

## 2024-08-23 ENCOUNTER — CLINICAL SUPPORT (OUTPATIENT)
Dept: REHABILITATION | Facility: HOSPITAL | Age: 64
End: 2024-08-23
Payer: MEDICARE

## 2024-08-23 DIAGNOSIS — R26.9 GAIT ABNORMALITY: ICD-10-CM

## 2024-08-23 DIAGNOSIS — M25.661 DECREASED RANGE OF MOTION OF RIGHT KNEE: Primary | ICD-10-CM

## 2024-08-23 PROCEDURE — 97112 NEUROMUSCULAR REEDUCATION: CPT | Mod: KX,PO,CQ

## 2024-08-23 NOTE — PROGRESS NOTES
"[facility]   Physical Therapy Treatment Note      Name: Nohemi Andujar [place]  Clinic Number: [id number]    Therapy Diagnosis:   Encounter Diagnoses   Name Primary?    Decreased range of motion of right knee Yes    Gait abnormality        Physician: Avery Campos MD    Visit Date: 8/23/2024    Physician Orders: PT Eval and Treat  Medical Diagnosis from Referral: Primary osteoarthritis of right knee [M17.11]   Evaluation Date: 7/5/2024  Authorization Period Expiration: 06/05/2025  Plan of Care Expiration: 8/16/24  Visit # / Visits authorized: updated 18/32 (+1/1 Eval)    NEXT MD appointment: 9/25/24    FOTO: 48%  FOTO 2: 59%  7/17/24  FOTO 3: incomplete     Progress Note Due on 8/5/24     Precautions: Standard and Fall     Time In: 11:00 am  Time Out: 11:53 am  Total Billable Time: 35 minutes  ** KX MODIFIERS**    Subjective     Patient reports: "I feel alright right now". Pt is requesting to   She was compliant with home exercise program.  Response to previous treatment: Some soreness.  Functional change: less use of single point cane around the house with family present    Pain: 2/10  Location: right knee      Objective          7/5/24 7/17/24 7/22/24 7/29/24 8/14/24 8/23/24 GOAL   Knee  (degrees) Right Right  (2 weeks and 1 day post op) Right Right Right Right    Knee Flexion  70 100 112 115 115  115* AROM  125* PROM 130   Knee Extension -10 -10 -6 -3 -2   -2 AROM  0* PROM +2        Treatment      received the treatments listed below:      therapeutic exercises to develop strength, endurance, ROM, and flexibility for  supervised minutes including:  - [] Nu Stepper 10:00 NT  - [x] LAQ 3lb 3x10 with NMES  - [x] Heel prop 10lb 5:00  - [] Seated HS stretch 20sx4  - [x] Prone hang 3:00 1.5lb (hot pack to hamstring muscle belly to promote relaxation)  - [x] Shuttle press B 3x10 2 bands     manual therapy techniques: Joint mobilizations were applied to the: R knee for 00 minutes, " "including:  - Patellar mobilizations with heel prop Grade III/IV    neuromuscular re-education activities to improve: Balance, Coordination, Kinesthetic, Sense, Proprioception, and Posture for 35 minutes. The following activities were included:  - [x] Quad set NMES 10:10 5:00 heel prop   - [] clams 3x10 each GTB   - [] Bridges 3x10 GTB   - [] Supine heel slide 3x10 5-10s  - [] Supine HS curl on ball 3x10 5s  - [] Step down w/ 4 "step 2x10  - [x] B standing calf raise 3x10 on HR  - [x] Sit to stand 2x10 no UE assist  - [x] TKE Black TB band 3x10 with NMES  - [] Retro walks at // for improved gait x 3-5 min (emphasis on improving functional TKE)  - [x] Forward step up 6" step 2x10  - [x] Tandem stance on foam pad for balance 2x30 sec each   - [x] Lateral walkouts with tband around ankles RED x 5 laps at P bars    therapeutic activities to improve functional performance for 00 minutes, including:    Patient Education and Home Exercises       Education provided:   - cues for increased hip extension upon rising from a seated position for maximizing PT gains. She responded well.    Written Home Exercises Provided: yes. Exercises were reviewed and  was able to demonstrate them prior to the end of the session.   demonstrated good  understanding of the education provided. See Electronic Medical Record under Patient Instructions for exercises provided during therapy sessions    Assessment     Ms Mejia arrived to session with no reports of increased R knee pain. She states her doctor advised her to end PT after today's session. She performed all activities as per TKA protocol. Reviewed / issued updated WHEP, pt verbally confirmed understanding. No adverse reactions to today's issed     Is progressing well towards her goals.   Patient prognosis is Good.     Patient will continue to benefit from skilled outpatient physical therapy to address the deficits listed in the problem list box on initial evaluation, " provide pt/family education and to maximize pt's level of independence in the home and community environment.     Patient's spiritual, cultural and educational needs considered and pt agreeable to plan of care and goals.     Anticipated barriers to physical therapy: co-morbidities     Goals:   Short Term Goals:  4 weeks Progress       Patient will report decreased pain to <3/10 at worst on VAS to progress toward Prior Level of Function. Progressing, not met    Patient will report improved function by a functional limitation score of <57.5 out of 100 on FOTO. Progressing, not met     Patient will improve AROM to 50% of stated goals in order to progress towards Prior Level of Function. Progressing, not met     Patient will improve strength to 50% of stated goals in order to progress towards Prior Level of Function. Progressing, not met        Long Term Goals:  8 weeks Progress      Patient will report decreased pain to <1/10 at worst on VAS to progress toward Prior Level of Function.   Progressing, not met     Patient will report improved function by a functional limitation score of <67.5 out of 100 on FOTO. Progressing, not met     Patient will improve ROM and Functional Mobility to stated goals to return to Prior Level of Function. Progressing, not met     Patient will improve strength to stated goals in order to return to Prior Level of Function. Progressing, not met         Plan     POSSIBLE DISCHARGE FROM PT SERVICES.     Plan of care Certification: 7/5/2024 to 8/16/2024      Outpatient Physical Therapy 3 times weekly for 6 weeks to include any combination of the following interventions: virtual visits, dry needling, modalities, electrical stimulation (IFC, Pre-Mod, Attended with Functional Dry Needling), Cervical/Lumbar Traction, Gait Training, Manual Therapy, Neuromuscular Re-ed, Patient Education, Self Care, Therapeutic Activites, and Therapeutic Exercise        Vinayak Borden, ROMULO

## 2024-08-23 NOTE — TELEPHONE ENCOUNTER
Called pt and stated that Dr. Campos would like to see patient prior to ordering more PT. Did state that pt should continue with her home exercises after her last session of PT today - pt stated she had been very diligent with HEP. Pt will plan to attend her 12wk po appt. Pt verbalized understanding and has no further questions.          ----- Message from Avery Campos MD sent at 8/22/2024  4:27 PM CDT -----  I don't need to see her sooner.  She can stop PT.  If she thinks she needs to get seen sooner xhe can see an HAILEE.  ----- Message -----  From: Deja Muñoz  Sent: 8/22/2024   2:03 PM CDT  To: Avery Campos MD; [address] Pt is stating that at PT has been doing the same exercises with her in [place] for the last 6 weeks and feels like she has not made any gains in her extension (short a few degrees) still but her flexion she states is good. Do you  want to her first before ordering more PT?  Please Advise.    DOS 7/2/2024,   12 week post op appointment scheduled for 9/25/24  ----- Message -----  From: Shankar Kendall MA  Sent: 8/20/2024   4:31 PM CDT  To: Avery Campos MD; Deja Muñoz    Called pt, she states she is done with PT on Friday but would like to continue. This the most recent PT note. Please advise on extending physical therapy? DOS 7/2/2024,   12 week post op appointment scheduled for 9/25/24.

## 2024-08-28 ENCOUNTER — TELEPHONE (OUTPATIENT)
Dept: ORTHOPEDICS | Facility: CLINIC | Age: 64
End: 2024-08-28
Payer: MEDICARE

## 2024-08-28 DIAGNOSIS — Z96.651 S/P TOTAL KNEE REPLACEMENT, RIGHT: ICD-10-CM

## 2024-08-28 RX ORDER — METHOCARBAMOL 750 MG/1
750 TABLET, FILM COATED ORAL 4 TIMES DAILY PRN
Qty: 40 TABLET | Refills: 0 | Status: SHIPPED | OUTPATIENT
Start: 2024-08-28

## 2024-08-28 NOTE — TELEPHONE ENCOUNTER
Pt called [facility] for refill on muscle relaxer's. Message sent to Gigi for refill.    ----- Message -----  From: Jonna Mandujano MA  Sent: 8/28/2024   9:45 AM CDT  To: Gigi Howell PA-C  Subject: Rx Refill                                        Good morning,     Pt called [facility] for refill on muscle relaxer's. Sent to [facility] on [address] on [address]    Thanks!   Dragan

## 2024-08-30 ENCOUNTER — TELEPHONE (OUTPATIENT)
Dept: ORTHOPEDICS | Facility: CLINIC | Age: 64
End: 2024-08-30
Payer: MEDICARE

## 2024-08-30 DIAGNOSIS — Z96.651 STATUS POST RIGHT KNEE REPLACEMENT: ICD-10-CM

## 2024-08-30 RX ORDER — HYDROMORPHONE HYDROCHLORIDE 2 MG/1
TABLET ORAL
Qty: 28 TABLET | Refills: 0 | Status: SHIPPED | OUTPATIENT
Start: 2024-08-30

## 2024-09-06 ENCOUNTER — OFFICE VISIT (OUTPATIENT)
Dept: NEPHROLOGY | Facility: CLINIC | Age: 64
End: 2024-09-06
Payer: MEDICARE

## 2024-09-06 ENCOUNTER — LAB VISIT (OUTPATIENT)
Dept: LAB | Facility: HOSPITAL | Age: 64
End: 2024-09-06
Attending: INTERNAL MEDICINE
Payer: MEDICARE

## 2024-09-06 VITALS
BODY MASS INDEX: 22.99 KG/M2 | HEIGHT: 66 IN | DIASTOLIC BLOOD PRESSURE: 62 MMHG | HEART RATE: 72 BPM | SYSTOLIC BLOOD PRESSURE: 94 MMHG | OXYGEN SATURATION: 98 % | WEIGHT: 143.06 LBS

## 2024-09-06 DIAGNOSIS — N18.31 STAGE 3A CHRONIC KIDNEY DISEASE: ICD-10-CM

## 2024-09-06 LAB
25(OH)D3+25(OH)D2 SERPL-MCNC: 70 NG/ML (ref 30–96)
ALBUMIN SERPL BCP-MCNC: 4 G/DL (ref 3.5–5.2)
ANION GAP SERPL CALC-SCNC: 10 MMOL/L (ref 8–16)
BASOPHILS # BLD AUTO: 0.01 K/UL (ref 0–0.2)
BASOPHILS NFR BLD: 0.3 % (ref 0–1.9)
BUN SERPL-MCNC: 17 MG/DL (ref 8–23)
CALCIUM SERPL-MCNC: 9.9 MG/DL (ref 8.7–10.5)
CHLORIDE SERPL-SCNC: 103 MMOL/L (ref 95–110)
CO2 SERPL-SCNC: 27 MMOL/L (ref 23–29)
CREAT SERPL-MCNC: 1 MG/DL (ref 0.5–1.4)
DIFFERENTIAL METHOD BLD: ABNORMAL
EOSINOPHIL # BLD AUTO: 0.1 K/UL (ref 0–0.5)
EOSINOPHIL NFR BLD: 2.6 % (ref 0–8)
ERYTHROCYTE [DISTWIDTH] IN BLOOD BY AUTOMATED COUNT: 12.2 % (ref 11.5–14.5)
EST. GFR  (NO RACE VARIABLE): >60 ML/MIN/1.73 M^2
GLUCOSE SERPL-MCNC: 79 MG/DL (ref 70–110)
HCT VFR BLD AUTO: 41.1 % (ref 37–48.5)
HGB BLD-MCNC: 12.9 G/DL (ref 12–16)
IMM GRANULOCYTES # BLD AUTO: 0 K/UL (ref 0–0.04)
IMM GRANULOCYTES NFR BLD AUTO: 0 % (ref 0–0.5)
LYMPHOCYTES # BLD AUTO: 1.3 K/UL (ref 1–4.8)
LYMPHOCYTES NFR BLD: 36.9 % (ref 18–48)
MCH RBC QN AUTO: 32.9 PG (ref 27–31)
MCHC RBC AUTO-ENTMCNC: 31.4 G/DL (ref 32–36)
MCV RBC AUTO: 105 FL (ref 82–98)
MONOCYTES # BLD AUTO: 0.4 K/UL (ref 0.3–1)
MONOCYTES NFR BLD: 11.8 % (ref 4–15)
NEUTROPHILS # BLD AUTO: 1.7 K/UL (ref 1.8–7.7)
NEUTROPHILS NFR BLD: 48.4 % (ref 38–73)
NRBC BLD-RTO: 0 /100 WBC
PHOSPHATE SERPL-MCNC: 3.7 MG/DL (ref 2.7–4.5)
PLATELET # BLD AUTO: 228 K/UL (ref 150–450)
PMV BLD AUTO: 9.4 FL (ref 9.2–12.9)
POTASSIUM SERPL-SCNC: 4.2 MMOL/L (ref 3.5–5.1)
PTH-INTACT SERPL-MCNC: 36.7 PG/ML (ref 9–77)
RBC # BLD AUTO: 3.92 M/UL (ref 4–5.4)
SODIUM SERPL-SCNC: 140 MMOL/L (ref 136–145)
WBC # BLD AUTO: 3.47 K/UL (ref 3.9–12.7)

## 2024-09-06 PROCEDURE — 3066F NEPHROPATHY DOC TX: CPT | Mod: CPTII,S$GLB,, | Performed by: INTERNAL MEDICINE

## 2024-09-06 PROCEDURE — 3008F BODY MASS INDEX DOCD: CPT | Mod: CPTII,S$GLB,, | Performed by: INTERNAL MEDICINE

## 2024-09-06 PROCEDURE — 99214 OFFICE O/P EST MOD 30 MIN: CPT | Mod: S$GLB,,, | Performed by: INTERNAL MEDICINE

## 2024-09-06 PROCEDURE — 3078F DIAST BP <80 MM HG: CPT | Mod: CPTII,S$GLB,, | Performed by: INTERNAL MEDICINE

## 2024-09-06 PROCEDURE — 82306 VITAMIN D 25 HYDROXY: CPT | Performed by: INTERNAL MEDICINE

## 2024-09-06 PROCEDURE — 36415 COLL VENOUS BLD VENIPUNCTURE: CPT | Performed by: INTERNAL MEDICINE

## 2024-09-06 PROCEDURE — 83970 ASSAY OF PARATHORMONE: CPT | Performed by: INTERNAL MEDICINE

## 2024-09-06 PROCEDURE — 99999 PR PBB SHADOW E&M-EST. PATIENT-LVL V: CPT | Mod: PBBFAC,,, | Performed by: INTERNAL MEDICINE

## 2024-09-06 PROCEDURE — 1160F RVW MEDS BY RX/DR IN RCRD: CPT | Mod: CPTII,S$GLB,, | Performed by: INTERNAL MEDICINE

## 2024-09-06 PROCEDURE — 85025 COMPLETE CBC W/AUTO DIFF WBC: CPT | Performed by: INTERNAL MEDICINE

## 2024-09-06 PROCEDURE — 1159F MED LIST DOCD IN RCRD: CPT | Mod: CPTII,S$GLB,, | Performed by: INTERNAL MEDICINE

## 2024-09-06 PROCEDURE — 3074F SYST BP LT 130 MM HG: CPT | Mod: CPTII,S$GLB,, | Performed by: INTERNAL MEDICINE

## 2024-09-06 PROCEDURE — 80069 RENAL FUNCTION PANEL: CPT | Performed by: INTERNAL MEDICINE

## 2024-09-06 NOTE — PATIENT INSTRUCTIONS
Please donot take NSAID's (Motrin, Advil, Aleve, Ibuprofen, BC powder, Goody Powder, Diclofenac, Naproxen, Meloxicam/MOBIC, Celebrex, Voltaren gel etc), Ok to take baby aspirin.  Follow up in 12 months or early if needed.

## 2024-09-06 NOTE — PROGRESS NOTES
REASON FOR CONSULT/CHIEF COMPLAIN: Renal dysfunction    REFERRING PHYSICIAN: Nataly Rose MD    HISTORY OF PRESENT ILLNESS: 64 y.o. female referred here for abnormal renal function.   No chronic NSAID use now (Chronic Motrin use in the past), no known exposure to lithium, lead. No family history of Lupus, or deafness. Some family members have diabetic kidney disease. No ingestion of licorice. No kidney stones. Reports quitting smoking.   Denied any issues with urination including dysuria, hematuria, urgency, hesitancy, nocturia, incomplete voiding. Denied chest pain, nausea, vomiting, abd pain, diarrhea, shortness of breath, pedal edema, Orthopnea, PND.    ROS:  General: negative for chills, or fatigue  Respiratory: No cough, shortness of breath, or wheezing  Cardiovascular: No chest pain or dyspnea   Gastrointestinal: No abdominal pain, change in bowel habits  Dermatological: No rash or ulcers.    MEDICAL PROBLEMS:  Past Medical History:   Diagnosis Date    Anemia     Anxiety     Arthritis     Deep vein thrombosis     Depression 2000    Renal failure        SURGICAL PROBLEMS:  Past Surgical History:   Procedure Laterality Date    COLONOSCOPY N/A 10/29/2018    Procedure: COLONOSCOPY;  Surgeon: Mp Alvarado MD;  Location: [address]);  Service: Endoscopy;  Laterality: N/A;  Referral received from Dr. SMOOTH Arias, [facility]  Last colonoscopy 2012-recommended f/u 5 years-past due    DILATION AND CURETTAGE OF UTERUS      GASTRIC BYPASS      PARTIAL HYSTERECTOMY      AUB    RADIOFREQUENCY ABLATION Right 9/13/2022    Procedure: RADIOFREQUENCY ABLATION, GENICULAR RIGHT COOLED;  Surgeon: Radha Justin MD;  [facility];  Service: Pain Management;  Laterality: Right;    TONSILLECTOMY      TOTAL KNEE ARTHROPLASTY Right 7/2/2024    Procedure: ARTHROPLASTY, KNEE, TOTAL: RIGHT: SAME DAY;  Surgeon: Avery Campos MD;  Location: [facility] OR;  Service: Orthopedics;  Laterality: Right;     TUBAL LIGATION Bilateral        FAMILY HISTORY:   Family History   Problem Relation Name Age of Onset    Hypertension Mother      Depression Mother      Hyperlipidemia Mother      Dementia Mother      Gallbladder disease Mother      Hyperlipidemia Father      Hypertension Sister      Depression Sister      Hyperlipidemia Sister      Diabetes Sister      Hypertension Sister      Diabetes Sister      Hypertension Sister      Depression Brother      Diabetes Brother      Hyperlipidemia Brother      Hypertension Brother      Diabetes Brother      Hypertension Brother      Diabetes Brother      Hyperlipidemia Brother      Colon cancer Maternal Grandfather          rectal ca    Depression Maternal Grandfather      Breast cancer Paternal Grandmother      Depression Paternal Grandfather      Depression Son      Hypertension Maternal Aunt      Depression Maternal Aunt      Diabetes Paternal Uncle      Depression Paternal Uncle      Alcohol abuse Neg Hx      Ovarian cancer Neg Hx      Heart disease Neg Hx      Stroke Neg Hx         SOCIAL HISTORY:  Social History     Socioeconomic History    Marital status:    Tobacco Use    Smoking status: Every Day     Current packs/day: 0.25     Average packs/day: 0.3 packs/day for 7.0 years (1.8 ttl pk-yrs)     Types: Cigarettes    Smokeless tobacco: Never   Substance and Sexual Activity    Alcohol use: Not Currently     Alcohol/week: 2.0 standard drinks of alcohol     Types: 2 Glasses of wine per week     Comment: occasionally    Drug use: No    Sexual activity: Yes     Partners: Male     Birth control/protection: None     Social Determinants of Health     Financial Resource Strain: Low Risk  (6/10/2024)    Overall Financial Resource Strain (CARDIA)     Difficulty of Paying Living Expenses: Not hard at all   Food Insecurity: No Food Insecurity (6/10/2024)    Hunger Vital Sign     Worried About Running Out of Food in the Last Year: Never true     Ran Out of Food in the Last  Year: Never true   Physical Activity: Insufficiently Active (6/10/2024)    Exercise Vital Sign     Days of Exercise per Week: 3 days     Minutes of Exercise per Session: 40 min   Stress: No Stress Concern Present (6/10/2024)    [employer] - Occupational Stress Questionnaire     Feeling of Stress : Only a little   Housing Stability: High Risk (6/10/2024)    Housing Stability Vital Sign     Unable to Pay for Housing in the Last Year: Yes       ALLERGIES:  Review of patient's allergies indicates:  No Known Allergies    MEDICATIONS:    Current Outpatient Medications:     acetaminophen (TYLENOL) 650 MG TbSR, Take 1 tablet (650 mg total) by mouth every 8 (eight) hours., Disp: 120 tablet, Rfl: 0    apixaban (ELIQUIS) 2.5 mg Tab, Take 1 tablet (2.5 mg total) by mouth 2 (two) times daily. Take for 3 months post op, Disp: 60 tablet, Rfl: 2    aspirin (ECOTRIN) 81 MG EC tablet, Take 81 mg by mouth once daily., Disp: , Rfl:     calcium carbonate (CALCIUM 300 ORAL), Take by mouth., Disp: , Rfl:     cholecalciferol, vitamin D3, 10 mcg (400 unit) Chew, Take 400 Units by mouth once daily., Disp: , Rfl:     clonazePAM (KLONOPIN) 1 MG tablet, Take 2 mg by mouth every evening. To help with sleeping, Disp: , Rfl:     cyanocobalamin, vitamin B-12, (VITAMIN B-12 ORAL), Take 1 tablet by mouth once daily., Disp: , Rfl:     FEROSUL 325 mg (65 mg iron) Tab tablet, Take by mouth every other day., Disp: , Rfl:     gabapentin (NEURONTIN) 300 MG capsule, Take 300-600 mg by mouth., Disp: , Rfl:     hydrocortisone 2.5 % ointment, Apply topically 2 (two) times daily., Disp: 28.35 g, Rfl: 1    HYDROmorphone (DILAUDID) 2 MG tablet, Take 1 tablet by mouth every 6 hours as needed for pain, Disp: 28 tablet, Rfl: 0    magnesium 30 mg Tab, Take by mouth once., Disp: , Rfl:     methocarbamoL (ROBAXIN) 750 MG Tab, Take 1 tablet (750 mg total) by mouth 4 (four) times daily as needed (for muscle spasms)., Disp: 40 tablet, Rfl:  0    multivit-min-FA-lycopen-lutein (CENTRUM SILVER) 0.4-300-250 mg-mcg-mcg Tab, Take 1 tablet by mouth once daily., Disp: , Rfl:     oxybutynin (DITROPAN) 5 MG Tab, Take 1 tablet (5 mg total) by mouth Daily., Disp: 90 tablet, Rfl: 2    risperiDONE (RISPERDAL) 1 MG tablet, Take 1 mg by mouth every evening., Disp: , Rfl:     senna-docusate 8.6-50 mg (SENNA WITH DOCUSATE SODIUM) 8.6-50 mg per tablet, Take 1 tablet by mouth once daily., Disp: 30 tablet, Rfl: 0    suvorexant (BELSOMRA) 20 mg Tab, Take 1 tablet by mouth every evening., Disp: , Rfl:     traZODone (DESYREL) 150 MG tablet, Take 450 mg by mouth nightly., Disp: , Rfl:     vortioxetine (TRINTELLIX) 20 mg Tab, Take 20 mg by mouth once daily., Disp: , Rfl:     pantoprazole (PROTONIX) 40 MG tablet, Take 1 tablet (40 mg total) by mouth once daily., Disp: 30 tablet, Rfl: 0    triamcinolone acetonide 0.1% (KENALOG) 0.1 % cream, Apply topically 2 (two) times daily. for 7 days, Disp: 45 g, Rfl: 0   Medication List with Changes/Refills   Current Medications    ACETAMINOPHEN (TYLENOL) 650 MG TBSR    Take 1 tablet (650 mg total) by mouth every 8 (eight) hours.    APIXABAN (ELIQUIS) 2.5 MG TAB    Take 1 tablet (2.5 mg total) by mouth 2 (two) times daily. Take for 3 months post op    ASPIRIN (ECOTRIN) 81 MG EC TABLET    Take 81 mg by mouth once daily.    CALCIUM CARBONATE (CALCIUM 300 ORAL)    Take by mouth.    CHOLECALCIFEROL, VITAMIN D3, 10 MCG (400 UNIT) CHEW    Take 400 Units by mouth once daily.    CLONAZEPAM (KLONOPIN) 1 MG TABLET    Take 2 mg by mouth every evening. To help with sleeping    CYANOCOBALAMIN, VITAMIN B-12, (VITAMIN B-12 ORAL)    Take 1 tablet by mouth once daily.    FEROSUL 325 MG (65 MG IRON) TAB TABLET    Take by mouth every other day.    GABAPENTIN (NEURONTIN) 300 MG CAPSULE    Take 300-600 mg by mouth.    HYDROCORTISONE 2.5 % OINTMENT    Apply topically 2 (two) times daily.    HYDROMORPHONE (DILAUDID) 2 MG TABLET    Take 1 tablet by mouth every 6  "hours as needed for pain    MAGNESIUM 30 MG TAB    Take by mouth once.    METHOCARBAMOL (ROBAXIN) 750 MG TAB    Take 1 tablet (750 mg total) by mouth 4 (four) times daily as needed (for muscle spasms).    MULTIVIT-MIN-FA-LYCOPEN-LUTEIN (CENTRUM SILVER) 0.4-300-250 MG-MCG-MCG TAB    Take 1 tablet by mouth once daily.    OXYBUTYNIN (DITROPAN) 5 MG TAB    Take 1 tablet (5 mg total) by mouth Daily.    PANTOPRAZOLE (PROTONIX) 40 MG TABLET    Take 1 tablet (40 mg total) by mouth once daily.    RISPERIDONE (RISPERDAL) 1 MG TABLET    Take 1 mg by mouth every evening.    SENNA-DOCUSATE 8.6-50 MG (SENNA WITH DOCUSATE SODIUM) 8.6-50 MG PER TABLET    Take 1 tablet by mouth once daily.    SUVOREXANT (BELSOMRA) 20 MG TAB    Take 1 tablet by mouth every evening.    TRAZODONE (DESYREL) 150 MG TABLET    Take 450 mg by mouth nightly.    TRIAMCINOLONE ACETONIDE 0.1% (KENALOG) 0.1 % CREAM    Apply topically 2 (two) times daily. for 7 days    VORTIOXETINE (TRINTELLIX) 20 MG TAB    Take 20 mg by mouth once daily.        PHYSICAL EXAM:  BP 94/62   Pulse 72   Ht 5' 6" (1.676 m)   Wt 64.9 kg (143 lb 1.3 oz)   SpO2 98%   BMI 23.09 kg/m²     General: No distress, No fever or chills  Neck: No adenopathy,no carotid bruit,no JVD  Lungs:Clear to auscultation bilaterally, No Crackles  Heart: Regular rate and rhythm, no murmur, gallops or rubs  Extremities: Atraumatic, no edema in LE  Skin: Turgor normal. No rashes  Dialysis Access: Non applicable        LABS:  Lab Results   Component Value Date    HGB 12.2 04/26/2024        Lab Results   Component Value Date    CREATININE 1.1 06/17/2024       Prot/Creat Ratio, Urine   Date Value Ref Range Status   08/12/2022 0.06 0.00 - 0.20 Final       Lab Results   Component Value Date     06/17/2024    K 4.2 06/17/2024    CO2 29 06/17/2024       last PTH   Lab Results   Component Value Date    PTH 44.8 06/16/2023    CALCIUM 9.6 06/17/2024    PHOS 3.9 06/16/2023       Lab Results   Component Value " Date    HGBA1C 4.4 06/16/2023       Lab Results   Component Value Date    LDLCALC 80.8 06/16/2023         Creatinine, Urine   Date Value Ref Range Status   06/16/2023 145.5 15.0 - 325.0 mg/dL Final   08/12/2022 148.0 15.0 - 325.0 mg/dL Final       Protein, Urine Random   Date Value Ref Range Status   08/12/2022 9 0 - 15 mg/dL Final       Prot/Creat Ratio, Urine   Date Value Ref Range Status   08/12/2022 0.06 0.00 - 0.20 Final         ASSESSMENT AND PLAN:    1) Chronic Kidney disease stage 3a   New baseline creatinine has been around 1.1-1.3 mg/dl. Urine analysis negative for hematuria and proteinuria. Abd ultrasound showed 9.6 and 9.2 cm kidneys in 2022. CKD stage 3a is secondary to aging, past smoking and past chronic NSAID use.    Electrolytes, Acid Base, Hemoglobin and vitamin D in acceptable range.    - Educated patient about CKD and anticipated progression  - Please try to drink atleast 2338-0582 ml of water.  - Educated about limiting NSAID's, and low animal protein diet.    RTC in one year    Diagnosis and plan of care explained to the patient.  Pt Verbalized understanding. Answered all questions.  Thanks for allowing me to participate in the care of this patient.     9:21 AM    Bahman Brown MD  Nephrology & Critical Care    30 minutes of total time spent on the encounter, which includes face to face time and non-face to face time preparing to see the patient (eg, review of tests), Obtaining and/or reviewing separately obtained history, documenting clinical information in the electronic or other health record, independently interpreting results (not separately reported) and communicating results to the patient/family/caregiver, or Care coordination (not separately reported).

## 2024-09-10 ENCOUNTER — TELEPHONE (OUTPATIENT)
Dept: ORTHOPEDICS | Facility: CLINIC | Age: 64
End: 2024-09-10
Payer: MEDICARE

## 2024-09-10 DIAGNOSIS — Z96.651 S/P TOTAL KNEE REPLACEMENT, RIGHT: ICD-10-CM

## 2024-09-10 DIAGNOSIS — Z96.651 STATUS POST RIGHT KNEE REPLACEMENT: ICD-10-CM

## 2024-09-10 RX ORDER — DEXTROMETHORPHAN HYDROBROMIDE, GUAIFENESIN 5; 100 MG/5ML; MG/5ML
650 LIQUID ORAL EVERY 8 HOURS
Qty: 120 TABLET | Refills: 0 | Status: SHIPPED | OUTPATIENT
Start: 2024-09-10

## 2024-09-10 RX ORDER — HYDROMORPHONE HYDROCHLORIDE 2 MG/1
TABLET ORAL
Qty: 28 TABLET | Refills: 0 | Status: SHIPPED | OUTPATIENT
Start: 2024-09-10

## 2024-09-10 RX ORDER — METHOCARBAMOL 750 MG/1
750 TABLET, FILM COATED ORAL 4 TIMES DAILY PRN
Qty: 40 TABLET | Refills: 0 | Status: SHIPPED | OUTPATIENT
Start: 2024-09-10

## 2024-09-10 NOTE — TELEPHONE ENCOUNTER
Pt called [facility] to advise she is going out of town and would like a refill on all her meds. Message sent to team to advise.

## 2024-09-10 NOTE — TELEPHONE ENCOUNTER
Addendum  created 04/03/20 1510 by Vidya Perry, CRNA    [facility] edited       Called patient regarding refill of medications.  Patient is still having moderate pain that is only be controlled by the Dilaudid and requested a refill.  She also endorses mild-to-moderate muscle spasms that are being relieved with the methocarbamol.    Refill of Dilaudid, acetaminophen 650 mg, and methocarbamol sent to pharmacy.

## 2024-09-16 DIAGNOSIS — Z96.651 S/P TOTAL KNEE REPLACEMENT, RIGHT: ICD-10-CM

## 2024-09-16 RX ORDER — DEXTROMETHORPHAN HYDROBROMIDE, GUAIFENESIN 5; 100 MG/5ML; MG/5ML
650 LIQUID ORAL EVERY 8 HOURS
Qty: 120 TABLET | Refills: 0 | Status: SHIPPED | OUTPATIENT
Start: 2024-09-16

## 2024-09-18 ENCOUNTER — TELEPHONE (OUTPATIENT)
Dept: ORTHOPEDICS | Facility: CLINIC | Age: 64
End: 2024-09-18
Payer: MEDICARE

## 2024-09-18 NOTE — TELEPHONE ENCOUNTER
Pt called the joint phone stating that she needs a refill of her tylenol. Pt stated that she has always gotten her tylenol prescribed to her but now she said that the pharmacy wont fill her prescription and pt is upset that she is not getting what she wants. I called back the pt and stated that she should call the pharmacy and ask why they wont fill the Rx that Kiara sent on 9/16/24. Also discussed that insurance may be the issue since the medication she's asking for is an OTC medication. Pt stated that she will call back after discussing with the pharmacy the reason for not refilling. Pt verbalized understanding and has no further questions.

## 2024-09-18 NOTE — TELEPHONE ENCOUNTER
Called pt and she stated that the [facility] in [place]  doesn't have it in stock so the pharmacy transferred script to the [facility] [address] and she will pick it up Wednesday next week.     ----- Message from Betty Mack sent at 9/18/2024 11:23 AM CDT -----  Regarding: Pharm Auth  Contact: pt [phone]  Pt is calling to inform provider prescription was sent to current pharm ([facility])  which was out of stock, pt had medication filled [facility] [address].     acetaminophen (TYLENOL) 650 MG TbSR

## 2024-09-19 ENCOUNTER — TELEPHONE (OUTPATIENT)
Dept: ORTHOPEDICS | Facility: CLINIC | Age: 64
End: 2024-09-19
Payer: MEDICARE

## 2024-09-19 NOTE — TELEPHONE ENCOUNTER
Called pt, pt says her legs has been feeling weak recently which she thinks is very unusually. Said her PT told her to stay off of it for a few days which bothers her. I said maybe she should take a day or two off to rest from exercising, though the exercise should not be the problem unless she is overworking herself. I told her Dr. Campos will take a look at it next week as she is scheduled to see him 9/25 and that if she still has weakness next week and feels liek she needs to, she can call again next week to hear from us. Pt verbalized understanding.   ----- Message from [place] sent at 9/19/2024  8:53 AM CDT -----  Regarding: PT IS CALLING TO INFORM STAFF THAT FOR THE LAST TWO DAYS HER RIGHT LEG HAS BEEN FEELING WEEK  Contact: pt  Pt is stating that she exercises everyday and is not sure if that is the problem.       Confirmed contact info below:  Contact Name: Nohemi Hull  Phone Number: [phone]

## 2024-09-25 ENCOUNTER — OFFICE VISIT (OUTPATIENT)
Dept: ORTHOPEDICS | Facility: CLINIC | Age: 64
End: 2024-09-25
Payer: MEDICARE

## 2024-09-25 VITALS — BODY MASS INDEX: 23.54 KG/M2 | HEIGHT: 66 IN | WEIGHT: 146.5 LBS

## 2024-09-25 DIAGNOSIS — Z96.651 S/P TOTAL KNEE REPLACEMENT, RIGHT: ICD-10-CM

## 2024-09-25 PROCEDURE — 99999 PR PBB SHADOW E&M-EST. PATIENT-LVL IV: CPT | Mod: PBBFAC,,, | Performed by: ORTHOPAEDIC SURGERY

## 2024-09-25 PROCEDURE — 1159F MED LIST DOCD IN RCRD: CPT | Mod: CPTII,S$GLB,, | Performed by: ORTHOPAEDIC SURGERY

## 2024-09-25 PROCEDURE — 99024 POSTOP FOLLOW-UP VISIT: CPT | Mod: S$GLB,,, | Performed by: ORTHOPAEDIC SURGERY

## 2024-09-25 PROCEDURE — 3066F NEPHROPATHY DOC TX: CPT | Mod: CPTII,S$GLB,, | Performed by: ORTHOPAEDIC SURGERY

## 2024-09-25 RX ORDER — METHOCARBAMOL 750 MG/1
750 TABLET, FILM COATED ORAL 4 TIMES DAILY PRN
Qty: 40 TABLET | Refills: 0 | Status: SHIPPED | OUTPATIENT
Start: 2024-09-25

## 2024-09-25 RX ORDER — DEXTROMETHORPHAN HYDROBROMIDE, GUAIFENESIN 5; 100 MG/5ML; MG/5ML
650 LIQUID ORAL EVERY 8 HOURS
Qty: 120 TABLET | Refills: 0 | Status: SHIPPED | OUTPATIENT
Start: 2024-09-25

## 2024-09-25 NOTE — PROGRESS NOTES
Nohemi Hull is in for 3 month follow up for a  right TKA.  She is doing fair.  Mild pain in the knee.  She has resumed activities of daily living. She has completed PT  Exam demonstrates  A well developed female in no distress.  Alert and oriented.  Mood and affect are appropriate.    Knee incision is well healed.  ROM is 5-120. Walks with siff leg gait  The patella tracks well and there is no instability. The extremity is neurovascularly intact.    Xrays demonstrate a well fixed and positioned prosthesis.         No data to display                     No data to display                     No data to display                    6/17/2024    11:00 AM   Knee Society and Function Score   FINDINGS - KNEE SOCIETY SCORE 44   FINDINGS - KNEE SOCIETY FUNCTION SCORE 50            No data to display                Imp:Progressing  Renewed tylenol and robaxin.  No NSAIDS due to gastric bypass  PT for gait training  F/u in 6 weeks

## 2024-09-26 ENCOUNTER — TELEPHONE (OUTPATIENT)
Dept: ORTHOPEDICS | Facility: CLINIC | Age: 64
End: 2024-09-26
Payer: MEDICARE

## 2024-09-26 NOTE — TELEPHONE ENCOUNTER
Called pt and left a message saying that his note says he renewed tyleno and robaxin and that she can pick those up at the [facility] on [address]. Asked her to call back if she still has any questions.   ----- Message from Bear Colmenares sent at 9/26/2024  1:49 PM CDT -----  Name Of Caller:  Nohemi        Provider Name:Avery Campos        Does patient feel the need to be seen today? no        Relationship to the Pt?: patient        Contact Preference?:   [phone]        What is the nature of the call?:Patient states that she would like someone in regards to the new medications for her to take but she does not remember the name of the medications or rather it was over the counter medication or something that she has to  from the pharmacy.

## 2024-10-07 ENCOUNTER — TELEPHONE (OUTPATIENT)
Dept: ORTHOPEDICS | Facility: CLINIC | Age: 64
End: 2024-10-07
Payer: MEDICARE

## 2024-10-07 NOTE — TELEPHONE ENCOUNTER
Called pt and r/s her appt to be 6 weeks following her sessions of PT which will start on 10/10/24. Pt verbalized understanding and has no further questions.    ----- Message from [place] sent at 10/7/2024  8:33 AM CDT -----  Contact: pt  [phone]  Pt is calling to reschedule her 11/11 appt with provider to six weeks past the start of her PT.  PT will start PT 10/10. No appt until Feb pt says too far away.  Needs to be last week  in Nov. Pt is asking for mid morning no afternoon. Patient Requesting Call Back @  [phone]

## 2024-10-09 NOTE — PROGRESS NOTES
Physical Therapy (PT) Initial Evaluation- KNEE     Name: Nohemi Hull  Clinic Number: [id number]  YOB: 1960    Therapy Diagnosis:   Encounter Diagnoses   Name Primary?    S/P total knee replacement, right     Decreased range of motion of right knee Yes    Gait abnormality      Physician: Avery Campos MD    Physician Orders: PT eval and treat  Medical Diagnosis: S/P total knee replacement, right [Z96.651]  Surgical Procedure and Date: 7/2/24 - Right TKA  Evaluation Date: 10/10/2024  Insurance Authorization Period Expiration: 9/25/25  Plan of Care Certification Period: 1/2/2025  Date of Return to MD: 11/18/24  Visit # / Visits authorized: 1 / 1; remaining visits pending    Precautions:  Standard    Time In: 1000  Time Out: 1100  Total Appointment Time (timed & untimed codes): 60 minutes    Subjective     History of current condition: Nohemi Hull presents with complaints of right knee issues since having her knee replaced on 7/2/24. She participated in several weeks of outpatient therapy recently and was discharged in August but returns due to weakness in her knee. She is living with her daughter while she recovers from her surgery, maybe for another month. She has 5 steps to enter/exit her home in [place] in a single story home. She lives with siblings. She is using Hydrocodone as needed for pain and a muscle relaxer 2-3x/day. She is walking laps in the house 3x/day. She feels about 70% better since having her knee replacement surgery. She has a membership with [employer] but she hasn't been yet.     Pain:  Current 0/10, worst 4/10, best 0/10   Location: R knee   Description: Achy  Aggravating Factors: walking, exercises   Easing Factors: ice, resting on pillow, pain medicine    Past medical history:   Past Medical History:   Diagnosis Date    Anemia     Anxiety     Arthritis     Deep vein thrombosis     Depression 2000    Renal failure      Past Surgical history:   "has a past surgical history that includes Gastric bypass; Tonsillectomy; Dilation and curettage of uterus; Partial hysterectomy; Colonoscopy (N/A, 10/29/2018); Tubal ligation (Bilateral); Radiofrequency ablation (Right, 9/13/2022); and Total knee arthroplasty (Right, 7/2/2024).    Medications: Nohemi has a current medication list which includes the following prescription(s): acetaminophen, aspirin, calcium carbonate, cholecalciferol (vitamin d3), clonazepam, cyanocobalamin (vitamin b-12), ferosul, gabapentin, hydrocortisone, hydromorphone, magnesium, methocarbamol, centrum silver, oxybutynin, pantoprazole, risperidone, senna-docusate 8.6-50 mg, belsomra, trazodone, triamcinolone acetonide 0.1%, and trintellix.    Allergies: Review of patient's allergies indicates:  No Known Allergies    Prior Therapy: A few weeks ago for same issue  Social History: Pt lives with their family  Occupation: Retired   Prior Level of Function: Independent with all ADL, IADL, community mobility and functional activities.   Current Level of Function: Patient experiences considerable difficulty with their typical ADL's.     Intake Outcome Measure for FOTO Survey    Therapist reviewed FOTO scores for Nohemi Hull on 10/10/2024.   FOTO report- see Media section or FOTO account episode details.    Intake Score: 42  Predicted Score: 64       Imaging:  X-ray:   Right knee 8/14/24: Findings - "Right knee arthroplasty identified.  Position alignment is satisfactory and unchanged as compared to the previous study. DJD involving the left knee with narrowing of the medial tibiofemoral joint space.  No fracture or bone destruction identified"    Environmental/Abuse/Neglect/Sensory concerns: None  The patient's spiritual, cultural, social and education needs were considered with no evidence of barriers noted.     Objective     Observation: Moderate swelling R knee, minor redness surrounding, lack of extension.  Pt presents to clinic with " bleeding from pain pump insertion.     RANGE OF MOTION:  *denotes pain     Knee  (degrees) Right Left Goal   Knee Flexion  124 A  130  130 A   Knee Extension +20 A  +15 AA +8 A  +4 AA +5 A      STRENGTH:  *denotes pain     L/E MMT Right Left Goal   Hip Flexion  4-/5 5/5 5/5   Hip Abduction  4/5 5/5 5/5   Knee Extension 4/5 5/5 5/5   Knee Flexion 4-/5 5/5 5/5   Ankle DF 4+/5 5/5 5/5   Ankle PF 4/5 5/5 5/5      Sensation:  Sensation is intact to light touch in B lower extremities      Gait Analysis: The patient presents with the following gait abnormalities: decreased step length on right, decreased stance time on right, decreased heel strike on right, decreased push off on right, decreased knee flexion on right, decreased knee extension on right, and decreased pelvic/trunk rotation.     Treatment   Treatment Time In: 1017  Treatment Time Out: 1100  Total Treatment time (time-based codes) separate from Evaluation: 43 minutes    DATE 10/10/24   Visit Eval   Dynamic functional therapeutic activities to improve functional performance 8'   Shuttle leg press (B) 2 bands 3 x 10   Single leg press 1 band 2 x 10               Neuro Re-education activities to improve: Balance, Coordination, Kinesthetic, Sense, Proprioception, and Posture 3'   Quad set w/heel prop 10s hold 2 x 10                   Therapeutic Exercises to develop strength, endurance, ROM, flexibility, and core stabilization 24'   Recumbent bike L4 x 10'   Prone knee hang 2# x 3'   Prone knee flexion AROM 3 x 10   Prone knee flexion stretch       4 x 30s       Manual Therapy    Scar mobilization 8'     Home Exercises and Patient Education Provided  Education provided:   Discussed PT plan of care and planned interventions. Printed handout of above listed exercises including illustrations and written instructions. Patient verbalized and demonstrated understanding.    Written Home Exercise Program (HEP) Provided: yes.  Exercises were reviewed and  was  able to demonstrate them prior to the end of the session.   demonstrated good  understanding of the education provided.     Assessment   Nohemi is a 64 y.o. female referred to outpatient Physical Therapy with a medical diagnosis of status post right total knee replacement. Pt presents with impairments including: impairments list: ROM, strength, endurance, joint mobility, muscle length, balance, posture, gait mechanics, functional movement patterns, and post-operative precautions. Her prognosis is fair. Patient will benefit from skilled outpatient Physical Therapy to address the deficits stated above and in the chart below, provide patient/family education, and to maximize patient's level of independence.     Medical necessity is demonstrated by the following IMPAIRMENTS:  Pain limits function of effected part for some activities, Unable to participate fully in daily activities, Requires skilled supervision to complete and progress HEP, Weakness, and Edema    Patient's spiritual, cultural and educational needs considered and patient is agreeable to the plan of care and goals as stated below:     Anticipated Barriers for therapy: Re-admission    History  Co-morbidities and personal factors that may impact the plan of care [] LOW: no personal factors / co-morbidities  [x] MODERATE: 1-2 personal factors / co-morbidities  [] HIGH: 3+ personal factors / co-morbidities    Moderate / High Support Documentation: See medical and surgical history     Examination  Body Structures and Functions, activity limitations and participation restrictions that may impact the plan of care [] LOW: addressing 1-2 elements  [x] MODERATE: 3+ elements  [] HIGH: 4+ elements (please support below)    Moderate / High Support Documentation: See evaluation and assessment     Clinical Presentation [x] LOW: stable  [] MODERATE: Evolving  [] HIGH: Unstable     Decision Making/ Complexity Score: moderate       GOALS  Short Term Goals:  4  weeks Progress       Patient will report decreased pain to <3/10 at worst on VAS to progress toward Prior Level of Function.     Patient will report improved function by a functional limitation score of at least 57.5 out of 100 on FOTO.     Patient will improve AROM to 50% of stated goals in order to progress towards Prior Level of Function.     Patient will improve strength to 50% of stated goals in order to progress towards Prior Level of Function.        Long Term Goals:  8 weeks Progress      Patient will report decreased pain to <1/10 at worst on VAS to progress toward Prior Level of Function.       Patient will report improved function by a functional limitation score of at least 64 out of 100 on FOTO.     Patient will improve ROM and Functional Mobility to stated goals to return to Prior Level of Function.     Patient will improve strength to stated goals in order to return to Prior Level of Function.         Plan     Plan of care Certification:     Outpatient Physical Therapy 2 times weekly for up to 12 weeks to include the following interventions: Electrical Stimulation as needed, Manual Therapy, Neuromuscular Re-ed, Patient Education, Therapeutic Activities, and Therapeutic Exercise.     Garcia Hood, PT  10/10/2024

## 2024-10-10 ENCOUNTER — CLINICAL SUPPORT (OUTPATIENT)
Dept: REHABILITATION | Facility: HOSPITAL | Age: 64
End: 2024-10-10
Attending: ORTHOPAEDIC SURGERY
Payer: MEDICARE

## 2024-10-10 ENCOUNTER — TELEPHONE (OUTPATIENT)
Dept: ORTHOPEDICS | Facility: CLINIC | Age: 64
End: 2024-10-10
Payer: MEDICARE

## 2024-10-10 DIAGNOSIS — Z96.651 S/P TOTAL KNEE REPLACEMENT, RIGHT: ICD-10-CM

## 2024-10-10 DIAGNOSIS — M25.661 DECREASED RANGE OF MOTION OF RIGHT KNEE: Primary | ICD-10-CM

## 2024-10-10 DIAGNOSIS — R26.9 GAIT ABNORMALITY: ICD-10-CM

## 2024-10-10 PROCEDURE — 97161 PT EVAL LOW COMPLEX 20 MIN: CPT | Mod: PO

## 2024-10-10 PROCEDURE — 97530 THERAPEUTIC ACTIVITIES: CPT | Mod: PO

## 2024-10-10 PROCEDURE — 97140 MANUAL THERAPY 1/> REGIONS: CPT | Mod: PO

## 2024-10-10 PROCEDURE — 97110 THERAPEUTIC EXERCISES: CPT | Mod: PO

## 2024-10-10 NOTE — PLAN OF CARE
Physical Therapy (PT) Initial Evaluation- KNEE     Name: Nohemi Hull  Clinic Number: [id number]  YOB: 1960    Therapy Diagnosis:   Encounter Diagnoses   Name Primary?    S/P total knee replacement, right     Decreased range of motion of right knee Yes    Gait abnormality      Physician: Avery Campos MD    Physician Orders: PT eval and treat  Medical Diagnosis: S/P total knee replacement, right [Z96.651]  Surgical Procedure and Date: 7/2/24 - Right TKA  Evaluation Date: 10/10/2024  Insurance Authorization Period Expiration: 9/25/25  Plan of Care Certification Period: 1/2/2025  Date of Return to MD: 11/18/24  Visit # / Visits authorized: 1 / 1; remaining visits pending    Precautions:  Standard    Time In: 1000  Time Out: 1100  Total Appointment Time (timed & untimed codes): 60 minutes    Subjective     History of current condition: Nohemi Hull presents with complaints of right knee issues since having her knee replaced on 7/2/24. She participated in several weeks of outpatient therapy recently and was discharged in August but returns due to weakness in her knee. She is living with her daughter while she recovers from her surgery, maybe for another month. She has 5 steps to enter/exit her home in [place] in a single story home. She lives with siblings. She is using Hydrocodone as needed for pain and a muscle relaxer 2-3x/day. She is walking laps in the house 3x/day. She feels about 70% better since having her knee replacement surgery. She has a membership with [employer] but she hasn't been yet.     Pain:  Current 0/10, worst 4/10, best 0/10   Location: R knee   Description: Achy  Aggravating Factors: walking, exercises   Easing Factors: ice, resting on pillow, pain medicine    Past medical history:   Past Medical History:   Diagnosis Date    Anemia     Anxiety     Arthritis     Deep vein thrombosis     Depression 2000    Renal failure      Past Surgical history:   "has a past surgical history that includes Gastric bypass; Tonsillectomy; Dilation and curettage of uterus; Partial hysterectomy; Colonoscopy (N/A, 10/29/2018); Tubal ligation (Bilateral); Radiofrequency ablation (Right, 9/13/2022); and Total knee arthroplasty (Right, 7/2/2024).    Medications: Nohemi has a current medication list which includes the following prescription(s): acetaminophen, aspirin, calcium carbonate, cholecalciferol (vitamin d3), clonazepam, cyanocobalamin (vitamin b-12), ferosul, gabapentin, hydrocortisone, hydromorphone, magnesium, methocarbamol, centrum silver, oxybutynin, pantoprazole, risperidone, senna-docusate 8.6-50 mg, belsomra, trazodone, triamcinolone acetonide 0.1%, and trintellix.    Allergies: Review of patient's allergies indicates:  No Known Allergies    Prior Therapy: A few weeks ago for same issue  Social History: Pt lives with their family  Occupation: Retired   Prior Level of Function: Independent with all ADL, IADL, community mobility and functional activities.   Current Level of Function: Patient experiences considerable difficulty with their typical ADL's.     Intake Outcome Measure for FOTO Survey    Therapist reviewed FOTO scores for Nohemi Hull on 10/10/2024.   FOTO report- see Media section or FOTO account episode details.    Intake Score: 42  Predicted Score: 64       Imaging:  X-ray:   Right knee 8/14/24: Findings - "Right knee arthroplasty identified.  Position alignment is satisfactory and unchanged as compared to the previous study. DJD involving the left knee with narrowing of the medial tibiofemoral joint space.  No fracture or bone destruction identified"    Environmental/Abuse/Neglect/Sensory concerns: None  The patient's spiritual, cultural, social and education needs were considered with no evidence of barriers noted.     Objective     Observation: Moderate swelling R knee, minor redness surrounding, lack of extension.  Pt presents to clinic with " bleeding from pain pump insertion.     RANGE OF MOTION:  *denotes pain     Knee  (degrees) Right Left Goal   Knee Flexion  124 A  130  130 A   Knee Extension +20 A  +15 AA +8 A  +4 AA +5 A      STRENGTH:  *denotes pain     L/E MMT Right Left Goal   Hip Flexion  4-/5 5/5 5/5   Hip Abduction  4/5 5/5 5/5   Knee Extension 4/5 5/5 5/5   Knee Flexion 4-/5 5/5 5/5   Ankle DF 4+/5 5/5 5/5   Ankle PF 4/5 5/5 5/5      Sensation:  Sensation is intact to light touch in B lower extremities      Gait Analysis: The patient presents with the following gait abnormalities: decreased step length on right, decreased stance time on right, decreased heel strike on right, decreased push off on right, decreased knee flexion on right, decreased knee extension on right, and decreased pelvic/trunk rotation.     Treatment   Treatment Time In: 1017  Treatment Time Out: 1100  Total Treatment time (time-based codes) separate from Evaluation: 43 minutes    DATE 10/10/24   Visit Eval   Dynamic functional therapeutic activities to improve functional performance 8'   Shuttle leg press (B) 2 bands 3 x 10   Single leg press 1 band 2 x 10               Neuro Re-education activities to improve: Balance, Coordination, Kinesthetic, Sense, Proprioception, and Posture 3'   Quad set w/heel prop 10s hold 2 x 10                   Therapeutic Exercises to develop strength, endurance, ROM, flexibility, and core stabilization 24'   Recumbent bike L4 x 10'   Prone knee hang 2# x 3'   Prone knee flexion AROM 3 x 10   Prone knee flexion stretch       4 x 30s       Manual Therapy    Scar mobilization (Prioritize) 8'       Gait Training    Also wanting to prioritize bending the knee while walking      Home Exercises and Patient Education Provided  Education provided:   Discussed PT plan of care and planned interventions. Printed handout of above listed exercises including illustrations and written instructions. Patient verbalized and demonstrated  understanding.    Written Home Exercise Program (HEP) Provided: yes.  Exercises were reviewed and  was able to demonstrate them prior to the end of the session.   demonstrated good  understanding of the education provided.     Assessment   Nohemi is a 64 y.o. female referred to outpatient Physical Therapy with a medical diagnosis of status post right total knee replacement. Pt presents with impairments including: impairments list: ROM, strength, endurance, joint mobility, muscle length, balance, posture, gait mechanics, functional movement patterns, and post-operative precautions. Her prognosis is fair. Patient will benefit from skilled outpatient Physical Therapy to address the deficits stated above and in the chart below, provide patient/family education, and to maximize patient's level of independence.     Medical necessity is demonstrated by the following IMPAIRMENTS:  Pain limits function of effected part for some activities, Unable to participate fully in daily activities, Requires skilled supervision to complete and progress HEP, Weakness, and Edema    Patient's spiritual, cultural and educational needs considered and patient is agreeable to the plan of care and goals as stated below:     Anticipated Barriers for therapy: Re-admission    History  Co-morbidities and personal factors that may impact the plan of care [] LOW: no personal factors / co-morbidities  [x] MODERATE: 1-2 personal factors / co-morbidities  [] HIGH: 3+ personal factors / co-morbidities    Moderate / High Support Documentation: See medical and surgical history     Examination  Body Structures and Functions, activity limitations and participation restrictions that may impact the plan of care [] LOW: addressing 1-2 elements  [x] MODERATE: 3+ elements  [] HIGH: 4+ elements (please support below)    Moderate / High Support Documentation: See evaluation and assessment     Clinical Presentation [x] LOW: stable  [] MODERATE:  Evolving  [] HIGH: Unstable     Decision Making/ Complexity Score: moderate       GOALS  Short Term Goals:  4 weeks Progress       Patient will report decreased pain to <3/10 at worst on VAS to progress toward Prior Level of Function.     Patient will report improved function by a functional limitation score of at least 57.5 out of 100 on FOTO.     Patient will improve AROM to 50% of stated goals in order to progress towards Prior Level of Function.     Patient will improve strength to 50% of stated goals in order to progress towards Prior Level of Function.        Long Term Goals:  8 weeks Progress      Patient will report decreased pain to <1/10 at worst on VAS to progress toward Prior Level of Function.       Patient will report improved function by a functional limitation score of at least 64 out of 100 on FOTO.     Patient will improve ROM and Functional Mobility to stated goals to return to Prior Level of Function.     Patient will improve strength to stated goals in order to return to Prior Level of Function.         Plan     Plan of care Certification:     Outpatient Physical Therapy 2 times weekly for up to 12 weeks to include the following interventions: Electrical Stimulation as needed, Manual Therapy, Neuromuscular Re-ed, Patient Education, Therapeutic Activities, and Therapeutic Exercise.     Garcia Hood, PT  10/10/2024

## 2024-10-10 NOTE — TELEPHONE ENCOUNTER
Called pt and informed her that the surgery is in her portal/chart.   ----- Message from Toney sent at 10/10/2024  3:15 PM CDT -----  Regarding: Surgery Date  Contact: pt [phone]  Pt is calling to ask nurse to put a note in portal stating she had  surgery  on 7/2/2024. Patient Requesting Call Back @ [phone]

## 2024-10-14 NOTE — PROGRESS NOTES
"Physical Therapy (PT) Daily Treatment Note     Name: Nohemi Andujar [place]  Clinic Number: [id number]    Therapy Diagnosis:   Encounter Diagnoses   Name Primary?    Decreased range of motion of right knee Yes    Gait abnormality      Physician: Avery Campos MD    Visit Date: 10/15/2024    Physician Orders: PT eval and treat  Medical Diagnosis: S/P total knee replacement, right [Z96.651]  Surgical Procedure and Date: 7/2/24 - Right TKA  Evaluation Date: 10/10/2024  Insurance Authorization Period Expiration: 9/25/25  Plan of Care Certification Period: 1/2/2025  Date of Return to MD: 11/18/24  Visit # / Visits authorized: 1 / 10 + Eval    FOTO  -Intake: 42 - 10/10/24  -Status: Incomplete  -Discharge: Incomplete    Time In: 0752  Time Out: 0850  Total Billable Time: 58 minutes    Precautions: Standard    Subjective     Patient reports: Already rode her bike this morning before therapy.  She was compliant with home exercise program.  Response to previous treatment: Decreased pain  Functional change: Increased walking    Pain: 0/10  Location: right knee      Objective     DATE 10/10/24 10/15/24   Visit Eval 1/10   Dynamic functional therapeutic activities to improve functional performance 8' 10'   Shuttle leg press (B) 2 bands 3 x 10 2.5 bands 3 x 1'   Single leg press 1 band 2 x 10 1.5 bands 2 x 1'    Step-ups   6" alternating 2 x 1'                 Neuro Re-education activities to improve: Balance, Coordination, Kinesthetic, Sense, Proprioception, and Posture 3' 8'   Quad set w/heel prop 10s hold 2 x 10 10s hold x 15    Prone TKE w/bolster   3s hold 2 x 15                        Therapeutic Exercises to develop strength, endurance, ROM, flexibility, and core stabilization 24' 32'   Recumbent bike L4 x 10' Pt performed at home   Prone knee hang 2# x 3' 3# x 3'   Prone knee flexion AROM 3 x 10 3# 3 x 12   Prone knee flexion stretch       4 x 30s 4 x 30s    Heel slide w/strap   3'        Manual Therapy      Scar " mobilization (Prioritize) 8' 8'              Home Exercise Program (HEP) Provided and Patient Education Provided     Patient was provided education on proper heel strike, knee flexion during swing-thru, and increased gait speed.     Written Home Exercises Provided: Patient instructed to cont prior home program.  Exercises were reviewed and  was able to demonstrate them prior to the end of the session.   demonstrated good  understanding of the education provided.     Assessment     Implemented the above listed exercises to progress knee range of motion, functional strengthening, and pain reduction with good participation throughout. No limitation due to pain, mild intermittent stiffness, especially with knee extension.    Is progressing well towards her goals.     Patient will continue to benefit from skilled outpatient physical therapy to address the deficits listed in the problem list box on initial evaluation, provide patient/family education and to maximize patient's level of independence in the home and community environment.     Patient prognosis is Fair.     Patient's spiritual, cultural and educational needs considered and patient agreeable to plan of care and goals.    Anticipated barriers to physical therapy: Re-admission     Goals:   Short Term Goals:  4 weeks Progress       Patient will report decreased pain to <3/10 at worst on VAS to progress toward Prior Level of Function.     Patient will report improved function by a functional limitation score of at least 57.5 out of 100 on FOTO.     Patient will improve AROM to 50% of stated goals in order to progress towards Prior Level of Function.     Patient will improve strength to 50% of stated goals in order to progress towards Prior Level of Function.        Long Term Goals:  8 weeks Progress      Patient will report decreased pain to <1/10 at worst on VAS to progress toward Prior Level of Function.       Patient will report improved  function by a functional limitation score of at least 64 out of 100 on FOTO.     Patient will improve ROM and Functional Mobility to stated goals to return to Prior Level of Function.     Patient will improve strength to stated goals in order to return to Prior Level of Function.         Plan      Outpatient Physical Therapy 2 times weekly for up to 12 weeks to include the following interventions: Electrical Stimulation as needed, Manual Therapy, Neuromuscular Re-ed, Patient Education, Therapeutic Activities, and Therapeutic Exercise.     Garcia Hood, PT

## 2024-10-15 ENCOUNTER — CLINICAL SUPPORT (OUTPATIENT)
Dept: REHABILITATION | Facility: HOSPITAL | Age: 64
End: 2024-10-15
Payer: MEDICARE

## 2024-10-15 DIAGNOSIS — R26.9 GAIT ABNORMALITY: ICD-10-CM

## 2024-10-15 DIAGNOSIS — M25.661 DECREASED RANGE OF MOTION OF RIGHT KNEE: Primary | ICD-10-CM

## 2024-10-15 PROCEDURE — 97110 THERAPEUTIC EXERCISES: CPT | Mod: PO

## 2024-10-15 PROCEDURE — 97140 MANUAL THERAPY 1/> REGIONS: CPT | Mod: PO

## 2024-10-15 PROCEDURE — 97530 THERAPEUTIC ACTIVITIES: CPT | Mod: PO

## 2024-10-15 PROCEDURE — 97112 NEUROMUSCULAR REEDUCATION: CPT | Mod: PO

## 2024-10-16 NOTE — PROGRESS NOTES
"Physical Therapy (PT) Daily Treatment Note     Name: Nohemi Andujar [facility] Number: [id number]    Therapy Diagnosis:   Encounter Diagnoses   Name Primary?    Gait abnormality Yes    Decreased range of motion of right knee      Physician: Avery Campos MD    Visit Date: 10/17/2024    Physician Orders: PT eval and treat  Medical Diagnosis: S/P total knee replacement, right [Z96.651]  Surgical Procedure and Date: 7/2/24 - Right TKA  Evaluation Date: 10/10/2024  Insurance Authorization Period Expiration: 9/25/25  Plan of Care Certification Period: 1/2/2025  Date of Return to MD: 11/18/24  Visit # / Visits authorized: 2/10 + Eval    FOTO  -Intake: 42 - 10/10/24  -Status: Incomplete  -Discharge: Incomplete    Time In: 0955  Time Out: 1050  Total Billable Time: 55 minutes    Precautions: Standard    Subjective     Patient reports: Knee is feeling better. Also feeling like her gait is better.   She was compliant with home exercise program.  Response to previous treatment: Decreased pain  Functional change: Increased walking    Pain: 0/10  Location: right knee      Objective     DATE 10/10/24 10/15/24 10/17/24   Visit Eval 1/10 2/10   Dynamic functional therapeutic activities to improve functional performance 8' 10' 10'   Shuttle leg press (B) 2 bands 3 x 10 2.5 bands 3 x 1' 2.5 bands 3 x 1'   Single leg press 1 band 2 x 10 1.5 bands 2 x 1' 1.5 bands 3 x 1'    Step-ups   6" alternating 2 x 1' 8" alternating 2 x 1'                   Neuro Re-education activities to improve: Balance, Coordination, Kinesthetic, Sense, Proprioception, and Posture 3' 8' 8'   Quad set w/heel prop 10s hold 2 x 10 10s hold x 15 10s hold x 20    Prone TKE w/bolster   3s hold 2 x 15 3s hold 2 x 15                           Therapeutic Exercises to develop strength, endurance, ROM, flexibility, and core stabilization 24' 32' 27'   Recumbent bike L4 x 10' Pt performed at home Pt performed at home   Prone knee hang 2# x 3' 3# x 3' 5# x 3' "   Prone knee flexion AROM 3 x 10 3# 3 x 12 4# 3 x 12   Prone knee flexion stretch       4 x 30s 4 x 30s 3 x 1'    Heel slide w/strap   3' 4'         Manual Therapy       Scar mobilization (Prioritize) 8' 8' 10'               Home Exercise Program (HEP) Provided and Patient Education Provided     Patient was provided education on proper heel strike, knee flexion during swing-thru, and increased gait speed.     Written Home Exercises Provided: Patient instructed to cont prior home program.  Exercises were reviewed and  was able to demonstrate them prior to the end of the session.   demonstrated good  understanding of the education provided.     Assessment     Ms. Hull continues to show good progress toward goals. She achieved +5 degrees from terminal knee extension with manual overpressure. She is also demonstrating improved weight distribution with transfers and improved gait mechanics.      Is progressing well towards her goals.     Patient will continue to benefit from skilled outpatient physical therapy to address the deficits listed in the problem list box on initial evaluation, provide patient/family education and to maximize patient's level of independence in the home and community environment.     Patient prognosis is Fair.     Patient's spiritual, cultural and educational needs considered and patient agreeable to plan of care and goals.    Anticipated barriers to physical therapy: Re-admission     Goals:   Short Term Goals:  4 weeks Progress       Patient will report decreased pain to <3/10 at worst on VAS to progress toward Prior Level of Function.     Patient will report improved function by a functional limitation score of at least 57.5 out of 100 on FOTO.     Patient will improve AROM to 50% of stated goals in order to progress towards Prior Level of Function.     Patient will improve strength to 50% of stated goals in order to progress towards Prior Level of Function.        Long Term  Goals:  8 weeks Progress      Patient will report decreased pain to <1/10 at worst on VAS to progress toward Prior Level of Function.       Patient will report improved function by a functional limitation score of at least 64 out of 100 on FOTO.     Patient will improve ROM and Functional Mobility to stated goals to return to Prior Level of Function.     Patient will improve strength to stated goals in order to return to Prior Level of Function.         Plan      Outpatient Physical Therapy 2 times weekly for up to 12 weeks to include the following interventions: Electrical Stimulation as needed, Manual Therapy, Neuromuscular Re-ed, Patient Education, Therapeutic Activities, and Therapeutic Exercise.     Garcia Hood, PT

## 2024-10-17 ENCOUNTER — CLINICAL SUPPORT (OUTPATIENT)
Dept: REHABILITATION | Facility: HOSPITAL | Age: 64
End: 2024-10-17
Payer: MEDICARE

## 2024-10-17 DIAGNOSIS — M25.661 DECREASED RANGE OF MOTION OF RIGHT KNEE: ICD-10-CM

## 2024-10-17 DIAGNOSIS — R26.9 GAIT ABNORMALITY: Primary | ICD-10-CM

## 2024-10-17 PROCEDURE — 97110 THERAPEUTIC EXERCISES: CPT | Mod: PO

## 2024-10-17 PROCEDURE — 97140 MANUAL THERAPY 1/> REGIONS: CPT | Mod: PO

## 2024-10-17 PROCEDURE — 97112 NEUROMUSCULAR REEDUCATION: CPT | Mod: PO

## 2024-10-17 PROCEDURE — 97530 THERAPEUTIC ACTIVITIES: CPT | Mod: PO

## 2024-10-29 ENCOUNTER — CLINICAL SUPPORT (OUTPATIENT)
Dept: REHABILITATION | Facility: HOSPITAL | Age: 64
End: 2024-10-29
Payer: MEDICARE

## 2024-10-29 DIAGNOSIS — M25.661 DECREASED RANGE OF MOTION OF RIGHT KNEE: Primary | ICD-10-CM

## 2024-10-29 DIAGNOSIS — R26.9 GAIT ABNORMALITY: ICD-10-CM

## 2024-10-29 PROCEDURE — 97110 THERAPEUTIC EXERCISES: CPT | Mod: KX,PO,CQ

## 2024-10-29 PROCEDURE — 97140 MANUAL THERAPY 1/> REGIONS: CPT | Mod: KX,PO,CQ

## 2024-10-29 PROCEDURE — 97112 NEUROMUSCULAR REEDUCATION: CPT | Mod: KX,PO,CQ

## 2024-10-29 PROCEDURE — 97530 THERAPEUTIC ACTIVITIES: CPT | Mod: KX,PO,CQ

## 2024-10-31 ENCOUNTER — CLINICAL SUPPORT (OUTPATIENT)
Dept: REHABILITATION | Facility: HOSPITAL | Age: 64
End: 2024-10-31
Payer: MEDICARE

## 2024-10-31 DIAGNOSIS — M25.661 DECREASED RANGE OF MOTION OF RIGHT KNEE: Primary | ICD-10-CM

## 2024-10-31 DIAGNOSIS — R26.9 GAIT ABNORMALITY: ICD-10-CM

## 2024-10-31 PROCEDURE — 97530 THERAPEUTIC ACTIVITIES: CPT | Mod: PO

## 2024-10-31 PROCEDURE — 97112 NEUROMUSCULAR REEDUCATION: CPT | Mod: PO

## 2024-10-31 PROCEDURE — 97110 THERAPEUTIC EXERCISES: CPT | Mod: PO

## 2024-10-31 PROCEDURE — 97140 MANUAL THERAPY 1/> REGIONS: CPT | Mod: PO

## 2024-10-31 NOTE — PROGRESS NOTES
[facility]   Physical Therapy Treatment Note      Name: Nohemi Andujar [facility] Number: [id number]    Therapy Diagnosis:   Encounter Diagnoses   Name Primary?    Decreased range of motion of right knee Yes    Gait abnormality      Physician: Avery Campos MD    Physician Orders: PT eval and treat  Medical Diagnosis: S/P total knee replacement, right [Z96.651]  Surgical Procedure and Date: 7/2/24 - Right TKA  Evaluation Date: 10/10/2024  Insurance Authorization Period Expiration: 9/25/25  Plan of Care Certification Period: 1/2/2025  Date of Return to MD: 11/18/24  Visit # / Visits authorized: 4/10 + Eval      FOTO  -Intake: 42 - 10/10/24  -Status: Incomplete  -Discharge: Incomplete     Time In: 10:00   Time Out: 11:00   Total Billable Time: 60 minutes      Precautions: Standard      Subjective     Patient reports: that her knee is feeling ok, just weak.  She was compliant with home exercise program.  Response to previous treatment:   Functional change: feeling better overall     Pain: 1/10, Associated with progressive fatigue related stiffness.  Location: right knee     Objective      Range of Motion (ROM):  Right knee flexion: 0° to 120°  Right knee extension: -5°  Strength (0-5 Scale):  Quadriceps: 3+/5  Hamstrings: 4/5  Hip abductors: 4-/5  Hip extensors: 4-/5      Neurological Tests:  Light touch sensation intact over right knee  Patellar reflex: Normal, stinging feeling intermittently over the medial collateral ligament.    Treatment      received the treatments listed below:      Therapeutic Exercises (15')  Quadriceps Sets: 15 reps  3 sets  Heel Slides: 10 reps  3 sets  Seated Long Arc Quads: 10 reps  2 sets  Straight Leg Raises (SLR): 10 reps  2 sets  Mini Squats (holding onto support): 10 reps  2 sets  Manual Therapy (12')  Patellar mobilizations (medial-lateral glide and superior-inferior glide) to decrease stiffness  Soft tissue mobilization around the  quadriceps, hamstrings, and IT band to reduce muscle tension  Neuromuscular Re-Education Activity (10')  Step-Down Training (2-inch step): 8 reps  2 sets  Weight Shifting Exercises: 15 seconds  3 sets  Therapeutic Activity (15')  Sit-to-Stand Transfers: 10 reps  3 sets to improve functional strength  Stair Climbing Drills (with handrail assistance): 5 reps each leg  2 sets    Patient Education and Home Exercises       Home Exercise Program (HEP) Provided and Patient Education Provided      Patient Education  Reinforced importance of knee ROM exercises at home.  Discussed proper cane use for reducing load on the right knee.  Reviewed strategies for managing swelling post-activity, including ice application and elevation.  Education on proper heel strike, knee flexion during swing-thru, and increased gait speed.      Written Home Exercises Provided: Patient instructed to cont prior home program.  Exercises were reviewed and  was able to demonstrate them prior to the end of the session.   demonstrated good  understanding of the education provided.     Assessment     Pt tolerated therapy session well and was able to complete exercise program w/ goals to improve knee ROM, functional mobiliy, and strength. Some weakness noted during prone hamstring curls as leg continually kept falling out of alignment, so manually kept leg straight and on track. Pt performed exercises w/o complaint or issue. Will continue to progress as tolerated.      Is progressing well towards her goals.      Patient will continue to benefit from skilled outpatient physical therapy to address the deficits listed in the problem list box on initial evaluation, provide patient/family education and to maximize patient's level of independence in the home and community environment.      Patient prognosis is Fair.      Patient's spiritual, cultural and educational needs considered and patient agreeable to plan of care and goals.      Anticipated barriers to physical therapy: Re-admission      Goals:   Short Term Goals:  4 weeks Progress       Patient will report decreased pain to <3/10 at worst on VAS to progress toward Prior Level of Function. In progress   Patient will report improved function by a functional limitation score of at least 57.5 out of 100 on FOTO.  In progress   Patient will improve AROM to 50% of stated goals in order to progress towards Prior Level of Function. In progress    Patient will improve strength to 50% of stated goals in order to progress towards Prior Level of Function.  In progress      Long Term Goals:  8 weeks Progress      Patient will report decreased pain to <1/10 at worst on VAS to progress toward Prior Level of Function.    In progress   Patient will report improved function by a functional limitation score of at least 64 out of 100 on FOTO.  In progress   Patient will improve ROM and Functional Mobility to stated goals to return to Prior Level of Function.  In progress   Patient will improve strength to stated goals in order to return to Prior Level of Function. In progress        Plan     Outpatient Physical Therapy 2 times weekly for up to 12 weeks to include the following interventions: Electrical Stimulation as needed, Manual Therapy, Neuromuscular Re-ed, Patient Education, Therapeutic Activities, and Therapeutic Exercise.     Jeanne Eason, PT   10/31/2024

## 2024-11-01 ENCOUNTER — TELEPHONE (OUTPATIENT)
Dept: ORTHOPEDICS | Facility: CLINIC | Age: 64
End: 2024-11-01
Payer: MEDICARE

## 2024-11-01 DIAGNOSIS — Z96.651 S/P TOTAL KNEE REPLACEMENT, RIGHT: ICD-10-CM

## 2024-11-01 RX ORDER — METHOCARBAMOL 750 MG/1
750 TABLET, FILM COATED ORAL 4 TIMES DAILY PRN
Qty: 40 TABLET | Refills: 0 | Status: SHIPPED | OUTPATIENT
Start: 2024-11-01

## 2024-11-05 ENCOUNTER — CLINICAL SUPPORT (OUTPATIENT)
Dept: REHABILITATION | Facility: HOSPITAL | Age: 64
End: 2024-11-05
Payer: MEDICARE

## 2024-11-05 DIAGNOSIS — M25.661 DECREASED RANGE OF MOTION OF RIGHT KNEE: Primary | ICD-10-CM

## 2024-11-05 DIAGNOSIS — R26.9 GAIT ABNORMALITY: ICD-10-CM

## 2024-11-05 PROCEDURE — 97530 THERAPEUTIC ACTIVITIES: CPT | Mod: KX,PO

## 2024-11-05 PROCEDURE — 97112 NEUROMUSCULAR REEDUCATION: CPT | Mod: KX,PO

## 2024-11-05 PROCEDURE — 97140 MANUAL THERAPY 1/> REGIONS: CPT | Mod: KX,PO

## 2024-11-05 PROCEDURE — 97110 THERAPEUTIC EXERCISES: CPT | Mod: KX,PO

## 2024-11-05 NOTE — PROGRESS NOTES
[facility]   Physical Therapy Treatment Note      Name: Nohemi Andujar [place]  Clinic Number: [id number]    Therapy Diagnosis:   Encounter Diagnoses   Name Primary?    Decreased range of motion of right knee Yes    Gait abnormality      Physician: Avery Campos MD    Visit Date: 11/5/2024    Physician Orders: PT eval and treat  Medical Diagnosis: S/P total knee replacement, right [Z96.651]  Surgical Procedure and Date: 7/2/24 - Right TKA  Evaluation Date: 10/10/2024  Insurance Authorization Period Expiration: 9/25/25  Plan of Care Certification Period: 1/2/2025  Date of Return to MD: 11/18/24  Visit # / Visits authorized: 5/10 + Eval      FOTO  -Intake: 42 - 10/10/24  -Status: Incomplete  -Discharge: Incomplete     Time In: 10:00   Time Out: 11:00   Total Billable Time: 60 minutes      Precautions: Standard        Subjective      Patient reports: that her knee is feeling ok, just weak.  She was compliant with home exercise program.  Response to previous treatment:   Functional change: feeling better overall     Pain: 1/10, Associated with progressive fatigue related stiffness.  Location: right knee      Objective       Range of Motion (ROM):  Right knee flexion: 0° to 120°  Right knee extension: -5°  Strength (0-5 Scale):  Quadriceps: 3+/5  Hamstrings: 4/5  Hip abductors: 4-/5  Hip extensors: 4-/5     Neurological Tests:  Light touch sensation intact over right knee  Patellar reflex: Normal, stinging feeling intermittently over the medial collateral ligament.     Treatment       received the treatments listed below:       Therapeutic Exercises (15')  Quadriceps Sets: 15 reps  3 sets  Heel Slides: 10 reps  3 sets  Seated Long Arc Quads: 10 reps  2 sets  Straight Leg Raises (SLR): 10 reps  2 sets  Mini Squats (holding onto support): 10 reps  2 sets  Manual Therapy (12')  Patellar mobilizations (medial-lateral glide and superior-inferior glide) to decrease stiffness  Soft  tissue mobilization around the quadriceps, hamstrings, and IT band to reduce muscle tension  Neuromuscular Re-Education Activity (10')  Step-Down Training (2-inch step): 8 reps  2 sets  Weight Shifting Exercises: 15 seconds  3 sets  Therapeutic Activity (15')  Sit-to-Stand Transfers: 10 reps  3 sets to improve functional strength  Stair Climbing Drills (with handrail assistance): 5 reps each leg  2 sets     Patient Education and Home Exercises        Home Exercise Program (HEP) Provided and Patient Education Provided      Patient Education  Reinforced importance of knee ROM exercises at home.  Discussed proper cane use for reducing load on the right knee.  Reviewed strategies for managing swelling post-activity, including ice application and elevation.  Education on proper heel strike, knee flexion during swing-thru, and increased gait speed.      Written Home Exercises Provided: Patient instructed to cont prior home program.  Exercises were reviewed and  was able to demonstrate them prior to the end of the session.   demonstrated good  understanding of the education provided.      Assessment      Pt tolerated therapy session well and was able to complete exercise program w/ goals to improve knee ROM, functional mobiliy, and strength. Some weakness noted during prone hamstring curls as leg continually kept falling out of alignment, so manually kept leg straight and on track. Pt performed exercises w/o complaint or issue. Will continue to progress as tolerated.      Is progressing well towards her goals.      Patient will continue to benefit from skilled outpatient physical therapy to address the deficits listed in the problem list box on initial evaluation, provide patient/family education and to maximize patient's level of independence in the home and community environment.      Patient prognosis is Fair.      Patient's spiritual, cultural and educational needs considered and patient agreeable  to plan of care and goals.     Anticipated barriers to physical therapy: Re-admission      Goals:   Short Term Goals:  4 weeks Progress       Patient will report decreased pain to <3/10 at worst on VAS to progress toward Prior Level of Function. In progress   Patient will report improved function by a functional limitation score of at least 57.5 out of 100 on FOTO.  In progress   Patient will improve AROM to 50% of stated goals in order to progress towards Prior Level of Function. In progress    Patient will improve strength to 50% of stated goals in order to progress towards Prior Level of Function.  In progress      Long Term Goals:  8 weeks Progress      Patient will report decreased pain to <1/10 at worst on VAS to progress toward Prior Level of Function.    In progress   Patient will report improved function by a functional limitation score of at least 64 out of 100 on FOTO.  In progress   Patient will improve ROM and Functional Mobility to stated goals to return to Prior Level of Function.  In progress   Patient will improve strength to stated goals in order to return to Prior Level of Function. In progress          Plan      Outpatient Physical Therapy 2 times weekly for up to 12 weeks to include the following interventions: Electrical Stimulation as needed, Manual Therapy, Neuromuscular Re-ed, Patient Education, Therapeutic Activities, and Therapeutic Exercise.    Jeanne Eason, PT   11/5/2024

## 2024-11-12 ENCOUNTER — CLINICAL SUPPORT (OUTPATIENT)
Dept: REHABILITATION | Facility: HOSPITAL | Age: 64
End: 2024-11-12
Payer: MEDICARE

## 2024-11-12 DIAGNOSIS — R26.9 GAIT ABNORMALITY: ICD-10-CM

## 2024-11-12 DIAGNOSIS — M25.661 DECREASED RANGE OF MOTION OF RIGHT KNEE: Primary | ICD-10-CM

## 2024-11-12 PROCEDURE — 97140 MANUAL THERAPY 1/> REGIONS: CPT | Mod: KX,PO

## 2024-11-12 PROCEDURE — 97112 NEUROMUSCULAR REEDUCATION: CPT | Mod: KX,PO

## 2024-11-12 PROCEDURE — 97110 THERAPEUTIC EXERCISES: CPT | Mod: KX,PO

## 2024-11-12 PROCEDURE — 97530 THERAPEUTIC ACTIVITIES: CPT | Mod: KX,PO

## 2024-11-12 NOTE — PROGRESS NOTES
[facility]   Physical Therapy Treatment Note      Name: Nohemi Andujar [place]  Clinic Number: [id number]    Therapy Diagnosis:   Encounter Diagnoses   Name Primary?    Decreased range of motion of right knee Yes    Gait abnormality      Physician: Avery Campos MD    Visit Date: 11/12/2024    Physician Orders: PT eval and treat  Medical Diagnosis: S/P total knee replacement, right [Z96.651]  Surgical Procedure and Date: 7/2/24 - Right TKA  Evaluation Date: 10/10/2024  Insurance Authorization Period Expiration: 9/25/25  Plan of Care Certification Period: 1/2/2025  Date of Return to MD: 11/18/24  Visit # / Visits authorized: 5/10 + Eval      FOTO  -Intake: 42 - 10/10/24  -Status: Incomplete  -Discharge: Incomplete     Time In: 10:00   Time Out: 11:00   Total Billable Time: 60 minutes      Precautions: Standard     Subjective      Patient reports: responding well   Response to previous treatment:   Functional change: feeling better overall     Pain: 2/10 due to the weather being damp, rainy and , Associated with progressive fatigue related stiffness.  Location: right knee      Objective       Range of Motion (ROM):  Right knee flexion: 0° to 120°  Right knee extension: -5°  Strength (0-5 Scale):  Quadriceps: 3+/5  Hamstrings: 4/5  Hip abductors: 4-/5  Hip extensors: 4+/5     Neurological Tests:  Light touch sensation intact over right knee  Patellar reflex: Normal, stinging feeling intermittently over the medial collateral ligament.     Treatment       received the treatments listed below:       Therapeutic Exercises (15')  Quadriceps Sets: 15 reps  3 sets  Heel Slides: 10 reps  3 sets  Seated Long Arc Quads: 10 reps  3 sets  Straight Leg Raises (SLR): 10 reps  3 sets  Mini Squats (holding onto support): 10 reps  3 sets  Manual Therapy (12')  Patellar mobilizations (medial-lateral glide and superior-inferior glide) to decrease stiffness  Soft tissue mobilization around the  quadriceps, hamstrings, and IT band to reduce muscle tension  Joint stretches into extension with over-pressure x 8 reps - 5 Second holds  Neuromuscular Re-Education Activity (10')  Step-Down Training (2-inch step): 8 reps  3 sets  Weight Shifting Exercises: 15 seconds  3 sets  Therapeutic Activity (15')  Sit-to-Stand Transfers: 10 reps  3 sets to improve functional strength  Stair Climbing Drills (with handrail assistance): 5 reps each leg  2 sets     Patient Education and Home Exercises        Home Exercise Program (HEP) Provided and Patient Education Provided      Patient Education  Reinforced importance of knee ROM exercises at home.  Discussed proper cane use for reducing load on the right knee.  Reviewed strategies for managing swelling post-activity, including ice application and elevation.  Education on proper heel strike, knee flexion during swing-thru, and increased gait speed.      Written Home Exercises Provided: Patient instructed to cont prior home program.  Exercises were reviewed and  was able to demonstrate them prior to the end of the session.   demonstrated good  understanding of the education provided.      Assessment      Pt tolerated therapy session well and was able to complete exercise program w/ goals to improve knee ROM, functional mobiliy, and strength. Some weakness noted during prone hamstring curls as leg continually kept falling out of alignment, so manually kept leg straight and on track. Pt performed exercises w/o complaint or issue. Will continue to progress as tolerated.      Is progressing well towards her goals.      Patient will continue to benefit from skilled outpatient physical therapy to address the deficits listed in the problem list box on initial evaluation, provide patient/family education and to maximize patient's level of independence in the home and community environment.      Patient prognosis is Fair.      Patient's spiritual, cultural and  educational needs considered and patient agreeable to plan of care and goals.     Anticipated barriers to physical therapy: Re-admission      Plan      Outpatient Physical Therapy 2 times weekly for up to 12 weeks to include the following interventions: Electrical Stimulation as needed, Manual Therapy, Neuromuscular Re-ed, Patient Education, Therapeutic Activities, and Therapeutic Exercise.    Jeanne Eason, PT

## 2024-11-14 ENCOUNTER — CLINICAL SUPPORT (OUTPATIENT)
Dept: REHABILITATION | Facility: HOSPITAL | Age: 64
End: 2024-11-14
Payer: MEDICARE

## 2024-11-14 DIAGNOSIS — M25.661 DECREASED RANGE OF MOTION OF RIGHT KNEE: Primary | ICD-10-CM

## 2024-11-14 DIAGNOSIS — R26.9 GAIT ABNORMALITY: ICD-10-CM

## 2024-11-14 PROCEDURE — 97140 MANUAL THERAPY 1/> REGIONS: CPT | Mod: KX,PO,CQ

## 2024-11-14 PROCEDURE — 97530 THERAPEUTIC ACTIVITIES: CPT | Mod: KX,PO,CQ

## 2024-11-14 PROCEDURE — 97110 THERAPEUTIC EXERCISES: CPT | Mod: KX,PO,CQ

## 2024-11-14 PROCEDURE — 97112 NEUROMUSCULAR REEDUCATION: CPT | Mod: KX,PO,CQ

## 2024-11-14 NOTE — PROGRESS NOTES
[facility]   Physical Therapy Treatment Note      Name: Nohemi Andujar [place]  Clinic Number: [id number]    Therapy Diagnosis:   No diagnosis found.    Physician: Avery Campos MD    Visit Date: 11/14/2024    Physician Orders: PT eval and treat  Medical Diagnosis: S/P total knee replacement, right [Z96.651]  Surgical Procedure and Date: 7/2/24 - Right TKA  Evaluation Date: 10/10/2024  Insurance Authorization Period Expiration: 9/25/25  Plan of Care Certification Period: 1/2/2025  Date of Return to MD: 11/18/24  Visit # / Visits authorized: 5/10 + Eval      FOTO  -Intake: 42 - 10/10/24  -Status: Incomplete  -Discharge: Incomplete     Time In: ***  Time Out: ***  Total Billable Time: ***     Precautions: Standard     Subjective      Patient reports: responding well   Response to previous treatment:   Functional change: feeling better overall     Pain: 2/10 due to the weather being damp, rainy and , Associated with progressive fatigue related stiffness.  Location: right knee      Objective       Range of Motion (ROM):  Right knee flexion: 0° to 120°  Right knee extension: -5°  Strength (0-5 Scale):  Quadriceps: 3+/5  Hamstrings: 4/5  Hip abductors: 4-/5  Hip extensors: 4+/5     Neurological Tests:  Light touch sensation intact over right knee  Patellar reflex: Normal, stinging feeling intermittently over the medial collateral ligament.     Treatment       received the treatments listed below:       Therapeutic Exercises (15')  Quadriceps Sets: 15 reps  3 sets  Heel Slides: 10 reps  3 sets  Seated Long Arc Quads: 10 reps  3 sets  Straight Leg Raises (SLR): 10 reps  3 sets  Mini Squats (holding onto support): 10 reps  3 sets  Manual Therapy (12')  Patellar mobilizations (medial-lateral glide and superior-inferior glide) to decrease stiffness  Soft tissue mobilization around the quadriceps, hamstrings, and IT band to reduce muscle tension  Joint stretches into extension with  over-pressure x 8 reps - 5 Second holds  Neuromuscular Re-Education Activity (10')  Step-Down Training (2-inch step): 8 reps  3 sets  Weight Shifting Exercises: 15 seconds  3 sets  Therapeutic Activity (15')  Sit-to-Stand Transfers: 10 reps  3 sets to improve functional strength  Stair Climbing Drills (with handrail assistance): 5 reps each leg  2 sets     Patient Education and Home Exercises        Home Exercise Program (HEP) Provided and Patient Education Provided      Patient Education  Reinforced importance of knee ROM exercises at home.  Discussed proper cane use for reducing load on the right knee.  Reviewed strategies for managing swelling post-activity, including ice application and elevation.  Education on proper heel strike, knee flexion during swing-thru, and increased gait speed.      Written Home Exercises Provided: Patient instructed to cont prior home program.  Exercises were reviewed and  was able to demonstrate them prior to the end of the session.   demonstrated good  understanding of the education provided.      Assessment   ***     Pt tolerated therapy session well and was able to complete exercise program w/ goals to improve knee ROM, functional mobiliy, and strength. Some weakness noted during prone hamstring curls as leg continually kept falling out of alignment, so manually kept leg straight and on track. Pt performed exercises w/o complaint or issue. Will continue to progress as tolerated.      Is progressing well towards her goals.      Patient will continue to benefit from skilled outpatient physical therapy to address the deficits listed in the problem list box on initial evaluation, provide patient/family education and to maximize patient's level of independence in the home and community environment.      Patient prognosis is Fair.      Patient's spiritual, cultural and educational needs considered and patient agreeable to plan of care and goals.     Anticipated  barriers to physical therapy: Re-admission      Plan      Outpatient Physical Therapy 2 times weekly for up to 12 weeks to include the following interventions: Electrical Stimulation as needed, Manual Therapy, Neuromuscular Re-ed, Patient Education, Therapeutic Activities, and Therapeutic Exercise.    Selina Marie, PTA

## 2024-11-14 NOTE — PROGRESS NOTES
[facility]   Physical Therapy Treatment Note      Name: Nohemi Andujar [place]  Clinic Number: [id number]    Therapy Diagnosis:   Encounter Diagnoses   Name Primary?    Decreased range of motion of right knee Yes    Gait abnormality      Physician: Avery Campos MD    Visit Date: 11/14/2024    Physician Orders: PT eval and treat  Medical Diagnosis: S/P total knee replacement, right [Z96.651]  Surgical Procedure and Date: 7/2/24 - Right TKA  Evaluation Date: 10/10/2024  Insurance Authorization Period Expiration: 9/25/25  Plan of Care Certification Period: 1/2/2025  Date of Return to MD: 11/18/24  Visit # / Visits authorized: 7/10 + Eval      FOTO  -Intake: 42 - 10/10/24  -Status: Incomplete  -Discharge: Incomplete     Time In: 11:00 pm  Time Out: 12:00 pm  Total Billable Time: 60 minutes      Precautions: Standard     Subjective      Patient reports: she feels like she's doing better overall and is going to start going to the gym again.  Response to previous treatment:   Functional change: feeling better overall     Pain: 2/10   Location: right knee      Objective       Range of Motion (ROM):  Right knee flexion: 0° to 120°  Right knee extension: -5°  Strength (0-5 Scale):  Quadriceps: 3+/5  Hamstrings: 4/5  Hip abductors: 4-/5  Hip extensors: 4+/5     Neurological Tests:  Light touch sensation intact over right knee  Patellar reflex: Normal, stinging feeling intermittently over the medial collateral ligament.     Treatment       received the treatments listed below:       Therapeutic Exercises (20')  Quadriceps Sets: 15 reps  3 sets  Heel Slides: 10 reps  3 sets  Seated Long Arc Quads: 10 reps  3 sets  Straight Leg Raises (SLR): 10 reps  3 sets  Mini Squats (holding onto support): 10 reps  3 sets  Manual Therapy (20')  Patellar mobilizations (medial-lateral glide and superior-inferior glide) to decrease stiffness  Soft tissue mobilization around the quadriceps,  hamstrings, and IT band to reduce muscle tension  Joint stretches into extension with over-pressure x 8 reps - 5 Second holds  Neuromuscular Re-Education Activity (10')  Step-Down Training (2-inch step): 8 reps  3 sets  Weight Shifting Exercises: 15 seconds  3 sets  Therapeutic Activity (10')  Sit-to-Stand Transfers: 10 reps  3 sets to improve functional strength  Stair Climbing Drills (with handrail assistance): 5 reps each leg  2 sets     Patient Education and Home Exercises        Home Exercise Program (HEP) Provided and Patient Education Provided      Patient Education  Reinforced importance of knee ROM exercises at home.  Discussed proper cane use for reducing load on the right knee.  Reviewed strategies for managing swelling post-activity, including ice application and elevation.  Education on proper heel strike, knee flexion during swing-thru, and increased gait speed.      Written Home Exercises Provided: Patient instructed to cont prior home program.  Exercises were reviewed and  was able to demonstrate them prior to the end of the session.   demonstrated good  understanding of the education provided.      Assessment      Pt completed therapy session w/ goals to improve knee ROM, strength, and functional mobility w/ min discomfort and mod fatigue. Pain noted when performing manual joint extensions into extension and pt required short rest breaks in between reps to complete. Some relief also noted if STM was performed during short rest breaks. Exercises challenged appropriately. Will continue to progress as tolerated.      Is progressing well towards her goals.      Patient will continue to benefit from skilled outpatient physical therapy to address the deficits listed in the problem list box on initial evaluation, provide patient/family education and to maximize patient's level of independence in the home and community environment.      Patient prognosis is Fair.      Patient's  spiritual, cultural and educational needs considered and patient agreeable to plan of care and goals.     Anticipated barriers to physical therapy: Re-admission      Plan      Outpatient Physical Therapy 2 times weekly for up to 12 weeks to include the following interventions: Electrical Stimulation as needed, Manual Therapy, Neuromuscular Re-ed, Patient Education, Therapeutic Activities, and Therapeutic Exercise.    Lenin Matute, PTA

## 2024-11-19 ENCOUNTER — CLINICAL SUPPORT (OUTPATIENT)
Dept: REHABILITATION | Facility: HOSPITAL | Age: 64
End: 2024-11-19
Payer: MEDICARE

## 2024-11-19 DIAGNOSIS — M25.661 DECREASED RANGE OF MOTION OF RIGHT KNEE: Primary | ICD-10-CM

## 2024-11-19 DIAGNOSIS — R26.9 GAIT ABNORMALITY: ICD-10-CM

## 2024-11-19 PROCEDURE — 97110 THERAPEUTIC EXERCISES: CPT | Mod: KX,PO,CQ

## 2024-11-19 PROCEDURE — 97112 NEUROMUSCULAR REEDUCATION: CPT | Mod: KX,PO,CQ

## 2024-11-19 NOTE — PROGRESS NOTES
[facility]   Physical Therapy Treatment Note      Name: Nohemi Andujar [place]  Clinic Number: [id number]    Therapy Diagnosis:   Encounter Diagnoses   Name Primary?    Decreased range of motion of right knee Yes    Gait abnormality        Physician: Avery Campos MD    Visit Date: 11/19/2024    Physician Orders: PT eval and treat  Medical Diagnosis: S/P total knee replacement, right [Z96.651]  Surgical Procedure and Date: 7/2/24 - Right TKA  Evaluation Date: 10/10/2024  Insurance Authorization Period Expiration: 9/25/25  Plan of Care Certification Period: 1/2/2025  Date of Return to MD: 11/18/24  Visit # / Visits authorized: 8/10 + Eval      FOTO - updated 11/19/24     Time In: 10:00 AM   Time Out: 10: 53 AM  Total Billable Time: 53 minutes - 2 TE, 2 NM      Precautions: Standard     Subjective      Patient reports: doing well, no pain today. Pt states its getting easier to lift her leg to get into shower.   Response to previous treatment: minimal soreness   Functional change: easier getting into shower      Pain: 0/10   Location: right knee      Objective       Range of Motion (ROM):  Right knee flexion: 0° to 120°  Right knee extension: -5°  Strength (0-5 Scale):  Quadriceps: 3+/5  Hamstrings: 4/5  Hip abductors: 4-/5  Hip extensors: 4+/5     Neurological Tests:  Light touch sensation intact over right knee  Patellar reflex: Normal, stinging feeling intermittently over the medial collateral ligament.     Treatment       received the treatments listed below:       Therapeutic Exercises (30')  + Nustep: 10 L1   + Knee extension stretch: 5# cuff over knee: 5'  + Prone quad stretch: 5 x 10 sec with strap  + Shuttle: 3 x 10 2 cords   Quadriceps Sets: 15 reps  3 sets  Heel Slides: 10 reps  3 sets  Seated Long Arc Quads: 10 reps  3 sets 2# ankle cuff   Straight Leg Raises (SLR): 10 reps  3 sets  Mini Squats (holding onto support): 10 reps  3 sets  Manual Therapy  (00)  Patellar mobilizations (medial-lateral glide and superior-inferior glide) to decrease stiffness  Soft tissue mobilization around the quadriceps, hamstrings, and IT band to reduce muscle tension  Joint stretches into extension with over-pressure x 8 reps - 5 Second holds  Neuromuscular Re-Education Activity (23')  Step-Down Training (2-inch step): 8 reps  3 sets  Weight Shifting Exercises: 15 seconds  3 sets  + Step ups: 2 x 10 4 in   Therapeutic Activity (00)  Sit-to-Stand Transfers: 10 reps  3 sets to improve functional strength  Stair Climbing Drills (with handrail assistance): 5 reps each leg  2 sets     Patient Education and Home Exercises        Home Exercise Program (HEP) Provided and Patient Education Provided      Patient Education  Reinforced importance of knee ROM exercises at home.  Discussed proper cane use for reducing load on the right knee.  Reviewed strategies for managing swelling post-activity, including ice application and elevation.  Education on proper heel strike, knee flexion during swing-thru, and increased gait speed.      Written Home Exercises Provided: Patient instructed to cont prior home program.  Exercises were reviewed and  was able to demonstrate them prior to the end of the session.   demonstrated good  understanding of the education provided.      Assessment   Pt presents to clinic with no complaints of pain. Manual therapy was not performed today due to patient demonstrating knee hypermobility. Tx session focused on LE ROM/Strengthening. Knee extension stretch was added to increase knee extension , nustep was added to improve ROM and shuttle to help increase LE strength. Pt had good tolerance to all exercises performed with no complaints. Pt is continuing to progress and do well and will continue to benefit from skilled therapy. Will progress as tolerated.       Is progressing well towards her goals.      Patient will continue to benefit from skilled  outpatient physical therapy to address the deficits listed in the problem list box on initial evaluation, provide patient/family education and to maximize patient's level of independence in the home and community environment.      Patient prognosis is Fair.      Patient's spiritual, cultural and educational needs considered and patient agreeable to plan of care and goals.     Anticipated barriers to physical therapy: Re-admission      Plan      Outpatient Physical Therapy 2 times weekly for up to 12 weeks to include the following interventions: Electrical Stimulation as needed, Manual Therapy, Neuromuscular Re-ed, Patient Education, Therapeutic Activities, and Therapeutic Exercise.    Selina Marie, PTA

## 2024-11-21 ENCOUNTER — CLINICAL SUPPORT (OUTPATIENT)
Dept: REHABILITATION | Facility: HOSPITAL | Age: 64
End: 2024-11-21
Payer: MEDICARE

## 2024-11-21 DIAGNOSIS — M25.661 DECREASED RANGE OF MOTION OF RIGHT KNEE: Primary | ICD-10-CM

## 2024-11-21 DIAGNOSIS — R26.9 GAIT ABNORMALITY: ICD-10-CM

## 2024-11-21 PROCEDURE — 97140 MANUAL THERAPY 1/> REGIONS: CPT | Mod: KX,PO

## 2024-11-21 PROCEDURE — 97530 THERAPEUTIC ACTIVITIES: CPT | Mod: KX,PO

## 2024-11-21 PROCEDURE — 97112 NEUROMUSCULAR REEDUCATION: CPT | Mod: KX,PO

## 2024-11-21 NOTE — PROGRESS NOTES
[facility]   Physical Therapy Treatment Note      Name: Nohemi Andujar [place]  Clinic Number: [id number]    Therapy Diagnosis:   Encounter Diagnoses   Name Primary?    Decreased range of motion of right knee Yes    Gait abnormality        Physician: Aevry Campos MD    Visit Date: 11/21/2024    Physician Orders: PT eval and treat  Medical Diagnosis: S/P total knee replacement, right [Z96.651]  Surgical Procedure and Date: 7/2/24 - Right TKA  Evaluation Date: 10/10/2024  Insurance Authorization Period Expiration: 9/25/25  Plan of Care Certification Period: 1/2/2025  Date of Return to MD: 11/18/24  Visit # / Visits authorized: 9/10 + Eval      FOTO - updated 11/19/24     Time In: 10:00 AM   Time Out: 10: 53 AM  Total Billable Time: 39 of 53 minutes - 1 TE, 1 NM, 1 MT      Precautions: Standard     Subjective      Patient reports: She is doing a lot better than she was previously. She does have more steps at her own house which she is a little nervous to navigate.   Response to previous treatment: minimal soreness   Functional change: easier getting into shower      Pain: 0/10   Location: right knee      Objective       Range of Motion (ROM):  Right knee flexion: 0° to 120°  Right knee extension: -5°  Strength (0-5 Scale):  Quadriceps: 3+/5  Hamstrings: 4/5  Hip abductors: 4-/5  Hip extensors: 4+/5     Neurological Tests:  Light touch sensation intact over right knee  Patellar reflex: Normal, stinging feeling intermittently over the medial collateral ligament.     Treatment       received the treatments listed below:       Bolded exercises = Performed    Therapeutic Exercises (30')  + Nustep: 10 L1   + Knee extension stretch: 5# cuff over knee: 5'  + Prone quad stretch: 5 x 10 sec with strap  + bridges: 3 x 10  + Shuttle: 4 x 10 2 cords   Quadriceps Sets: 10 reps  4 sets  Heel Slides: 10 reps  3 sets  Seated Long Arc Quads: 10 reps  3 sets 2# ankle cuff   Straight Leg Raises  (SLR): 10 reps  3 sets with 2# weight  Mini Squats (holding onto support): 10 reps  3 sets  Manual Therapy (8 minutes)  Patellar mobilizations (medial-lateral glide and superior-inferior glide) to decrease stiffness  Soft tissue mobilization around the quadriceps, hamstrings, and IT band to reduce muscle tension  Joint stretches into extension with over-pressure x 20 reps - 5 Second holds  Neuromuscular Re-Education Activity (15')  Step-Down Training (4-inch step): 10 reps  2 sets  Weight Shifting Exercises: 15 seconds  3 sets  + Step ups: 2 x 10 6 in   + slow TKE on 2 inch step: 3 x 6 reps  Therapeutic Activity (00)  Sit-to-Stand Transfers: 10 reps  3 sets to improve functional strength  Stair Climbing Drills (with handrail assistance): 5 reps each leg  2 sets     Patient Education and Home Exercises        Home Exercise Program (HEP) Provided and Patient Education Provided      Patient Education  Reinforced importance of knee ROM exercises at home.  Discussed proper cane use for reducing load on the right knee.  Reviewed strategies for managing swelling post-activity, including ice application and elevation.  Education on proper heel strike, knee flexion during swing-thru, and increased gait speed.      Written Home Exercises Provided: Patient instructed to cont prior home program.  Exercises were reviewed and  was able to demonstrate them prior to the end of the session.   demonstrated good  understanding of the education provided.      Assessment      reports for follow up treatment with decrease in complaint of symptoms. Patient tolerated today's session with no complaints. Today's treatment included strengthening, range of motion, cardiovascular endurance, and manual therapy interventions. Patient tolerated progressions well and included controlled step downs where she demonstrated quality quad control.  She remains limited in lower extremity strength.    is progressing well  towards her goals. Patient will continue to benefit from skilled outpatient physical therapy to address the deficits listed in the problem list box on initial evaluation, provide patient/family education and to maximize patient's level of independence in the home and community environment.        Patient prognosis is Fair.      Patient's spiritual, cultural and educational needs considered and patient agreeable to plan of care and goals.     Anticipated barriers to physical therapy: Re-admission      Plan      Outpatient Physical Therapy 2 times weekly for up to 12 weeks to include the following interventions: Electrical Stimulation as needed, Manual Therapy, Neuromuscular Re-ed, Patient Education, Therapeutic Activities, and Therapeutic Exercise.    Lenin Nava, PT

## 2024-11-29 DIAGNOSIS — Z96.651 S/P TOTAL KNEE REPLACEMENT, RIGHT: Primary | ICD-10-CM

## 2024-12-02 ENCOUNTER — OFFICE VISIT (OUTPATIENT)
Dept: ORTHOPEDICS | Facility: CLINIC | Age: 64
End: 2024-12-02
Payer: MEDICARE

## 2024-12-02 ENCOUNTER — HOSPITAL ENCOUNTER (OUTPATIENT)
Dept: RADIOLOGY | Facility: HOSPITAL | Age: 64
Discharge: HOME | End: 2024-12-02
Attending: ORTHOPAEDIC SURGERY
Payer: MEDICARE

## 2024-12-02 VITALS — WEIGHT: 146.5 LBS | BODY MASS INDEX: 23.54 KG/M2 | HEIGHT: 66 IN

## 2024-12-02 DIAGNOSIS — M25.561 CHRONIC PAIN OF RIGHT KNEE: ICD-10-CM

## 2024-12-02 DIAGNOSIS — G89.29 CHRONIC PAIN OF RIGHT KNEE: ICD-10-CM

## 2024-12-02 DIAGNOSIS — Z96.651 S/P TOTAL KNEE REPLACEMENT, RIGHT: ICD-10-CM

## 2024-12-02 DIAGNOSIS — Z96.651 STATUS POST RIGHT KNEE REPLACEMENT: Primary | ICD-10-CM

## 2024-12-02 PROCEDURE — 3066F NEPHROPATHY DOC TX: CPT | Mod: CPTII,S$GLB,, | Performed by: ORTHOPAEDIC SURGERY

## 2024-12-02 PROCEDURE — 99213 OFFICE O/P EST LOW 20 MIN: CPT | Mod: S$GLB,,, | Performed by: ORTHOPAEDIC SURGERY

## 2024-12-02 PROCEDURE — 73562 X-RAY EXAM OF KNEE 3: CPT | Mod: TC,RT

## 2024-12-02 PROCEDURE — 73562 X-RAY EXAM OF KNEE 3: CPT | Mod: 26,RT,, | Performed by: RADIOLOGY

## 2024-12-02 PROCEDURE — 3008F BODY MASS INDEX DOCD: CPT | Mod: CPTII,S$GLB,, | Performed by: ORTHOPAEDIC SURGERY

## 2024-12-02 PROCEDURE — 99999 PR PBB SHADOW E&M-EST. PATIENT-LVL III: CPT | Mod: PBBFAC,,, | Performed by: ORTHOPAEDIC SURGERY

## 2024-12-02 PROCEDURE — 1159F MED LIST DOCD IN RCRD: CPT | Mod: CPTII,S$GLB,, | Performed by: ORTHOPAEDIC SURGERY

## 2024-12-02 PROCEDURE — 73560 X-RAY EXAM OF KNEE 1 OR 2: CPT | Mod: 26,59,LT, | Performed by: RADIOLOGY

## 2024-12-02 NOTE — PROGRESS NOTES
Nohemi Hull is in for 5 month follow up for a  right TKA.  She is doing  well.  Mild pain in the knee.  She has resumed activities of daily living. She has improved since last visit  Exam demonstrates  A well developed female in no distress.  Alert and oriented.  Mood and affect are appropriate.    Knee incision is well healed.  ROM is 0-120.  The patella tracks well and there is no instability. The extremity is neurovascularly intact.    Xrays demonstrate a well fixed and positioned prosthesis.         No data to display                     No data to display                     No data to display                    6/17/2024    11:00 AM   Knee Society and Function Score   FINDINGS - KNEE SOCIETY SCORE 44   FINDINGS - KNEE SOCIETY FUNCTION SCORE 50            No data to display                Imp:Doing well    F/u in 7 months with xrays and PROMS

## 2024-12-18 ENCOUNTER — TELEPHONE (OUTPATIENT)
Dept: ORTHOPEDICS | Facility: CLINIC | Age: 64
End: 2024-12-18
Payer: MEDICARE

## 2024-12-18 NOTE — TELEPHONE ENCOUNTER
Called Ms. Mejia and let her know that I forwarded the request for refill but if it does not get done today it will next week when Kiara returns. Pt verbalized understanding and has no further questions.    ----- Message from Toney sent at 12/18/2024 12:52 PM CST -----  Regarding: Refill  Contact: [phone]  Rx Refill/Request Is this a Refill or New Rx:  Refill    Rx Name and Strength:      acetaminophen (TYLENOL) 650 MG TbSR  methocarbamoL (ROBAXIN) 750 MG Tab    Preferred Pharmacy with phone number:    "[id number] - [place], [place] - [address] [facility] AT [facility] [address]  [address] [facility] [address]  Phone: [phone] Fax: [phone]       Communication Preference: [phone]

## 2024-12-30 DIAGNOSIS — Z96.651 S/P TOTAL KNEE REPLACEMENT, RIGHT: ICD-10-CM

## 2024-12-30 RX ORDER — DEXTROMETHORPHAN HYDROBROMIDE, GUAIFENESIN 5; 100 MG/5ML; MG/5ML
650 LIQUID ORAL EVERY 8 HOURS
Qty: 120 TABLET | Refills: 0 | Status: SHIPPED | OUTPATIENT
Start: 2024-12-30 | End: 2024-12-31 | Stop reason: SDUPTHER

## 2024-12-31 DIAGNOSIS — Z96.651 S/P TOTAL KNEE REPLACEMENT, RIGHT: ICD-10-CM

## 2024-12-31 RX ORDER — DEXTROMETHORPHAN HYDROBROMIDE, GUAIFENESIN 5; 100 MG/5ML; MG/5ML
650 LIQUID ORAL EVERY 8 HOURS
Qty: 120 TABLET | Refills: 0 | Status: SHIPPED | OUTPATIENT
Start: 2024-12-31

## 2025-02-18 DIAGNOSIS — N89.8 VAGINAL IRRITATION: ICD-10-CM

## 2025-02-18 DIAGNOSIS — N89.8 VAGINAL ITCHING: ICD-10-CM

## 2025-02-18 RX ORDER — TRIAMCINOLONE ACETONIDE 1 MG/G
CREAM TOPICAL 2 TIMES DAILY
Qty: 45 G | Refills: 0 | Status: SHIPPED | OUTPATIENT
Start: 2025-02-18 | End: 2025-02-25

## 2025-02-18 NOTE — TELEPHONE ENCOUNTER
Care Due:                  Date            Visit Type   Department     Provider  --------------------------------------------------------------------------------                                EP -                              PRIMARY      [facility] INTERNAL  Last Visit: 06-      CARE ([facility])   MEDICINE       Nataly Rose  Next Visit: None Scheduled  None         None Found                                                            Last  Test          Frequency    Reason                     Performed    Due Date  --------------------------------------------------------------------------------    Office Visit  15 months..  oxybutynin...............  06- 09-    [facility] Embedded Care Due Messages. Reference number: [id number].   2/18/2025 3:21:11 PM CST

## 2025-02-18 NOTE — TELEPHONE ENCOUNTER
Refill Encounter    PCP Visits: Recent Visits  Date Type Provider Dept   04/26/24 Office Visit Woody Huston NP [facility] Internal Medicine   Showing recent visits within past 360 days and meeting all other requirements  Future Appointments  No visits were found meeting these conditions.  Showing future appointments within next 720 days and meeting all other requirements      Last 3 Blood Pressure:   BP Readings from Last 3 Encounters:   09/06/24 94/62   07/03/24 (!) 120/58   06/21/24 (!) 89/62     Preferred Pharmacy:   [id number] - [place], [place] - [address] [facility] AT [facility] [address]  [address] [facility] [address]  Phone: [phone] Fax: [phone]    Requested RX:  Requested Prescriptions     Pending Prescriptions Disp Refills    triamcinolone acetonide 0.1% (KENALOG) 0.1 % cream 45 g 0     Sig: Apply topically 2 (two) times daily. for 7 days      RX Route: Normal

## 2025-03-26 DIAGNOSIS — N18.31 STAGE 3A CHRONIC KIDNEY DISEASE: ICD-10-CM

## 2025-03-26 RX ORDER — OXYBUTYNIN CHLORIDE 5 MG/1
5 TABLET ORAL DAILY
Qty: 90 TABLET | Refills: 0 | Status: SHIPPED | OUTPATIENT
Start: 2025-03-26

## 2025-03-26 NOTE — TELEPHONE ENCOUNTER
No care due was identified.  [facility] Embedded Care Due Messages. Reference number: [id number].   3/26/2025 9:54:25 AM CDT

## 2025-04-03 DIAGNOSIS — Z12.31 SCREENING MAMMOGRAM FOR BREAST CANCER: ICD-10-CM

## 2025-04-03 NOTE — TELEPHONE ENCOUNTER
----- Message from Meghna sent at 4/3/2025 10:59 AM CDT -----  Regarding: HILARIO  Caller is requesting to schedule their annual mammogram appointment.  Order is not listed in EPIC.  Please enter order and contact patient to schedule.Name of Caller: HILARIOWhere would they like the mammogram performed? BaptistWould the patient rather a call back or a response via [facility]? callbackBe Call Back Number:.[phone] Information:

## 2025-04-10 ENCOUNTER — OFFICE VISIT (OUTPATIENT)
Dept: INTERNAL MEDICINE | Facility: CLINIC | Age: 65
End: 2025-04-10
Payer: MEDICARE

## 2025-04-10 VITALS
DIASTOLIC BLOOD PRESSURE: 68 MMHG | HEART RATE: 62 BPM | BODY MASS INDEX: 23.57 KG/M2 | HEIGHT: 66 IN | WEIGHT: 146.63 LBS | OXYGEN SATURATION: 98 % | SYSTOLIC BLOOD PRESSURE: 110 MMHG

## 2025-04-10 DIAGNOSIS — E53.8 VITAMIN B12 DEFICIENCY: ICD-10-CM

## 2025-04-10 DIAGNOSIS — D53.9 ANEMIA, MACROCYTIC: ICD-10-CM

## 2025-04-10 DIAGNOSIS — Z23 NEED FOR VACCINATION: ICD-10-CM

## 2025-04-10 DIAGNOSIS — D53.9 NUTRITIONAL ANEMIA: ICD-10-CM

## 2025-04-10 DIAGNOSIS — Z00.00 HEALTH MAINTENANCE EXAMINATION: Primary | ICD-10-CM

## 2025-04-10 DIAGNOSIS — Z98.84 HISTORY OF GASTRIC BYPASS: ICD-10-CM

## 2025-04-10 DIAGNOSIS — Z96.651 S/P TOTAL KNEE REPLACEMENT, RIGHT: ICD-10-CM

## 2025-04-10 DIAGNOSIS — Z87.891 HISTORY OF NICOTINE USE: ICD-10-CM

## 2025-04-10 DIAGNOSIS — Z78.0 ASYMPTOMATIC MENOPAUSAL STATE: ICD-10-CM

## 2025-04-10 DIAGNOSIS — Z12.11 SCREENING FOR COLORECTAL CANCER: ICD-10-CM

## 2025-04-10 DIAGNOSIS — F17.210 NICOTINE DEPENDENCE, CIGARETTES, UNCOMPLICATED: ICD-10-CM

## 2025-04-10 DIAGNOSIS — Z13.6 SCREENING FOR CARDIOVASCULAR CONDITION: ICD-10-CM

## 2025-04-10 DIAGNOSIS — E55.9 VITAMIN D DEFICIENCY: ICD-10-CM

## 2025-04-10 DIAGNOSIS — R73.09 OTHER ABNORMAL GLUCOSE: ICD-10-CM

## 2025-04-10 DIAGNOSIS — Z12.12 SCREENING FOR COLORECTAL CANCER: ICD-10-CM

## 2025-04-10 PROBLEM — F17.200 CURRENTLY SMOKES TOBACCO: Status: RESOLVED | Noted: 2022-10-11 | Resolved: 2025-04-10

## 2025-04-10 PROBLEM — M25.669 DECREASED RANGE OF MOTION (ROM) OF KNEE: Status: RESOLVED | Noted: 2024-07-05 | Resolved: 2025-04-10

## 2025-04-10 PROBLEM — I49.9 CARDIAC ARRHYTHMIA: Status: RESOLVED | Noted: 2024-06-17 | Resolved: 2025-04-10

## 2025-04-10 PROBLEM — N18.31 STAGE 3A CHRONIC KIDNEY DISEASE: Status: RESOLVED | Noted: 2024-06-17 | Resolved: 2025-04-10

## 2025-04-10 PROCEDURE — 1159F MED LIST DOCD IN RCRD: CPT | Mod: CPTII,S$GLB,, | Performed by: STUDENT IN AN ORGANIZED HEALTH CARE EDUCATION/TRAINING PROGRAM

## 2025-04-10 PROCEDURE — G0009 ADMIN PNEUMOCOCCAL VACCINE: HCPCS | Mod: S$GLB,,, | Performed by: STUDENT IN AN ORGANIZED HEALTH CARE EDUCATION/TRAINING PROGRAM

## 2025-04-10 PROCEDURE — 91320 SARSCV2 VAC 30MCG TRS-SUC IM: CPT | Mod: S$GLB,,, | Performed by: STUDENT IN AN ORGANIZED HEALTH CARE EDUCATION/TRAINING PROGRAM

## 2025-04-10 PROCEDURE — 3008F BODY MASS INDEX DOCD: CPT | Mod: CPTII,S$GLB,, | Performed by: STUDENT IN AN ORGANIZED HEALTH CARE EDUCATION/TRAINING PROGRAM

## 2025-04-10 PROCEDURE — 90677 PCV20 VACCINE IM: CPT | Mod: S$GLB,,, | Performed by: STUDENT IN AN ORGANIZED HEALTH CARE EDUCATION/TRAINING PROGRAM

## 2025-04-10 PROCEDURE — 1101F PT FALLS ASSESS-DOCD LE1/YR: CPT | Mod: CPTII,S$GLB,, | Performed by: STUDENT IN AN ORGANIZED HEALTH CARE EDUCATION/TRAINING PROGRAM

## 2025-04-10 PROCEDURE — 3044F HG A1C LEVEL LT 7.0%: CPT | Mod: CPTII,S$GLB,, | Performed by: STUDENT IN AN ORGANIZED HEALTH CARE EDUCATION/TRAINING PROGRAM

## 2025-04-10 PROCEDURE — 3078F DIAST BP <80 MM HG: CPT | Mod: CPTII,S$GLB,, | Performed by: STUDENT IN AN ORGANIZED HEALTH CARE EDUCATION/TRAINING PROGRAM

## 2025-04-10 PROCEDURE — 99999 PR PBB SHADOW E&M-EST. PATIENT-LVL V: CPT | Mod: PBBFAC,,, | Performed by: STUDENT IN AN ORGANIZED HEALTH CARE EDUCATION/TRAINING PROGRAM

## 2025-04-10 PROCEDURE — 99214 OFFICE O/P EST MOD 30 MIN: CPT | Mod: 25,S$GLB,, | Performed by: STUDENT IN AN ORGANIZED HEALTH CARE EDUCATION/TRAINING PROGRAM

## 2025-04-10 PROCEDURE — 3074F SYST BP LT 130 MM HG: CPT | Mod: CPTII,S$GLB,, | Performed by: STUDENT IN AN ORGANIZED HEALTH CARE EDUCATION/TRAINING PROGRAM

## 2025-04-10 PROCEDURE — 3288F FALL RISK ASSESSMENT DOCD: CPT | Mod: CPTII,S$GLB,, | Performed by: STUDENT IN AN ORGANIZED HEALTH CARE EDUCATION/TRAINING PROGRAM

## 2025-04-10 PROCEDURE — 90480 ADMN SARSCOV2 VAC 1/ONLY CMP: CPT | Mod: S$GLB,,, | Performed by: STUDENT IN AN ORGANIZED HEALTH CARE EDUCATION/TRAINING PROGRAM

## 2025-04-10 RX ORDER — DEXTROMETHORPHAN HYDROBROMIDE, GUAIFENESIN 5; 100 MG/5ML; MG/5ML
650 LIQUID ORAL EVERY 8 HOURS
Qty: 120 TABLET | Refills: 0 | Status: SHIPPED | OUTPATIENT
Start: 2025-04-10

## 2025-04-10 NOTE — PROGRESS NOTES
After obtaining consent, and per orders of Dr. CHERRY, injection of COMIRNATY (COVID-19) Lot [id number] 04/19/2025 given in the LD by DEMETRIO. Patient tolerated well and band aid applied. Patient instructed to remain in clinic for 15 minutes afterwards, and to report any adverse reaction to me immediately.       After obtaining consent, and per orders of Dr. CHERRY, injection of PCV20 Lot [id number] 02/2026 given in the RD by Demetrio. Patient tolerated well and band aid applied. Patient instructed to remain in clinic for 15 minutes afterwards, and to report any adverse reaction to me immediately.

## 2025-04-10 NOTE — PROGRESS NOTES
Subjective:       Patient ID: Nohemi Hull is a 65 y.o. female.    Chief Complaint: Health maintenance examination [Z00.00]    Patient is established with me, here today for the following:    Depression, anxiety, insomnia, gastric bypass (2004), osteopenia, vitamin D deficiency, anemia     History of Present Illness      CURRENT SYMPTOMS:  She reports experiencing an unusual, slight headache last night, which is atypical for her. She denies associated congestion or allergy symptoms. She reports being very cold-natured, requiring multiple layers of clothing including t-shirt and long-sleeve shirt to maintain comfort, even in indoor environments.    MEDICAL HISTORY:  She has a history of DVT, anemia, gastric bypass, and emphysema noted on CT. Her previously classified stage 3a kidney disease has improved with kidney function now back to normal on recent check. She reports improvement in previously experienced incontinence.    SOCIAL HISTORY:  She maintains an active lifestyle, waking up at 6:00-6:15 AM daily. She assists with care for her brother who has spinal stenosis, visiting Friday through Sunday to help with rehabilitation exercises. She also helps with school pickup for her granddaughter who recently started pre-K.    SUBSTANCE USE:  She quit smoking following knee replacement surgery several years ago.      ROS:  General: +cold intolerance  Cardiovascular: -chest pain  Musculoskeletal: -joint pain  Neurological: +headache, -dizziness  Head: +head pain          Health maintenance -   Colonoscopy performed OCT2018. Told to repeat in 5 years.  Family history of colorectal cancer.   Mammogram BI-RADS 1 in FEB2024.   Denies history of prior abnormal mammogram.  Family history of breast cancers.  Denies family history of ovarian cancers.  Hysterectomy for AUB.   Denies history of prior abnormal pap smears.  Denies family history of osteoporosis.  Denies significant family history of cardiac disease.  [facility]  "on COVID primary/bivalent, Tdap, shingles vaccinations.  Due for PCV20, RSV, influenza, COVID vaccination.  Started smoking at age 18-19, smoked intermittently since then.   Stopped smoking JULY2024.   Denies drug use.  Low dose lung CT last completed in MAY2024.  Completed HIV and hepatitis C screening.  Lab Results   Component Value Date    LDLCALC 80.8 06/16/2023   The 10-year ASCVD risk score (David DK, et al., 2019) is: 5.8%  Lab Results   Component Value Date    HGBA1C 4.4 06/16/2023     Osteopenia -   Vitamin D deficiency -   Completed DEXA in JULY2022.  Showed osteopenia.  "There is a 3.8% risk of a major osteoporotic fracture and a 0.6% risk of hip fracture in the next 10 years (FRAX)."  Currently taking vitamin D3 daily.  Lab Results   Component Value Date    HNONCMXW43DE 70 09/06/2024    PMFGOHMO70IJ 59 06/16/2023    HCYDXWHS55VQ 53 05/10/2022     History of gastric bypass -   Anemia -  Vitamin B12 deficiency -  Currently taking vitamin B12 daily supplementation.  Lab Results   Component Value Date    HGB 12.9 09/06/2024    HCT 41.1 09/06/2024     (H) 09/06/2024    RDW 12.2 09/06/2024     Lab Results   Component Value Date    UIBC 196 10/04/2004    IRON 162 (H) 06/16/2023    TRANSFERRIN 236 06/16/2023    TIBC 349 06/16/2023    FESATURATED 46 06/16/2023      Lab Results   Component Value Date    FERRITIN 55 06/16/2023     Lab Results   Component Value Date    CTMMJXEE23 1447 (H) 06/16/2023     Lab Results   Component Value Date    FOLATE 10.1 06/16/2023                 Current Outpatient Medications   Medication Instructions    acetaminophen (TYLENOL) 650 mg, Oral, Every 8 hours    aspirin (ECOTRIN) 81 mg, Daily    calcium carbonate (CALCIUM 300 ORAL) Take by mouth.    cholecalciferol (vitamin D3) 400 Units, Daily    clonazePAM (KLONOPIN) 2 mg, Nightly    cyanocobalamin, vitamin B-12, (VITAMIN B-12 ORAL) 1 tablet, Daily    FEROSUL 325 mg (65 mg iron) Tab tablet Every other day    gabapentin " "(NEURONTIN) 300-600 mg    hydrocortisone 2.5 % ointment Topical (Top), 2 times daily    magnesium 30 mg Tab Once    methocarbamoL (ROBAXIN) 750 mg, Oral, 4 times daily PRN    multivit-min-FA-lycopen-lutein (CENTRUM SILVER) 0.4-300-250 mg-mcg-mcg Tab 1 tablet, Daily    oxybutynin (DITROPAN) 5 mg, Oral, Daily    risperiDONE (RISPERDAL) 1 mg, Nightly    suvorexant (BELSOMRA) 20 mg Tab 1 tablet, Nightly    traZODone (DESYREL) 450 mg, Nightly    triamcinolone acetonide 0.1% (KENALOG) 0.1 % cream Topical (Top), 2 times daily    TRINTELLIX 20 mg, Daily     Objective:      Vitals:    04/10/25 1027   BP: 110/68   Pulse: 62   SpO2: 98%   Weight: 66.5 kg (146 lb 9.7 oz)   Height: 5' 6" (1.676 m)   PainSc: 0-No pain     Body mass index is 23.66 kg/m².    Physical Exam  Vitals reviewed.   Constitutional:       General: She is not in acute distress.     Appearance: Normal appearance. She is not ill-appearing or diaphoretic.   HENT:      Head: Normocephalic and atraumatic.      Right Ear: Tympanic membrane, ear canal and external ear normal. There is no impacted cerumen.      Left Ear: Tympanic membrane, ear canal and external ear normal. There is no impacted cerumen.      Nose: Nose normal. No rhinorrhea.      Mouth/Throat:      Mouth: Mucous membranes are moist.      Pharynx: Oropharynx is clear. No oropharyngeal exudate or posterior oropharyngeal erythema.   Eyes:      General: No scleral icterus.        Right eye: No discharge.         Left eye: No discharge.      Conjunctiva/sclera: Conjunctivae normal.   Neck:      Thyroid: No thyromegaly or thyroid tenderness.      Trachea: Trachea normal.   Cardiovascular:      Rate and Rhythm: Normal rate and regular rhythm.      Heart sounds: Normal heart sounds. No murmur heard.     No friction rub. No gallop.   Pulmonary:      Effort: Pulmonary effort is normal. No respiratory distress.      Breath sounds: Normal breath sounds. No stridor. No wheezing, rhonchi or rales.   Abdominal:    "   General: There is no distension.      Palpations: Abdomen is soft.      Tenderness: There is no abdominal tenderness. There is no guarding or rebound.   Musculoskeletal:         General: No swelling or deformity.      Cervical back: Neck supple.   Lymphadenopathy:      Head:      Right side of head: No submandibular or posterior auricular adenopathy.      Left side of head: No submandibular or posterior auricular adenopathy.      Cervical: No cervical adenopathy.      Right cervical: No superficial, deep or posterior cervical adenopathy.     Left cervical: No superficial, deep or posterior cervical adenopathy.      Upper Body:      Right upper body: No supraclavicular adenopathy.      Left upper body: No supraclavicular adenopathy.   Skin:     General: Skin is warm and dry.   Neurological:      General: No focal deficit present.      Mental Status: She is alert. Mental status is at baseline.      Gait: Gait normal.   Psychiatric:         Mood and Affect: Mood normal.         Behavior: Behavior normal.         Assessment:       1. Health maintenance examination    2. Screening for colorectal cancer    3. Vitamin B12 deficiency    4. History of gastric bypass    5. Anemia, macrocytic    6. Nutritional anemia    7. Vitamin D deficiency    8. Other abnormal glucose    9. Screening for cardiovascular condition    10. Nicotine dependence, cigarettes, uncomplicated    11. Asymptomatic menopausal state    12. History of nicotine use    13. Need for vaccination        Plan:   Health maintenance examination  -     Vitamin B12; Future; Expected date: 04/10/2025  -     Vitamin B1; Future; Expected date: 04/10/2025  -     Vitamin E; Future; Expected date: 04/10/2025  -     Zinc; Future; Expected date: 04/10/2025  -     Vitamin A; Future; Expected date: 04/10/2025  -     PTH, Intact; Future; Expected date: 04/10/2025  -     Phosphorus; Future; Expected date: 04/10/2025  -     Magnesium; Future; Expected date: 04/10/2025  -      Iron and TIBC; Future; Expected date: 04/10/2025  -     Folate; Future; Expected date: 04/10/2025  -     Ferritin; Future; Expected date: 04/10/2025  -     Copper, Serum; Future; Expected date: 04/10/2025  -     Vitamin D; Future; Expected date: 04/10/2025  -     Microalbumin/Creatinine Ratio, Urine; Future; Expected date: 04/10/2025  -     CBC Auto Differential; Future; Expected date: 04/10/2025  -     Hemoglobin A1C; Future; Expected date: 04/10/2025  -     Lipid Panel; Future; Expected date: 04/10/2025  -     TSH; Future; Expected date: 04/10/2025  -     Comprehensive Metabolic Panel; Future; Expected date: 04/10/2025    Screening for colorectal cancer  -     Ambulatory referral/consult to Endo Procedure ; Future; Expected date: 04/11/2025    Vitamin B12 deficiency  -     Vitamin B12; Future; Expected date: 04/10/2025    History of gastric bypass  -     Vitamin B12; Future; Expected date: 04/10/2025  -     Vitamin B1; Future; Expected date: 04/10/2025  -     Vitamin E; Future; Expected date: 04/10/2025  -     Zinc; Future; Expected date: 04/10/2025  -     Vitamin A; Future; Expected date: 04/10/2025  -     PTH, Intact; Future; Expected date: 04/10/2025  -     Phosphorus; Future; Expected date: 04/10/2025  -     Magnesium; Future; Expected date: 04/10/2025  -     Iron and TIBC; Future; Expected date: 04/10/2025  -     Folate; Future; Expected date: 04/10/2025  -     Ferritin; Future; Expected date: 04/10/2025  -     Copper, Serum; Future; Expected date: 04/10/2025  -     Vitamin D; Future; Expected date: 04/10/2025  -     TSH; Future; Expected date: 04/10/2025    Anemia, macrocytic  -     Vitamin B12; Future; Expected date: 04/10/2025  -     Vitamin B1; Future; Expected date: 04/10/2025  -     Vitamin E; Future; Expected date: 04/10/2025  -     Zinc; Future; Expected date: 04/10/2025  -     Vitamin A; Future; Expected date: 04/10/2025  -     PTH, Intact; Future; Expected date: 04/10/2025  -      Phosphorus; Future; Expected date: 04/10/2025  -     Magnesium; Future; Expected date: 04/10/2025  -     Iron and TIBC; Future; Expected date: 04/10/2025  -     Folate; Future; Expected date: 04/10/2025  -     Ferritin; Future; Expected date: 04/10/2025  -     Copper, Serum; Future; Expected date: 04/10/2025  -     Vitamin D; Future; Expected date: 04/10/2025  -     Microalbumin/Creatinine Ratio, Urine; Future; Expected date: 04/10/2025  -     CBC Auto Differential; Future; Expected date: 04/10/2025  -     Hemoglobin A1C; Future; Expected date: 04/10/2025  -     TSH; Future; Expected date: 04/10/2025  -     Comprehensive Metabolic Panel; Future; Expected date: 04/10/2025  -     Protein Electrophoresis, Serum; Future; Expected date: 04/10/2025  -     Immunoglobulin Free LT Chains Blood; Future; Expected date: 04/10/2025  -     Immunoglobulins (IgG, IgA, IgM) Quantitative; Future; Expected date: 04/10/2025    Nutritional anemia  -     Vitamin B12; Future; Expected date: 04/10/2025  -     Vitamin B1; Future; Expected date: 04/10/2025  -     Vitamin E; Future; Expected date: 04/10/2025  -     Zinc; Future; Expected date: 04/10/2025  -     Vitamin A; Future; Expected date: 04/10/2025  -     PTH, Intact; Future; Expected date: 04/10/2025  -     Phosphorus; Future; Expected date: 04/10/2025  -     Magnesium; Future; Expected date: 04/10/2025  -     Iron and TIBC; Future; Expected date: 04/10/2025  -     Folate; Future; Expected date: 04/10/2025  -     Ferritin; Future; Expected date: 04/10/2025  -     Copper, Serum; Future; Expected date: 04/10/2025  -     Vitamin D; Future; Expected date: 04/10/2025  -     Microalbumin/Creatinine Ratio, Urine; Future; Expected date: 04/10/2025  -     CBC Auto Differential; Future; Expected date: 04/10/2025  -     Hemoglobin A1C; Future; Expected date: 04/10/2025  -     TSH; Future; Expected date: 04/10/2025  -     Comprehensive Metabolic Panel; Future; Expected date: 04/10/2025    Vitamin  D deficiency  -     Vitamin D; Future; Expected date: 04/10/2025    Other abnormal glucose  -     Hemoglobin A1C; Future; Expected date: 04/10/2025    Screening for cardiovascular condition  -     Lipid Panel; Future; Expected date: 04/10/2025    Nicotine dependence, cigarettes, uncomplicated  -     CT Chest Lung Screening Low Dose; Future; Expected date: 04/10/2025    Asymptomatic menopausal state  -     DXA Bone Density Axial Skeleton 1 or more sites; Future; Expected date: 07/10/2025    History of nicotine use    Need for vaccination  -     pneumoc 20-alana conj-dip cr(PF) (PREVNAR-20 (PF)) injection Syrg 0.5 mL  -     COVID-19 (Pfizer) 30 mcg/0.3 mL IM vaccine (>/= 11 yo) 0.3 mL        Assessment & Plan      IMPRESSION:  - Recommend colonoscopy due to patient being overdue for screening.  - Noted enlarged MCV, considering B12/folate deficiency or copper deficiency as potential causes.  - Renal function improved from previous stage 3a CKD, now back to normal range.  - Recommend annual low dose lung CT for lung cancer screening due to history of smoking.  - Considered pneumonia vaccine, noting concerns about previous adverse reaction but explaining newer vaccine is generally well-tolerated.  - Recommend RSV vaccine, suggesting administration closer to September when RSV prevalence increases.    PLAN SUMMARY:  - Start bariatric multivitamin supplement  - Order CBC, CMP, vitamin B12, vitamin D, and iron studies  - Check copper levels  - Order low dose lung CT for annual lung cancer screening  - Refer for colonoscopy  - Administer in-office COVID-19 and pneumonia vaccines  - Follow up in 6 months (October) for general health assessment  - Additional follow up in July for DEXA scan    NICOTINE USE IN REMISSION:  - Ordered low dose lung CT for annual lung cancer screening.  - Physical exam revealed clear lungs, and the patient denies chest pain or trouble breathing.  - Emphysema is noted on the patient's chart based on  previous CT findings; however, pulmonary function testing has not been performed to confirm the diagnosis.    ANEMIA:  - Ordered CBC, CMP, vitamin B12, vitamin D, and iron studies to assess overall health status, including iron levels, renal function, and blood count.  - Noted patient reports feeling cold-natured, a potential symptom of anemia.  - Previous labs showed slightly low blood count (white and red cells) and enlarged MCV.  - Will check copper levels to investigate possible causes of enlarged MCV, including B12 and folate deficiency.    BARIATRIC SURGERY STATUS:  - Recommend and started a bariatric multivitamin supplement to address potential vitamin deficiencies and improve nutrient absorption related to gastric bypass surgery.    ARTIFICIAL KNEE JOINT:  - Nohemi reported knees feeling great after knee replacement.  - Acknowledged improvement in knee condition.    GENERAL HEALTH MANAGEMENT:  - Educated on benefits of pre-surgical exercise for better post-op outcomes.  - Encouraged patient to remain physically active for long-term health.  - Ordered in-office COVID-19 and pneumonia vaccines.  - Referred for colonoscopy; patient to await call from colonoscopy department to set up procedure and pre-procedure phone consultation.    FOLLOW-UP:  - Follow up in 6 months (October).  - Additional follow up in July for DEXA scan.           Nataly Rose MD  4/10/2025

## 2025-04-11 ENCOUNTER — LAB VISIT (OUTPATIENT)
Dept: LAB | Facility: OTHER | Age: 65
End: 2025-04-11
Attending: STUDENT IN AN ORGANIZED HEALTH CARE EDUCATION/TRAINING PROGRAM
Payer: MEDICARE

## 2025-04-11 DIAGNOSIS — E55.9 VITAMIN D DEFICIENCY: ICD-10-CM

## 2025-04-11 DIAGNOSIS — E53.8 VITAMIN B12 DEFICIENCY: ICD-10-CM

## 2025-04-11 DIAGNOSIS — D53.9 NUTRITIONAL ANEMIA: ICD-10-CM

## 2025-04-11 DIAGNOSIS — R73.09 OTHER ABNORMAL GLUCOSE: ICD-10-CM

## 2025-04-11 DIAGNOSIS — D53.9 ANEMIA, MACROCYTIC: ICD-10-CM

## 2025-04-11 DIAGNOSIS — Z98.84 HISTORY OF GASTRIC BYPASS: ICD-10-CM

## 2025-04-11 DIAGNOSIS — Z00.00 HEALTH MAINTENANCE EXAMINATION: ICD-10-CM

## 2025-04-11 DIAGNOSIS — Z13.6 SCREENING FOR CARDIOVASCULAR CONDITION: ICD-10-CM

## 2025-04-11 LAB
25(OH)D3+25(OH)D2 SERPL-MCNC: 36 NG/ML (ref 30–96)
ABSOLUTE EOSINOPHIL (OHS): 0.06 K/UL
ABSOLUTE MONOCYTE (OHS): 0.3 K/UL (ref 0.3–1)
ABSOLUTE NEUTROPHIL COUNT (OHS): 2.34 K/UL (ref 1.8–7.7)
ALBUMIN SERPL BCP-MCNC: 3.9 G/DL (ref 3.5–5.2)
ALBUMIN/CREAT UR: 2.1 UG/MG
ALP SERPL-CCNC: 60 UNIT/L (ref 40–150)
ALT SERPL W/O P-5'-P-CCNC: 10 UNIT/L (ref 10–44)
ANION GAP (OHS): 10 MMOL/L (ref 8–16)
AST SERPL-CCNC: 14 UNIT/L (ref 11–45)
BASOPHILS # BLD AUTO: 0.02 K/UL
BASOPHILS NFR BLD AUTO: 0.6 %
BILIRUB SERPL-MCNC: 0.7 MG/DL (ref 0.1–1)
BUN SERPL-MCNC: 19 MG/DL (ref 8–23)
CALCIUM SERPL-MCNC: 9.1 MG/DL (ref 8.7–10.5)
CHLORIDE SERPL-SCNC: 107 MMOL/L (ref 95–110)
CHOLEST SERPL-MCNC: 160 MG/DL (ref 120–199)
CHOLEST/HDLC SERPL: 2.3 {RATIO} (ref 2–5)
CO2 SERPL-SCNC: 23 MMOL/L (ref 23–29)
CREAT SERPL-MCNC: 1.1 MG/DL (ref 0.5–1.4)
CREAT UR-MCNC: 285 MG/DL (ref 15–325)
EAG (OHS): 82 MG/DL (ref 68–131)
ERYTHROCYTE [DISTWIDTH] IN BLOOD BY AUTOMATED COUNT: 12.4 % (ref 11.5–14.5)
FERRITIN SERPL-MCNC: 40 NG/ML (ref 20–300)
FOLATE SERPL-MCNC: 5.5 NG/ML (ref 4–24)
GFR SERPLBLD CREATININE-BSD FMLA CKD-EPI: 56 ML/MIN/1.73/M2
GLUCOSE SERPL-MCNC: 87 MG/DL (ref 70–110)
HBA1C MFR BLD: 4.5 % (ref 4–5.6)
HCT VFR BLD AUTO: 38.2 % (ref 37–48.5)
HDLC SERPL-MCNC: 71 MG/DL (ref 40–75)
HDLC SERPL: 44.4 % (ref 20–50)
HGB BLD-MCNC: 12.2 GM/DL (ref 12–16)
IGA SERPL-MCNC: 262 MG/DL (ref 40–350)
IGG SERPL-MCNC: 988 MG/DL (ref 650–1600)
IGM SERPL-MCNC: 182 MG/DL (ref 50–300)
IMM GRANULOCYTES # BLD AUTO: 0.01 K/UL (ref 0–0.04)
IMM GRANULOCYTES NFR BLD AUTO: 0.3 % (ref 0–0.5)
IRON SATN MFR SERPL: 26 % (ref 20–50)
IRON SERPL-MCNC: 81 UG/DL (ref 30–160)
LDLC SERPL CALC-MCNC: 78.2 MG/DL (ref 63–159)
LYMPHOCYTES # BLD AUTO: 0.81 K/UL (ref 1–4.8)
MAGNESIUM SERPL-MCNC: 2 MG/DL (ref 1.6–2.6)
MCH RBC QN AUTO: 33.3 PG (ref 27–31)
MCHC RBC AUTO-ENTMCNC: 31.9 G/DL (ref 32–36)
MCV RBC AUTO: 104 FL (ref 82–98)
MICROALBUMIN UR-MCNC: 6 UG/ML (ref ?–5000)
NONHDLC SERPL-MCNC: 89 MG/DL
NUCLEATED RBC (/100WBC) (OHS): 0 /100 WBC
PHOSPHATE SERPL-MCNC: 3.7 MG/DL (ref 2.7–4.5)
PLATELET # BLD AUTO: 165 K/UL (ref 150–450)
PMV BLD AUTO: 9.8 FL (ref 9.2–12.9)
POTASSIUM SERPL-SCNC: 4.3 MMOL/L (ref 3.5–5.1)
PROT SERPL-MCNC: 6.8 GM/DL (ref 6–8.4)
PTH-INTACT SERPL-MCNC: 48.3 PG/ML (ref 9–77)
RBC # BLD AUTO: 3.66 M/UL (ref 4–5.4)
RELATIVE EOSINOPHIL (OHS): 1.7 %
RELATIVE LYMPHOCYTE (OHS): 22.9 % (ref 18–48)
RELATIVE MONOCYTE (OHS): 8.5 % (ref 4–15)
RELATIVE NEUTROPHIL (OHS): 66 % (ref 38–73)
SODIUM SERPL-SCNC: 140 MMOL/L (ref 136–145)
TIBC SERPL-MCNC: 315 UG/DL (ref 250–450)
TRANSFERRIN SERPL-MCNC: 213 MG/DL (ref 200–375)
TRIGL SERPL-MCNC: 54 MG/DL (ref 30–150)
TSH SERPL-ACNC: 2.3 UIU/ML (ref 0.4–4)
VIT B12 SERPL-MCNC: 446 PG/ML (ref 210–950)
WBC # BLD AUTO: 3.54 K/UL (ref 3.9–12.7)

## 2025-04-11 PROCEDURE — 83521 IG LIGHT CHAINS FREE EACH: CPT

## 2025-04-11 PROCEDURE — 82728 ASSAY OF FERRITIN: CPT

## 2025-04-11 PROCEDURE — 82607 VITAMIN B-12: CPT

## 2025-04-11 PROCEDURE — 83735 ASSAY OF MAGNESIUM: CPT

## 2025-04-11 PROCEDURE — 84165 PROTEIN E-PHORESIS SERUM: CPT

## 2025-04-11 PROCEDURE — 82784 ASSAY IGA/IGD/IGG/IGM EACH: CPT | Mod: 59

## 2025-04-11 PROCEDURE — 84443 ASSAY THYROID STIM HORMONE: CPT

## 2025-04-11 PROCEDURE — 84425 ASSAY OF VITAMIN B-1: CPT

## 2025-04-11 PROCEDURE — 83970 ASSAY OF PARATHORMONE: CPT

## 2025-04-11 PROCEDURE — 82306 VITAMIN D 25 HYDROXY: CPT

## 2025-04-11 PROCEDURE — 85025 COMPLETE CBC W/AUTO DIFF WBC: CPT

## 2025-04-11 PROCEDURE — 84590 ASSAY OF VITAMIN A: CPT

## 2025-04-11 PROCEDURE — 84630 ASSAY OF ZINC: CPT

## 2025-04-11 PROCEDURE — 82746 ASSAY OF FOLIC ACID SERUM: CPT

## 2025-04-11 PROCEDURE — 82525 ASSAY OF COPPER: CPT

## 2025-04-11 PROCEDURE — 83036 HEMOGLOBIN GLYCOSYLATED A1C: CPT

## 2025-04-11 PROCEDURE — 36415 COLL VENOUS BLD VENIPUNCTURE: CPT

## 2025-04-11 PROCEDURE — 84466 ASSAY OF TRANSFERRIN: CPT

## 2025-04-11 PROCEDURE — 82570 ASSAY OF URINE CREATININE: CPT

## 2025-04-11 PROCEDURE — 80061 LIPID PANEL: CPT

## 2025-04-11 PROCEDURE — 84100 ASSAY OF PHOSPHORUS: CPT

## 2025-04-11 PROCEDURE — 84446 ASSAY OF VITAMIN E: CPT

## 2025-04-11 PROCEDURE — 84132 ASSAY OF SERUM POTASSIUM: CPT

## 2025-04-14 LAB
ALBUMIN, SPE (OHS): 3.91 G/DL (ref 3.35–5.55)
ALPHA 1 GLOB (OHS): 0.23 GM/DL (ref 0.17–0.41)
ALPHA 2 GLOB (OHS): 0.52 GM/DL (ref 0.43–0.99)
BETA GLOB (OHS): 0.67 GM/DL (ref 0.5–1.1)
GAMMA GLOBULIN (OHS): 0.97 GM/DL (ref 0.67–1.58)
KAPPA LC FREE SER-MCNC: 1.31 MG/L (ref 0.26–1.65)
KAPPA LC FREE/LAMBDA FREE SER: 2.47 MG/DL (ref 0.33–1.94)
LAMBDA LC FREE SERPL-MCNC: 1.88 MG/DL (ref 0.57–2.63)
PROT SERPL-MCNC: 6.3 GM/DL (ref 6–8.4)
W ZINC: 51 UG/DL

## 2025-04-15 LAB
PATHOLOGIST REVIEW - SPE (OHS): NORMAL
W COPPER: 1140 UG/L
W VITAMIN A: 45 UG/DL
W VITAMIN E: 1022 UG/DL

## 2025-04-16 ENCOUNTER — TELEPHONE (OUTPATIENT)
Dept: INTERNAL MEDICINE | Facility: CLINIC | Age: 65
End: 2025-04-16
Payer: MEDICARE

## 2025-04-16 ENCOUNTER — RESULTS FOLLOW-UP (OUTPATIENT)
Dept: INTERNAL MEDICINE | Facility: CLINIC | Age: 65
End: 2025-04-16

## 2025-04-16 DIAGNOSIS — D75.89 MACROCYTOSIS: Primary | ICD-10-CM

## 2025-04-16 NOTE — TELEPHONE ENCOUNTER
----- Message from Katherine sent at 4/16/2025  7:37 AM CDT -----  Type:  Test ResultsWho Called: ptName of Test (Lab/Mammo/Etc): LabDate of Test: 04/11/25Ordering Provider: penny[facility] a call back or a response via [facility]? callBest Call Back Number: [phone] Information:  Pt would like all of her diagnosis mailed to her.

## 2025-04-16 NOTE — TELEPHONE ENCOUNTER
Overall her labs look ok! Her zinc level is low, I recommend taking an over the counter zinc supplement 50 mg twice daily for 2-3 months (no longer), then switch to a multivitamin that contains zinc. She is not anemic, but her red blood cells are still quite large and the additional testing did not identify a reason for this. I would like her to follow up with a blood specialist to discuss this further. Please assist with scheduling an appointment, thank you!

## 2025-04-16 NOTE — TELEPHONE ENCOUNTER
----- Message from Nyjha sent at 4/16/2025 10:51 AM CDT -----  Regarding: Advice  Contact: [phone]  Type:  Patient Returning CallWho Called:PatientWho Left Message for Patient:Demetrio Ghosh MADoes the patient know what this is regarding?ResultsWould the patient rather a call back or a response via [email or web address]? Call [facility] Call Back Number:[phone] Additional Information:

## 2025-04-30 ENCOUNTER — TELEPHONE (OUTPATIENT)
Dept: INTERNAL MEDICINE | Facility: CLINIC | Age: 65
End: 2025-04-30
Payer: MEDICARE

## 2025-04-30 DIAGNOSIS — B35.1 ONYCHOMYCOSIS: Primary | ICD-10-CM

## 2025-04-30 RX ORDER — CICLOPIROX 80 MG/ML
SOLUTION TOPICAL
Qty: 6.6 ML | Refills: 2 | Status: SHIPPED | OUTPATIENT
Start: 2025-04-30

## 2025-04-30 NOTE — TELEPHONE ENCOUNTER
----- Message from Naren sent at 4/30/2025  2:58 PM CDT -----  Regarding: Advice  Contact: [phone]  Patient is calling to speak with her doctor about her toe she removed polish an notice what looks like fungus and would like something called in as well as she says she is having charley horse at night. Please contact pt

## 2025-05-01 NOTE — TELEPHONE ENCOUNTER
Pt was informed medication ciclopirox was sent  to pharmacy for toenail, pt was informed she can take 3-6 months of use before improvement notice. Pt was informed for  mohamud walden recommend the following:     For cramping:   - Ensure drinking 2-3 liters water daily.   - Start stretching lower legs before bedtime each night.   - Start taking magnesium glycinate 200-400 mg nightly.  - If no improvement with magnesium after 2-3 weeks, start vitamin B12 1,000 mcg daily.      Patient was also informed if she doesn't see any  improvement to follow up for appointment. Thank you!

## 2025-05-01 NOTE — TELEPHONE ENCOUNTER
Sent ciclopirox to pharmacy for toenail, can take 3-6 months of use before improvement noted. For mohamud horse recommend the following:    For cramping:   - Ensure drinking 2-3 liters water daily.   - Start stretching lower legs before bedtime each night.   - Start taking magnesium glycinate 200-400 mg nightly.  - If no improvement with magnesium after 2-3 weeks, start vitamin B12 1,000 mcg daily.     Let us know if no improvement and follow up for appointment. Thank you!   yes

## 2025-05-06 ENCOUNTER — CLINICAL SUPPORT (OUTPATIENT)
Dept: ENDOSCOPY | Facility: HOSPITAL | Age: 65
End: 2025-05-06
Attending: STUDENT IN AN ORGANIZED HEALTH CARE EDUCATION/TRAINING PROGRAM
Payer: MEDICARE

## 2025-05-06 ENCOUNTER — TELEPHONE (OUTPATIENT)
Dept: ENDOSCOPY | Facility: HOSPITAL | Age: 65
End: 2025-05-06

## 2025-05-06 VITALS — WEIGHT: 150 LBS | BODY MASS INDEX: 24.11 KG/M2 | HEIGHT: 66 IN

## 2025-05-06 DIAGNOSIS — Z12.11 SPECIAL SCREENING FOR MALIGNANT NEOPLASMS, COLON: Primary | ICD-10-CM

## 2025-05-06 DIAGNOSIS — Z12.12 SCREENING FOR COLORECTAL CANCER: ICD-10-CM

## 2025-05-06 DIAGNOSIS — Z12.11 SCREENING FOR COLORECTAL CANCER: ICD-10-CM

## 2025-05-06 NOTE — TELEPHONE ENCOUNTER
Colonoscopy Procedure Prep Instructions    Date of procedure: 6/23/25 Arrive at: 8:15 AM    Location of Department:   [facility] [address] [place] [address]   Stop in the hospital lobby on the 1st floor to get registered, then take the hospital elevators to the 2nd floor waiting room    As soon as possible:   your prep from pharmacy and over the counter DULCOLAX LAXATIVE TABLETS            On the day before your procedure   What You CAN do:   You may have clear liquids ONLY-see below for list.     Liquids That Are OK to Drink:   Water  Sports drinks (Gatorade, Power-Aid)  Coffee or tea (no cream or nondairy creamer)  Clear juices without pulp (apple, white grape)  Gelatin desserts (no fruit or toppings)  Clear soda (sprite, coke, ginger ale)  Chicken broth (until 12 midnight the night before procedure)    What You CANNOT do:   Do not EAT solid food, drink milk or anything   colored red.  Do not drink alcohol.  Do not take oral medications within 1 hour of starting   each dose of prep.  No gum chewing or candy morning of procedure                       Note:   (Please disregard the insert instructions from pharmacy).  PEG Bowel Prep is indicated for cleansing of the colon as a preparation for colonoscopy in adults.   Be sure to tell your doctor about all the medicines you take, including prescription and non-prescription medicines, vitamins, and herbal supplements. PEG Bowel Prep may affect how other medicines work.  Medication taken by mouth may not be absorbed properly when taken within 1 hour before the start of each dose of PEG Bowel Prep.    It is not uncommon to experience some abdominal cramping, nausea and/or vomiting when taking the prep. If you have nausea and/or vomiting while taking the prep, stop drinking for 20 to 30 minutes then continue.      How to take prep:    PEG Bowel Prep is a (2-day) prep.    One (1) bottle of prep are required for a complete preparation for  colonoscopy. Dilute the solution concentrate as directed prior to use. You must drink water with each dose of prep, and additional water after each dose.    DOSE 1--Day Before Colonoscopy 6/22/25     Drink at least 6 to 8 glasses of clear liquids from time you wake up until you begin your prep and then continue until bedtime to avoid dehydration.     12:00 pm (NOON) Mix your entire container of prep with lukewarm water and refrigerate. Take four (4) Dulcolax (Bisacodyl) tablets with at least 8 ounces or more of clear liquids.       6:00 pm:    You must complete Steps 1 and 2 below before going to bed:    Step 1-Drink half the liquid in the container within one (1) hour.   Step 2-Refrigerate the remaining half of the liquid for dose 2. See below when to begin this step.                       IMPORTANT: If you experience preparation-related symptoms (for example, nausea, bloating, or cramping), stop, or slow the rate of drinking the additional water until your symptoms decrease.    DOSE 2--Day of the Colonoscopy 6/23/25 at 2-3 AM.    For this dose, repeat Step 1 shown above using the remaining half of the liquid prep.   You may continue drinking water/clear liquids until   4 hours before your colonoscopy or as directed by the scheduling nurse  5:15 AM.      For information about your procedure, two (2) things to view prior to colonoscopy:  Please watch this informational video. It is important to watch this animated consent video prior to your arrival. If you haven't watched the video prior to arriving, you are required to watch it during admission which can causes delays.    Options for viewing:   Using a keyboard:  press and hold the control tab (Ctrl) and left mouse click to follow links.           Colonoscopy Instructional Video                                                                                   OR    Type link address into your web browser's address  bar:  [email or web address]      Educational Booklet with pictures:      Colonoscopy Prep - Liquid      Comments:           IMPORTANT INFORMATION TO KNOW BEFORE YOUR PROCEDURE    [facility] 2nd Floor         If your procedure requires the administration of anesthesia, it is necessary for a responsible adult to drive you home. (Medical Transportation, Uber, Lyft, Taxi, etc. may ONLY be used if a responsible adult is present to accompany you home.  The responsible adult CAN'T be the  of the service).      person must be available to return to pick you up within 15 minutes of being notified of discharge.       Please bring a picture ID, insurance card, & copayment      Take Medications as directed below:    If you begin taking any blood thinning medications, injectable weight loss/diabetes medications (other than insulin) , or Adipex (Phentermine) please contact the endoscopy scheduling department listed below as soon as possible.    If you are diabetic see the attached instruction sheet regarding your medication.     If you take HEART, BLOOD PRESSURE, SEIZURE, PAIN, LUNG (including inhalers/nebulizers), ANTI-REJECTION (transplant patients), or PSYCHIATRIC medications, please take at your regular times with a sip of water or as directed by the scheduling nurse.     Important contact information:    Endoscopy [phone] Hours of operation Monday-Friday 8:00-4:30pm.    Questions about insurance or financial obligations call [phone] or [phone].    If you have questions regarding the prep or need to reschedule, please call [phone]. After hours questions requiring immediate assistance, contact [facility] nurse line at [phone] or [phone].   NOTE:     On occasion, unforeseen circumstances may cause a delay in your procedure start time. We respect your time and appreciate your patience during these  circumstances.      Comments:

## 2025-05-14 ENCOUNTER — TELEPHONE (OUTPATIENT)
Dept: ENDOSCOPY | Facility: HOSPITAL | Age: 65
End: 2025-05-14
Payer: MEDICARE

## 2025-05-14 NOTE — TELEPHONE ENCOUNTER
----- Message from Eulalia sent at 5/14/2025 11:15 AM CDT -----  Regarding: FW: Advice  Contact: [phone]    ----- Message -----  From: Armen Figueroa MA  Sent: 5/14/2025   8:37 AM CDT  To: [facility]oscopy Schedulers  Subject: FW: Advice                                         ----- Message -----  From: Naren Reyes  Sent: 5/14/2025   8:36 AM CDT  To: [facility] Staff  Subject: Advice                                           Patient is calling to get paper work about how to prep for her procedure. Please contact pt

## 2025-05-14 NOTE — TELEPHONE ENCOUNTER
Contacted the patient in regards to below message . Spoke with patient. Patient states to disregard the message as she received the colonoscopy prep instructions in the mail on today.

## 2025-05-23 ENCOUNTER — HOSPITAL ENCOUNTER (OUTPATIENT)
Dept: RADIOLOGY | Facility: OTHER | Age: 65
Discharge: HOME | End: 2025-05-23
Attending: STUDENT IN AN ORGANIZED HEALTH CARE EDUCATION/TRAINING PROGRAM
Payer: MEDICARE

## 2025-05-23 DIAGNOSIS — F17.210 NICOTINE DEPENDENCE, CIGARETTES, UNCOMPLICATED: ICD-10-CM

## 2025-05-23 DIAGNOSIS — Z12.31 SCREENING MAMMOGRAM FOR BREAST CANCER: ICD-10-CM

## 2025-05-23 DIAGNOSIS — Z78.0 ASYMPTOMATIC MENOPAUSAL STATE: ICD-10-CM

## 2025-05-23 PROCEDURE — 77063 BREAST TOMOSYNTHESIS BI: CPT | Mod: 26,,, | Performed by: RADIOLOGY

## 2025-05-23 PROCEDURE — 77080 DXA BONE DENSITY AXIAL: CPT | Mod: 26,,, | Performed by: RADIOLOGY

## 2025-05-23 PROCEDURE — 77067 SCR MAMMO BI INCL CAD: CPT | Mod: 26,,, | Performed by: RADIOLOGY

## 2025-05-23 PROCEDURE — 77080 DXA BONE DENSITY AXIAL: CPT | Mod: TC

## 2025-05-23 PROCEDURE — 71271 CT THORAX LUNG CANCER SCR C-: CPT | Mod: 26,,, | Performed by: RADIOLOGY

## 2025-05-23 PROCEDURE — 77067 SCR MAMMO BI INCL CAD: CPT | Mod: TC

## 2025-05-23 PROCEDURE — 71271 CT THORAX LUNG CANCER SCR C-: CPT | Mod: TC

## 2025-06-19 ENCOUNTER — TELEPHONE (OUTPATIENT)
Dept: ENDOSCOPY | Facility: HOSPITAL | Age: 65
End: 2025-06-19
Payer: MEDICARE

## 2025-06-20 ENCOUNTER — NURSE TRIAGE (OUTPATIENT)
Dept: ADMINISTRATIVE | Facility: CLINIC | Age: 65
End: 2025-06-20
Payer: MEDICARE

## 2025-06-20 NOTE — TELEPHONE ENCOUNTER
Pt calling with questions about colonoscopy and prep for procedure on 6/23. Pt states she has no received any printed information about prep instructions but was told information over the phone what she can drink on a clear liquid diet. However, she states she has received conflicting information from the pharmacist where she picked up the GoLYTELY. Pt was also unaware that her colonoscopy was scheduled at [facility] and was expecting to go to [facility]    Using instructions in chart on May 6, prep and clear liquid diet reviewed. Pt states she is no longer comfortable using this type of bowel prep, nor is she comfortable going to [facility] for her procedure. Pt states she would like the prep that was Rxd for her last colonoscopy, and she wants to reschedule the procedure at [facility] or [facility].    Reason for Disposition   Colonoscopy, questions about   Bowel prep for colonoscopy, questions about    Protocols used: Colonoscopy Symptoms and [id number]-J-BO

## 2025-06-23 ENCOUNTER — ANESTHESIA (OUTPATIENT)
Dept: ENDOSCOPY | Facility: HOSPITAL | Age: 65
End: 2025-06-23
Payer: MEDICARE

## 2025-06-23 ENCOUNTER — TELEPHONE (OUTPATIENT)
Dept: GASTROENTEROLOGY | Facility: CLINIC | Age: 65
End: 2025-06-23
Payer: MEDICARE

## 2025-06-23 ENCOUNTER — ANESTHESIA EVENT (OUTPATIENT)
Dept: ENDOSCOPY | Facility: HOSPITAL | Age: 65
End: 2025-06-23
Payer: MEDICARE

## 2025-06-23 NOTE — ANESTHESIA PREPROCEDURE EVALUATION
06/23/2025  Nohemi Hull is a 65 y.o., female.  [facility]  Anesthesia Pre-Operative Evaluation       Patient Name: Nohemi Hull  YOB: 1960  MRN: [id number]  [facility]: [id number]      Code Status: Prior   Date of Procedure: 6/23/2025  Anesthesia: Choice Procedure: Procedure(s) (LRB):  COLONOSCOPY, SCREENING, LOW RISK PATIENT (N/A)  Pre-Operative Diagnosis: Screening for colorectal cancer [Z12.11, Z12.12]  Proceduralist: Surgeons and Role:     * Kong Fleming MD - Primary        SUBJECTIVE:   Nohemi Hull is a 65 y.o. female who is here today for Procedure(s) (LRB):  COLONOSCOPY, SCREENING, LOW RISK PATIENT (N/A).   No notes on file    No current facility-administered medications for this encounter.       she has a current medication list which includes the following long-term medication(s): ciclopirox, clonazepam, gabapentin, hydrocortisone, oxybutynin, risperidone, and trazodone.   ALLERGIES:   Review of patient's allergies indicates:  No Known Allergies  History:   There are no hospital problems to display for this patient.    Problem List[1]  Medical History   Past Medical History:   Diagnosis Date    Anemia     Anxiety     Arthritis     Deep vein thrombosis     Depression 2000    Renal failure      Surgical History:    has a past surgical history that includes Gastric bypass; Tonsillectomy; Dilation and curettage of uterus; Partial hysterectomy; Colonoscopy (N/A, 10/29/2018); Tubal ligation (Bilateral); Radiofrequency ablation (Right, 9/13/2022); and Total knee arthroplasty (Right, 7/2/2024).   Social History:    reports that she has been smoking cigarettes. She has a 1.8 pack-year smoking history. She has never used smokeless tobacco. She reports that she does not currently use alcohol after a past usage of about 2.0 standard drinks of  "alcohol per week. She reports that she does not use drugs.     OBJECTIVE:     Vital Signs (Most Recent):    Vital Signs Range (Last 24H):        Last 3 Vitals:       12/2/2024    10:00 AM 4/10/2025    10:27 AM 5/6/2025     2:47 PM   Vitals - 1 value per visit   SYSTOLIC  110    DIASTOLIC  68    Pulse  62    SPO2  98 %    Weight (lb) 146.5 146.61 150   Weight (kg) 66.45 66.5 68.04   Height 5' 6" (1.676 m) 5' 6" (1.676 m) 5' 6" (1.676 m)   BMI (Calculated) 23.7 23.7 24.2   Pain Score Two Zero      There is no height or weight on file to calculate BMI.   Wt Readings from Last 4 Encounters:   05/06/25 68 kg (150 lb)   04/10/25 66.5 kg (146 lb 9.7 oz)   12/02/24 66.4 kg (146 lb 7.9 oz)   09/25/24 66.4 kg (146 lb 7.9 oz)     Significant Labs:  Lab Results   Component Value Date    WBC 3.54 (L) 04/11/2025    HGB 12.2 04/11/2025    HCT 38.2 04/11/2025     04/11/2025     04/11/2025    K 4.3 04/11/2025     04/11/2025    CREATININE 1.1 04/11/2025    BUN 19 04/11/2025    CO2 23 04/11/2025    GLU 87 04/11/2025    CALCIUM 9.1 04/11/2025    MG 2.0 04/11/2025    PHOS 3.7 04/11/2025    ALKPHOS 60 04/11/2025    ALT 10 04/11/2025    AST 14 04/11/2025    ALBUMIN 3.9 04/11/2025    INR 1.0 06/17/2024    HGBA1C 4.5 04/11/2025     No LMP recorded. Patient has had a hysterectomy.      EKG:   Results for orders placed or performed during the hospital encounter of 06/17/24   SCHEDULED EKG 12-LEAD (to Muse)    Collection Time: 06/17/24 11:59 AM   Result Value Ref Range    QRS Duration 82 ms    OHS QTC Calculation 449 ms    Narrative    Test Reason : I49.9,    Vent. Rate : 049 BPM     Atrial Rate : 049 BPM     P-R Int : 130 ms          QRS Dur : 082 ms      QT Int : 498 ms       P-R-T Axes : 074 046 030 degrees     QTc Int : 449 ms    Sinus bradycardia with sinus arrhythmia  Otherwise normal ECG  When compared with ECG of 18-FEB-2020 15:02,  Vent. rate has decreased BY  47 BPM  Nonspecific T wave abnormality no longer evident " in Lateral leads  Confirmed by Goldy Blanc MD ([id number]) on 6/17/2024 1:37:19 PM    Referred By: TOMASA ALEMAN           Confirmed By:Goldy Blanc MD     ASSESSMENT/PLAN:         Pre-op Assessment    I have reviewed the Patient Summary Reports.     I have reviewed the Nursing Notes. I have reviewed the NPO Status.   I have reviewed the Medications.     Review of Systems  Anesthesia Hx:  No problems with previous Anesthesia   History of prior surgery of interest to airway management or planning: gastric bypass. Previous anesthesia: General        Denies Family Hx of Anesthesia complications.    Denies Personal Hx of Anesthesia complications.                    Social:  Former Smoker       Hematology/Oncology:    Oncology Normal    -- Anemia:                                  EENT/Dental:  EENT/Dental Normal           Cardiovascular:  Cardiovascular Normal Exercise tolerance: good                                             Pulmonary:   COPD, mild         Chronic Obstructive Pulmonary Disease (COPD):                      Renal/:  Chronic Renal Disease        Kidney Function/Disease             Hepatic/GI:  Hepatic/GI Normal                    Musculoskeletal:  Arthritis        Arthritis          Neurological:  Neurology Normal              Arthritis                           Endocrine:  Endocrine Normal            Dermatological:  Skin Normal    Psych:  Psychiatric History  depression                Physical Exam  General: Well nourished, Cooperative, Alert and Oriented    Airway:  Mallampati: III / II  Mouth Opening: Normal  TM Distance: Normal  Tongue: Normal  Neck ROM: Normal ROM    Dental:  Partial Dentures    Chest/Lungs:  Clear to auscultation, Normal Respiratory Rate    Heart:  Rate: Normal  Rhythm: Regular Rhythm  Sounds: Normal    Abdomen:  Normal, Soft, Nontender        Anesthesia Plan  Type of Anesthesia, risks & benefits discussed:    Anesthesia Type: Gen Natural Airway, MAC  Intra-op Monitoring Plan:  Standard ASA Monitors  Post Op Pain Control Plan: IV/PO Opioids PRN  Induction:  IV  ASA Score: 3  Day of Surgery Review of History & Physical: H&P Update referred to the surgeon/provider.    Ready For Surgery From Anesthesia Perspective.     .           [1]   Patient Active Problem List  Diagnosis    Primary osteoarthritis of right knee    Primary osteoarthritis of left knee    Primary insomnia    History of gastric bypass    Recurrent major depressive disorder, in partial remission    Osteopenia    Vitamin D deficiency    Anemia, macrocytic    Leukopenia    Emphysema of lung    Long term current use of antithrombotics/antiplatelets    Urinary incontinence    History of DVT (deep vein thrombosis)    Gait abnormality    History of nicotine use

## 2025-06-24 ENCOUNTER — TELEPHONE (OUTPATIENT)
Dept: ENDOSCOPY | Facility: HOSPITAL | Age: 65
End: 2025-06-24
Payer: MEDICARE

## 2025-06-24 NOTE — TELEPHONE ENCOUNTER
Contacted the patient to reschedule an endoscopy procedure(s) Colonoscopy . The patient did not answer the call and left a voice message requesting a call back.

## 2025-06-25 DIAGNOSIS — N18.31 STAGE 3A CHRONIC KIDNEY DISEASE: ICD-10-CM

## 2025-06-25 RX ORDER — OXYBUTYNIN CHLORIDE 5 MG/1
5 TABLET ORAL DAILY
Qty: 90 TABLET | Refills: 3 | Status: SHIPPED | OUTPATIENT
Start: 2025-06-25

## 2025-06-25 NOTE — TELEPHONE ENCOUNTER
No care due was identified.  [facility] Embedded Care Due Messages. Reference number: [id number].   6/25/2025 12:27:59 PM CDT

## 2025-06-25 NOTE — TELEPHONE ENCOUNTER
Copied from CRM #[id number]. Topic: Medications - Medication Refill  >> Jun 25, 2025 11:46 AM Jesenia wrote:  Can the clinic reply in [facility]:        Please refill the medication(s) listed below. Please call the patient when the prescription(s) is ready for  at this phone number Jackie Hull Telephone Information:  Mobile          [phone]            Medication #1oxybutynin (DITROPAN) 5 MG Tab             Preferred Pharmacy:       [id number] [address] AT [facility] [address]  [facility]  [facility] [address]  Phone: [phone] Fax: [phone]

## 2025-07-22 ENCOUNTER — TELEPHONE (OUTPATIENT)
Dept: ENDOSCOPY | Facility: HOSPITAL | Age: 65
End: 2025-07-22
Payer: MEDICARE

## 2025-07-22 NOTE — TELEPHONE ENCOUNTER
Copied from CRM #[id number]. Topic: Appointments - Appointment Scheduling  >> Jul 22, 2025 11:51 AM Merline wrote:  Type:   Appointment Request      Name of Caller:pt  When is the first available appointment?n/a  Symptoms:colonoscopy reschedule  Best Call Back Number:[phone]  Additional Information:  >> Jul 22, 2025 11:59 AM Med Assistant Ayers wrote:

## 2025-07-23 ENCOUNTER — TELEPHONE (OUTPATIENT)
Dept: ENDOSCOPY | Facility: HOSPITAL | Age: 65
End: 2025-07-23
Payer: MEDICARE

## 2025-07-23 VITALS — BODY MASS INDEX: 24.11 KG/M2 | WEIGHT: 150 LBS | HEIGHT: 66 IN

## 2025-07-23 NOTE — TELEPHONE ENCOUNTER
Colonoscopy Procedure Prep Instructions    Date of procedure: 7/28/25 Arrive at: 8:00 AM    Location of Department:   [facility] [address] [place] [place] [address]  Take the Atrium Elevators to 4th Floor Endoscopy Lab    As soon as possible:   your prep from pharmacy and over the counter DULCOLAX LAXATIVE TABLETS            On the day before your procedure   What You CAN do:   You may have clear liquids ONLY-see below for list.     Liquids That Are OK to Drink:   Water  Sports drinks (Gatorade, Power-Aid)  Coffee or tea (no cream or nondairy creamer)  Clear juices without pulp (apple, white grape)  Gelatin desserts (no fruit or toppings)  Clear soda (sprite, coke, ginger ale)  Chicken broth (until 12 midnight the night before procedure)    What You CANNOT do:   Do not EAT solid food, drink milk or anything   colored red.  Do not drink alcohol.  Do not take oral medications within 1 hour of starting   each dose of prep.  No gum chewing or candy morning of procedure                       Note:   (Please disregard the insert instructions from pharmacy).  PEG Bowel Prep is indicated for cleansing of the colon as a preparation for colonoscopy in adults.   Be sure to tell your doctor about all the medicines you take, including prescription and non-prescription medicines, vitamins, and herbal supplements. PEG Bowel Prep may affect how other medicines work.  Medication taken by mouth may not be absorbed properly when taken within 1 hour before the start of each dose of PEG Bowel Prep.    It is not uncommon to experience some abdominal cramping, nausea and/or vomiting when taking the prep. If you have nausea and/or vomiting while taking the prep, stop drinking for 20 to 30 minutes then continue.      How to take prep:    PEG Bowel Prep is a (2-day) prep.    One (1) bottle of prep are required for a complete preparation for colonoscopy. Dilute the solution concentrate as directed prior to use.  You must drink water with each dose of prep, and additional water after each dose.    DOSE 1--Day Before Colonoscopy 7/27/25     Drink at least 6 to 8 glasses of clear liquids from time you wake up until you begin your prep and then continue until bedtime to avoid dehydration.     12:00 pm (NOON) Mix your entire container of prep with lukewarm water and refrigerate. Take four (4) Dulcolax (Bisacodyl) tablets with at least 8 ounces or more of clear liquids.       6:00 pm:    You must complete Steps 1 and 2 below before going to bed:    Step 1-Drink half the liquid in the container within one (1) hour.   Step 2-Refrigerate the remaining half of the liquid for dose 2. See below when to begin this step.                       IMPORTANT: If you experience preparation-related symptoms (for example, nausea, bloating, or cramping), stop, or slow the rate of drinking the additional water until your symptoms decrease.    DOSE 2--Day of the Colonoscopy 7/28/25 at 2-3 AM.    For this dose, repeat Step 1 shown above using the remaining half of the liquid prep.   You may continue drinking water/clear liquids until   4 hours before your colonoscopy or as directed by the scheduling nurse  5:00 AM.      For information about your procedure, two (2) things to view prior to colonoscopy:  Please watch this informational video. It is important to watch this animated consent video prior to your arrival. If you haven't watched the video prior to arriving, you are required to watch it during admission which can causes delays.    Options for viewing:   Using a keyboard:  press and hold the control tab (Ctrl) and left mouse click to follow links.           Colonoscopy Instructional Video                                                                                   OR    Type link address into your web browser's address bar:  [email or web address]      Educational Booklet with pictures:      Colonoscopy Prep -  Liquid      Comments:        IMPORTANT INFORMATION TO KNOW BEFORE YOUR PROCEDURE    [facility] 4th Floor     If your procedure requires the administration of anesthesia, it is necessary for a responsible adult to drive you home. The designated adult is strongly encouraged to remain in the endoscopy area until the patient is discharged. (Medical Transportation, Uber, Lyft, Taxi, etc. may ONLY be used if a responsible adult is present to accompany you home. The responsible adult CANNOT be the  of the service.)     person must be available to return to pick you up within 15 minutes of being notified of discharge.     Please bring a picture ID, insurance card, and copayment.     Take Medications as directed below:      If you begin taking any blood thinning medications, injectable weight loss/diabetes medications (other than insulin) , Adipex (Phentermine) , please contact the endoscopy scheduling department listed below as soon as possible.    If you are diabetic see the attached instruction sheet regarding your medication.     If you take HEART, BLOOD PRESSURE, SEIZURE, PAIN, LUNG (including inhalers/nebulizers), ANTI-REJECTION (transplant patients), or PSYCHIATRIC medications, please take at your regular times with a sip of water or as directed by the scheduling nurse.     Important contact information:    Endoscopy [phone] Hours of operation Monday-Friday 8:00-4:30pm.    Questions about insurance or financial obligations call [phone] or [phone].    If you have questions regarding the prep or need to reschedule, please call [phone]. After hours questions requiring immediate assistance, contact [facility] nurse line at [phone] or [phone].   NOTE:     On occasion, unforeseen circumstances may cause a delay in your procedure start time. We respect your time and appreciate your patience during these circumstances.      Comments:

## 2025-07-23 NOTE — TELEPHONE ENCOUNTER
Patient rescheduled for a Colonoscopy on 7/28/25 with Dr. JW Sadler  Referral for procedure from  [facility] referral (see Appts tab)

## 2025-07-28 ENCOUNTER — NURSE TRIAGE (OUTPATIENT)
Dept: ADMINISTRATIVE | Facility: CLINIC | Age: 65
End: 2025-07-28
Payer: MEDICARE

## 2025-07-28 ENCOUNTER — HOSPITAL ENCOUNTER (OUTPATIENT)
Facility: HOSPITAL | Age: 65
Discharge: HOME | End: 2025-07-28
Attending: COLON & RECTAL SURGERY | Admitting: COLON & RECTAL SURGERY
Payer: MEDICARE

## 2025-07-28 VITALS
TEMPERATURE: 98 F | BODY MASS INDEX: 21.25 KG/M2 | HEART RATE: 79 BPM | HEIGHT: 66 IN | DIASTOLIC BLOOD PRESSURE: 54 MMHG | OXYGEN SATURATION: 98 % | WEIGHT: 132.25 LBS | SYSTOLIC BLOOD PRESSURE: 108 MMHG | RESPIRATION RATE: 16 BRPM

## 2025-07-28 DIAGNOSIS — Z12.12 SCREENING FOR COLORECTAL CANCER: ICD-10-CM

## 2025-07-28 DIAGNOSIS — Z12.11 SCREENING FOR COLORECTAL CANCER: ICD-10-CM

## 2025-07-28 DIAGNOSIS — Z12.11 SCREENING FOR MALIGNANT NEOPLASM OF COLON: Primary | ICD-10-CM

## 2025-07-28 PROCEDURE — 45385 COLONOSCOPY W/LESION REMOVAL: CPT | Mod: PT | Performed by: COLON & RECTAL SURGERY

## 2025-07-28 PROCEDURE — 45385 COLONOSCOPY W/LESION REMOVAL: CPT | Mod: PT,,, | Performed by: COLON & RECTAL SURGERY

## 2025-07-28 PROCEDURE — 37000009 HC ANESTHESIA EA ADD 15 MINS: Performed by: COLON & RECTAL SURGERY

## 2025-07-28 PROCEDURE — 37000008 HC ANESTHESIA 1ST 15 MINUTES: Performed by: COLON & RECTAL SURGERY

## 2025-07-28 PROCEDURE — 25000003 PHARM REV CODE 250

## 2025-07-28 PROCEDURE — 88305 TISSUE EXAM BY PATHOLOGIST: CPT | Mod: 26,,, | Performed by: STUDENT IN AN ORGANIZED HEALTH CARE EDUCATION/TRAINING PROGRAM

## 2025-07-28 PROCEDURE — 27201089 HC SNARE, DISP (ANY): Performed by: COLON & RECTAL SURGERY

## 2025-07-28 PROCEDURE — 63600175 PHARM REV CODE 636 W HCPCS

## 2025-07-28 PROCEDURE — 45380 COLONOSCOPY AND BIOPSY: CPT | Mod: XS,PT | Performed by: COLON & RECTAL SURGERY

## 2025-07-28 PROCEDURE — 27201012 HC FORCEPS, HOT/COLD, DISP: Performed by: COLON & RECTAL SURGERY

## 2025-07-28 PROCEDURE — 45380 COLONOSCOPY AND BIOPSY: CPT | Mod: XS,PT,, | Performed by: COLON & RECTAL SURGERY

## 2025-07-28 PROCEDURE — 88305 TISSUE EXAM BY PATHOLOGIST: CPT | Mod: TC | Performed by: COLON & RECTAL SURGERY

## 2025-07-28 RX ORDER — PHENYLEPHRINE HYDROCHLORIDE 10 MG/ML
INJECTION INTRAVENOUS
Status: DISCONTINUED | OUTPATIENT
Start: 2025-07-28 | End: 2025-07-28

## 2025-07-28 RX ORDER — SODIUM CHLORIDE 9 MG/ML
INJECTION, SOLUTION INTRAVENOUS CONTINUOUS
Status: DISCONTINUED | OUTPATIENT
Start: 2025-07-28 | End: 2025-07-28 | Stop reason: HOSPADM

## 2025-07-28 RX ORDER — PROPOFOL 10 MG/ML
VIAL (ML) INTRAVENOUS
Status: DISCONTINUED | OUTPATIENT
Start: 2025-07-28 | End: 2025-07-28

## 2025-07-28 RX ORDER — PROPOFOL 10 MG/ML
VIAL (ML) INTRAVENOUS CONTINUOUS PRN
Status: DISCONTINUED | OUTPATIENT
Start: 2025-07-28 | End: 2025-07-28

## 2025-07-28 RX ORDER — LIDOCAINE HYDROCHLORIDE 20 MG/ML
INJECTION INTRAVENOUS
Status: DISCONTINUED | OUTPATIENT
Start: 2025-07-28 | End: 2025-07-28

## 2025-07-28 RX ADMIN — PHENYLEPHRINE HYDROCHLORIDE 100 MCG: 10 INJECTION INTRAVENOUS at 09:07

## 2025-07-28 RX ADMIN — LIDOCAINE HYDROCHLORIDE 60 MG: 20 INJECTION INTRAVENOUS at 09:07

## 2025-07-28 RX ADMIN — PROPOFOL 80 MG: 10 INJECTION, EMULSION INTRAVENOUS at 09:07

## 2025-07-28 RX ADMIN — SODIUM CHLORIDE: 9 INJECTION, SOLUTION INTRAVENOUS at 09:07

## 2025-07-28 RX ADMIN — PROPOFOL 150 MCG/KG/MIN: 10 INJECTION, EMULSION INTRAVENOUS at 09:07

## 2025-07-28 NOTE — PROVATION PATIENT INSTRUCTIONS
Discharge Summary/Instructions after an Endoscopic Procedure  Patient Name: Nohemi Hull  Patient MRN: [id number]  Patient YOB: 1960  Monday, July 28, 2025  Keila Sadler MD  Dear patient,  As a result of recent federal legislation (The Federal Cures Act), you may   receive lab or pathology results from your procedure in your [facility]   account before your physician is able to contact you. Your physician or   their representative will relay the results to you with their   recommendations at their soonest availability.  Thank you,  RESTRICTIONS:  During your procedure today, you received medications for sedation.  These   medications may affect your judgment, balance and coordination.  Therefore,   for 24 hours, you have the following restrictions:   - DO NOT drive a car, operate machinery, make legal/financial decisions,   sign important papers or drink alcohol.    ACTIVITY:  Today: no heavy lifting, straining or running due to procedural   sedation/anesthesia.  The following day: return to full activity including work.  DIET:  Eat and drink normally unless instructed otherwise.     TREATMENT FOR COMMON SIDE EFFECTS:  - Mild abdominal pain, nausea, belching, bloating or excessive gas:  rest,   eat lightly and use a heating pad.  - Sore Throat: treat with throat lozenges and/or gargle with warm salt   water.  - Because air was used during the procedure, expelling large amounts of air   from your rectum or belching is normal.  - If a bowel prep was taken, you may not have a bowel movement for 1-3 days.    This is normal.  SYMPTOMS TO WATCH FOR AND REPORT TO YOUR PHYSICIAN:  1. Abdominal pain or bloating, other than gas cramps.  2. Chest pain.  3. Back pain.  4. Signs of infection such as: chills or fever occurring within 24 hours   after the procedure.  5. Rectal bleeding, which would show as bright red, maroon, or black stools.   (A tablespoon of blood from the rectum is not serious, especially  if   hemorrhoids are present.)  6. Vomiting.  7. Weakness or dizziness.  GO DIRECTLY TO THE NEAREST EMERGENCY ROOM IF YOU HAVE ANY OF THE FOLLOWING:      Difficulty breathing              Chills and/or fever over 101 F   Persistent vomiting and/or vomiting blood   Severe abdominal pain   Severe chest pain   Black, tarry stools   Bleeding- more than one tablespoon   Any other symptom or condition that you feel may need urgent attention  Your doctor recommends these additional instructions:  If any biopsies were taken, your doctors clinic will contact you in 1 to 2   weeks with any results.  - Discharge patient to home.   - Resume previous diet.   - Continue present medications.   - Await pathology results.   - Repeat colonoscopy date to be determined after pending pathology results   are reviewed for surveillance.   - Written discharge instructions were provided to the patient.   - The signs and symptoms of potential delayed complications were discussed   with the patient.   - Patient has a contact number available for emergencies.   - Return to normal activities tomorrow.  For questions, problems or results please call your physician - Keila Sadler MD at Work:  [phone].  [facility], EMERGENCY ROOM PHONE NUMBER: [phone]  IF A COMPLICATION OR EMERGENCY SITUATION ARISES AND YOU ARE UNABLE TO REACH   YOUR PHYSICIAN - GO DIRECTLY TO THE EMERGENCY ROOM.  Keila Sadler MD  7/28/2025 9:54:21 AM  This report has been verified and signed electronically.  Dear patient,  As a result of recent federal legislation (The Federal Cures Act), you may   receive lab or pathology results from your procedure in your [facility]   account before your physician is able to contact you. Your physician or   their representative will relay the results to you with their   recommendations at their soonest availability.  Thank you,  PROVATION

## 2025-07-28 NOTE — TRANSFER OF CARE
"Anesthesia Transfer of Care Note    Patient: Nohemi Hull    Procedure(s) Performed: Procedure(s) (LRB):  COLONOSCOPY, SCREENING, LOW RISK PATIENT (N/A)    Patient location: GI    Anesthesia Type: general    Transport from OR: Transported from OR on room air with adequate spontaneous ventilation    Post pain: adequate analgesia    Post assessment: no apparent anesthetic complications and tolerated procedure well    Post vital signs: stable    Level of consciousness: awake    Nausea/Vomiting: no nausea/vomiting    Complications: none    Transfer of care protocol was followed      Last vitals: Visit Vitals  BP 98/67 (BP Location: Left arm)   Pulse 76   Temp 36.3 °C (97.3 °F) (Temporal)   Resp 16   Ht 5' 6" (1.676 m)   Wt 60 kg (132 lb 4.4 oz)   SpO2 100% on RA   Breastfeeding No   BMI 21.35 kg/m²     "

## 2025-07-28 NOTE — ANESTHESIA POSTPROCEDURE EVALUATION
Anesthesia Post Evaluation    Patient: Nohemi Hull    Procedure(s) Performed: Procedure(s) (LRB):  COLONOSCOPY, SCREENING, LOW RISK PATIENT (N/A)    Final Anesthesia Type: general      Patient location during evaluation: GI PACU  Patient participation: Yes- Able to Participate  Level of consciousness: awake and alert and oriented  Post-procedure vital signs: reviewed and stable  Pain management: adequate  Airway patency: patent    PONV status at discharge: No PONV  Anesthetic complications: no      Cardiovascular status: hemodynamically stable  Respiratory status: unassisted, spontaneous ventilation and room air  Hydration status: euvolemic  Follow-up not needed.              Vitals Value Taken Time   /54 07/28/25 10:30   Temp 36.6 °C (97.9 °F) 07/28/25 10:00   Pulse 79 07/28/25 10:30   Resp 16 07/28/25 10:30   SpO2 98 % 07/28/25 10:30         Event Time   Out of Recovery 10:36:47         Pain/Hussein Score: Hussein Score: 10 (7/28/2025 10:30 AM)

## 2025-07-28 NOTE — TELEPHONE ENCOUNTER
Pt called with c/o upper abdominal pain x 3 hours. Pt reports pain is 8/10. Care advice recommends go to ED now. Pt verbalized understanding.  Reason for Disposition   [1] SEVERE pain (e.g., excruciating) AND [2] present > 1 hour    Additional Information   Negative: SEVERE difficulty breathing (e.g., struggling for each breath, speaks in single words)   Negative: Shock suspected (e.g., cold/pale/clammy skin, too weak to stand, low BP, rapid pulse)   Negative: Difficult to awaken or acting confused (e.g., disoriented, slurred speech)   Negative: Passed out (e.g., fainted, lost consciousness, blacked out and was not responding)   Negative: Visible sweat on face or sweat dripping down face   Negative: Sounds like a life-threatening emergency to the triager    Protocols used: Abdominal Pain - Upper-A-AH

## 2025-07-28 NOTE — H&P
"COLONOSCOPY HISTORY AND PHYSICAL EXAM    Procedure : Colonoscopy      INDICATIONS: asymptomatic screening exam    Family Hx of CRC: Grandfather    Last Colonoscopy:  2018  Findings: Normal       Past Medical History:   Diagnosis Date    Anemia     Anxiety     Arthritis     Deep vein thrombosis     Depression 2000    Renal failure      Sedation Problems: NO  Family History   Problem Relation Name Age of Onset    Hypertension Mother      Depression Mother      Hyperlipidemia Mother      Dementia Mother      Gallbladder disease Mother      Hyperlipidemia Father      Hypertension Sister      Depression Sister      Hyperlipidemia Sister      Diabetes Sister      Hypertension Sister      Diabetes Sister      Hypertension Sister      Depression Brother      Diabetes Brother      Hyperlipidemia Brother      Hypertension Brother      Diabetes Brother      Hypertension Brother      Diabetes Brother      Hyperlipidemia Brother      Depression Son      Hypertension Maternal Aunt      Depression Maternal Aunt      Diabetes Paternal Uncle      Depression Paternal Uncle      Depression Maternal Grandfather      Breast cancer Paternal Grandmother      Depression Paternal Grandfather      Alcohol abuse Neg Hx      Ovarian cancer Neg Hx      Heart disease Neg Hx      Stroke Neg Hx       Fam Hx of Sedation Problems: NO  Social History[1]    Review of Systems - Negative except   Respiratory ROS: no dyspnea  Cardiovascular ROS: no exertional chest pain  Gastrointestinal ROS: NO abdominal discomfort,  NO rectal bleeding  Musculoskeletal ROS: no muscular pain  Neurological ROS: no recent stroke    Physical Exam:  BP (!) 131/57 (BP Location: Left arm)   Pulse 85   Temp 97.3 °F (36.3 °C) (Temporal)   Resp 16   Ht 5' 6" (1.676 m)   Wt 60 kg (132 lb 4.4 oz)   SpO2 100%   Breastfeeding No   BMI 21.35 kg/m²   General: no distress  Head: normocephalic  Mallampati Score   Neck: supple, symmetrical, trachea midline  Lungs:  clear to " auscultation bilaterally and normal respiratory effort  Heart: regular rate and rhythm and no murmur  Abdomen: soft, non-tender non-distented; bowel sounds normal; no masses,  no organomegaly  Extremities: no cyanosis or edema, or clubbing    PLAN  COLONOSCOPY.    SedationPlan :MAC    The details of the procedure, the possible need for biopsy or polypectomy and the potential risks including bleeding, perforation, missed polyps were discussed in detail.         [1]   Social History  Socioeconomic History    Marital status:    Tobacco Use    Smoking status: Former     Current packs/day: 0.25     Average packs/day: 0.3 packs/day for 7.0 years (1.8 ttl pk-yrs)     Types: Cigarettes    Smokeless tobacco: Never   Substance and Sexual Activity    Alcohol use: Not Currently     Alcohol/week: 2.0 standard drinks of alcohol     Types: 2 Glasses of wine per week     Comment: occasionally    Drug use: No    Sexual activity: Yes     Partners: Male     Birth control/protection: None     Social Drivers of Health     Financial Resource Strain: Low Risk  (4/10/2025)    Overall Financial Resource Strain (CARDIA)     Difficulty of Paying Living Expenses: Not hard at all   Recent Concern: Financial Resource Strain - High Risk (2/20/2025)    Received from [facility] SDOH Screening     In the past year, have you been unable to get any of the following when you really needed them? choose all that apply.: Clothing     In the past year, have you been unable to get any of the following when you really needed them? choose all that apply.: Medicine or health care     In the past year, have you been unable to get any of the following when you really needed them? choose all that apply.: Internet   Food Insecurity: No Food Insecurity (4/10/2025)    Hunger Vital Sign     Worried About Running Out of Food in the Last Year: Never true     Ran Out of Food in the Last Year: Never true   Transportation Needs: No Transportation  Needs (4/10/2025)    PRAPARE - Transportation     Lack of Transportation (Medical): No     Lack of Transportation (Non-Medical): No   Physical Activity: Insufficiently Active (4/10/2025)    Exercise Vital Sign     Days of Exercise per Week: 4 days     Minutes of Exercise per Session: 20 min   Stress: No Stress Concern Present (4/10/2025)    [facility] - Occupational Stress Questionnaire     Feeling of Stress : Only a little   Housing Stability: Low Risk  (4/10/2025)    Housing Stability Vital Sign     Unable to Pay for Housing in the Last Year: No     Homeless in the Last Year: No   Recent Concern: Housing Stability - High Risk (2/20/2025)    Received from [facility] SDOH Screening     What is your living situation today?: I have a place to live today, but i am worried about losing it in the future

## 2025-07-29 ENCOUNTER — HOSPITAL ENCOUNTER (EMERGENCY)
Facility: HOSPITAL | Age: 65
Discharge: HOME | End: 2025-07-29
Attending: EMERGENCY MEDICINE
Payer: MEDICARE

## 2025-07-29 ENCOUNTER — TELEPHONE (OUTPATIENT)
Dept: SURGERY | Facility: CLINIC | Age: 65
End: 2025-07-29
Payer: MEDICARE

## 2025-07-29 VITALS
DIASTOLIC BLOOD PRESSURE: 57 MMHG | WEIGHT: 163 LBS | OXYGEN SATURATION: 98 % | HEIGHT: 66 IN | TEMPERATURE: 99 F | SYSTOLIC BLOOD PRESSURE: 95 MMHG | BODY MASS INDEX: 26.2 KG/M2 | HEART RATE: 69 BPM | RESPIRATION RATE: 18 BRPM

## 2025-07-29 DIAGNOSIS — I95.9 HYPOTENSION: ICD-10-CM

## 2025-07-29 DIAGNOSIS — R10.31 RIGHT LOWER QUADRANT ABDOMINAL PAIN: Primary | ICD-10-CM

## 2025-07-29 LAB
ABSOLUTE EOSINOPHIL (OHS): 0.01 K/UL
ABSOLUTE MONOCYTE (OHS): 0.47 K/UL (ref 0.3–1)
ABSOLUTE NEUTROPHIL COUNT (OHS): 6.42 K/UL (ref 1.8–7.7)
ALBUMIN SERPL BCP-MCNC: 4.6 G/DL (ref 3.5–5.2)
ALP SERPL-CCNC: 64 UNIT/L (ref 40–150)
ALT SERPL W/O P-5'-P-CCNC: 14 UNIT/L (ref 0–55)
ANION GAP (OHS): 10 MMOL/L (ref 8–16)
AST SERPL-CCNC: 19 UNIT/L (ref 0–50)
BASOPHILS # BLD AUTO: 0.01 K/UL
BASOPHILS NFR BLD AUTO: 0.1 %
BILIRUB SERPL-MCNC: 1.1 MG/DL (ref 0.1–1)
BILIRUB UR QL STRIP.AUTO: NEGATIVE
BIPAP: 0
BUN SERPL-MCNC: 17 MG/DL (ref 8–23)
BUN SERPL-MCNC: 19 MG/DL (ref 6–30)
CALCIUM SERPL-MCNC: 9.7 MG/DL (ref 8.7–10.5)
CHLORIDE SERPL-SCNC: 108 MMOL/L (ref 95–110)
CHLORIDE SERPL-SCNC: 109 MMOL/L (ref 95–110)
CLARITY UR: CLEAR
CO2 SERPL-SCNC: 19 MMOL/L (ref 23–29)
COLOR UR AUTO: YELLOW
CREAT SERPL-MCNC: 1 MG/DL (ref 0.5–1.4)
CREAT SERPL-MCNC: 1.1 MG/DL (ref 0.5–1.4)
ERYTHROCYTE [DISTWIDTH] IN BLOOD BY AUTOMATED COUNT: 11.9 % (ref 11.5–14.5)
ESTROGEN SERPL-MCNC: NORMAL PG/ML
FIO2: 21 %
GFR SERPLBLD CREATININE-BSD FMLA CKD-EPI: 56 ML/MIN/1.73/M2
GLUCOSE SERPL-MCNC: 94 MG/DL (ref 70–110)
GLUCOSE SERPL-MCNC: 96 MG/DL (ref 70–110)
GLUCOSE UR QL STRIP: NEGATIVE
HCT VFR BLD AUTO: 39.9 % (ref 37–48.5)
HCT VFR BLD CALC: 43 %PCV (ref 36–54)
HCV AB SERPL QL IA: NORMAL
HGB BLD-MCNC: 12.9 GM/DL (ref 12–16)
HGB UR QL STRIP: ABNORMAL
HIV 1+2 AB+HIV1 P24 AG SERPL QL IA: NORMAL
IMM GRANULOCYTES # BLD AUTO: 0.03 K/UL (ref 0–0.04)
IMM GRANULOCYTES NFR BLD AUTO: 0.4 % (ref 0–0.5)
INSULIN SERPL-ACNC: NORMAL U[IU]/ML
KETONES UR QL STRIP: NEGATIVE
LAB AP CLINICAL INFORMATION: NORMAL
LAB AP GROSS DESCRIPTION: NORMAL
LAB AP PERFORMING LOCATION(S): NORMAL
LAB AP REPORT FOOTNOTES: NORMAL
LDH SERPL L TO P-CCNC: 1.3 MMOL/L (ref 0.5–2.2)
LEUKOCYTE ESTERASE UR QL STRIP: NEGATIVE
LIPASE SERPL-CCNC: 7 U/L (ref 4–60)
LYMPHOCYTES # BLD AUTO: 1.05 K/UL (ref 1–4.8)
MCH RBC QN AUTO: 33.3 PG (ref 27–31)
MCHC RBC AUTO-ENTMCNC: 32.3 G/DL (ref 32–36)
MCV RBC AUTO: 103 FL (ref 82–98)
NITRITE UR QL STRIP: NEGATIVE
NUCLEATED RBC (/100WBC) (OHS): 0 /100 WBC
OHS QRS DURATION: 70 MS
OHS QTC CALCULATION: 434 MS
PH UR STRIP: 6 [PH]
PLATELET # BLD AUTO: 178 K/UL (ref 150–450)
PMV BLD AUTO: 9.6 FL (ref 9.2–12.9)
POC IONIZED CALCIUM: 1.23 MMOL/L (ref 1.06–1.42)
POC PERFORMED BY: NORMAL
POC TCO2 (MEASURED): 22 MMOL/L (ref 23–29)
POC TEMPERATURE: 37 C
POTASSIUM BLD-SCNC: 3.9 MMOL/L (ref 3.5–5.1)
POTASSIUM SERPL-SCNC: 4 MMOL/L (ref 3.5–5.1)
PROT SERPL-MCNC: 8 GM/DL (ref 6–8.4)
PROT UR QL STRIP: ABNORMAL
RBC # BLD AUTO: 3.87 M/UL (ref 4–5.4)
RELATIVE EOSINOPHIL (OHS): 0.1 %
RELATIVE LYMPHOCYTE (OHS): 13.1 % (ref 18–48)
RELATIVE MONOCYTE (OHS): 5.9 % (ref 4–15)
RELATIVE NEUTROPHIL (OHS): 80.4 % (ref 38–73)
SAMPLE: ABNORMAL
SODIUM BLD-SCNC: 138 MMOL/L (ref 136–145)
SODIUM SERPL-SCNC: 138 MMOL/L (ref 136–145)
SP GR UR STRIP: >=1.03
SPECIMEN SOURCE: NORMAL
TROPONIN I SERPL HS-MCNC: <3 NG/L
UROBILINOGEN UR STRIP-ACNC: NEGATIVE EU/DL
WBC # BLD AUTO: 7.99 K/UL (ref 3.9–12.7)

## 2025-07-29 PROCEDURE — 25000003 PHARM REV CODE 250: Performed by: EMERGENCY MEDICINE

## 2025-07-29 PROCEDURE — 82040 ASSAY OF SERUM ALBUMIN: CPT | Performed by: EMERGENCY MEDICINE

## 2025-07-29 PROCEDURE — 87389 HIV-1 AG W/HIV-1&-2 AB AG IA: CPT | Performed by: PHYSICIAN ASSISTANT

## 2025-07-29 PROCEDURE — 63600175 PHARM REV CODE 636 W HCPCS: Performed by: EMERGENCY MEDICINE

## 2025-07-29 PROCEDURE — 85025 COMPLETE CBC W/AUTO DIFF WBC: CPT | Performed by: EMERGENCY MEDICINE

## 2025-07-29 PROCEDURE — 93010 ELECTROCARDIOGRAM REPORT: CPT | Mod: ,,, | Performed by: INTERNAL MEDICINE

## 2025-07-29 PROCEDURE — 96361 HYDRATE IV INFUSION ADD-ON: CPT

## 2025-07-29 PROCEDURE — 25500020 PHARM REV CODE 255: Performed by: EMERGENCY MEDICINE

## 2025-07-29 PROCEDURE — 99900035 HC TECH TIME PER 15 MIN (STAT)

## 2025-07-29 PROCEDURE — 86803 HEPATITIS C AB TEST: CPT | Performed by: PHYSICIAN ASSISTANT

## 2025-07-29 PROCEDURE — 93005 ELECTROCARDIOGRAM TRACING: CPT

## 2025-07-29 PROCEDURE — 80047 BASIC METABLC PNL IONIZED CA: CPT

## 2025-07-29 PROCEDURE — 81003 URINALYSIS AUTO W/O SCOPE: CPT | Performed by: EMERGENCY MEDICINE

## 2025-07-29 PROCEDURE — 84484 ASSAY OF TROPONIN QUANT: CPT | Performed by: EMERGENCY MEDICINE

## 2025-07-29 PROCEDURE — 96375 TX/PRO/DX INJ NEW DRUG ADDON: CPT

## 2025-07-29 PROCEDURE — 83690 ASSAY OF LIPASE: CPT | Performed by: EMERGENCY MEDICINE

## 2025-07-29 PROCEDURE — 99285 EMERGENCY DEPT VISIT HI MDM: CPT | Mod: 25

## 2025-07-29 PROCEDURE — 83605 ASSAY OF LACTIC ACID: CPT

## 2025-07-29 PROCEDURE — 96374 THER/PROPH/DIAG INJ IV PUSH: CPT

## 2025-07-29 RX ORDER — ONDANSETRON HYDROCHLORIDE 2 MG/ML
4 INJECTION, SOLUTION INTRAVENOUS
Status: COMPLETED | OUTPATIENT
Start: 2025-07-29 | End: 2025-07-29

## 2025-07-29 RX ORDER — MORPHINE SULFATE 15 MG/1
15 TABLET ORAL EVERY 6 HOURS PRN
Qty: 5 TABLET | Refills: 0 | Status: SHIPPED | OUTPATIENT
Start: 2025-07-29 | End: 2025-07-30

## 2025-07-29 RX ORDER — MORPHINE SULFATE 4 MG/ML
4 INJECTION, SOLUTION INTRAMUSCULAR; INTRAVENOUS
Refills: 0 | Status: COMPLETED | OUTPATIENT
Start: 2025-07-29 | End: 2025-07-29

## 2025-07-29 RX ORDER — MORPHINE SULFATE 15 MG/1
15 TABLET ORAL EVERY 4 HOURS PRN
Qty: 5 TABLET | Refills: 0 | Status: SHIPPED | OUTPATIENT
Start: 2025-07-29 | End: 2025-07-29

## 2025-07-29 RX ORDER — SODIUM CHLORIDE 9 MG/ML
1000 INJECTION, SOLUTION INTRAVENOUS
Status: COMPLETED | OUTPATIENT
Start: 2025-07-29 | End: 2025-07-29

## 2025-07-29 RX ADMIN — MORPHINE SULFATE 4 MG: 4 INJECTION INTRAVENOUS at 11:07

## 2025-07-29 RX ADMIN — IOHEXOL 75 ML: 350 INJECTION, SOLUTION INTRAVENOUS at 12:07

## 2025-07-29 RX ADMIN — ONDANSETRON 4 MG: 2 INJECTION INTRAMUSCULAR; INTRAVENOUS at 11:07

## 2025-07-29 RX ADMIN — SODIUM CHLORIDE 1000 ML: 9 INJECTION, SOLUTION INTRAVENOUS at 11:07

## 2025-07-29 NOTE — ED NOTES
I-STAT Chem-8+ Results:   Value Reference Range   Sodium 138 136-145 mmol/L   Potassium  3.9 3.5-5.1 mmol/L   Chloride 108  mmol/L   Ionized Calcium 1.23 1.06-1.42 mmol/L   CO2 (measured) 22 23-29 mmol/L   Glucose 96  mg/dL   BUN 19 6-30 mg/dL   Creatinine 1 0.5-1.4 mg/dL   Hematocrit 43 36-54%

## 2025-07-29 NOTE — ED TRIAGE NOTES
Nohemi Hull, a 65 y.o. female, presents to ED via POV with complaints of R sided abdominal pain that radiates to the back. Reports having EGD yesterday. Denies having BM since procedure, reports passing flatus. Denies rectal bleeding. AAOx4. Denies N/V. Reports loss of appetite. Initial BP 80s/50s.

## 2025-07-29 NOTE — TELEPHONE ENCOUNTER
Spoke with patient, reports abdominal pain 5/10, not passing gas, no bowel movement yes but patient had colonoscopy yesterday. Patient states that abdomen is not hard or round. Patient reports chills yesterday but no fevers or chills today. Instructed patient to go to the ED to be evaluated. Patient verbalized understanding.

## 2025-07-29 NOTE — DISCHARGE INSTRUCTIONS
"You may take morphine, 1 tablet every 6 hours as needed for pain. Opioid medications like Morphine can make you constipated.  We recommend that you drink plenty of water and use whichever medications he prefer to prevent constipation at home such as MiraLax or docusate also known as Colace, both available over-the-counter.    The CT scan of your abdomen showed some inflammation of the transverse colon.     It also showed that you have some dilation of what are called your intrahepatic and pancreatic ducts, which are bile ducts located with in your liver and the connection between your liver and your pancreas. Your liver function tests and a test called lipase which is a measure of your pancreas function are normal today, so it is reasonable for you to follow up with your gastroenterologist as an outpatient to have an imaging study called an "MRCP" completed to better investigate this finding.    Return to the ED immediately for any new chest pain, shortness of breath, severe abdominal pain, abdominal pain that is constant, nausea/and vomiting that does not stop on its own, severe headache, new numbness, tingling, or weakness anywhere, fever greater than 101F or any additional concerns.       "

## 2025-07-29 NOTE — ED NOTES
Patient given discharge instructions, given 1 rx electronically sent, medications explained. Patient verbalized understanding. Patient escorted to d/c desk with steady gait. No acute distress noted. Patient to wait in lobby for transport to escort her to [facility].

## 2025-07-29 NOTE — ED PROVIDER NOTES
Encounter Date: 7/29/2025       History     Chief Complaint   Patient presents with    Abdominal Pain     Pt. Had endoscopy yesterday and is having abdominal pain since then. No bleeding.     HPI patient is a 65-year-old female with a past medical history of anxiety, anemia, DVT not currently anticoagulated who presents emergency department with 2 days of right lower quadrant abdominal pain without associated nausea or vomiting since undergoing an endoscopy yesterday.  The patient notes decreased p.o. intake, no bowel movement since, no urinary frequency, urgency, dysuria, hematuria.  Patient notes no chest pain, no shortness of breath, no cough, no lower extremity edema.    Review of patient's allergies indicates:  No Known Allergies  Past Medical History:   Diagnosis Date    Anemia     Anxiety     Arthritis     Deep vein thrombosis     Depression 2000    Renal failure      Past Surgical History:   Procedure Laterality Date    COLONOSCOPY N/A 10/29/2018    Procedure: COLONOSCOPY;  Surgeon: Mp Alvarado MD;  [facility]  Service: Endoscopy;  Laterality: N/A;  Referral received from Dr. SMOOTH Arias, [facility]  Last colonoscopy 2012-recommended f/u 5 years-past due    COLONOSCOPY, SCREENING, LOW RISK PATIENT N/A 7/28/2025    Procedure: COLONOSCOPY, SCREENING, LOW RISK PATIENT;  Surgeon: Keila Sadler MD;  [facility]  Service: Endoscopy;  Laterality: N/A;  5/6 ref by Nataly Rose MD, PEG, mailing-gg  7/23 pt r/s, PEG, emailed to [email or web address]  7/24 - precall complete - [facility]    DILATION AND CURETTAGE OF UTERUS      GASTRIC BYPASS      PARTIAL HYSTERECTOMY      AUB    RADIOFREQUENCY ABLATION Right 9/13/2022    Procedure: RADIOFREQUENCY ABLATION, GENICULAR RIGHT COOLED;  Surgeon: Radha Justin MD;  Location: [facility];  Service: Pain Management;  Laterality: Right;    TONSILLECTOMY      TOTAL KNEE ARTHROPLASTY Right 7/2/2024    Procedure:  ARTHROPLASTY, KNEE, TOTAL: RIGHT: SAME DAY;  Surgeon: Avery Campos MD;  Location: [facility];  Service: Orthopedics;  Laterality: Right;    TUBAL LIGATION Bilateral      Family History   Problem Relation Name Age of Onset    Hypertension Mother      Depression Mother      Hyperlipidemia Mother      Dementia Mother      Gallbladder disease Mother      Hyperlipidemia Father      Hypertension Sister      Depression Sister      Hyperlipidemia Sister      Diabetes Sister      Hypertension Sister      Diabetes Sister      Hypertension Sister      Depression Brother      Diabetes Brother      Hyperlipidemia Brother      Hypertension Brother      Diabetes Brother      Hypertension Brother      Diabetes Brother      Hyperlipidemia Brother      Depression Son      Hypertension Maternal Aunt      Depression Maternal Aunt      Diabetes Paternal Uncle      Depression Paternal Uncle      Depression Maternal Grandfather      Breast cancer Paternal Grandmother      Depression Paternal Grandfather      Alcohol abuse Neg Hx      Ovarian cancer Neg Hx      Heart disease Neg Hx      Stroke Neg Hx       Social History[1]      Physical Exam     Initial Vitals [07/29/25 1023]   BP Pulse Resp Temp SpO2   (!) 84/59 86 20 98.2 °F (36.8 °C) 98 %      MAP       --         Physical Exam    Nursing note and vitals reviewed.  Constitutional: Patient appears well-developed and well-nourished. No distress.   HENT:   Head: Normocephalic and atraumatic.   Eyes: Conjunctivae and EOM are normal. Pupils are equal, round, and reactive to light.   Neck: Neck supple.   Normal range of motion.  Cardiovascular: Normal rate, regular rhythm, normal heart sounds and intact distal pulses.   Pulmonary/Chest: + crackles in R base  Abdominal: Abdomen is soft. + TTP along RLQ, RUQ, no guarding/rebound  Musculoskeletal:      Cervical back: Normal range of motion and neck supple.   Neurological: Patient is alert and oriented to person, place, and time. No cranial  nerve deficit. GCS score is 15.    Skin: Skin is warm and dry.  Psych: Normal mood/affect    ED Course   Procedures  Labs Reviewed   COMPREHENSIVE METABOLIC PANEL - Abnormal       Result Value    Sodium 138      Potassium 4.0      Chloride 109      CO2 19 (*)     Glucose 94      BUN 17      Creatinine 1.1      Calcium 9.7      Protein Total 8.0      Albumin 4.6      Bilirubin Total 1.1 (*)     ALP 64      AST 19      ALT 14      Anion Gap 10      eGFR 56 (*)    URINALYSIS, REFLEX TO URINE CULTURE - Abnormal    Color, UA Yellow      Appearance, UA Clear      pH, UA 6.0      Spec Grav UA >=1.030 (*)     Protein, UA Trace (*)     Glucose, UA Negative      Ketones, UA Negative      Bilirubin, UA Negative      Blood, UA Trace (*)     Nitrites, UA Negative      Urobilinogen, UA Negative      Leukocyte Esterase, UA Negative     CBC WITH DIFFERENTIAL - Abnormal    WBC 7.99      RBC 3.87 (*)     HGB 12.9      HCT 39.9       (*)     MCH 33.3 (*)     MCHC 32.3      RDW 11.9      Platelet Count 178      MPV 9.6      Nucleated RBC 0      Neut % 80.4 (*)     Lymph % 13.1 (*)     Mono % 5.9      Eos % 0.1      Basophil % 0.1      Imm Grans % 0.4      Neut # 6.42      Lymph # 1.05      Mono # 0.47      Eos # 0.01      Baso # 0.01      Imm Grans # 0.03     ISTAT PROCEDURE - Abnormal    POC Glucose 96      POC BUN 19      POC Creatinine 1.0      POC Sodium 138      POC Potassium 3.9      POC Chloride 108      POC TCO2 (MEASURED) 22 (*)     POC Ionized Calcium 1.23      POC Hematocrit 43      Sample RADHA     HEPATITIS C ANTIBODY - Normal    Hep C Ab Interp Non-Reactive     HIV 1 / 2 ANTIBODY - Normal    HIV 1/2 Ag/Ab Non-Reactive     LIPASE - Normal    Lipase Level 7     TROPONIN I HIGH SENSITIVITY - Normal    Troponin High Sensitive <3     CBC W/ AUTO DIFFERENTIAL    Narrative:     The following orders were created for panel order CBC W/ AUTO DIFFERENTIAL.  Procedure                               Abnormality         Status                      ---------                               -----------         ------                     CBC with Differential[[id number]]       Abnormal            Final result                 Please view results for these tests on the individual orders.   HEP C VIRUS HOLD SPECIMEN   GREY TOP URINE HOLD   ISTAT CHEM8        ECG Results              EKG 12-lead (Final result)        Collection Time Result Time QRS Duration OHS QTC Calculation    07/29/25 11:31:38 07/29/25 13:52:07 70 434                     Final result by Interface, Lab In [facility] (07/29/25 13:52:14)                   Narrative:    Test Reason : I95.9,    Vent. Rate :  76 BPM     Atrial Rate :  76 BPM     P-R Int : 144 ms          QRS Dur :  70 ms      QT Int : 386 ms       P-R-T Axes :  69  61  13 degrees    QTcB Int : 434 ms    Normal sinus rhythm  Right atrial enlargement  Low voltage QRS  Borderline Abnormal ECG  When compared with ECG of 17-Jun-2024 11:59,  Vent. rate has increased by  27 bpm  Confirmed by Temi Brenner ([id number]) on 7/29/2025 1:52:05 PM    Referred By: AAAREFERRAL SELF           Confirmed By: Temi Brenner                                  Imaging Results              CT Abdomen Pelvis With IV Contrast NO Oral Contrast (Final result)  Result time 07/29/25 13:25:12      Final result by Kurt Ingram MD (07/29/25 13:25:12)                   Impression:      Diffuse bowel wall thickening of the ascending and transverse colon to the level of the hepatic flexure.  Findings may be seen with colitis including infectious or inflammatory etiology.  Differential consideration also includes vascular etiology noting the vasculature appears patent.    Mild intra and extrahepatic biliary ductal dilatation as well as pancreatic ductal dilatation.  Further evaluation with outpatient MRCP without and with contrast is recommended.      Electronically signed by: Kurt Ingram MD  Date:    07/29/2025  Time:    13:25                Narrative:    EXAMINATION:  CT ABDOMEN PELVIS WITH IV CONTRAST    CLINICAL HISTORY:  Abdominal pain, post-op;    TECHNIQUE:  Axial images of the abdomen and pelvis were acquired  after the use of 75 cc [id number] IV contrast.  Coronal and sagittal reconstructions were also obtained    COMPARISON:  Ultrasound abdomen from 07/14/2022.    FINDINGS:  Heart: Normal in size. No pericardial effusion. No significant calcific coronary atherosclerosis.    Lungs: Probable centrilobular emphysema.  Subsegmental atelectasis of the lower lobes bilaterally.    Liver: Normal in size and contour.  No focal hepatic lesion.    Gallbladder: No calcified gallstones.    Bile Ducts: Mild intrahepatic biliary ductal dilatation.  Common bile duct is mildly prominent measuring 8 mm.    Pancreas: No mass or peripancreatic fat stranding.  Mild prominence of the pancreatic duct measuring up to 5 mm.    Spleen: Unremarkable.    Stomach and duodenum: Postsurgical changes status post gastric bypass.    Adrenals: Unremarkable.    Kidneys/ Ureters: Normal in size and location. Normal enhancement. No hydronephrosis or nephrolithiasis. No ureteral dilatation.    Bladder: No evidence of wall thickening.    Reproductive organs: Unremarkable.    Bowel/Mesentery: Small bowel is normal in caliber with no evidence of obstruction. No evidence of inflammation or wall thickening.  Normal appendix.  Diffuse bowel wall thickening of the colon at the level of the ascending and transverse colon near the hepatic flexure.  Remaining portion of the colon is normal in appearance.    Lymph nodes: No lymphadenapathy.    Vasculature: No aneurysm. Mild calcific atherosclerosis.    Abdominal wall:  Unremarkable.    Bones: No acute fracture. No suspicious osseous lesions.                                       X-Ray Chest AP Portable (Final result)  Result time 07/29/25 11:53:19      Final result by Kevin Richardson III, MD (07/29/25 11:53:19)                   Impression:       No acute process seen.      Electronically signed by: Kevin Richardson MD  Date:    07/29/2025  Time:    11:53               Narrative:    EXAMINATION:  XR CHEST AP PORTABLE    CLINICAL HISTORY:  hypotension;    FINDINGS:  Chest one view AP portable.    Heart size is normal.  Lungs are clear.  The bones show nothing unusual.                                       Medications   0.9% NaCl infusion (0 mLs Intravenous Stopped 7/29/25 1425)   morphine injection 4 mg (4 mg Intravenous Given 7/29/25 1141)   ondansetron injection 4 mg (4 mg Intravenous Given 7/29/25 1141)   iohexoL (OMNIPAQUE 350) injection 75 mL (75 mLs Intravenous Given 7/29/25 1258)     Medical Decision Making  Amount and/or Complexity of Data Reviewed  Labs: ordered.  Radiology: ordered.    Risk  Prescription drug management.                                 I have personally reviewed and interpreted the patients laboratory studies:  Lactate 1.3, lipase 7, no significant leukocytosis on CBC, CMP with creatinine within normal limits, T bili 1.1, lipase within normal limits  I have personally reviewed and interpreted imaging studies: CXR: I have personally reviewed the CXR. No evidence of consolidation, cardiomegaly, pulmonary edema, pneumothroax    CT abdomen pelvis remarkable for inflammatory changes of the transverse colon, dilation of intrahepatic ducts, pancreatic duct    I have personally reviewed and interpreted the patient's EKG:  Normal sinus rhythm at 76 beats per minute, , , right atrial enlargement noted, similar to previous      I have also reviewed the following:   external records:  EKG from 06/17/2024 with similar right atrial enlargement to current    Differential diagnosis is broad:   Considered appendicitis, no evidence on CT  Considered diverticulitis, no evidence on CT  Considered cholelithiasis, cholecystitis,  Considered cystitis, no evidence on UA   Considered pyelonephritis, no evidence of UTI on UA, no flank TTP makes  this less likely.   Considered pancreatitis, less likely with unremarkable lipase.   Given recent instrumentation, considered perforated viscus, no evidence on CT    The patient's CT abdomen pelvis was remarkable for colitis of the transverse colon without evidence of pneumatosis, as well as intrahepatic bile duct dilatation and pancreatic duct dilatation however with normal liver function tests and lipase.    The patient's abdominal pain significantly improved in the emergency department and the patient is able to tolerate p.o. without difficulty.    I discussed today's findings with the patient and the patient feels comfortable with discharge home with pain control, follow up with her gastroenterologist with strict return to ED precautions.                  Clinical Impression:  Final diagnoses:  [I95.9] Hypotension  [R10.31] Right lower quadrant abdominal pain (Primary)          ED Disposition Condition    Discharge Stable          ED Prescriptions       Medication Sig Dispense Start Date End Date Auth. Provider    morphine (MSIR) 15 MG tablet  (Status: Discontinued) Take 1 tablet (15 mg total) by mouth every 4 (four) hours as needed for Pain. 5 tablet 7/29/2025 7/29/2025 Irish Myers MD    morphine (MSIR) 15 MG tablet Take 1 tablet (15 mg total) by mouth every 6 (six) hours as needed for Pain. 5 tablet 7/29/2025 -- Irish Myers MD          Follow-up Information       Follow up With Specialties Details Why Contact Info    [facility] - Emergency Dept Emergency Medicine  As needed, If symptoms worsen [id number]  [facility] [id number] [phone]                   [1]   Social History  Tobacco Use    Smoking status: Former     Current packs/day: 0.25     Average packs/day: 0.3 packs/day for 7.0 years (1.8 ttl pk-yrs)     Types: Cigarettes    Smokeless tobacco: Never   Substance Use Topics    Alcohol use: Not Currently     Alcohol/week: 2.0 standard drinks of alcohol     Types: 2 Glasses of wine per  week     Comment: occasionally    Drug use: No        Irish Myers MD  07/29/25 [id number]

## 2025-07-30 ENCOUNTER — TELEPHONE (OUTPATIENT)
Dept: SURGERY | Facility: CLINIC | Age: 65
End: 2025-07-30
Payer: MEDICARE

## 2025-07-30 ENCOUNTER — TELEPHONE (OUTPATIENT)
Dept: GASTROENTEROLOGY | Facility: CLINIC | Age: 65
End: 2025-07-30
Payer: MEDICARE

## 2025-07-30 ENCOUNTER — TELEPHONE (OUTPATIENT)
Dept: INTERNAL MEDICINE | Facility: CLINIC | Age: 65
End: 2025-07-30
Payer: MEDICARE

## 2025-07-30 ENCOUNTER — PATIENT OUTREACH (OUTPATIENT)
Facility: OTHER | Age: 65
End: 2025-07-30
Payer: MEDICARE

## 2025-07-30 ENCOUNTER — NURSE TRIAGE (OUTPATIENT)
Dept: ADMINISTRATIVE | Facility: CLINIC | Age: 65
End: 2025-07-30
Payer: MEDICARE

## 2025-07-30 ENCOUNTER — OCHSNER VIRTUAL EMERGENCY DEPARTMENT (OUTPATIENT)
Facility: CLINIC | Age: 65
End: 2025-07-30
Payer: MEDICARE

## 2025-07-30 ENCOUNTER — OFFICE VISIT (OUTPATIENT)
Dept: INTERNAL MEDICINE | Facility: CLINIC | Age: 65
End: 2025-07-30
Payer: MEDICARE

## 2025-07-30 VITALS
SYSTOLIC BLOOD PRESSURE: 112 MMHG | HEART RATE: 70 BPM | BODY MASS INDEX: 21.79 KG/M2 | HEIGHT: 66 IN | WEIGHT: 135.56 LBS | DIASTOLIC BLOOD PRESSURE: 60 MMHG | OXYGEN SATURATION: 98 %

## 2025-07-30 DIAGNOSIS — K62.5 RECTAL BLEEDING: Primary | ICD-10-CM

## 2025-07-30 DIAGNOSIS — R10.31 RIGHT LOWER QUADRANT ABDOMINAL PAIN: ICD-10-CM

## 2025-07-30 LAB — HOLD SPECIMEN: NORMAL

## 2025-07-30 PROCEDURE — 3288F FALL RISK ASSESSMENT DOCD: CPT | Mod: CPTII,S$GLB,, | Performed by: COUNSELOR

## 2025-07-30 PROCEDURE — 99999 PR PBB SHADOW E&M-EST. PATIENT-LVL IV: CPT | Mod: PBBFAC,,, | Performed by: COUNSELOR

## 2025-07-30 PROCEDURE — 3044F HG A1C LEVEL LT 7.0%: CPT | Mod: CPTII,S$GLB,, | Performed by: COUNSELOR

## 2025-07-30 PROCEDURE — 3078F DIAST BP <80 MM HG: CPT | Mod: CPTII,S$GLB,, | Performed by: COUNSELOR

## 2025-07-30 PROCEDURE — 99215 OFFICE O/P EST HI 40 MIN: CPT | Mod: S$GLB,,, | Performed by: COUNSELOR

## 2025-07-30 PROCEDURE — 3074F SYST BP LT 130 MM HG: CPT | Mod: CPTII,S$GLB,, | Performed by: COUNSELOR

## 2025-07-30 PROCEDURE — 3008F BODY MASS INDEX DOCD: CPT | Mod: CPTII,S$GLB,, | Performed by: COUNSELOR

## 2025-07-30 PROCEDURE — 1101F PT FALLS ASSESS-DOCD LE1/YR: CPT | Mod: CPTII,S$GLB,, | Performed by: COUNSELOR

## 2025-07-30 PROCEDURE — 1159F MED LIST DOCD IN RCRD: CPT | Mod: CPTII,S$GLB,, | Performed by: COUNSELOR

## 2025-07-30 RX ORDER — CIPROFLOXACIN 500 MG/1
500 TABLET, FILM COATED ORAL EVERY 12 HOURS
Qty: 10 TABLET | Refills: 0 | Status: SHIPPED | OUTPATIENT
Start: 2025-07-30 | End: 2025-08-04

## 2025-07-30 RX ORDER — TRAMADOL HYDROCHLORIDE 50 MG/1
50 TABLET, FILM COATED ORAL EVERY 6 HOURS PRN
Qty: 20 TABLET | Refills: 0 | Status: SHIPPED | OUTPATIENT
Start: 2025-07-30

## 2025-07-30 RX ORDER — METRONIDAZOLE 500 MG/1
500 TABLET ORAL EVERY 8 HOURS
Qty: 15 TABLET | Refills: 0 | Status: SHIPPED | OUTPATIENT
Start: 2025-07-30 | End: 2025-08-04

## 2025-07-30 NOTE — TELEPHONE ENCOUNTER
Called patient to check in on pain.  Had some bright red when wiping after a BM today.  Stool itself was non-blood.  Having some right-sided abdominal pain.  No fevers.  No diarrhea.    Discussed parameters for returning to ED for worsening pain/bleeding.  Will send short course of antibiotics to treat for colitis given thickening seen on CT.  Will follow-up by phone on Monday.    Keila Sadler

## 2025-07-30 NOTE — PROGRESS NOTES
[facility] RN on call nurse consulted with [facility] NINO on call, Dr. Radha Borges, and disposition is primary care. Patient has same day appointment. Follow up scheduled on 7/31/25 to outreach to assess for any additional needs/concerns.

## 2025-07-30 NOTE — TELEPHONE ENCOUNTER
"Pt had a colonoscopy on 7/28. She was seen in the ER yesterday for abdominal pain. Pt has rectal bleeding this morning when she wipes. She denies straining this morning with BM. She didn't check the toilet, unknown if any in the toilet. No current bleeding. Current abdominal pain 5/10, lower abdomen. Pain has continued since her colonoscopy. Recommended dispo is to Go to ed/ucc now (or to office with pcp approval). Contacted Dr Borges with [facility]. "so as far as we know just bright red blood on the toilet paper one time? and she feels fine, no lightheadedness or shortness of breath? also she doesn't know if her stool was formed or loose?ok. can she take her blood pressure at home?"  Verified with pt: Yes to only blood on the tissue that pt knows of, She says she feels mildly dizzy when she gets up and walks. That started today. Stool was formed but no straining. She said she had a good cup of coffee and didn't have trouble getting it out. BP is 121/70 and she says she got slightly sob when she walked to get the bp cuff. No pulse oximeter available.   MD: "ok. well if her BM was formed this morning, it was not pure blood which is reassuring. they did remove quite a few polyps during her colonoscopy, so the possibility of a little blood on the TP is not unusual. her CT scan yesterday was quite reassuring. her BP is better than it was yesterday. can we get her in to her PCP?".  Appt scheduled for this afternoon, advised pt to call back for worsening symptoms. Pt VU.                             Reason for Disposition   Constant abdominal pain lasting > 2 hours    Additional Information   Negative: Passed out (e.g., fainted, lost consciousness, blacked out and was not responding)   Negative: Shock suspected (e.g., cold/pale/clammy skin, too weak to stand, low BP, rapid pulse)   Negative: Vomiting red blood or black (coffee ground) material   Negative: Sounds like a life-threatening emergency to the triager   Negative: " SEVERE rectal bleeding (large blood clots; constant or on and off bleeding)   Negative: SEVERE dizziness (e.g., unable to stand, requires support to walk, feels like passing out now)   Negative: MODERATE rectal bleeding (small blood clots, passing blood without stool, or toilet water turns red) more than once a day   Negative: Bloody, black, or tarry bowel movements  (Exception: Chronic-unchanged black-grey bowel movements and is taking iron pills or Pepto-Bismol.)   Negative: High-risk adult (e.g., prior surgery on aorta, abdominal aortic aneurysm)   Negative: Rectal foreign body (inserted or swallowed)   Negative: SEVERE abdominal pain (e.g., excruciating)    Protocols used: Rectal Bleeding-A-OH

## 2025-07-30 NOTE — PLAN OF CARE-OVED
[facility] Plan of Care Note  Referral Source: Nurse On-Call                               Chief Complaint   Patient presents with    Rectal Bleeding     Had colonoscopy 5d ago, seen in ER yesterday for abd pain since colonoscopy, labs and CT reassuring. First BM today formed though blood on TP x 1 episode. No change in abd pain. Mildly lightheaded with walking, though BP normal 121/70.        Recommendation: Primary Care                            No diagnosis found.

## 2025-07-30 NOTE — PROGRESS NOTES
Subjective:       Patient ID: Nohemi Hull is a 65 y.o. female with history of depression, emphysema, UI, DVT, s/p gastric bypass, vitamin-D deficiency, osteopenia, osteoarthritis of the knees, insomnia.    Chief Complaint: Rectal bleeding [K62.5]    Patient is new to me, PCP is Dr. Nataly Rose. Here today for the following:    History of Present Illness    CHIEF COMPLAINT:  Patient presents today with abdominal pain and rectal bleeding following recent colonoscopy with polypectomy.    HISTORY OF PRESENT ILLNESS:  She reports ongoing right lower abdominal pain that worsens with movement and has remained consistent since her recent colonoscopy. She describes bright red blood noted on toilet paper after wiping, occurring approximately 4-5 times during one bathroom visit. She has one formed bowel movement per day without diarrhea or straining. Her appetite is decreased with difficulty motivating herself to eat, though she continues to tolerate oral intake. She has mild dizziness when standing. She denies nausea, vomiting, or significant fatigue. She reports inadequate pain control with Tylenol.     RECENT ER VISIT:  She had an emergency room visit yesterday where CT and labs were largely normal. The colon was found to be mildly inflamed without evidence of diverticulitis or perforations. Blood vessels were noted to be patent and there were no signs of active bleeding identified during ER evaluation. There was also mild intra and extrahepatic biliary ductal dilatation as well as pancreatic ductal dilatation.  They recommended outpatient MRCP without and with contrast.    MEDICAL HISTORY:  She has a history of DVT not currently on anticoagulation but takes ASA 81 mg for primary prevention. Recent colonoscopy with polyp removal in ascending and transverse colon that were adenomatous. Cold forceps, hot snare and cold snare were used to remove them.    ROS:  General: -fever, -chills, -fatigue, -weight gain,  "-weight loss  Cardiovascular: -chest pain, -palpitations, -lower extremity edema  Respiratory: -cough, -shortness of breath  Gastrointestinal: +abdominal pain, -nausea, -vomiting, -diarrhea, -constipation, -blood in stool, +bright red blood per rectum, +loss of appetite  Genitourinary: -dysuria, -hematuria, -frequency  Musculoskeletal: -joint pain, -muscle pain  Skin: -rash, -lesion  Neurological: -headache, +dizziness, -numbness, -tingling, +near-syncope  Psychiatric: -anxiety, -depression, -sleep difficulty         Current Outpatient Medications   Medication Instructions    acetaminophen (TYLENOL) 650 mg, Oral, Every 8 hours    aspirin (ECOTRIN) 81 mg, Daily    calcium carbonate (CALCIUM 300 ORAL) Take by mouth.    cholecalciferol (vitamin D3) 400 Units, Daily    ciclopirox (PENLAC) 8 % Soln Apply to affected toenail(s) and adjacent skin once daily in combination with weekly nail trimming. Remove with nail polish remover once weekly. Continue therapy until nail clearance.    clonazePAM (KLONOPIN) 2 mg, Nightly    cyanocobalamin, vitamin B-12, (VITAMIN B-12 ORAL) 1 tablet, Daily    FEROSUL 325 mg (65 mg iron) Tab tablet Every other day    gabapentin (NEURONTIN) 300-600 mg    hydrocortisone 2.5 % ointment Topical (Top), 2 times daily    magnesium 30 mg Tab Once    methocarbamoL (ROBAXIN) 750 mg, Oral, 4 times daily PRN    multivit-min-FA-lycopen-lutein (CENTRUM SILVER) 0.4-300-250 mg-mcg-mcg Tab 1 tablet, Daily    oxybutynin (DITROPAN) 5 mg, Oral, Daily    risperiDONE (RISPERDAL) 1 mg, Nightly    suvorexant (BELSOMRA) 20 mg Tab 1 tablet, Nightly    traMADoL (ULTRAM) 50 mg, Oral, Every 6 hours PRN    traZODone (DESYREL) 450 mg, Nightly    TRINTELLIX 20 mg, Daily     Objective:      Vitals:    07/30/25 1353   BP: 112/60   Pulse: 70   SpO2: 98%   Weight: 61.5 kg (135 lb 9.3 oz)   Height: 5' 6" (1.676 m)   PainSc:   6   PainLoc: Abdomen     Body mass index is 21.88 kg/m².    Physical Exam  Vitals reviewed. "   Constitutional:       General: She is not in acute distress.     Appearance: Normal appearance. She is well-developed and normal weight. She is not ill-appearing, toxic-appearing or diaphoretic.      Comments: Patient slightly hunched over.   HENT:      Head: Normocephalic and atraumatic.   Eyes:      General: No scleral icterus.        Right eye: No discharge.         Left eye: No discharge.   Neck:      Thyroid: No thyromegaly.      Trachea: No tracheal deviation.   Cardiovascular:      Rate and Rhythm: Normal rate and regular rhythm.      Pulses: Normal pulses.      Heart sounds: Normal heart sounds. No murmur heard.     No friction rub. No gallop.   Pulmonary:      Effort: Pulmonary effort is normal. No respiratory distress.      Breath sounds: Normal breath sounds. No stridor. No wheezing, rhonchi or rales.   Abdominal:      General: Abdomen is flat. Bowel sounds are normal. There is no distension.      Palpations: Abdomen is soft. There is no mass.      Tenderness: There is abdominal tenderness. There is no guarding or rebound.      Hernia: No hernia is present.      Comments: Tenderness to RUQ and RLQ.   No mass appreciated.   Overlying skin normal.   Rovsing's, Obturator, and Psoas sign negative.    Genitourinary:     Rectum: Normal. Guaiac result positive.      Comments: No obvious abnormality appreciated around rectum. Did not appreciate hemorrhoids or fissures.   No significant pain with rectal examination  Musculoskeletal:         General: No deformity.      Cervical back: Neck supple.      Right lower leg: No edema.      Left lower leg: No edema.   Lymphadenopathy:      Cervical: No cervical adenopathy.   Skin:     General: Skin is warm and dry.      Capillary Refill: Capillary refill takes less than 2 seconds.      Findings: No bruising, erythema, lesion or rash.   Neurological:      Mental Status: She is alert and oriented to person, place, and time. Mental status is at baseline.      Gait: Gait  normal.   Psychiatric:         Mood and Affect: Mood normal.         Behavior: Behavior normal.         Thought Content: Thought content normal.         Judgment: Judgment normal.         Assessment:       1. Rectal bleeding    2. Right lower quadrant abdominal pain        IMPRESSION:  - Assessed post-colonoscopy abdominal pain and rectal bleeding.  - Reviewed ER CT Abdomen and lab results  - Performed physical exam including abdominal palpation and rectal exam with hemoccult test positive for blood, indicating ongoing bleeding.  - Considered differential diagnoses including post-polypectomy bleeding and other GI issues.  - Determined stable for outpatient management given recent normal labs and imaging.  - Started tramadol for pain management, discontinued morphine prescription from ER after extensive discussion with patient about pain management.   - Discontinued aspirin temporarily due to current bleeding concerns and bleeding risk.  - Plan to consult with Dr. Rose and GI regarding case management.    Plan:     PLAN SUMMARY:  - Referred to colorectal surgery for evaluation of post-colonoscopy bleeding  - Initiated tramadol for pain management, discontinued morphine  - Recommend Tylenol for pain, with tramadol as alternative if needed  - Temporarily discontinued aspirin due to bleeding concerns  - Advised sending message to gastroenterologist regarding current symptoms  - Await contact from GI department for MRCP scheduling  - Follow-up by Friday if symptoms not improving or worsening    Rectal bleeding  - Referred the patient to colorectal surgery for evaluation of post-colonoscopy bleeding.  - Performed rectal exam which was positive for blood.  - Temporarily discontinued aspirin due to current bleeding concerns and risk.  - Noted labs was normal yesterday, indicating no acute significant blood loss.  - Advised patient to continue hydrating well to address dizziness and maintain blood volume.  - Educated  patient on signs of significant blood loss to monitor for, including profuse bleeding, worsening dizziness, fatigue, and shortness of breath.  - Instructed to contact the office if symptoms worsen, especially if experiencing profuse bleeding, nausea, vomiting, significant dizziness, fatigue, or shortness of breath.  - Recommend sending message to gastroenterologist regarding current symptoms.  -     Ambulatory referral/consult to Colorectal Surgery; Future; Expected date: 08/06/2025    Right lower quadrant abdominal pain  - Confirmed tenderness in the right side of abdomen during physical exam, consistent with imaging findings.  - Initiated tramadol for pain management and discontinued morphine prescription from ER, balancing efficacy and side effects.  - Explained potential sedation effects of tramadol, especially when combined with other CNS depressants, and advised against driving while taking it.  - Recommend Tylenol for pain management, with tramadol as an alternative if needed.  - Will monitor pain and abdominal tenderness, with instructions to contact the office by Friday if symptoms are not improving or if pain/symptoms worsen.  - Follow up with GI to help schedule MRCP.   -     traMADoL (ULTRAM) 50 mg tablet; Take 1 tablet (50 mg total) by mouth every 6 (six) hours as needed for Pain.  Dispense: 20 tablet; Refill: 0        50 minutes were spent in chart review, documentation and review of results, and evaluation, treatment, and counseling of patient on the same day of service. This note was generated with the assistance of ambient listening technology. Verbal consent was obtained by the patient and accompanying visitor(s) for the recording of patient appointment to facilitate this note. I attest to having reviewed and edited the generated note for accuracy, though some syntax or spelling errors may persist. Please contact the author of this note for any clarification.    Arnav Baron,  JAYNE    7/30/2025

## 2025-07-30 NOTE — TELEPHONE ENCOUNTER
FYI-I will contact her about her question regarding the MRI.  Dr Sadler did her colonoscopy. Just wanted to loop you in.

## 2025-07-30 NOTE — TELEPHONE ENCOUNTER
Copied from CRM #[id number]. Topic: Appointments - Appointment Access  >> Jul 30, 2025 10:27 AM Med Assistant Oj wrote:  Type:  Sooner Apoointment Request    Caller is requesting a sooner appointment.  Name of Caller: Nohemi's daughter   When is the first available appointment?N/A  Symptoms: Follow up after Colonoscopy on 07/28 was released from hospital on yesterday and they stated she needed a referral follow up with GI provider because they weren't abl to do a particular scan MRCP  Would the patient rather a call back or a response via [facility]? Call back  Best Call Back Number:Georges Maldonado Cell [phone]  Additional Information: calling to get seen by provider SHE'S NOW PASSING BLOOD THROUGH HER STOOL AS WELL

## 2025-07-30 NOTE — TELEPHONE ENCOUNTER
Copied from CRM #[id number]. Topic: Appointments - Same Day Access  >> Jul 30, 2025  2:49 PM Lorene wrote:  Pt is calling to get np appt for blood in stool, pt has referral in chart, asking for call back

## 2025-07-30 NOTE — TELEPHONE ENCOUNTER
Patient seen in the ED last evening for abdominal pain and advised to make an appointment to see Dr. Sadler. I have called to schedule but appointments must be made by Dr. Sadler. Message sent to clinic by .  Reason for Disposition   [1] Follow-up call to recent contact AND [2] information only call, no triage required    Protocols used: Information Only Call - No Triage-A-

## 2025-07-30 NOTE — Clinical Note
Hello, saw patient in clinic for continued RUQ and RLQ pain following colonoscopy. Worse with movement and some discomfort when laying flat. Today had some BRBPR with BM on toilet paper. Guaiac positive in clinic with normal rectal exam. Mild dizziness when standing. Vitals normal. ER workup yesterday largely normal with some colitis noted on CT. I think this is fine to monitor for now, but sending to you in case I should be concerned for postpolypectomy syndrome or other complications. I did place referral to [facility]. Let me know how I can help!

## 2025-07-31 ENCOUNTER — TELEPHONE (OUTPATIENT)
Dept: GASTROENTEROLOGY | Facility: CLINIC | Age: 65
End: 2025-07-31
Payer: MEDICARE

## 2025-07-31 ENCOUNTER — PATIENT OUTREACH (OUTPATIENT)
Facility: OTHER | Age: 65
End: 2025-07-31
Payer: MEDICARE

## 2025-07-31 NOTE — TELEPHONE ENCOUNTER
Copied from CRM #[id number]. Topic: General Inquiry - Return Call  >> Jul 31, 2025 11:25 AM Merline wrote:  Type:  Patient Returning Call    Who Called:pt  Who Left Message for Patient:Hortencia Rose MA  Does the patient know what this is regarding?:returning call  Would the patient rather a call back or a response via [facility]? call  Best Call Back Number:[phone] (  Additional Information:

## 2025-07-31 NOTE — PROGRESS NOTES
Patient seen PCP, Tonny Baron PA-C, on 7/30/2025 per [facility] provider, Dr. Borges recommendation.  Patient reported no concerns at this time.  Patient's needs were met and will follow up with PCP or [facility] on call as needed.

## 2025-07-31 NOTE — TELEPHONE ENCOUNTER
Copied from CRM #[id number]. Topic: General Inquiry - Return Call  >> Jul 31, 2025  3:28 PM Latonya wrote:  Who Called: Nohemi Hull    Patient is returning phone call    Who Left Message for Patient: Hortencia on 7/30  Does the patient know what this is regarding?:yes to schedule an appt      Preferred Method of Contact: Phone Call  Patient's Preferred Phone Number on File: [phone]   Best Call Back Number, if different:  Additional Information:

## 2025-08-01 ENCOUNTER — NURSE TRIAGE (OUTPATIENT)
Dept: ADMINISTRATIVE | Facility: CLINIC | Age: 65
End: 2025-08-01
Payer: MEDICARE

## 2025-08-01 ENCOUNTER — TELEPHONE (OUTPATIENT)
Dept: INTERNAL MEDICINE | Facility: CLINIC | Age: 65
End: 2025-08-01
Payer: MEDICARE

## 2025-08-01 NOTE — TELEPHONE ENCOUNTER
Pt states that when she had a BM she noticed red blood on the toilet tissue. Pt states that she has been having pains in her right lower abdomen. Pt is sometimes. Lightheaded. Pt did have a rectal exam on  7/30. 4/10 for her abdominal pains. When pt gets up to walk, she feels the abdominal pain. Denies vomiting, denies passing any clots. Denies bleeding without a BM. Dispo- See in office or VV today. Able to set pt up with a VV today with Yin Vu NP @ 1230 pm. Pt aware and VU. Care advice given. Advised pt to call back for any further questions, concerns or worsening of symptoms.               Reason for Disposition   Colonoscopy in past 72 hours    Additional Information   Negative: Passed out (e.g., fainted, lost consciousness, blacked out and was not responding)   Negative: Shock suspected (e.g., cold/pale/clammy skin, too weak to stand, low BP, rapid pulse)   Negative: Vomiting red blood or black (coffee ground) material   Negative: Sounds like a life-threatening emergency to the triager   Negative: SEVERE rectal bleeding (large blood clots; constant or on and off bleeding)   Negative: SEVERE dizziness (e.g., unable to stand, requires support to walk, feels like passing out now)   Negative: MODERATE rectal bleeding (small blood clots, passing blood without stool, or toilet water turns red) more than once a day   Negative: Bloody, black, or tarry bowel movements  (Exception: Chronic-unchanged black-grey bowel movements and is taking iron pills or Pepto-Bismol.)   Negative: High-risk adult (e.g., prior surgery on aorta, abdominal aortic aneurysm)   Negative: Rectal foreign body (inserted or swallowed)   Negative: SEVERE abdominal pain (e.g., excruciating)   Negative: Constant abdominal pain lasting > 2 hours   Negative: Pale skin (pallor) of new-onset or getting worse   Negative: Patient sounds very sick or weak to the triager   Negative: MODERATE rectal bleeding (small blood clots, passing blood without stool,  or toilet water turns red)   Negative: Taking Coumadin (warfarin) or other strong blood thinner, or known bleeding disorder (e.g., thrombocytopenia)    Protocols used: Rectal Bleeding-A-OH

## 2025-08-01 NOTE — TELEPHONE ENCOUNTER
Staff spoke with patient to inform her that she needs to be seen in person for her complaint, patient states she was seen for this matter on 7/30 and wants to cancel the appointment. Staff canceled the appointment. Patient is in agreement to the above and verbalized understanding.

## 2025-08-06 ENCOUNTER — OFFICE VISIT (OUTPATIENT)
Dept: SURGERY | Facility: CLINIC | Age: 65
End: 2025-08-06
Payer: MEDICARE

## 2025-08-06 VITALS
WEIGHT: 136.69 LBS | RESPIRATION RATE: 16 BRPM | HEIGHT: 66 IN | BODY MASS INDEX: 21.97 KG/M2 | HEART RATE: 83 BPM | DIASTOLIC BLOOD PRESSURE: 52 MMHG | SYSTOLIC BLOOD PRESSURE: 94 MMHG

## 2025-08-06 DIAGNOSIS — Z80.0 FAMILY HISTORY OF PANCREATIC CANCER: ICD-10-CM

## 2025-08-06 DIAGNOSIS — K91.89: Primary | ICD-10-CM

## 2025-08-06 PROCEDURE — 99999 PR PBB SHADOW E&M-EST. PATIENT-LVL IV: CPT | Mod: PBBFAC,,, | Performed by: STUDENT IN AN ORGANIZED HEALTH CARE EDUCATION/TRAINING PROGRAM

## 2025-08-06 NOTE — PROGRESS NOTES
CRS Office Visit History and Physical    Patient Name:  Nohemi Hull  MRN:    [id number]  YOB: 1960  (65 y.o.)  Encounter Date:  8/6/2025  Referring Physician:  Tonny Baron Pa-c  [id number]  [address]  [place] [address]    HISTORY OF PRESENT ILLNESS  The patient is new to this practice.     Nohemi Hull is a 65 y.o. 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    Ms. Hull underwent colonoscopy 7/28/25 with hot snare polypectomy of 7 mm transverse colon polyp as well as cold snare polypectomy of 1mm ascending colon polyp and 4 mm transverse colon polyp. Following her colonoscopy, she developed right abdominal pain and presented to [facility] ED with RLQ pain but no leukocytosis and stable Hgb. She underwent CT Abd/Pelv which demonstrated diffuse bowel wall thickening of the ascending and transverse colon to the level of the hepatic flexure. She was prescribed cipro/flagyl for 5 days with resolution of pain after 4 days (8/1/25). Over the past 5 days, she has returned back to her baseline.    She notes intermittent left lower quadrant pain lasting 30 minutes, occurring once per day over the past month but present since prior to her colonoscopy - she attributes it to creamer in her coffee in the setting of lactose intolerance. She otherwise denies fevers/chills/sweats, nausea/vomiting, change in bowel habits. She experienced 2 episodes of hematochezia smeared on toilet paper with wiping the first 24-48 hours after her procedure but has since subsided; she denies blood in toilet water or mixed with stool. She otherwise is eating normally without no other ongoing concerns.    Bowel Habits:  Number of bowel movements per day: 1  Consistency of bowel movements ([place]nath Stool Scale): 4  Current water intake / fiber supplementation / bowel  regimen:  3+ bottles of water daily; denies fiber supplementation or bowel regimen  Difficulty passing stool: No     Straining: No     Rectal digitation: No     Vaginal digitation: No     Splinting: No  Change in stool caliber: No  Anal tissue protrusion: No  Bleeding with bowel movements: Per HPI  Anorectal pain: No   Urgency with bowel movements: No    Colonoscopy:  Date: 7/28/25, complete  Impression:            - One 1 mm polyp in the ascending colon, removed                          with a jumbo cold forceps. Resected and retrieved. +TA                         - One 7 mm polyp in the transverse colon, removed                          with a hot snare. Resected and retrieved. +TA                         - One 4 mm polyp in the transverse colon, removed                          with a cold snare. Resected and retrieved. +TA                         - The examination was otherwise normal on direct                          and retroflexion views.     Lifetime Polyp [place]: 0 []     1 - 10 [x]     >10 []    PAST MEDICAL HISTORY:  Past Medical History:   Diagnosis Date    Anemia     Anxiety     Arthritis     Deep vein thrombosis     Depression 2000    Renal failure      PAST SURGICAL HISTORY:  Past Surgical History:   Procedure Laterality Date    COLONOSCOPY N/A 10/29/2018    Procedure: COLONOSCOPY;  Surgeon: Mp Alvarado MD;  Location: [facility] ([address] [place] [address]);  Service: Endoscopy;  Laterality: N/A;  Referral received from Dr. SMOOTH Arias, [facility]  Last colonoscopy 2012-recommended f/u 5 years-past due    COLONOSCOPY, SCREENING, LOW RISK PATIENT N/A 7/28/2025    Procedure: COLONOSCOPY, SCREENING, LOW RISK PATIENT;  Surgeon: Keila Sadler MD;  Location: [facility] ([address] [place] [address]);  Service: Endoscopy;  Laterality: N/A;  5/6 ref by Nataly Rose MD, PEG, mailing-gg  7/23 pt r/s, PEG, emailed to [email or web address]  7/24 - precall complete - EH    DILATION AND CURETTAGE OF UTERUS      GASTRIC  "BYPASS      PARTIAL HYSTERECTOMY      AUB    RADIOFREQUENCY ABLATION Right 9/13/2022    Procedure: RADIOFREQUENCY ABLATION, GENICULAR RIGHT COOLED;  Surgeon: Radha Justin MD;  Location: [facility];  Service: Pain Management;  Laterality: Right;    TONSILLECTOMY      TOTAL KNEE ARTHROPLASTY Right 7/2/2024    Procedure: ARTHROPLASTY, KNEE, TOTAL: RIGHT: SAME DAY;  Surgeon: Avery Campos MD;  Location: [facility] OR;  Service: Orthopedics;  Laterality: Right;    TUBAL LIGATION Bilateral      MEDICATIONS:  Medications Ordered Prior to Encounter[1]    ALLERGIES:  Review of patient's allergies indicates:  No Known Allergies    FAMILY HISTORY:  Family History   Problem Relation Name Age of Onset    Hypertension Mother      Depression Mother      Hyperlipidemia Mother      Dementia Mother      Gallbladder disease Mother      Hyperlipidemia Father      Hypertension Sister      Depression Sister      Hyperlipidemia Sister      Diabetes Sister      Hypertension Sister      Diabetes Sister      Hypertension Sister      Depression Brother      Diabetes Brother      Hyperlipidemia Brother      Hypertension Brother      Diabetes Brother      Hypertension Brother      Diabetes Brother      Hyperlipidemia Brother      Depression Son      Hypertension Maternal Aunt      Depression Maternal Aunt      Diabetes Paternal Uncle      Depression Paternal Uncle      Depression Maternal Grandfather      Breast cancer Paternal Grandmother      Depression Paternal Grandfather      Alcohol abuse Neg Hx      Ovarian cancer Neg Hx      Heart disease Neg Hx      Stroke Neg Hx       SOCIAL HISTORY:  Social History[2]  PHYSICAL EXAMINATION:  Blood pressure (!) 94/52, pulse 83, resp. rate 16, height 5' 6" (1.676 m), weight 62 kg (136 lb 11 oz).  Body mass index is 22.06 kg/m².  Well-developed, well nourished, in no acute distress  HEENT: Sclerae anicteric, trachea midline  Nodes: No supraclavicular, periumbilical or inguinal " lymphadenopathy  Pulm: Normal respiratory rate and effort on room air  Abd:   Soft, non-tender, non-distended. No organomegaly or masses. Well-healed laparoscopic port sites.  Ext:   Warm and well perfused. No upper or lower extremity edema bilaterally.  Neuro: Grossly intact with no focal neurological deficit.    DIGITAL RECTAL EXAM: Deferred in setting of recent colonoscopy with normal TEJAS/retroflexion and resolution of scant postprocedural hematochezia    LABORATORY RESULTS:  Lab Results   Component Value Date    WBC 7.99 07/29/2025    HGB 12.9 07/29/2025    HCT 43 07/29/2025     (H) 07/29/2025     07/29/2025     Lab Results   Component Value Date     07/29/2025    K 4.0 07/29/2025     07/29/2025    CO2 19 (L) 07/29/2025    BUN 17 07/29/2025    CREATININE 1.1 07/29/2025    CALCIUM 9.7 07/29/2025    ANIONGAP 10 07/29/2025    EGFRNORACEVR 56 (L) 07/29/2025     Lab Results   Component Value Date    ALBUMIN 4.6 07/29/2025     Lab Results   Component Value Date    HGBA1C 4.5 04/11/2025       IMAGING RESULTS:   CT Abd/Pelv w IV Contrast (7/29/25):  Bowel/Mesentery: Small bowel is normal in caliber with no evidence of obstruction. No evidence of inflammation or wall thickening. Normal appendix. Diffuse bowel wall thickening of the colon at the level of the ascending and transverse colon near the hepatic flexure. Remaining portion of the colon is normal in appearance.     Diffuse bowel wall thickening of the ascending and transverse colon to the level of the hepatic flexure.  Findings may be seen with colitis including infectious or inflammatory etiology.  Differential consideration also includes vascular etiology noting the vasculature appears patent.     Mild intra and extrahepatic biliary ductal dilatation as well as pancreatic ductal dilatation.  Further evaluation with outpatient MRCP without and with contrast is recommended.    IMPRESSION:  Postpolyectomy Syndrome  Family history of  pancreatic cancer    PLAN:  Postpolypectomy Syndrome  We reviewed the pathophysiology and natural history of postpolypectomy syndrome. We discussed outpatient management consisting of antibiotics and possible bowel rest, which she has completed with resolution of her symptoms. Given her symptom resolution with a normal abdominal exam and normal diet/bowel function, we discussed how there is limited utility of performing a repeat CT to confirm resolution of her prior bowel wall thickening. We also reviewed her polypectomy pathology report and Dr. Sadler's recommendation for surveillance colonoscopy.    Family history of pancreatic cancer  We also reviewed her family history of cancer, notable for pancreatic cancer in her maternal uncle (age 70s) and rectal cancer in her maternal grandparent (age 80s), with family history of breast cancer in her paternal grandmother and no additional ovarian/prostate cancers. We reviewed NCCN guidelines for germline testing for the above cancers, noting that cancer genetic testing is recommended for individuals with (1) a family history of pancreatic cancer in 1 or more first degree relatives, (2) multiple individuals with Mckinney cancers or early-onset Mckinney cancers before age 50, or (3) early-onset breast cancer or 3+ breast/prostate cancers in the same lineage. We discussed how Ms. Hull's family history does not meet any of these testing criteria, indicating that there is a low likelihood of finding a germline mutation if testing were conducted. (Note: NCCN specifically notes that some second-degree relatives may meet testing criteria based on additional family history, but given her lack of additional concerning risk factors would not be recommended and unlikely to be covered by insurance.)    We also reviewed NCCN, [facility] and [facility] guidelines for pancreatic cancer surveillance which recommend consideration of pancreatic cancer screening for individuals with 2 or more relatives  (from the same side of the family) who developed pancreatic cancer. As Ms. Hull only has 1 second-degree relative with pancreatic cancer, she would not meet these criteria and would typically not be recommended to undergo pancreatic cancer surveillance. Ms. Hull can discuss the risks, benefits and limitations of pancreatic cancer surveillance (including but not limited to cost, high rate of incidental findings, and uncertainty about benefits) in more detail with the advanced endoscopy team at her upcoming appointment if desired.      Edwin Jacinto MD  Staff Surgeon  Colon & Rectal Surgery       [1]   Current Outpatient Medications on File Prior to Visit   Medication Sig Dispense Refill    acetaminophen (TYLENOL) 650 MG TbSR Take 1 tablet (650 mg total) by mouth every 8 (eight) hours. 120 tablet 0    aspirin (ECOTRIN) 81 MG EC tablet Take 81 mg by mouth once daily.      calcium carbonate (CALCIUM 300 ORAL) Take by mouth.      cholecalciferol, vitamin D3, 10 mcg (400 unit) Chew Take 400 Units by mouth once daily.      clonazePAM (KLONOPIN) 1 MG tablet Take 2 mg by mouth every evening. To help with sleeping      cyanocobalamin, vitamin B-12, (VITAMIN B-12 ORAL) Take 1 tablet by mouth once daily.      FEROSUL 325 mg (65 mg iron) Tab tablet Take by mouth every other day.      hydrocortisone 2.5 % ointment Apply topically 2 (two) times daily. 28.35 g 1    magnesium 30 mg Tab Take by mouth once.      multivit-min-FA-lycopen-lutein (CENTRUM SILVER) 0.4-300-250 mg-mcg-mcg Tab Take 1 tablet by mouth once daily.      oxybutynin (DITROPAN) 5 MG Tab Take 1 tablet (5 mg total) by mouth Daily. 90 tablet 3    risperiDONE (RISPERDAL) 1 MG tablet Take 1 mg by mouth every evening.      suvorexant (BELSOMRA) 20 mg Tab Take 1 tablet by mouth every evening.      traMADoL (ULTRAM) 50 mg tablet Take 1 tablet (50 mg total) by mouth every 6 (six) hours as needed for Pain. 20 tablet 0    traZODone (DESYREL) 150 MG tablet Take 450 mg by  mouth nightly.      vortioxetine (TRINTELLIX) 20 mg Tab Take 20 mg by mouth once daily.      ciclopirox (PENLAC) 8 % Soln Apply to affected toenail(s) and adjacent skin once daily in combination with weekly nail trimming. Remove with nail polish remover once weekly. Continue therapy until nail clearance. (Patient not taking: Reported on 8/6/2025) 6.6 mL 2    gabapentin (NEURONTIN) 300 MG capsule Take 300-600 mg by mouth. (Patient not taking: Reported on 8/6/2025)      methocarbamoL (ROBAXIN) 750 MG Tab Take 1 tablet (750 mg total) by mouth 4 (four) times daily as needed (for muscle spasms). (Patient not taking: Reported on 8/6/2025) 40 tablet 0     No current facility-administered medications on file prior to visit.   [2]   Social History  Tobacco Use    Smoking status: Former     Current packs/day: 0.25     Average packs/day: 0.3 packs/day for 7.0 years (1.8 ttl pk-yrs)     Types: Cigarettes    Smokeless tobacco: Never   Substance Use Topics    Alcohol use: Not Currently     Alcohol/week: 2.0 standard drinks of alcohol     Types: 2 Glasses of wine per week     Comment: occasionally    Drug use: No

## 2025-08-21 ENCOUNTER — TELEPHONE (OUTPATIENT)
Dept: NEPHROLOGY | Facility: CLINIC | Age: 65
End: 2025-08-21
Payer: MEDICARE

## 2025-08-29 ENCOUNTER — OFFICE VISIT (OUTPATIENT)
Dept: INTERNAL MEDICINE | Facility: CLINIC | Age: 65
End: 2025-08-29
Payer: MEDICARE

## 2025-08-29 VITALS
DIASTOLIC BLOOD PRESSURE: 52 MMHG | OXYGEN SATURATION: 98 % | BODY MASS INDEX: 22.92 KG/M2 | SYSTOLIC BLOOD PRESSURE: 94 MMHG | HEIGHT: 66 IN | WEIGHT: 142.63 LBS | HEART RATE: 62 BPM

## 2025-08-29 DIAGNOSIS — Z98.84 HISTORY OF GASTRIC BYPASS: ICD-10-CM

## 2025-08-29 DIAGNOSIS — M79.644 FINGER PAIN, RIGHT: Primary | ICD-10-CM

## 2025-08-29 PROCEDURE — 99999 PR PBB SHADOW E&M-EST. PATIENT-LVL IV: CPT | Mod: PBBFAC,,, | Performed by: NURSE PRACTITIONER

## 2025-08-29 RX ORDER — PREDNISONE 20 MG/1
20 TABLET ORAL DAILY
Qty: 5 TABLET | Refills: 0 | Status: SHIPPED | OUTPATIENT
Start: 2025-08-29

## 2025-09-03 ENCOUNTER — TELEPHONE (OUTPATIENT)
Dept: INTERNAL MEDICINE | Facility: CLINIC | Age: 65
End: 2025-09-03
Payer: MEDICARE

## 2025-09-03 DIAGNOSIS — Z96.651 S/P TOTAL KNEE REPLACEMENT, RIGHT: ICD-10-CM

## 2025-09-03 DIAGNOSIS — M79.644 FINGER PAIN, RIGHT: Primary | ICD-10-CM

## 2025-09-03 RX ORDER — DEXTROMETHORPHAN HYDROBROMIDE, GUAIFENESIN 5; 100 MG/5ML; MG/5ML
650 LIQUID ORAL EVERY 8 HOURS
Qty: 120 TABLET | Refills: 0 | Status: SHIPPED | OUTPATIENT
Start: 2025-09-03

## (undated) DEVICE — SET CEMENT (SCULP)

## (undated) DEVICE — UNDERGLOVES BIOGEL PI SIZE 8.5

## (undated) DEVICE — TOURNIQUET SB QC DP 34X4IN

## (undated) DEVICE — DRESSING LEUKOPLAST FLEX 1X3IN

## (undated) DEVICE — DRAPE INCISE IOBAN 2 23X33IN

## (undated) DEVICE — COVER CAMERA OPERATING ROOM

## (undated) DEVICE — DRAPE THREE-QTR REINF 53X77IN

## (undated) DEVICE — ELECTRODE REM PLYHSV RETURN 9

## (undated) DEVICE — BLADE SAGITTAL 18 X 1.27 X 90M

## (undated) DEVICE — WRAP PROTECTIVE LEG POS STRL

## (undated) DEVICE — GOWN ECLIPSE POLY REINF 3XL

## (undated) DEVICE — SOL NACL IRR 3000ML

## (undated) DEVICE — SUT VICRYL+ 1 CT1 18IN

## (undated) DEVICE — MIXER BONE CEMENT

## (undated) DEVICE — SUT VICRYL PLUS 2-0 CT1 18

## (undated) DEVICE — GLOVE BIOGEL SKINSENSE PI 8.0

## (undated) DEVICE — WRAP KNEE ACCU THERM GEL PACK

## (undated) DEVICE — KIT IRR SUCTION HND PIECE

## (undated) DEVICE — SUT STRATAFIX SPRL PS-2 3-0

## (undated) DEVICE — COVER LIGHT HANDLE 80/CA

## (undated) DEVICE — SUT MONOCRYL 3-0 PS-1

## (undated) DEVICE — SOL BETADINE 5%

## (undated) DEVICE — SOL NACL IRR 1000ML BTL

## (undated) DEVICE — ADHESIVE DERMABOND ADVANCED

## (undated) DEVICE — DRAPE T EXTRM SURG 121X128X90

## (undated) DEVICE — HOOD T7 W/ PEEL AWAY LENS

## (undated) DEVICE — DRAPE STERI INSTRUMENT 1018

## (undated) DEVICE — SUT VICRYL PLUS 0 CT1 18IN

## (undated) DEVICE — DRESSING AQUACEL AG ADV 3.5X12

## (undated) DEVICE — KIT TOTAL KNEE TKOFG OMC